# Patient Record
Sex: MALE | Race: ASIAN | NOT HISPANIC OR LATINO
[De-identification: names, ages, dates, MRNs, and addresses within clinical notes are randomized per-mention and may not be internally consistent; named-entity substitution may affect disease eponyms.]

---

## 2017-11-22 PROBLEM — Z00.00 ENCOUNTER FOR PREVENTIVE HEALTH EXAMINATION: Status: ACTIVE | Noted: 2017-11-22

## 2017-12-21 ENCOUNTER — RESULT REVIEW (OUTPATIENT)
Age: 62
End: 2017-12-21

## 2017-12-21 ENCOUNTER — OUTPATIENT (OUTPATIENT)
Dept: OUTPATIENT SERVICES | Facility: HOSPITAL | Age: 62
LOS: 1 days | Discharge: HOME | End: 2017-12-21
Payer: COMMERCIAL

## 2017-12-21 DIAGNOSIS — Z98.89 OTHER SPECIFIED POSTPROCEDURAL STATES: Chronic | ICD-10-CM

## 2017-12-21 DIAGNOSIS — R10.9 UNSPECIFIED ABDOMINAL PAIN: ICD-10-CM

## 2017-12-21 LAB — GLUCOSE BLDC GLUCOMTR-MCNC: 197 MG/DL — HIGH (ref 70–99)

## 2017-12-21 PROCEDURE — 88312 SPECIAL STAINS GROUP 1: CPT | Mod: 26

## 2017-12-21 PROCEDURE — 45378 DIAGNOSTIC COLONOSCOPY: CPT

## 2017-12-21 PROCEDURE — 82962 GLUCOSE BLOOD TEST: CPT

## 2017-12-21 PROCEDURE — 88305 TISSUE EXAM BY PATHOLOGIST: CPT

## 2017-12-21 PROCEDURE — 43239 EGD BIOPSY SINGLE/MULTIPLE: CPT

## 2017-12-21 PROCEDURE — 88312 SPECIAL STAINS GROUP 1: CPT

## 2017-12-21 PROCEDURE — 88305 TISSUE EXAM BY PATHOLOGIST: CPT | Mod: 26

## 2017-12-26 LAB — SURGICAL PATHOLOGY FINAL REPORT - CH: SIGNIFICANT CHANGE UP

## 2018-03-27 ENCOUNTER — APPOINTMENT (OUTPATIENT)
Dept: UROLOGY | Facility: CLINIC | Age: 63
End: 2018-03-27

## 2018-04-03 ENCOUNTER — APPOINTMENT (OUTPATIENT)
Dept: UROLOGY | Facility: CLINIC | Age: 63
End: 2018-04-03
Payer: MEDICARE

## 2018-04-03 VITALS
BODY MASS INDEX: 25.9 KG/M2 | WEIGHT: 185 LBS | HEIGHT: 71 IN | SYSTOLIC BLOOD PRESSURE: 119 MMHG | DIASTOLIC BLOOD PRESSURE: 57 MMHG | HEART RATE: 66 BPM

## 2018-04-03 DIAGNOSIS — F17.200 NICOTINE DEPENDENCE, UNSPECIFIED, UNCOMPLICATED: ICD-10-CM

## 2018-04-03 DIAGNOSIS — R35.0 FREQUENCY OF MICTURITION: ICD-10-CM

## 2018-04-03 DIAGNOSIS — I10 ESSENTIAL (PRIMARY) HYPERTENSION: ICD-10-CM

## 2018-04-03 DIAGNOSIS — Z83.3 FAMILY HISTORY OF DIABETES MELLITUS: ICD-10-CM

## 2018-04-03 DIAGNOSIS — R32 UNSPECIFIED URINARY INCONTINENCE: ICD-10-CM

## 2018-04-03 DIAGNOSIS — Z82.49 FAMILY HISTORY OF ISCHEMIC HEART DISEASE AND OTHER DISEASES OF THE CIRCULATORY SYSTEM: ICD-10-CM

## 2018-04-03 PROCEDURE — 99203 OFFICE O/P NEW LOW 30 MIN: CPT

## 2018-04-03 PROCEDURE — 51798 US URINE CAPACITY MEASURE: CPT

## 2018-04-18 ENCOUNTER — OTHER (OUTPATIENT)
Age: 63
End: 2018-04-18

## 2018-04-24 ENCOUNTER — APPOINTMENT (OUTPATIENT)
Dept: UROLOGY | Facility: CLINIC | Age: 63
End: 2018-04-24
Payer: MEDICARE

## 2018-04-24 VITALS
SYSTOLIC BLOOD PRESSURE: 136 MMHG | DIASTOLIC BLOOD PRESSURE: 63 MMHG | BODY MASS INDEX: 25.9 KG/M2 | WEIGHT: 185 LBS | HEART RATE: 64 BPM | HEIGHT: 71 IN

## 2018-04-24 DIAGNOSIS — R33.9 RETENTION OF URINE, UNSPECIFIED: ICD-10-CM

## 2018-04-24 DIAGNOSIS — N39.3 STRESS INCONTINENCE (FEMALE) (MALE): ICD-10-CM

## 2018-04-24 LAB
BILIRUB UR QL STRIP: NORMAL
CLARITY UR: CLEAR
COLLECTION METHOD: NORMAL
GLUCOSE UR-MCNC: 500
HCG UR QL: NORMAL EU/DL
HGB UR QL STRIP.AUTO: NORMAL
KETONES UR-MCNC: NORMAL
LEUKOCYTE ESTERASE UR QL STRIP: NORMAL
NITRITE UR QL STRIP: NORMAL
PH UR STRIP: 9
PROT UR STRIP-MCNC: 30
SP GR UR STRIP: 1.01

## 2018-04-24 PROCEDURE — 81003 URINALYSIS AUTO W/O SCOPE: CPT | Mod: QW

## 2018-04-24 PROCEDURE — 99213 OFFICE O/P EST LOW 20 MIN: CPT

## 2018-05-29 ENCOUNTER — APPOINTMENT (OUTPATIENT)
Dept: UROLOGY | Facility: CLINIC | Age: 63
End: 2018-05-29

## 2022-06-27 ENCOUNTER — INPATIENT (INPATIENT)
Facility: HOSPITAL | Age: 67
LOS: 1 days | Discharge: HOME | DRG: 682 | End: 2022-06-29
Attending: INTERNAL MEDICINE | Admitting: INTERNAL MEDICINE
Payer: MEDICARE

## 2022-06-27 VITALS
HEART RATE: 67 BPM | TEMPERATURE: 96 F | SYSTOLIC BLOOD PRESSURE: 200 MMHG | RESPIRATION RATE: 18 BRPM | OXYGEN SATURATION: 99 % | DIASTOLIC BLOOD PRESSURE: 79 MMHG | WEIGHT: 184.97 LBS

## 2022-06-27 DIAGNOSIS — Z98.89 OTHER SPECIFIED POSTPROCEDURAL STATES: Chronic | ICD-10-CM

## 2022-06-27 DIAGNOSIS — J96.01 ACUTE RESPIRATORY FAILURE WITH HYPOXIA: ICD-10-CM

## 2022-06-27 DIAGNOSIS — I25.10 ATHEROSCLEROTIC HEART DISEASE OF NATIVE CORONARY ARTERY WITHOUT ANGINA PECTORIS: ICD-10-CM

## 2022-06-27 DIAGNOSIS — I16.0 HYPERTENSIVE URGENCY: ICD-10-CM

## 2022-06-27 DIAGNOSIS — N18.6 END STAGE RENAL DISEASE: ICD-10-CM

## 2022-06-27 DIAGNOSIS — E11.9 TYPE 2 DIABETES MELLITUS WITHOUT COMPLICATIONS: ICD-10-CM

## 2022-06-27 LAB
ALBUMIN SERPL ELPH-MCNC: 3.3 G/DL — SIGNIFICANT CHANGE UP (ref 3.3–5)
ALP SERPL-CCNC: 102 U/L — SIGNIFICANT CHANGE UP (ref 40–120)
ALT FLD-CCNC: 56 U/L — SIGNIFICANT CHANGE UP (ref 12–78)
ANION GAP SERPL CALC-SCNC: 11 MMOL/L — SIGNIFICANT CHANGE UP (ref 5–17)
AST SERPL-CCNC: 30 U/L — SIGNIFICANT CHANGE UP (ref 15–37)
BILIRUB SERPL-MCNC: 0.4 MG/DL — SIGNIFICANT CHANGE UP (ref 0.2–1.2)
BUN SERPL-MCNC: 61 MG/DL — HIGH (ref 7–23)
CALCIUM SERPL-MCNC: 8.3 MG/DL — LOW (ref 8.5–10.1)
CHLORIDE SERPL-SCNC: 108 MMOL/L — SIGNIFICANT CHANGE UP (ref 96–108)
CO2 SERPL-SCNC: 17 MMOL/L — LOW (ref 22–31)
CREAT SERPL-MCNC: 13 MG/DL — HIGH (ref 0.5–1.3)
EGFR: 4 ML/MIN/1.73M2 — LOW
GLUCOSE SERPL-MCNC: 89 MG/DL — SIGNIFICANT CHANGE UP (ref 70–99)
HAV IGM SER-ACNC: SIGNIFICANT CHANGE UP
HBV CORE IGM SER-ACNC: SIGNIFICANT CHANGE UP
HBV SURFACE AG SER-ACNC: SIGNIFICANT CHANGE UP
HCT VFR BLD CALC: 33 % — LOW (ref 39–50)
HCV AB S/CO SERPL IA: 0.08 S/CO — SIGNIFICANT CHANGE UP (ref 0–0.99)
HCV AB SERPL-IMP: SIGNIFICANT CHANGE UP
HGB BLD-MCNC: 10.1 G/DL — LOW (ref 13–17)
MAGNESIUM SERPL-MCNC: 4.1 MG/DL — HIGH (ref 1.6–2.6)
MCHC RBC-ENTMCNC: 30.2 PG — SIGNIFICANT CHANGE UP (ref 27–34)
MCHC RBC-ENTMCNC: 30.6 GM/DL — LOW (ref 32–36)
MCV RBC AUTO: 98.8 FL — SIGNIFICANT CHANGE UP (ref 80–100)
NRBC # BLD: 0 /100 WBCS — SIGNIFICANT CHANGE UP (ref 0–0)
PHOSPHATE SERPL-MCNC: 3.8 MG/DL — SIGNIFICANT CHANGE UP (ref 2.5–4.5)
PLATELET # BLD AUTO: 144 K/UL — LOW (ref 150–400)
POTASSIUM SERPL-MCNC: 5.3 MMOL/L — SIGNIFICANT CHANGE UP (ref 3.5–5.3)
POTASSIUM SERPL-SCNC: 5.3 MMOL/L — SIGNIFICANT CHANGE UP (ref 3.5–5.3)
PROT SERPL-MCNC: 6.7 G/DL — SIGNIFICANT CHANGE UP (ref 6–8.3)
RBC # BLD: 3.34 M/UL — LOW (ref 4.2–5.8)
RBC # FLD: 15.8 % — HIGH (ref 10.3–14.5)
SARS-COV-2 RNA SPEC QL NAA+PROBE: SIGNIFICANT CHANGE UP
SODIUM SERPL-SCNC: 136 MMOL/L — SIGNIFICANT CHANGE UP (ref 135–145)
WBC # BLD: 10.7 K/UL — HIGH (ref 3.8–10.5)
WBC # FLD AUTO: 10.7 K/UL — HIGH (ref 3.8–10.5)

## 2022-06-27 PROCEDURE — 99222 1ST HOSP IP/OBS MODERATE 55: CPT

## 2022-06-27 PROCEDURE — 71046 X-RAY EXAM CHEST 2 VIEWS: CPT | Mod: 26

## 2022-06-27 PROCEDURE — 99291 CRITICAL CARE FIRST HOUR: CPT

## 2022-06-27 PROCEDURE — 93010 ELECTROCARDIOGRAM REPORT: CPT

## 2022-06-27 RX ORDER — NITROGLYCERIN 6.5 MG
0.4 CAPSULE, EXTENDED RELEASE ORAL
Refills: 0 | Status: COMPLETED | OUTPATIENT
Start: 2022-06-27 | End: 2022-06-27

## 2022-06-27 RX ORDER — DEXTROSE 50 % IN WATER 50 %
12.5 SYRINGE (ML) INTRAVENOUS ONCE
Refills: 0 | Status: DISCONTINUED | OUTPATIENT
Start: 2022-06-27 | End: 2022-06-29

## 2022-06-27 RX ORDER — FUROSEMIDE 40 MG
80 TABLET ORAL ONCE
Refills: 0 | Status: COMPLETED | OUTPATIENT
Start: 2022-06-27 | End: 2022-06-27

## 2022-06-27 RX ORDER — FUROSEMIDE 40 MG
40 TABLET ORAL ONCE
Refills: 0 | Status: COMPLETED | OUTPATIENT
Start: 2022-06-27 | End: 2022-06-27

## 2022-06-27 RX ORDER — DEXTROSE 50 % IN WATER 50 %
12.5 SYRINGE (ML) INTRAVENOUS ONCE
Refills: 0 | Status: COMPLETED | OUTPATIENT
Start: 2022-06-27 | End: 2022-06-27

## 2022-06-27 RX ORDER — SODIUM CHLORIDE 9 MG/ML
1000 INJECTION, SOLUTION INTRAVENOUS
Refills: 0 | Status: DISCONTINUED | OUTPATIENT
Start: 2022-06-27 | End: 2022-06-29

## 2022-06-27 RX ORDER — INSULIN LISPRO 100/ML
VIAL (ML) SUBCUTANEOUS EVERY 6 HOURS
Refills: 0 | Status: DISCONTINUED | OUTPATIENT
Start: 2022-06-27 | End: 2022-06-28

## 2022-06-27 RX ORDER — DEXTROSE 50 % IN WATER 50 %
25 SYRINGE (ML) INTRAVENOUS ONCE
Refills: 0 | Status: DISCONTINUED | OUTPATIENT
Start: 2022-06-27 | End: 2022-06-29

## 2022-06-27 RX ORDER — DEXTROSE 50 % IN WATER 50 %
15 SYRINGE (ML) INTRAVENOUS ONCE
Refills: 0 | Status: DISCONTINUED | OUTPATIENT
Start: 2022-06-27 | End: 2022-06-29

## 2022-06-27 RX ORDER — PANTOPRAZOLE SODIUM 20 MG/1
40 TABLET, DELAYED RELEASE ORAL
Refills: 0 | Status: DISCONTINUED | OUTPATIENT
Start: 2022-06-27 | End: 2022-06-29

## 2022-06-27 RX ORDER — CHLORHEXIDINE GLUCONATE 213 G/1000ML
1 SOLUTION TOPICAL
Refills: 0 | Status: DISCONTINUED | OUTPATIENT
Start: 2022-06-27 | End: 2022-06-29

## 2022-06-27 RX ORDER — NITROGLYCERIN 6.5 MG
6 CAPSULE, EXTENDED RELEASE ORAL
Qty: 50 | Refills: 0 | Status: DISCONTINUED | OUTPATIENT
Start: 2022-06-27 | End: 2022-06-28

## 2022-06-27 RX ORDER — ERYTHROPOIETIN 10000 [IU]/ML
10000 INJECTION, SOLUTION INTRAVENOUS; SUBCUTANEOUS
Refills: 0 | Status: DISCONTINUED | OUTPATIENT
Start: 2022-06-27 | End: 2022-06-29

## 2022-06-27 RX ORDER — HEPARIN SODIUM 5000 [USP'U]/ML
5000 INJECTION INTRAVENOUS; SUBCUTANEOUS EVERY 8 HOURS
Refills: 0 | Status: DISCONTINUED | OUTPATIENT
Start: 2022-06-27 | End: 2022-06-29

## 2022-06-27 RX ORDER — METOPROLOL TARTRATE 50 MG
25 TABLET ORAL
Refills: 0 | Status: DISCONTINUED | OUTPATIENT
Start: 2022-06-27 | End: 2022-06-29

## 2022-06-27 RX ORDER — GLUCAGON INJECTION, SOLUTION 0.5 MG/.1ML
1 INJECTION, SOLUTION SUBCUTANEOUS ONCE
Refills: 0 | Status: DISCONTINUED | OUTPATIENT
Start: 2022-06-27 | End: 2022-06-29

## 2022-06-27 RX ORDER — CYCLOSPORINE 0.5 MG/ML
1 EMULSION OPHTHALMIC DAILY
Refills: 0 | Status: DISCONTINUED | OUTPATIENT
Start: 2022-06-27 | End: 2022-06-29

## 2022-06-27 RX ORDER — NITROGLYCERIN 6.5 MG
20 CAPSULE, EXTENDED RELEASE ORAL
Qty: 50 | Refills: 0 | Status: DISCONTINUED | OUTPATIENT
Start: 2022-06-27 | End: 2022-06-27

## 2022-06-27 RX ADMIN — Medication 6 MICROGRAM(S)/MIN: at 11:31

## 2022-06-27 RX ADMIN — Medication 40 MILLIGRAM(S): at 10:36

## 2022-06-27 RX ADMIN — ERYTHROPOIETIN 10000 UNIT(S): 10000 INJECTION, SOLUTION INTRAVENOUS; SUBCUTANEOUS at 16:48

## 2022-06-27 RX ADMIN — CYCLOSPORINE 1 DROP(S): 0.5 EMULSION OPHTHALMIC at 18:44

## 2022-06-27 RX ADMIN — HEPARIN SODIUM 5000 UNIT(S): 5000 INJECTION INTRAVENOUS; SUBCUTANEOUS at 21:35

## 2022-06-27 RX ADMIN — Medication 1 DROP(S): at 21:34

## 2022-06-27 RX ADMIN — Medication 0.4 MILLIGRAM(S): at 10:50

## 2022-06-27 RX ADMIN — SODIUM CHLORIDE 50 MILLILITER(S): 9 INJECTION, SOLUTION INTRAVENOUS at 19:29

## 2022-06-27 RX ADMIN — Medication 80 MILLIGRAM(S): at 11:43

## 2022-06-27 RX ADMIN — HEPARIN SODIUM 5000 UNIT(S): 5000 INJECTION INTRAVENOUS; SUBCUTANEOUS at 15:19

## 2022-06-27 RX ADMIN — Medication 12.5 GRAM(S): at 18:36

## 2022-06-27 RX ADMIN — Medication 2 MILLIGRAM(S): at 10:52

## 2022-06-27 NOTE — ED PROVIDER NOTE - NS ED MD DISPO ADMITTING SERVICE
Progress Note, Physician


Chief Complaint: 





24 HR EVENTS:


- Khanh d/tom this AM


- pt reports lack of pain control


-pt continues to work with PT 





- Current Medication List


Current Medications: 


Active Medications





Docusate Sodium (Colace -)  100 mg PO DAILY [facility]


   Last Admin: 01/16/19 11:39 Dose:  100 mg


Famotidine/Sodium Chloride (Pepcid 20 Mg Premixed Ivpb -)  20 mg in 50 mls @ 

100 mls/hr IVPB BID [facility]


   Last Admin: 01/16/19 10:02 Dose:  100 mls/hr


Morphine Sulfate (Morphine Sulfate)  2 mg IVPUSH Q3H PRN


   PRN Reason: PAIN LEVEL 6-10


   Last Admin: 01/16/19 18:15 Dose:  2 mg


Ondansetron HCl (Zofran Injection)  4 mg IVPUSH Q6H PRN


   PRN Reason: NAUSEA AND/OR VOMITING


Ondansetron HCl (Zofran Injection)  4 mg IVPUSH Q6H PRN


   PRN Reason: NAUSEA AND/OR VOMITING


Oxycodone HCl (Roxicodone -)  10 mg PO Q6H PRN


   PRN Reason: PAIN LEVEL 1-5


   Last Admin: 01/16/19 12:52 Dose:  10 mg


Polyethylene Glycol (Miralax (For Daily Use) -)  17 gm PO DAILY [facility]


   Last Admin: 01/16/19 13:24 Dose:  17 gm


Saliva Substitute (Mouthkote Solution -)  1 applic MM DAILY [facility]


   Last Admin: 01/16/19 09:39 Dose:  Not Given


Senna/Docusate Sodium (Pericolace -)  1 tablet PO HS [facility]


   Last Admin: 01/15/19 22:38 Dose:  1 tablet











- Objective


Vital Signs: 


 Vital Signs











Temperature  100.7 F H  01/16/19 18:00


 


Pulse Rate  92 H  01/16/19 18:00


 


Respiratory Rate  18   01/16/19 18:00


 


Blood Pressure  115/77   01/16/19 18:00


 


O2 Sat by Pulse Oximetry (%)  97   01/16/19 08:00











Constitutional: Yes: Well Nourished, No Distress, Calm


Eyes: Yes: Conjunctiva Clear, PERRL


HENT: Yes: Atraumatic, Normocephalic


Neck: Yes: Supple


Cardiovascular: Yes: Regular Rate and Rhythm


Respiratory: Yes: Regular, CTA Bilaterally


Gastrointestinal: Yes: Normal Bowel Sounds, Soft


...Rectal Exam: Yes: Deferred


Musculoskeletal: Yes: Back Pain


Extremities: Yes: WNL


Edema: No


Peripheral Pulses WNL: Yes


Integumentary: Yes: WNL


Wound/Incision: Yes: Clean/Dry, Dressing Dry and Intact


Neurological: Yes: Alert, Oriented


...Motor Strength: WNL


Psychiatric: Yes: Alert, Oriented


Labs: 


 CBC, BMP





 01/16/19 05:30 





 01/16/19 05:30 











- ....Imaging


Chest X-ray: Report Reviewed (cxr 1/16: no acute pathology)





Problem List





- Problems


(1) S/P laminectomy


Assessment/Plan: 


transfer to floor


IC


ambulate with PT


pain control with oxycodone and morphine








Code(s): Z98.890 - OTHER SPECIFIED POSTPROCEDURAL STATES   





Impression/Plan


Impression/Plan: 





DISPO: transfer to step floor





DVT PPX: venodynes





GI: pepcid, Zofran PRN nausea














Visit type





- Emergency Visit


Emergency Visit: No





- New Patient


This patient is new to me today: No





- Critical Care


Critical Care patient: Yes


Total Critical Care Time (in minutes): 30


Critical Care Statement: The care of this patient involved high complexity 

decision making to prevent further life threatening deterioration of the patient

's condition and/or to evaluate & treat vital organ system(s) failure or risk 

of failure.





- Discharge Referral


Referred to [facility] No MED

## 2022-06-27 NOTE — PATIENT PROFILE ADULT - FALL HARM RISK - FALLEN IN PAST
Referred To Oculoplastics For Closure Text (Leave Blank If You Do Not Want): After obtaining clear surgical margins the patient was sent to oculoplastics for surgical repair.  The patient understands they will receive post-surgical care and follow-up from the referring physician's office. No

## 2022-06-27 NOTE — ED PROVIDER NOTE - OBJECTIVE STATEMENT
67 M history of ESRD on HD sent in from HD center for PPD, Covid test, hepatitis panel. patient was on vacation in [place] and just returned. patient previously getting HD T,R,S with last session Thursday (4 days ago). patient denies complaints- No associated chest pain, shortness of breath. patient also with DM, CAD, Gerd, HLD, HTN. PMD Dr. Win. Nephro Dr. Shoemaker. Gets HD at [facility] [place].

## 2022-06-27 NOTE — ED PROVIDER NOTE - PROGRESS NOTE DETAILS
spoke with HD center, patient is a new patient, cannot get HD until Wednesday at the soonest. requesting information faxed to [phone], will admit. Dr. Shoemaker to arrange for HD today. Hospitalist made aware. patient with increased work of breathing, now with bilateral rales. spO2 88 on 5L NC. will give lasix and start bipap. Hospitalist made aware. HTN worsening, increased dyspnea and work of breathing. patient not tolerating bipap until given ativan, patient given sublingual nitro and started on nitro drip. Dr. Shoemaker updated, recommending 80 lasix on top of 40 already given. ICU aware and accepting.

## 2022-06-27 NOTE — CONSULT NOTE ADULT - ASSESSMENT
67 yr male T2DM ,  ESRD on HD, returned from trip overseas  and not not able to get on community HD seat.  Came to hospital ED to have HD and arrange for community seat.  (27 Jun 2022 19:44)    esrd on hd plan for hd now   Excess fluids and waste products will be removed from your blood; your electrolytes will be balanced; your blood pressure will be controlled.      BP monitoring,continue current antihypertensive meds, low salt diet,followup with PMD in 1-2 weeks      ANEMIA PLAN:  Anemia of chronic disease:  Well controlled by Aranesp  H and H subtherapeutic .  We will continue Aranesp aiming for a HCT of 32-36 %.   We will monitor Iron stores, B12 and RBC folate .

## 2022-06-27 NOTE — H&P ADULT - NSHPLABSRESULTS_GEN_ALL_CORE
< from: Xray Chest 2 Views PA/Lat (06.27.22 @ 08:39) >    NTERPRETATION:  PA and lateral chest radiographs    COMPARISON:  NONE.    CLINICAL INFORMATION: Screening chest radiograph. Dialysis patient.    FINDINGS:    PULMONARY: The airway is midline.  Bilateral perihilar/basilar diffuse airspace disease.  No pleural effusion or pneumothorax.    HEART/VASCULAR: The  heart is enlarged in transverse diameter. .    BONES: The visualized osseus structures are intact.    IMPRESSION:    Mild cardiomegaly.  Bilateral perihilar/basilar diffuse airspace disease.    --- End of    < end of copied text >

## 2022-06-27 NOTE — CONSULT NOTE ADULT - NS PANP COMMENT GEN_ALL_CORE FT
68 yo man with Hx ERSD on HD, htn, DM2, CAD s/p PCI recently returned from long stay in [place] and missed HD session, presenting with htn emergency, acute pulmonary edema and respiratory failure.    admit to ICU for emergent HD  nitro gtt for preload reduction, wean as tolerates and restart lopressor  continue BiPAP, wean FiO2  after HD able to be weaned from BiPAP  emergent HD with volume removal   plan discussed with family at bedside

## 2022-06-27 NOTE — CONSULT NOTE ADULT - SUBJECTIVE AND OBJECTIVE BOX
Patient is a 67y old  Male who presents with a chief complaint of     BRIEF HOSPITAL COURSE: 67 M history of ESRD on HD sent in from HD center for PPD, Covid test, hepatitis panel. Patient was on vacation in [place] x 6 months and just returned. Patient previously getting HD T,R,S with last session Thursday (4 days ago). patient denies complaints- No associated chest pain, shortness of breath. patient also with DM, CAD, Gerd, HLD, HTN. PMD Dr. Win. Nephro Dr. Shoemaker. Gets HD at [facility] [place].    Called by ED for Hypertension, AHRF, Emergent Dialysis. Upon my initial evaluation Pt hypertensive SBP > 200 on Nitro gtt, On bIPAP  FIO2 60% with abdominal thoracic paradoxis, requiring emergent dialysis. Will admit to ICU for need of higher level of care. Pt high risl for decompensation and losing airway.    PAST MEDICAL & SURGICAL HISTORY:    Allergies    No Known Allergies    Intolerances      FAMILY HISTORY:    SOCIAL HISTORY:     Review of Systems:  TOM due to clinical condition    Medications:    nitroglycerin  Infusion 20 MICROgram(s)/Min IV Continuous <Continuous>                    epoetin demetrius-epbx (RETACRIT) Injectable 49771 Unit(s) IV Push <User Schedule>    chlorhexidine 4% Liquid 1 Application(s) Topical <User Schedule>            ICU Vital Signs Last 24 Hrs  T(C): 35.6 (27 Jun 2022 07:05), Max: 35.6 (27 Jun 2022 07:05)  T(F): 96.1 (27 Jun 2022 07:05), Max: 96.1 (27 Jun 2022 07:05)  HR: 84 (27 Jun 2022 12:15) (67 - 112)  BP: 232/96 (27 Jun 2022 12:15) (178/135 - 234/108)  BP(mean): --  ABP: --  ABP(mean): --  RR: 28 (27 Jun 2022 11:10) (18 - 28)  SpO2: 97% (27 Jun 2022 12:15) (92% - 99%)    Vital Signs Last 24 Hrs  T(C): 35.6 (27 Jun 2022 07:05), Max: 35.6 (27 Jun 2022 07:05)  T(F): 96.1 (27 Jun 2022 07:05), Max: 96.1 (27 Jun 2022 07:05)  HR: 84 (27 Jun 2022 12:15) (67 - 112)  BP: 232/96 (27 Jun 2022 12:15) (178/135 - 234/108)  BP(mean): --  RR: 28 (27 Jun 2022 11:10) (18 - 28)  SpO2: 97% (27 Jun 2022 12:15) (92% - 99%)        I&O's Detail      LABS:                        10.1   10.70 )-----------( 144      ( 27 Jun 2022 07:50 )             33.0     06-27    136  |  108  |  61<H>  ----------------------------<  89  5.3   |  17<L>  |  13.00<H>    Ca    8.3<L>      27 Jun 2022 07:50  Phos  3.8     06-27  Mg     4.1     06-27    TPro  6.7  /  Alb  3.3  /  TBili  0.4  /  DBili  x   /  AST  30  /  ALT  56  /  AlkPhos  102  06-27          CAPILLARY BLOOD GLUCOSE      POCT Blood Glucose.: 114 mg/dL (27 Jun 2022 11:04)          CULTURES:      Physical Examination:    General: toxic appearing in distress    HEENT: Pupils equal, reactive to light. Symmetric. No scleral icterus or injection.    PULM: Clear to auscultation B/L. No wheezes, rales, or rhonchi appreciated. No significant sputum production positive increased respiratory effort.    NECK: Supple, no lymphadenopathy, trachea midline.    CVS: Regular rate and rhythm, no murmurs appreciated, +s1/s2.    ABD: Soft, distended, nontender, normoactive bowel sounds.    EXT: No edema, nontender.    SKIN: Warm and well perfused, no rashes noted.    NEURO: Alert, interactive, nonfocal, in respiratory distress on bipap TOM orientation

## 2022-06-27 NOTE — H&P ADULT - NSICDXPASTMEDICALHX_GEN_ALL_CORE_FT
PAST MEDICAL HISTORY:  CAD (coronary artery disease)     ESRD on dialysis     T2DM (type 2 diabetes mellitus)      PAST MEDICAL HISTORY:  CAD (coronary artery disease)     ESRD on dialysis     GERD (gastroesophageal reflux disease)     HLD (hyperlipidemia)     T2DM (type 2 diabetes mellitus)

## 2022-06-27 NOTE — ED ADULT NURSE REASSESSMENT NOTE - NS ED NURSE REASSESS COMMENT FT1
Patient started to have breathing difficulty. Oxygen saturation is 88%. Patient is very anxious. Oxygen via nasal canula 5 liter is on flow. Oxygen came up to 93%. Dr. Dueñas made aware.

## 2022-06-27 NOTE — PROVIDER CONTACT NOTE (HYPOGLYCEMIA EVENT) - NS PROVIDER CONTACT BACKGROUND-HYPO
Age: 67y    Gender: Male    POCT Blood Glucose:  67 mg/dL (06-27-22 @ 18:29)  114 mg/dL (06-27-22 @ 11:04)  73 mg/dL (06-27-22 @ 10:28)      eMAR:  dextrose 50% Injectable   12.5 Gram(s) IV Push (06-27-22 @ 18:36)

## 2022-06-27 NOTE — ED PROVIDER NOTE - EMPLOYMENT
Pt discharged with significant other and transport team via wheelchair  Report given to [place] at [facility]  N/A

## 2022-06-27 NOTE — H&P ADULT - HISTORY OF PRESENT ILLNESS
67 yr male T2DM ,  ESRD on HD, returned from trip [place]  and not not able to get on community HD seat.  Came to hospital ED to have HD and arrange for community seat.  67 yr male T2DM , HTN , CAD ,  ESRD on HD, [place] from trip [place]  and not not able to get on community HD seat as apparently terminated for being away 6 months. Came to hospital ED to have HD and arrange for community seat. Required to get PPD and hepatitis panel.  67 yr male T2DM , HTN , CAD ,  ESRD on HD, returned from trip [place]  and not not able to get on community HD seat as apparently terminated for being away 6 months. Came to hospital ED to have HD and arrange for community seat. Required to get PPD and hepatitis panel.  In ED dismal renal indices with Cr 13, CXR cardiomegaly with perihilar airspace prominence. Severe BP elevation 220/135 with ensuing delirium necessitating nitro drip , Bipap and ICU admission , emergent HD

## 2022-06-27 NOTE — H&P ADULT - PROBLEM SELECTOR PLAN 1
Presented in uremia with cr 13.o  Signs of fluid overload pulm edema  Severe hypertension   resp failure   Lasix iv   Nitro drip  Metoprolol  Bipap  Ativan  ICU admit for close monitoring   Emergent HD Presented in uremia with cr 13.o  Signs of fluid overload pulm edema  Severe hypertension   resp failure   Lasix iv   Nitro drip  Metoprolol  Bipap  Ativan  ICU admit for close monitoring   Nephrology is following , recommend epogen for anemia.   Emergent HD

## 2022-06-27 NOTE — ED ADULT NURSE NOTE - NURSING GU BLADDER
non-distended IV Ceftriaxone day 2  can be switch to 500 mg Ceftin BID upon DC IV Ceftriaxone day 2  can be switch to 500 mg Ceftin BID upon DC appears stable s/p gastrectomy.  - s/p chemo

## 2022-06-27 NOTE — H&P ADULT - NSHPPHYSICALEXAM_GEN_ALL_CORE
Normocephalic   EOMI PERRL  Neck supple no JVD  S1 and S2 regular   Chest basilar rales   Abd soft + BS not tender   No pedal edema   AAO X3 No focal neurologic deficit   Anxious, depressed mood . Affect is appropriate

## 2022-06-27 NOTE — CONSULT NOTE ADULT - SUBJECTIVE AND OBJECTIVE BOX
full consult to f/u    Patient is a 67y Male whom presented to the hospital with esrd on hd     PAST MEDICAL & SURGICAL HISTORY:  ESRD on dialysis      T2DM (type 2 diabetes mellitus)      CAD (coronary artery disease)          MEDICATIONS  (STANDING):  artificial tears (preservative free) Ophthalmic Solution 1 Drop(s) Both EYES three times a day  chlorhexidine 4% Liquid 1 Application(s) Topical <User Schedule>  cycloSPORINE (RESTASIS) 0.05% Emulsion 1 Drop(s) Both EYES daily  dextrose 5%. 1000 milliLiter(s) (100 mL/Hr) IV Continuous <Continuous>  dextrose 5%. 1000 milliLiter(s) (50 mL/Hr) IV Continuous <Continuous>  dextrose 5%. 1000 milliLiter(s) (50 mL/Hr) IV Continuous <Continuous>  dextrose 50% Injectable 25 Gram(s) IV Push once  dextrose 50% Injectable 12.5 Gram(s) IV Push once  dextrose 50% Injectable 25 Gram(s) IV Push once  epoetin demetrius-epbx (RETACRIT) Injectable 84946 Unit(s) IV Push <User Schedule>  glucagon  Injectable 1 milliGRAM(s) IntraMuscular once  heparin   Injectable 5000 Unit(s) SubCutaneous every 8 hours  insulin lispro (ADMELOG) corrective regimen sliding scale   SubCutaneous every 6 hours  metoprolol tartrate 25 milliGRAM(s) Oral two times a day  nitroglycerin  Infusion 6 MICROgram(s)/Min (1.8 mL/Hr) IV Continuous <Continuous>  pantoprazole    Tablet 40 milliGRAM(s) Oral before breakfast      Allergies    No Known Allergies    Intolerances        SOCIAL HISTORY:  Denies ETOh,Smoking,     FAMILY HISTORY:      REVIEW OF SYSTEMS:    CONSTITUTIONAL: No weakness, fevers or chills  RESPIRATORY: No cough, wheezing, hemoptysis; pos  shortness of breath  CARDIOVASCULAR: No chest pain or palpitations  GASTROINTESTINAL: No abdominal or epigastric pain. No nausea, vomiting,     No diarrhea or constipation. No melena   GENITOURINARY: No dysuria, frequency or hematuria  NEUROLOGICAL: No numbness or weakness  SKIN: dry      VITAL:  T(C): , Max: 37.6 (06-27-22 @ 19:10)  T(F): , Max: 99.7 (06-27-22 @ 19:10)  HR: 86 (06-27-22 @ 20:30)  BP: 164/71 (06-27-22 @ 20:30)  BP(mean): 102 (06-27-22 @ 20:30)  RR: 31 (06-27-22 @ 20:30)  SpO2: 97% (06-27-22 @ 20:30)  Wt(kg): --    I and O's:    06-27 @ 07:01  -  06-27 @ 21:06  --------------------------------------------------------  IN: 122.5 mL / OUT: 2600 mL / NET: -2477.5 mL      Height (cm): 177.8 (06-27 @ 13:00)  Weight (kg): 87 (06-27 @ 13:00)  BMI (kg/m2): 27.5 (06-27 @ 13:00)  BSA (m2): 2.05 (06-27 @ 13:00)    PHYSICAL EXAM:    Constitutional: NAD  HEENT: conjunctive   clear   Neck:  No JVD  Respiratory: decrease bs b/l   Cardiovascular: S1 and S2  Gastrointestinal: BS+, soft, NT/ND  Extremities: No peripheral edema  : No Lechuga  Skin: No rashes  Access: pos fistula     LABS:                        10.1   10.70 )-----------( 144      ( 27 Jun 2022 07:50 )             33.0     06-27    136  |  108  |  61<H>  ----------------------------<  89  5.3   |  17<L>  |  13.00<H>    Ca    8.3<L>      27 Jun 2022 07:50  Phos  3.8     06-27  Mg     4.1     06-27    TPro  6.7  /  Alb  3.3  /  TBili  0.4  /  DBili  x   /  AST  30  /  ALT  56  /  AlkPhos  102  06-27      Urine Studies:          RADIOLOGY & ADDITIONAL STUDIES:

## 2022-06-27 NOTE — ED PROVIDER NOTE - PHYSICAL EXAMINATION
Vital signs as available reviewed.  General:  No acute distress.  Head:  Normocephalic, atraumatic.  Eyes:  Conjunctiva pink, no icterus.  Cardiovascular:  Regular rate, no obvious murmur.  Respiratory:  Clear to auscultation, good air entry bilaterally.  Abdomen:  Soft, non-tender.  Musculoskeletal:  No obvious deformity.  Neurologic: Alert and oriented, moving all extremities.  Skin:  Warm and dry. left arm with HD shunt.

## 2022-06-27 NOTE — H&P ADULT - ASSESSMENT
67 yr male with T2DM Hypertension, CAD , ESRD on HD presenting in uremia Hypertensive urgency, acute  hypoxic resp failure , fluid overload, secondary to missed HD session, ESRD.

## 2022-06-27 NOTE — ED ADULT NURSE REASSESSMENT NOTE - NS ED NURSE REASSESS COMMENT FT1
Patient breathing difficulty is increasing. Patient is placed on Bipap. Patient is not cooperating.  Keep on pulling out the bipap mask. Ativan 2 mg, IVP given as ordered.

## 2022-06-27 NOTE — PATIENT PROFILE ADULT - FALL HARM RISK - HARM RISK INTERVENTIONS

## 2022-06-27 NOTE — CONSULT NOTE ADULT - ASSESSMENT
67 M history of ESRD on HD admitted to ICU for     1. Sympathetic Crashing Acute Pulmonary edema  2. Acute Hypoxic Respiratory Failure  3. ESRD requiring emergent Dialysis    Neuro:  - Avoid Neuro Deliriogenic / sedative medications    CV:  - Nitro gtt to decrease afterload  - Avoiding fluid overload    Pulm:  - Actively titrating Bipap settings to maintain SpO2 > 92%                  GI:  -NPO    Renal:  - Even to net negative fluid balance as tolerated by hemodynamics and renal fx.    - Cr 13, Chronic HD with L AVF, Continue to monitor Bun/Cr and UOP  - Replacing electrolytes as needed with Goal K> 4, PO> 3, Mg> 2               - Strict I&O's, [place] to BSD  - Avoid Nephro toxic medication  - Renally dose meds  - retacrit  - Nephrology consulted for emergent diaylsis    Heme:  - Heparin for DVT Prophylaxis    ID:  - WBC 10.7,  remains afebrile with no antipyretics administered   - Microbiology and Radiology reviewed   - trend CBC with diff, CMP  and fever curve  - Avoiding antibiotics unless there is a concern for a bacterial/fungal infection    Endo:  - ISS for aggressive glycemic control to limit FS glucose to < 180mg/dl.      Critical Care Time (EXCLUSIVE of any non bundled procedures) :  40 minutes were spent assessing the patient's presenting problems of acute illness that pose a high probability of life threatening  deterioration or end organ damage / dysfunction.  Medical decision making includes initiation / continuation of plan or care review data/ labwork/ radiographic study, direct patient care bedside ,  discussions with  consultants regarding care,  evaluation and interpretation of vital signs, any necessary ventilator management,   NIV or BIPAP changes  or initiation,    discussions with multidisciplinary team,  am or pm rounds, discussions of goals of care with patient and family all non-inclusive of procedures.    Plan discussed with Dr Davenport 67 M history of ESRD on HD admitted to ICU for     1. Sympathetic Crashing Acute Pulmonary edema  2. Acute Hypoxic Respiratory Failure  3. ESRD requiring emergent Dialysis    Neuro:  - Avoid Neuro Deliriogenic / sedative medications    CV:  - Nitro gtt to decrease afterload  - Avoiding fluid overload    Pulm:  - Actively titrating Bipap settings to maintain SpO2 > 92%                  GI:  -NPO    Renal:  - Even to net negative fluid balance as tolerated by hemodynamics and renal fx.    - Cr 13, Chronic HD with L AVF, Continue to monitor Bun/Cr and UOP  - Replacing electrolytes as needed with Goal K> 4, PO> 3, Mg> 2               - Strict I&O's, [place] Cath to BSD pt makes urine  - Avoid Nephro toxic medication  - Renally dose meds  - retacrit  - Nephrology consulted for emergent diaylsis    Heme:  - Heparin for DVT Prophylaxis    ID:  - WBC 10.7,  remains afebrile with no antipyretics administered   - Microbiology and Radiology reviewed   - trend CBC with diff, CMP  and fever curve  - Avoiding antibiotics unless there is a concern for a bacterial/fungal infection    Endo:  - ISS for aggressive glycemic control to limit FS glucose to < 180mg/dl.      Critical Care Time (EXCLUSIVE of any non bundled procedures) :  40 minutes were spent assessing the patient's presenting problems of acute illness that pose a high probability of life threatening  deterioration or end organ damage / dysfunction.  Medical decision making includes initiation / continuation of plan or care review data/ labwork/ radiographic study, direct patient care bedside ,  discussions with  consultants regarding care,  evaluation and interpretation of vital signs, any necessary ventilator management,   NIV or BIPAP changes  or initiation,    discussions with multidisciplinary team,  am or pm rounds, discussions of goals of care with patient and family all non-inclusive of procedures.    Plan discussed with Dr Davenport 67 M history of ESRD on HD admitted to ICU for     1. Sympathetic Crashing Acute Pulmonary edema  2. Acute Hypoxic Respiratory Failure  3. ESRD requiring emergent Dialysis    Neuro:  - Avoid Neuro Deliriogenic / sedative medications  - PRN pushes ativan/morphine if needed for Bipap compliance    CV:  - Nitro gtt to decrease afterload  - Avoiding fluid overload    Pulm:  - Actively titrating Bipap settings to maintain SpO2 > 92%, Currently on 15/7 60%, pt high risk for losing airway                  GI:  -NPO    Renal:  - Even to net negative fluid balance as tolerated by hemodynamics and renal fx.    - Cr 13, Chronic HD with L AVF, Continue to monitor Bun/Cr and UOP  - Replacing electrolytes as needed with Goal K> 4, PO> 3, Mg> 2               - Strict I&O's, [place] Cath to BSD pt makes urine  - Avoid Nephro toxic medication  - Renally dose meds  - retacrit  - Nephrology consulted for emergent diaylsis    Heme:  - Heparin for DVT Prophylaxis    ID:  - WBC 10.7,  remains afebrile with no antipyretics administered   - Microbiology and Radiology reviewed   - trend CBC with diff, CMP  and fever curve  - Avoiding antibiotics unless there is a concern for a bacterial/fungal infection    Endo:  - ISS for aggressive glycemic control to limit FS glucose to < 180mg/dl.      Critical Care Time (EXCLUSIVE of any non bundled procedures) :  40 minutes were spent assessing the patient's presenting problems of acute illness that pose a high probability of life threatening  deterioration or end organ damage / dysfunction.  Medical decision making includes initiation / continuation of plan or care review data/ labwork/ radiographic study, direct patient care bedside ,  discussions with  consultants regarding care,  evaluation and interpretation of vital signs, any necessary ventilator management,   NIV or BIPAP changes  or initiation,    discussions with multidisciplinary team,  am or pm rounds, discussions of goals of care with patient and family all non-inclusive of procedures.    Plan discussed with Dr Davenport 67 M history of ESRD on HD admitted to ICU for     1. Sympathetic Crashing Acute Pulmonary edema  2. Acute Hypoxic Respiratory Failure  3. ESRD requiring emergent Dialysis    Neuro:  - Avoid Neuro Deliriogenic / sedative medications  - PRN pushes ativan/morphine if needed for Bipap compliance    CV:  - Nitro gtt to decrease afterload  - POCUS- Lungs: Diffuse B lines bilateral, no pleural effusions. Cardiac: No wall motion abnormalities, IVC is moderately dilated with respirophasic variation.  - Avoiding Fluid overload    Pulm:  - Actively titrating Bipap settings to maintain SpO2 > 92%, Currently on 15/7 60%, pt high risk for losing airway                  GI:  -NPO    Renal:  - Even to net negative fluid balance as tolerated by hemodynamics and renal fx.    - Cr 13, Chronic HD with L AVF, Continue to monitor Bun/Cr and UOP  - Replacing electrolytes as needed with Goal K> 4, PO> 3, Mg> 2               - Strict I&O's, [place] Cath to BSD pt makes urine  - Avoid Nephro toxic medication  - Renally dose meds  - retacrit  - Nephrology consulted for emergent diaylsis    Heme:  - Heparin for DVT Prophylaxis    ID:  - WBC 10.7,  remains afebrile with no antipyretics administered   - Microbiology and Radiology reviewed   - trend CBC with diff, CMP  and fever curve  - Avoiding antibiotics unless there is a concern for a bacterial/fungal infection    Endo:  - ISS for aggressive glycemic control to limit FS glucose to < 180mg/dl.      Critical Care Time (EXCLUSIVE of any non bundled procedures) :  40 minutes were spent assessing the patient's presenting problems of acute illness that pose a high probability of life threatening  deterioration or end organ damage / dysfunction.  Medical decision making includes initiation / continuation of plan or care review data/ labwork/ radiographic study, direct patient care bedside ,  discussions with  consultants regarding care,  evaluation and interpretation of vital signs, any necessary ventilator management,   NIV or BIPAP changes  or initiation,    discussions with multidisciplinary team,  am or pm rounds, discussions of goals of care with patient and family all non-inclusive of procedures.    Plan discussed with Dr Davenport 67 M history of ESRD on HD admitted to ICU for     1. Sympathetic Crashing Acute Pulmonary edema  2. Acute Hypoxic Respiratory Failure  3. ESRD requiring emergent Dialysis    Neuro:  - Avoid Neuro Deliriogenic / sedative medications  - PRN pushes ativan/morphine if needed for Bipap compliance    CV:  - Nitro gtt to decrease afterload goal SBP < 200, Pt baseline sbp 140's according to daughter  - POCUS- Lungs: Diffuse B lines bilateral, no pleural effusions. Cardiac: No wall motion abnormalities, IVC is moderately dilated with respirophasic variation.  - Avoiding Fluid overload    Pulm:  - Actively titrating Bipap settings to maintain SpO2 > 92%, Currently on 15/7 60%, pt high risk for losing airway                  GI:  -NPO    Renal:  - Even to net negative fluid balance as tolerated by hemodynamics and renal fx.    - Cr 13, Chronic HD with L AVF, Continue to monitor Bun/Cr and UOP  - Replacing electrolytes as needed with Goal K> 4, PO> 3, Mg> 2               - Strict I&O's, [place] Cath to BSD pt makes urine  - Avoid Nephro toxic medication  - Renally dose meds  - retacrit  - Nephrology consulted for emergent diaylsis    Heme:  - Heparin for DVT Prophylaxis    ID:  - WBC 10.7,  remains afebrile with no antipyretics administered   - Microbiology and Radiology reviewed   - trend CBC with diff, CMP  and fever curve  - Avoiding antibiotics unless there is a concern for a bacterial/fungal infection    Endo:  - ISS for aggressive glycemic control to limit FS glucose to < 180mg/dl.      Critical Care Time (EXCLUSIVE of any non bundled procedures) :  40 minutes were spent assessing the patient's presenting problems of acute illness that pose a high probability of life threatening  deterioration or end organ damage / dysfunction.  Medical decision making includes initiation / continuation of plan or care review data/ labwork/ radiographic study, direct patient care bedside ,  discussions with  consultants regarding care,  evaluation and interpretation of vital signs, any necessary ventilator management,   NIV or BIPAP changes  or initiation,    discussions with multidisciplinary team,  am or pm rounds, discussions of goals of care with patient and family all non-inclusive of procedures.    Plan discussed with Dr Davenport 67 M history of ESRD on HD admitted to ICU for     1. Sympathetic Crashing Acute Pulmonary edema  2. Acute Hypoxic Respiratory Failure  3. ESRD requiring emergent Dialysis    Neuro:  - Avoid Neuro Deliriogenic / sedative medications  - PRN pushes ativan/morphine if needed for Bipap compliance    CV:  - Nitro gtt to decrease afterload goal , Pt baseline sbp 140's according to daughter  - POCUS- Lungs: Diffuse B lines bilateral, no pleural effusions. Cardiac: No wall motion abnormalities, IVC is moderately dilated with respirophasic variation.  - Avoiding Fluid overload    Pulm:  - Actively titrating Bipap settings to maintain SpO2 > 92%, Currently on 15/7 60%, pt high risk for losing airway                  GI:  -NPO    Renal:  - Even to net negative fluid balance as tolerated by hemodynamics and renal fx.    - Cr 13, Chronic HD with L AVF, Continue to monitor Bun/Cr and UOP  - Replacing electrolytes as needed with Goal K> 4, PO> 3, Mg> 2               - Strict I&O's, [place] Cath to BSD pt makes urine  - Avoid Nephro toxic medication  - Renally dose meds  - retacrit  - Nephrology consulted for emergent diaylsis    Heme:  - Heparin for DVT Prophylaxis    ID:  - WBC 10.7,  remains afebrile with no antipyretics administered   - Microbiology and Radiology reviewed   - trend CBC with diff, CMP  and fever curve  - Avoiding antibiotics unless there is a concern for a bacterial/fungal infection    Endo:  - ISS for aggressive glycemic control to limit FS glucose to < 180mg/dl.      Critical Care Time (EXCLUSIVE of any non bundled procedures) :  40 minutes were spent assessing the patient's presenting problems of acute illness that pose a high probability of life threatening  deterioration or end organ damage / dysfunction.  Medical decision making includes initiation / continuation of plan or care review data/ labwork/ radiographic study, direct patient care bedside ,  discussions with  consultants regarding care,  evaluation and interpretation of vital signs, any necessary ventilator management,   NIV or BIPAP changes  or initiation,    discussions with multidisciplinary team,  am or pm rounds, discussions of goals of care with patient and family all non-inclusive of procedures.    Plan discussed with Dr Davenport 67 M history of ESRD on HD, DM2, Cardiac Stent (per daughter) admitted to ICU for     1. Sympathetic Crashing Acute Pulmonary edema  2. Acute Hypoxic Respiratory Failure  3. ESRD requiring emergent Dialysis    Neuro:  - Avoid Neuro Deliriogenic / sedative medications  - PRN pushes ativan/morphine if needed for Bipap compliance    CV:  - Nitro gtt to decrease afterload goal , Pt baseline sbp 140's according to daughter  - POCUS- Lungs: Diffuse B lines bilateral, no pleural effusions. Cardiac: No wall motion abnormalities, IVC is moderately dilated with respirophasic variation.  - Avoiding Fluid overload    Pulm:  - Actively titrating Bipap settings to maintain SpO2 > 92%, Currently on 15/7 60%, pt high risk for losing airway                  GI:  -NPO    Renal:  - Even to net negative fluid balance as tolerated by hemodynamics and renal fx.    - Cr 13, Chronic HD with L AVF, Continue to monitor Bun/Cr and UOP  - Replacing electrolytes as needed with Goal K> 4, PO> 3, Mg> 2               - Strict I&O's, [place] Cath to BSD pt makes urine  - Avoid Nephro toxic medication  - Renally dose meds  - retacrit  - Nephrology consulted for emergent diaylsis    Heme:  - Heparin for DVT Prophylaxis    ID:  - WBC 10.7,  remains afebrile with no antipyretics administered   - Microbiology and Radiology reviewed   - trend CBC with diff, CMP  and fever curve  - Avoiding antibiotics unless there is a concern for a bacterial/fungal infection    Endo:  - ISS for aggressive glycemic control to limit FS glucose to < 180mg/dl.      Critical Care Time (EXCLUSIVE of any non bundled procedures) :  40 minutes were spent assessing the patient's presenting problems of acute illness that pose a high probability of life threatening  deterioration or end organ damage / dysfunction.  Medical decision making includes initiation / continuation of plan or care review data/ labwork/ radiographic study, direct patient care bedside ,  discussions with  consultants regarding care,  evaluation and interpretation of vital signs, any necessary ventilator management,   NIV or BIPAP changes  or initiation,    discussions with multidisciplinary team,  am or pm rounds, discussions of goals of care with patient and family all non-inclusive of procedures.    Plan discussed with Dr Davenport

## 2022-06-27 NOTE — ED ADULT NURSE NOTE - OBJECTIVE STATEMENT
Received the patient in the Er. Patient is alert and oriented. Skin warm and dry. Patient was in [place] for 6 months. Patient came back last week. Unable to get dialysis unless gets cleared. Patient came in for blood work

## 2022-06-27 NOTE — ED PROVIDER NOTE - CARE PLAN
normal... 1 Principal Discharge DX:	Encounter for hemodialysis for ESRD   Principal Discharge DX:	Encounter for hemodialysis for ESRD  Secondary Diagnosis:	Acute respiratory failure with hypoxia

## 2022-06-27 NOTE — ED PROVIDER NOTE - CLINICAL SUMMARY MEDICAL DECISION MAKING FREE TEXT BOX
Needs HD, outpatient HD center requiring infectious testing prior to HD. will start testing, contact nephrology.

## 2022-06-28 LAB
A1C WITH ESTIMATED AVERAGE GLUCOSE RESULT: 5.5 % — SIGNIFICANT CHANGE UP (ref 4–5.6)
ANION GAP SERPL CALC-SCNC: 11 MMOL/L — SIGNIFICANT CHANGE UP (ref 5–17)
APTT BLD: 37.9 SEC — HIGH (ref 27.5–35.5)
BUN SERPL-MCNC: 31 MG/DL — HIGH (ref 7–23)
CALCIUM SERPL-MCNC: 8.1 MG/DL — LOW (ref 8.5–10.1)
CHLORIDE SERPL-SCNC: 98 MMOL/L — SIGNIFICANT CHANGE UP (ref 96–108)
CO2 SERPL-SCNC: 24 MMOL/L — SIGNIFICANT CHANGE UP (ref 22–31)
CREAT SERPL-MCNC: 8.4 MG/DL — HIGH (ref 0.5–1.3)
EGFR: 6 ML/MIN/1.73M2 — LOW
ESTIMATED AVERAGE GLUCOSE: 111 MG/DL — SIGNIFICANT CHANGE UP (ref 68–114)
GLUCOSE SERPL-MCNC: 219 MG/DL — HIGH (ref 70–99)
HCT VFR BLD CALC: 33.7 % — LOW (ref 39–50)
HCV AB S/CO SERPL IA: 0.07 S/CO — SIGNIFICANT CHANGE UP (ref 0–0.99)
HCV AB SERPL-IMP: SIGNIFICANT CHANGE UP
HGB BLD-MCNC: 10.6 G/DL — LOW (ref 13–17)
INR BLD: 1.17 RATIO — HIGH (ref 0.88–1.16)
MAGNESIUM SERPL-MCNC: 3 MG/DL — HIGH (ref 1.6–2.6)
MCHC RBC-ENTMCNC: 29.9 PG — SIGNIFICANT CHANGE UP (ref 27–34)
MCHC RBC-ENTMCNC: 31.5 GM/DL — LOW (ref 32–36)
MCV RBC AUTO: 94.9 FL — SIGNIFICANT CHANGE UP (ref 80–100)
MRSA PCR RESULT.: SIGNIFICANT CHANGE UP
NRBC # BLD: 0 /100 WBCS — SIGNIFICANT CHANGE UP (ref 0–0)
PHOSPHATE SERPL-MCNC: 3.3 MG/DL — SIGNIFICANT CHANGE UP (ref 2.5–4.5)
PLATELET # BLD AUTO: 146 K/UL — LOW (ref 150–400)
POTASSIUM SERPL-MCNC: 3.8 MMOL/L — SIGNIFICANT CHANGE UP (ref 3.5–5.3)
POTASSIUM SERPL-SCNC: 3.8 MMOL/L — SIGNIFICANT CHANGE UP (ref 3.5–5.3)
PROTHROM AB SERPL-ACNC: 13.7 SEC — HIGH (ref 10.5–13.4)
RBC # BLD: 3.55 M/UL — LOW (ref 4.2–5.8)
RBC # FLD: 15.4 % — HIGH (ref 10.3–14.5)
S AUREUS DNA NOSE QL NAA+PROBE: SIGNIFICANT CHANGE UP
SODIUM SERPL-SCNC: 133 MMOL/L — LOW (ref 135–145)
WBC # BLD: 8.02 K/UL — SIGNIFICANT CHANGE UP (ref 3.8–10.5)
WBC # FLD AUTO: 8.02 K/UL — SIGNIFICANT CHANGE UP (ref 3.8–10.5)

## 2022-06-28 PROCEDURE — 99233 SBSQ HOSP IP/OBS HIGH 50: CPT | Mod: GC

## 2022-06-28 PROCEDURE — 99233 SBSQ HOSP IP/OBS HIGH 50: CPT

## 2022-06-28 RX ORDER — TICAGRELOR 90 MG/1
60 TABLET ORAL EVERY 12 HOURS
Refills: 0 | Status: DISCONTINUED | OUTPATIENT
Start: 2022-06-28 | End: 2022-06-29

## 2022-06-28 RX ORDER — ATORVASTATIN CALCIUM 80 MG/1
40 TABLET, FILM COATED ORAL AT BEDTIME
Refills: 0 | Status: DISCONTINUED | OUTPATIENT
Start: 2022-06-28 | End: 2022-06-29

## 2022-06-28 RX ORDER — HYDRALAZINE HCL 50 MG
10 TABLET ORAL EVERY 6 HOURS
Refills: 0 | Status: DISCONTINUED | OUTPATIENT
Start: 2022-06-28 | End: 2022-06-29

## 2022-06-28 RX ADMIN — Medication 10 MILLIGRAM(S): at 05:55

## 2022-06-28 RX ADMIN — Medication 1 DROP(S): at 21:43

## 2022-06-28 RX ADMIN — ATORVASTATIN CALCIUM 40 MILLIGRAM(S): 80 TABLET, FILM COATED ORAL at 21:42

## 2022-06-28 RX ADMIN — Medication 1 DROP(S): at 05:35

## 2022-06-28 RX ADMIN — HEPARIN SODIUM 5000 UNIT(S): 5000 INJECTION INTRAVENOUS; SUBCUTANEOUS at 21:42

## 2022-06-28 RX ADMIN — HEPARIN SODIUM 5000 UNIT(S): 5000 INJECTION INTRAVENOUS; SUBCUTANEOUS at 05:35

## 2022-06-28 RX ADMIN — CYCLOSPORINE 1 DROP(S): 0.5 EMULSION OPHTHALMIC at 12:09

## 2022-06-28 RX ADMIN — HEPARIN SODIUM 5000 UNIT(S): 5000 INJECTION INTRAVENOUS; SUBCUTANEOUS at 13:07

## 2022-06-28 RX ADMIN — Medication 25 MILLIGRAM(S): at 05:35

## 2022-06-28 RX ADMIN — TICAGRELOR 60 MILLIGRAM(S): 90 TABLET ORAL at 18:20

## 2022-06-28 RX ADMIN — TICAGRELOR 60 MILLIGRAM(S): 90 TABLET ORAL at 10:13

## 2022-06-28 RX ADMIN — Medication 1 DROP(S): at 15:20

## 2022-06-28 RX ADMIN — Medication 25 MILLIGRAM(S): at 18:19

## 2022-06-28 RX ADMIN — PANTOPRAZOLE SODIUM 40 MILLIGRAM(S): 20 TABLET, DELAYED RELEASE ORAL at 05:35

## 2022-06-28 NOTE — DIETITIAN INITIAL EVALUATION ADULT - PERTINENT MEDS FT
MEDICATIONS  (STANDING):  artificial tears (preservative free) Ophthalmic Solution 1 Drop(s) Both EYES three times a day  atorvastatin 40 milliGRAM(s) Oral at bedtime  chlorhexidine 4% Liquid 1 Application(s) Topical <User Schedule>  cycloSPORINE (RESTASIS) 0.05% Emulsion 1 Drop(s) Both EYES daily  dextrose 5%. 1000 milliLiter(s) (100 mL/Hr) IV Continuous <Continuous>  dextrose 5%. 1000 milliLiter(s) (50 mL/Hr) IV Continuous <Continuous>  dextrose 5%. 1000 milliLiter(s) (50 mL/Hr) IV Continuous <Continuous>  dextrose 50% Injectable 25 Gram(s) IV Push once  dextrose 50% Injectable 12.5 Gram(s) IV Push once  dextrose 50% Injectable 25 Gram(s) IV Push once  epoetin demetrius-epbx (RETACRIT) Injectable 79057 Unit(s) IV Push <User Schedule>  glucagon  Injectable 1 milliGRAM(s) IntraMuscular once  heparin   Injectable 5000 Unit(s) SubCutaneous every 8 hours  insulin lispro (ADMELOG) corrective regimen sliding scale   SubCutaneous every 6 hours  metoprolol tartrate 25 milliGRAM(s) Oral two times a day  pantoprazole    Tablet 40 milliGRAM(s) Oral before breakfast  ticagrelor 60 milliGRAM(s) Oral every 12 hours    MEDICATIONS  (PRN):  benzonatate 100 milliGRAM(s) Oral three times a day PRN Cough  dextrose Oral Gel 15 Gram(s) Oral once PRN Blood Glucose LESS THAN 70 milliGRAM(s)/deciliter  hydrALAZINE Injectable 10 milliGRAM(s) IV Push every 6 hours PRN SBP >160

## 2022-06-28 NOTE — PROGRESS NOTE ADULT - ASSESSMENT
67 yr male with T2DM Hypertension, CAD, ESRD on HD presenting in uremia, hypertensive urgency, acute hypoxic respiratory failure, fluid overload secondary to missed HD session.

## 2022-06-28 NOTE — PROGRESS NOTE ADULT - PROBLEM SELECTOR PLAN 1
Presented with creatinine of 13.0  s/p dialysis  s/p Nitroglycerin drip  Metoprolol  No longer requiring BiPAP.  Now stable for downgrade to floor.  Nephrology on board.  Continue HD per renal.

## 2022-06-28 NOTE — PROGRESS NOTE ADULT - ATTENDING COMMENTS
66 yo man with Hx ERSD on HD, htn, DM2, CAD s/p PCI presenting with htn emergency, acute pulmonary edema and respiratory failure in the setting of missed HD session.  Now improved and doing well.    --mentating well  --CAD, htn, continue lopressor, prn hydralazine  continue ASA, brilinta, statin  --hypoxemia on 3L NC, wean as tolerates  --ERSD, continue HD tiw  --tolerating PO diet  --no infectious concerns  --DM2, hypoglycemia, continue D5  --stable for tele  --plan discussed with family at bedside.

## 2022-06-28 NOTE — DIETITIAN INITIAL EVALUATION ADULT - OTHER INFO
excerpt from MD progress note: 67 yr male T2DM , HTN , CAD ,  ESRD on HD, [place]  and not not able to get on community HD seat as apparently terminated for being away 6 months. Came to hospital ED to have HD and arrange for community seat. Required to get PPD and hepatitis panel.  In ED dismal renal indices with Cr 13, CXR cardiomegaly with perihilar airspace prominence. Severe BP elevation 220/135 with ensuing delirium necessitating nitro drip , Bipap and ICU admission , emergent HD excerpt from MD progress note: 67 yr male T2DM , HTN , CAD ,  ESRD on HD, [place] from trip [place]  and not not able to get on community HD seat as apparently terminated for being away 6 months. Came to hospital ED to have HD and arrange for community seat. Required to get PPD and hepatitis panel.  In ED dismal renal indices with Cr 13, CXR cardiomegaly with perihilar airspace prominence. Severe BP elevation 220/135 with ensuing delirium necessitating nitro drip , Bipap and ICU admission , emergent HD.  Pt denies being away from HD center for ^ mo, just states 2.5 months but when I asked him where he usually goes for HD, he did not respond. He offers no nutrition related complaints, ate 100% of breakfast ( and plate waste study confirms) , denies any problems chewing or swallowing, denies change in bowel habits, having BM's. He reports he is 5'11" tall and UBW is 185#. Pt reports he was dialyzed regularly while away in [place].

## 2022-06-28 NOTE — PROGRESS NOTE ADULT - ASSESSMENT
68yo male with PMH of ESRD on HD, T2DM, CAD s/p stent, GERD, HLD who presented to the ED for dialysis after losing position at dialysis center, found to be hypertensive and acutely overloaded with acute hypoxic respiratory failure requiring emergent dialysis, BiPAP and nitroprusside drip in MICU.     Neuro:  - No active issues    CV:  - Hypertensive urgency: Now off nitro gtt  - Continue Lopressor 25mg BID, Cardizem 10mg IVP q6h for SBP >160  - CAD s/p stent: continue Brilinta     Pulm:  - Acute hypoxic respiratory failure in the setting of acute pulmonary edema from volume overload  - On BiPAP overnight, now on 3L NC satting well                   GI:  - NPO    Renal:  - Presented to the ED requiring emergent dialysis yesterday with 2.5L of fluid removal  - Cr 8.4 today, down from 13    Heme:  - Heparin for DVT Prophylaxis    ID:  - Afebrile, leukocytosis downtrended   - Monitor off antibiotics    Endo:  - ISS for aggressive glycemic control to limit FS glucose to < 180mg/dl.    Skin:  - Left AV fistula    Dispo  - ICU  66yo male with PMH of ESRD on HD, T2DM, CAD s/p stent, GERD, HLD who presented to the ED for dialysis after losing position at dialysis center, found to be hypertensive and acutely overloaded with acute hypoxic respiratory failure requiring emergent dialysis, BiPAP and nitroprusside drip in MICU.     Neuro:  - No active issues    CV:  - Hypertensive urgency: Now off nitro gtt since 5AM  - Continue Lopressor 25mg BID, Cardizem 10mg IVP q6h for SBP >160  - CAD s/p stent: continue Brilinta     Pulm:  - Acute hypoxic respiratory failure in the setting of acute pulmonary edema from volume overload  - On BiPAP overnight, now on 3L NC satting well                   GI:  - NPO    Renal:  - Presented to the ED requiring emergent dialysis yesterday with 2.5L of fluid removal  - Cr 8.4 today, down from 13    Heme:  - Heparin for DVT Prophylaxis    ID:  - Afebrile, leukocytosis downtrended   - Monitor off antibiotics    Endo:  - T2DM: not on antidiabetic agents, A1c 5.5  - Mildly hypoglycemic, d/c ISS; continue D5W @ 50cc/hr    Skin:  - Left AV fistula    Dispo  - Stable for floor 68yo male with PMH of ESRD on HD, T2DM, CAD s/p stent, GERD, HLD who presented to the ED for dialysis after losing position at dialysis center, found to be hypertensive and acutely overloaded with acute hypoxic respiratory failure requiring emergent dialysis, BiPAP and nitroprusside drip in MICU.     Neuro:  - No active issues    CV:  - Hypertensive urgency: Now off nitro gtt since 5AM  - Continue Lopressor 25mg BID, Cardizem 10mg IVP q6h for SBP >160  - CAD s/p stent: continue Brilinta     Pulm:  - Acute hypoxic respiratory failure in the setting of acute pulmonary edema from volume overload  - On BiPAP overnight, now on 3L NC satting well                   GI:  - NPO    Renal:  - Presented to the ED requiring emergent dialysis yesterday with 2.5L of fluid removal  - Cr 8.4 today, down from 13    Heme:  - Heparin for DVT Prophylaxis    ID:  - Afebrile, leukocytosis downtrended   - Monitor off antibiotics    Endo:  - T2DM: not on antidiabetic agents, A1c 5.5  - Mildly hypoglycemic, d/c ISS; continue D5W @ 50cc/hr    Skin:  - Left AV fistula    Dispo  - Stable for floor  - Updated daughter at bedside

## 2022-06-28 NOTE — DIETITIAN INITIAL EVALUATION ADULT - PERTINENT LABORATORY DATA
06-28    133<L>  |  98  |  31<H>  ----------------------------<  219<H>  3.8   |  24  |  8.40<H>    Ca    8.1<L>      28 Jun 2022 07:05  Phos  3.3     06-28  Mg     3.0     06-28    TPro  6.7  /  Alb  3.3  /  TBili  0.4  /  DBili  x   /  AST  30  /  ALT  56  /  AlkPhos  102  06-27  POCT Blood Glucose.: 117 mg/dL (06-28-22 @ 05:50)

## 2022-06-28 NOTE — PROGRESS NOTE ADULT - SUBJECTIVE AND OBJECTIVE BOX
Patient is a 67y old  Male who presents with a chief complaint of End-stage renal disease     (28 Jun 2022 08:46)    24 hour events: Patient admitted to MICU for emergent dialysis, hypertensive emergency requiring nitroprusside drip and acute hypoxic respiratory failure. Patient received HD with 2.5L of volume removal. Titrated off nitroprusside drip as of 6AM this morning, now on Lopressor 25mg BID and PRN Hydralazine 10mg IVP q6h for SBP >160. Patient seen and examined at bedside. Currently off BiPAP, on 3L NC. He states that his breathing is much improved and he feels better compared to yesterday.     REVIEW OF SYSTEMS  Constitutional: No fever, chills, fatigue  Neuro: No headache, numbness, weakness  Resp: No cough, wheezing, shortness of breath  CVS: No chest pain, palpitations, leg swelling  GI: No abdominal pain, nausea, vomiting, diarrhea   : No dysuria, frequency, incontinence  Skin: No itching, burning, rashes, or lesions   Msk: No joint pain or swelling  Psych: No depression, anxiety, mood swings  Heme: No bleeding    T(F): 98.6 (06-28-22 @ 07:56), Max: 99.7 (06-27-22 @ 19:10)  HR: 72 (06-28-22 @ 08:00) (66 - 112)  BP: 160/72 (06-28-22 @ 08:00) (145/67 - 234/108)  RR: 10 (06-28-22 @ 08:00) (10 - 49)  SpO2: 97% (06-28-22 @ 08:00) (92% - 100%)  Wt(kg): --            I&O's Summary    06-27 @ 07:01  -  06-28 @ 07:00  --------------------------------------------------------  IN: 641.7 mL / OUT: 3080 mL / NET: -2438.3 mL      PHYSICAL EXAM  General: male in NAD  CNS: A&Ox3, follows commands, sensation intact  HEENT: nc/at, TREVER, EOMI b/l  Resp: coarse rhonchi in bilateral lung fields  CVS: RRR, no murmurs  Abd: soft, NT/ND  Ext: no edema b/l; AV fistula of left upper extremity with palpable thrill  Skin: warm and well perfused    MEDICATIONS    hydrALAZINE Injectable IV Push PRN  metoprolol tartrate Oral    atorvastatin Oral  dextrose 50% Injectable IV Push  dextrose 50% Injectable IV Push  dextrose 50% Injectable IV Push  dextrose Oral Gel Oral PRN  glucagon  Injectable IntraMuscular  insulin lispro (ADMELOG) corrective regimen sliding scale SubCutaneous    benzonatate Oral PRN        heparin   Injectable SubCutaneous  ticagrelor Oral    pantoprazole    Tablet Oral      dextrose 5%. IV Continuous  dextrose 5%. IV Continuous  dextrose 5%. IV Continuous    epoetin demetrius-epbx (RETACRIT) Injectable IV Push    artificial tears (preservative free) Ophthalmic Solution Both EYES  chlorhexidine 4% Liquid Topical  cycloSPORINE (RESTASIS) 0.05% Emulsion Both EYES                            10.6   8.02  )-----------( 146      ( 28 Jun 2022 07:05 )             33.7       06-28    133<L>  |  98  |  31<H>  ----------------------------<  219<H>  3.8   |  24  |  8.40<H>    Ca    8.1<L>      28 Jun 2022 07:05  Phos  3.3     06-28  Mg     3.0     06-28    TPro  6.7  /  Alb  3.3  /  TBili  0.4  /  DBili  x   /  AST  30  /  ALT  56  /  AlkPhos  102  06-27          PT/INR - ( 28 Jun 2022 07:05 )   PT: 13.7 sec;   INR: 1.17 ratio         PTT - ( 28 Jun 2022 07:05 )  PTT:37.9 sec          Radiology: CXR bilateral perihilar/basilar diffuse airspace disease  Bedside lung ultrasound: ***  Bedside ECHO: ***    CENTRAL LINE: N            SOUSA: N                          A-LINE: N                         AV fistula left upper extremity      GLOBAL ISSUE/BEST PRACTICE  Analgesia: none  Sedation: Ativan PRN for BiPAP compliance  CAM-ICU: n/a  HOB elevation: yes  Stress ulcer prophylaxis: Protonix  VTE prophylaxis: SQH  Glycemic control: ISS  Nutrition: NPO    CODE STATUS: FULL CODE       Patient is a 67y old  Male who presents with a chief complaint of End-stage renal disease     (28 Jun 2022 08:46)    24 hour events: Patient admitted to MICU for emergent dialysis, hypertensive emergency requiring nitroprusside drip and acute hypoxic respiratory failure. Patient received HD with 2.5L of volume removal. Titrated off nitroprusside drip as of 5AM this morning, now on Lopressor 25mg BID and PRN Hydralazine 10mg IVP q6h for SBP >160. Patient seen and examined at bedside. Currently off BiPAP, on 3L NC. He states that his breathing is much improved and he feels better compared to yesterday.     REVIEW OF SYSTEMS  Constitutional: No fever, chills, fatigue  Neuro: No headache, numbness, weakness  Resp: No cough, wheezing, shortness of breath  CVS: No chest pain, palpitations, leg swelling  GI: No abdominal pain, nausea, vomiting, diarrhea   : No dysuria, frequency, incontinence  Skin: No itching, burning, rashes, or lesions   Msk: No joint pain or swelling  Psych: No depression, anxiety, mood swings  Heme: No bleeding    T(F): 98.6 (06-28-22 @ 07:56), Max: 99.7 (06-27-22 @ 19:10)  HR: 72 (06-28-22 @ 08:00) (66 - 112)  BP: 160/72 (06-28-22 @ 08:00) (145/67 - 234/108)  RR: 10 (06-28-22 @ 08:00) (10 - 49)  SpO2: 97% (06-28-22 @ 08:00) (92% - 100%)  Wt(kg): --            I&O's Summary    06-27 @ 07:01  -  06-28 @ 07:00  --------------------------------------------------------  IN: 641.7 mL / OUT: 3080 mL / NET: -2438.3 mL      PHYSICAL EXAM  General: male in NAD  CNS: A&Ox3, follows commands, sensation intact  HEENT: nc/at, TREVER, EOMI b/l  Resp: coarse rhonchi in bilateral lung fields  CVS: RRR, no murmurs  Abd: soft, NT/ND  Ext: no edema b/l; AV fistula of left upper extremity with palpable thrill  Skin: warm and well perfused    MEDICATIONS    hydrALAZINE Injectable IV Push PRN  metoprolol tartrate Oral    atorvastatin Oral  dextrose 50% Injectable IV Push  dextrose 50% Injectable IV Push  dextrose 50% Injectable IV Push  dextrose Oral Gel Oral PRN  glucagon  Injectable IntraMuscular  insulin lispro (ADMELOG) corrective regimen sliding scale SubCutaneous    benzonatate Oral PRN        heparin   Injectable SubCutaneous  ticagrelor Oral    pantoprazole    Tablet Oral      dextrose 5%. IV Continuous  dextrose 5%. IV Continuous  dextrose 5%. IV Continuous    epoetin demetrius-epbx (RETACRIT) Injectable IV Push    artificial tears (preservative free) Ophthalmic Solution Both EYES  chlorhexidine 4% Liquid Topical  cycloSPORINE (RESTASIS) 0.05% Emulsion Both EYES                            10.6   8.02  )-----------( 146      ( 28 Jun 2022 07:05 )             33.7       06-28    133<L>  |  98  |  31<H>  ----------------------------<  219<H>  3.8   |  24  |  8.40<H>    Ca    8.1<L>      28 Jun 2022 07:05  Phos  3.3     06-28  Mg     3.0     06-28    TPro  6.7  /  Alb  3.3  /  TBili  0.4  /  DBili  x   /  AST  30  /  ALT  56  /  AlkPhos  102  06-27          PT/INR - ( 28 Jun 2022 07:05 )   PT: 13.7 sec;   INR: 1.17 ratio         PTT - ( 28 Jun 2022 07:05 )  PTT:37.9 sec          Radiology: CXR bilateral perihilar/basilar diffuse airspace disease  Bedside lung ultrasound: ***  Bedside ECHO: ***    CENTRAL LINE: N            SOUSA: N                          A-LINE: N                         AV fistula left upper extremity      GLOBAL ISSUE/BEST PRACTICE  Analgesia: none  Sedation: Ativan PRN for BiPAP compliance  CAM-ICU: n/a  HOB elevation: yes  Stress ulcer prophylaxis: Protonix  VTE prophylaxis: SQH  Glycemic control: ISS  Nutrition: NPO    CODE STATUS: FULL CODE       Patient is a 67y old  Male who presents with a chief complaint of End-stage renal disease     (28 Jun 2022 08:46)    24 hour events: Patient admitted to MICU for emergent dialysis, hypertensive emergency requiring nitroglycerine drip and acute hypoxic respiratory failure. Patient received HD with 2.5L of volume removal. Titrated off nitroglycerine drip as of 5AM this morning, now on Lopressor 25mg BID and PRN Hydralazine 10mg IVP q6h for SBP >160. Patient seen and examined at bedside. Currently off BiPAP, on 3L NC. He states that his breathing is much improved and he feels better compared to yesterday.     REVIEW OF SYSTEMS  Constitutional: No fever, chills, fatigue  Neuro: No headache, numbness, weakness  Resp: No cough, wheezing, shortness of breath  CVS: No chest pain, palpitations, leg swelling  GI: No abdominal pain, nausea, vomiting, diarrhea   : No dysuria, frequency, incontinence  Skin: No itching, burning, rashes, or lesions   Msk: No joint pain or swelling  Psych: No depression, anxiety, mood swings  Heme: No bleeding    T(F): 98.6 (06-28-22 @ 07:56), Max: 99.7 (06-27-22 @ 19:10)  HR: 72 (06-28-22 @ 08:00) (66 - 112)  BP: 160/72 (06-28-22 @ 08:00) (145/67 - 234/108)  RR: 10 (06-28-22 @ 08:00) (10 - 49)  SpO2: 97% (06-28-22 @ 08:00) (92% - 100%)  Wt(kg): --            I&O's Summary    06-27 @ 07:01  -  06-28 @ 07:00  --------------------------------------------------------  IN: 641.7 mL / OUT: 3080 mL / NET: -2438.3 mL      PHYSICAL EXAM  General: male in NAD  CNS: A&Ox3, follows commands, sensation intact  HEENT: nc/at, TREVER, EOMI b/l  Resp: coarse rhonchi in bilateral lung fields  CVS: RRR, no murmurs  Abd: soft, NT/ND  Ext: no edema b/l; AV fistula of left upper extremity with palpable thrill  Skin: warm and well perfused    MEDICATIONS    hydrALAZINE Injectable IV Push PRN  metoprolol tartrate Oral    atorvastatin Oral  dextrose 50% Injectable IV Push  dextrose 50% Injectable IV Push  dextrose 50% Injectable IV Push  dextrose Oral Gel Oral PRN  glucagon  Injectable IntraMuscular  insulin lispro (ADMELOG) corrective regimen sliding scale SubCutaneous    benzonatate Oral PRN        heparin   Injectable SubCutaneous  ticagrelor Oral    pantoprazole    Tablet Oral      dextrose 5%. IV Continuous  dextrose 5%. IV Continuous  dextrose 5%. IV Continuous    epoetin demetrius-epbx (RETACRIT) Injectable IV Push    artificial tears (preservative free) Ophthalmic Solution Both EYES  chlorhexidine 4% Liquid Topical  cycloSPORINE (RESTASIS) 0.05% Emulsion Both EYES                            10.6   8.02  )-----------( 146      ( 28 Jun 2022 07:05 )             33.7       06-28    133<L>  |  98  |  31<H>  ----------------------------<  219<H>  3.8   |  24  |  8.40<H>    Ca    8.1<L>      28 Jun 2022 07:05  Phos  3.3     06-28  Mg     3.0     06-28    TPro  6.7  /  Alb  3.3  /  TBili  0.4  /  DBili  x   /  AST  30  /  ALT  56  /  AlkPhos  102  06-27          PT/INR - ( 28 Jun 2022 07:05 )   PT: 13.7 sec;   INR: 1.17 ratio         PTT - ( 28 Jun 2022 07:05 )  PTT:37.9 sec          Radiology: CXR bilateral perihilar/basilar diffuse airspace disease      CENTRAL LINE: N            SOUSA: N                          A-LINE: N                         AV fistula left upper extremity      GLOBAL ISSUE/BEST PRACTICE  Analgesia: none  Sedation: Ativan PRN for BiPAP compliance  CAM-ICU: n/a  HOB elevation: yes  Stress ulcer prophylaxis: Protonix  VTE prophylaxis: SQH  Glycemic control: ISS  Nutrition: NPO    CODE STATUS: FULL CODE

## 2022-06-28 NOTE — PROGRESS NOTE ADULT - ASSESSMENT
67 yr male T2DM ,  ESRD on HD, [place]  and not not able to get on community HD seat.  Came to hospital ED to have HD and arrange for community seat.  (27 Jun 2022 19:44)    esrd on hd plan for hd today    Excess fluids and waste products will be removed from your blood; your electrolytes will be balanced; your blood pressure will be controlled.      BP monitoring,continue current antihypertensive meds, low salt diet,followup with PMD in 1-2 weeks      ANEMIA PLAN:  Anemia of chronic disease:  Well controlled by Aranesp  H and H subtherapeutic .  We will continue Aranesp aiming for a HCT of 32-36 %.   We will monitor Iron stores, B12 and RBC folate .

## 2022-06-28 NOTE — PROGRESS NOTE ADULT - SUBJECTIVE AND OBJECTIVE BOX
Patient is a 67y Male whom presented to the hospital with esrd on hd     PAST MEDICAL & SURGICAL HISTORY:  ESRD on dialysis      T2DM (type 2 diabetes mellitus)      CAD (coronary artery disease)          MEDICATIONS  (STANDING):  artificial tears (preservative free) Ophthalmic Solution 1 Drop(s) Both EYES three times a day  chlorhexidine 4% Liquid 1 Application(s) Topical <User Schedule>  cycloSPORINE (RESTASIS) 0.05% Emulsion 1 Drop(s) Both EYES daily  dextrose 5%. 1000 milliLiter(s) (100 mL/Hr) IV Continuous <Continuous>  dextrose 5%. 1000 milliLiter(s) (50 mL/Hr) IV Continuous <Continuous>  dextrose 5%. 1000 milliLiter(s) (50 mL/Hr) IV Continuous <Continuous>  dextrose 50% Injectable 25 Gram(s) IV Push once  dextrose 50% Injectable 12.5 Gram(s) IV Push once  dextrose 50% Injectable 25 Gram(s) IV Push once  epoetin demetrius-epbx (RETACRIT) Injectable 28349 Unit(s) IV Push <User Schedule>  glucagon  Injectable 1 milliGRAM(s) IntraMuscular once  heparin   Injectable 5000 Unit(s) SubCutaneous every 8 hours  insulin lispro (ADMELOG) corrective regimen sliding scale   SubCutaneous every 6 hours  metoprolol tartrate 25 milliGRAM(s) Oral two times a day  nitroglycerin  Infusion 6 MICROgram(s)/Min (1.8 mL/Hr) IV Continuous <Continuous>  pantoprazole    Tablet 40 milliGRAM(s) Oral before breakfast      Allergies    No Known Allergies    Intolerances        SOCIAL HISTORY:  Denies ETOh,Smoking,     FAMILY HISTORY:      REVIEW OF SYSTEMS:    CONSTITUTIONAL: No weakness, fevers or chills  RESPIRATORY: No cough, wheezing, hemoptysis; pos  shortness of breath  CARDIOVASCULAR: No chest pain or palpitations  GASTROINTESTINAL: No abdominal or epigastric pain. No nausea, vomiting,     No diarrhea or constipation. No melena   GENITOURINARY: No dysuria, frequency or hematuria  NEUROLOGICAL: No numbness or weakness  SKIN: dry                            10.6   8.02  )-----------( 146      ( 28 Jun 2022 07:05 )             33.7       CBC Full  -  ( 28 Jun 2022 07:05 )  WBC Count : 8.02 K/uL  RBC Count : 3.55 M/uL  Hemoglobin : 10.6 g/dL  Hematocrit : 33.7 %  Platelet Count - Automated : 146 K/uL  Mean Cell Volume : 94.9 fl  Mean Cell Hemoglobin : 29.9 pg  Mean Cell Hemoglobin Concentration : 31.5 gm/dL  Auto Neutrophil # : x  Auto Lymphocyte # : x  Auto Monocyte # : x  Auto Eosinophil # : x  Auto Basophil # : x  Auto Neutrophil % : x  Auto Lymphocyte % : x  Auto Monocyte % : x  Auto Eosinophil % : x  Auto Basophil % : x      06-28    133<L>  |  98  |  31<H>  ----------------------------<  219<H>  3.8   |  24  |  8.40<H>    Ca    8.1<L>      28 Jun 2022 07:05  Phos  3.3     06-28  Mg     3.0     06-28    TPro  6.7  /  Alb  3.3  /  TBili  0.4  /  DBili  x   /  AST  30  /  ALT  56  /  AlkPhos  102  06-27      CAPILLARY BLOOD GLUCOSE      POCT Blood Glucose.: 136 mg/dL (28 Jun 2022 11:06)  POCT Blood Glucose.: 117 mg/dL (28 Jun 2022 05:50)  POCT Blood Glucose.: 101 mg/dL (27 Jun 2022 23:25)      Vital Signs Last 24 Hrs  T(C): 37.2 (28 Jun 2022 16:06), Max: 37.2 (28 Jun 2022 16:06)  T(F): 99 (28 Jun 2022 16:06), Max: 99 (28 Jun 2022 16:06)  HR: 73 (28 Jun 2022 19:00) (66 - 86)  BP: 143/64 (28 Jun 2022 19:00) (137/62 - 178/72)  BP(mean): 92 (28 Jun 2022 19:00) (74 - 117)  RR: 28 (28 Jun 2022 19:00) (10 - 36)  SpO2: 98% (28 Jun 2022 19:00) (94% - 100%)        PT/INR - ( 28 Jun 2022 07:05 )   PT: 13.7 sec;   INR: 1.17 ratio         PTT - ( 28 Jun 2022 07:05 )  PTT:37.9 sec        PHYSICAL EXAM:    Constitutional: NAD  HEENT: conjunctive   clear   Neck:  No JVD  Respiratory: decrease bs b/l   Cardiovascular: S1 and S2  Gastrointestinal: BS+, soft, NT/ND  Extremities: No peripheral edema  : No Lechuga  Skin: No rashes  Access: pos fistula

## 2022-06-28 NOTE — DIETITIAN INITIAL EVALUATION ADULT - NSICDXPASTMEDICALHX_GEN_ALL_CORE_FT
PAST MEDICAL HISTORY:  CAD (coronary artery disease)     ESRD on dialysis     GERD (gastroesophageal reflux disease)     HLD (hyperlipidemia)     T2DM (type 2 diabetes mellitus)

## 2022-06-28 NOTE — DIETITIAN INITIAL EVALUATION ADULT - ORAL INTAKE PTA/DIET HISTORY
Pt reports that while he was in [place] for 2.5 months recently he was non compliant with renal diet but educated in the past at HD center and during prior hospitalizations

## 2022-06-28 NOTE — PROGRESS NOTE ADULT - SUBJECTIVE AND OBJECTIVE BOX
Patient is a 67y old  Male who presents with a chief complaint of Need for HD (28 Jun 2022 08:50)      INTERVAL HPI/OVERNIGHT EVENTS:  Patient seen and examined. Resting comfortably in bed. No complaints currently. He is off nitroglycerin drip now and BP reasonably controlled. He denies any headache, visual changes, chest pain, palpitations, nausea, vomiting, diarrhea.    MEDICATIONS  (STANDING):  artificial tears (preservative free) Ophthalmic Solution 1 Drop(s) Both EYES three times a day  atorvastatin 40 milliGRAM(s) Oral at bedtime  chlorhexidine 4% Liquid 1 Application(s) Topical <User Schedule>  cycloSPORINE (RESTASIS) 0.05% Emulsion 1 Drop(s) Both EYES daily  dextrose 5%. 1000 milliLiter(s) (100 mL/Hr) IV Continuous <Continuous>  dextrose 5%. 1000 milliLiter(s) (50 mL/Hr) IV Continuous <Continuous>  dextrose 5%. 1000 milliLiter(s) (50 mL/Hr) IV Continuous <Continuous>  dextrose 50% Injectable 25 Gram(s) IV Push once  dextrose 50% Injectable 12.5 Gram(s) IV Push once  dextrose 50% Injectable 25 Gram(s) IV Push once  epoetin demetrius-epbx (RETACRIT) Injectable 01353 Unit(s) IV Push <User Schedule>  glucagon  Injectable 1 milliGRAM(s) IntraMuscular once  heparin   Injectable 5000 Unit(s) SubCutaneous every 8 hours  metoprolol tartrate 25 milliGRAM(s) Oral two times a day  pantoprazole    Tablet 40 milliGRAM(s) Oral before breakfast  ticagrelor 60 milliGRAM(s) Oral every 12 hours    MEDICATIONS  (PRN):  benzonatate 100 milliGRAM(s) Oral three times a day PRN Cough  dextrose Oral Gel 15 Gram(s) Oral once PRN Blood Glucose LESS THAN 70 milliGRAM(s)/deciliter  hydrALAZINE Injectable 10 milliGRAM(s) IV Push every 6 hours PRN SBP >160      Allergies    No Known Allergies    Intolerances        REVIEW OF SYSTEMS:  as per HPI    Vital Signs Last 24 Hrs  T(C): 37.2 (28 Jun 2022 16:06), Max: 37.6 (27 Jun 2022 19:10)  T(F): 99 (28 Jun 2022 16:06), Max: 99.7 (27 Jun 2022 19:10)  HR: 71 (28 Jun 2022 13:20) (66 - 98)  BP: 161/- (28 Jun 2022 13:20) (145/67 - 194/78)  BP(mean): 74 (28 Jun 2022 13:20) (74 - 121)  RR: 18 (28 Jun 2022 13:20) (10 - 38)  SpO2: 100% (28 Jun 2022 13:20) (94% - 100%)    PHYSICAL EXAM:  GENERAL: NAD  HEENT:  anicteric, moist mucous membranes  CHEST/LUNG:  decreased breath sounds, no wheeze noted  HEART:  RRR, S1, S2  ABDOMEN:  BS+, soft, nontender, nondistended  EXTREMITIES: AV fistula RUE, no edema, cyanosis, or calf tenderness  NERVOUS SYSTEM: answers questions and follows commands appropriately    LABS:                        10.6   8.02  )-----------( 146      ( 28 Jun 2022 07:05 )             33.7     CBC Full  -  ( 28 Jun 2022 07:05 )  WBC Count : 8.02 K/uL  Hemoglobin : 10.6 g/dL  Hematocrit : 33.7 %  Platelet Count - Automated : 146 K/uL  Mean Cell Volume : 94.9 fl  Mean Cell Hemoglobin : 29.9 pg  Mean Cell Hemoglobin Concentration : 31.5 gm/dL  Auto Neutrophil # : x  Auto Lymphocyte # : x  Auto Monocyte # : x  Auto Eosinophil # : x  Auto Basophil # : x  Auto Neutrophil % : x  Auto Lymphocyte % : x  Auto Monocyte % : x  Auto Eosinophil % : x  Auto Basophil % : x    28 Jun 2022 07:05    133    |  98     |  31     ----------------------------<  219    3.8     |  24     |  8.40     Ca    8.1        28 Jun 2022 07:05  Phos  3.3       28 Jun 2022 07:05  Mg     3.0       28 Jun 2022 07:05      PT/INR - ( 28 Jun 2022 07:05 )   PT: 13.7 sec;   INR: 1.17 ratio         PTT - ( 28 Jun 2022 07:05 )  PTT:37.9 sec    CAPILLARY BLOOD GLUCOSE  POCT Blood Glucose.: 136 mg/dL (28 Jun 2022 11:06)  POCT Blood Glucose.: 117 mg/dL (28 Jun 2022 05:50)  POCT Blood Glucose.: 101 mg/dL (27 Jun 2022 23:25)  POCT Blood Glucose.: 125 mg/dL (27 Jun 2022 18:49)  POCT Blood Glucose.: 67 mg/dL (27 Jun 2022 18:29)          RADIOLOGY & ADDITIONAL TESTS:    Personally reviewed.     Consultant(s) Notes Reviewed:  [x] YES  [ ] NO

## 2022-06-29 ENCOUNTER — TRANSCRIPTION ENCOUNTER (OUTPATIENT)
Age: 67
End: 2022-06-29

## 2022-06-29 VITALS
HEART RATE: 74 BPM | SYSTOLIC BLOOD PRESSURE: 146 MMHG | OXYGEN SATURATION: 90 % | TEMPERATURE: 98 F | RESPIRATION RATE: 18 BRPM | DIASTOLIC BLOOD PRESSURE: 67 MMHG

## 2022-06-29 LAB
ALBUMIN SERPL ELPH-MCNC: 2.8 G/DL — LOW (ref 3.3–5)
ALP SERPL-CCNC: 77 U/L — SIGNIFICANT CHANGE UP (ref 40–120)
ALT FLD-CCNC: 27 U/L — SIGNIFICANT CHANGE UP (ref 12–78)
ANION GAP SERPL CALC-SCNC: 6 MMOL/L — SIGNIFICANT CHANGE UP (ref 5–17)
AST SERPL-CCNC: 12 U/L — LOW (ref 15–37)
BASOPHILS # BLD AUTO: 0.05 K/UL — SIGNIFICANT CHANGE UP (ref 0–0.2)
BASOPHILS NFR BLD AUTO: 0.6 % — SIGNIFICANT CHANGE UP (ref 0–2)
BILIRUB SERPL-MCNC: 0.5 MG/DL — SIGNIFICANT CHANGE UP (ref 0.2–1.2)
BUN SERPL-MCNC: 23 MG/DL — SIGNIFICANT CHANGE UP (ref 7–23)
CALCIUM SERPL-MCNC: 8.2 MG/DL — LOW (ref 8.5–10.1)
CHLORIDE SERPL-SCNC: 96 MMOL/L — SIGNIFICANT CHANGE UP (ref 96–108)
CO2 SERPL-SCNC: 32 MMOL/L — HIGH (ref 22–31)
CREAT SERPL-MCNC: 6.4 MG/DL — HIGH (ref 0.5–1.3)
EGFR: 9 ML/MIN/1.73M2 — LOW
EOSINOPHIL # BLD AUTO: 0.06 K/UL — SIGNIFICANT CHANGE UP (ref 0–0.5)
EOSINOPHIL NFR BLD AUTO: 0.7 % — SIGNIFICANT CHANGE UP (ref 0–6)
GAMMA INTERFERON BACKGROUND BLD IA-ACNC: 0.02 IU/ML — SIGNIFICANT CHANGE UP
GLUCOSE SERPL-MCNC: 202 MG/DL — HIGH (ref 70–99)
HCT VFR BLD CALC: 32.9 % — LOW (ref 39–50)
HGB BLD-MCNC: 10.4 G/DL — LOW (ref 13–17)
IMM GRANULOCYTES NFR BLD AUTO: 0.4 % — SIGNIFICANT CHANGE UP (ref 0–1.5)
LYMPHOCYTES # BLD AUTO: 0.88 K/UL — LOW (ref 1–3.3)
LYMPHOCYTES # BLD AUTO: 9.7 % — LOW (ref 13–44)
M TB IFN-G BLD-IMP: NEGATIVE — SIGNIFICANT CHANGE UP
M TB IFN-G CD4+ BCKGRND COR BLD-ACNC: 0.04 IU/ML — SIGNIFICANT CHANGE UP
M TB IFN-G CD4+CD8+ BCKGRND COR BLD-ACNC: 0.02 IU/ML — SIGNIFICANT CHANGE UP
MAGNESIUM SERPL-MCNC: 2.4 MG/DL — SIGNIFICANT CHANGE UP (ref 1.6–2.6)
MCHC RBC-ENTMCNC: 30 PG — SIGNIFICANT CHANGE UP (ref 27–34)
MCHC RBC-ENTMCNC: 31.6 GM/DL — LOW (ref 32–36)
MCV RBC AUTO: 94.8 FL — SIGNIFICANT CHANGE UP (ref 80–100)
MONOCYTES # BLD AUTO: 0.93 K/UL — HIGH (ref 0–0.9)
MONOCYTES NFR BLD AUTO: 10.2 % — SIGNIFICANT CHANGE UP (ref 2–14)
NEUTROPHILS # BLD AUTO: 7.12 K/UL — SIGNIFICANT CHANGE UP (ref 1.8–7.4)
NEUTROPHILS NFR BLD AUTO: 78.4 % — HIGH (ref 43–77)
NRBC # BLD: 0 /100 WBCS — SIGNIFICANT CHANGE UP (ref 0–0)
PHOSPHATE SERPL-MCNC: 2.7 MG/DL — SIGNIFICANT CHANGE UP (ref 2.5–4.5)
PLATELET # BLD AUTO: 140 K/UL — LOW (ref 150–400)
POTASSIUM SERPL-MCNC: 4 MMOL/L — SIGNIFICANT CHANGE UP (ref 3.5–5.3)
POTASSIUM SERPL-SCNC: 4 MMOL/L — SIGNIFICANT CHANGE UP (ref 3.5–5.3)
PROT SERPL-MCNC: 6.1 G/DL — SIGNIFICANT CHANGE UP (ref 6–8.3)
QUANT TB PLUS MITOGEN MINUS NIL: 1.37 IU/ML — SIGNIFICANT CHANGE UP
RBC # BLD: 3.47 M/UL — LOW (ref 4.2–5.8)
RBC # FLD: 15.2 % — HIGH (ref 10.3–14.5)
SODIUM SERPL-SCNC: 134 MMOL/L — LOW (ref 135–145)
WBC # BLD: 9.08 K/UL — SIGNIFICANT CHANGE UP (ref 3.8–10.5)
WBC # FLD AUTO: 9.08 K/UL — SIGNIFICANT CHANGE UP (ref 3.8–10.5)

## 2022-06-29 PROCEDURE — 83735 ASSAY OF MAGNESIUM: CPT

## 2022-06-29 PROCEDURE — 85730 THROMBOPLASTIN TIME PARTIAL: CPT

## 2022-06-29 PROCEDURE — 83036 HEMOGLOBIN GLYCOSYLATED A1C: CPT

## 2022-06-29 PROCEDURE — 82962 GLUCOSE BLOOD TEST: CPT

## 2022-06-29 PROCEDURE — 36415 COLL VENOUS BLD VENIPUNCTURE: CPT

## 2022-06-29 PROCEDURE — 85027 COMPLETE CBC AUTOMATED: CPT

## 2022-06-29 PROCEDURE — 99239 HOSP IP/OBS DSCHRG MGMT >30: CPT

## 2022-06-29 PROCEDURE — 94660 CPAP INITIATION&MGMT: CPT

## 2022-06-29 PROCEDURE — 80053 COMPREHEN METABOLIC PANEL: CPT

## 2022-06-29 PROCEDURE — 86803 HEPATITIS C AB TEST: CPT

## 2022-06-29 PROCEDURE — U0005: CPT

## 2022-06-29 PROCEDURE — 80048 BASIC METABOLIC PNL TOTAL CA: CPT

## 2022-06-29 PROCEDURE — 85610 PROTHROMBIN TIME: CPT

## 2022-06-29 PROCEDURE — 71046 X-RAY EXAM CHEST 2 VIEWS: CPT

## 2022-06-29 PROCEDURE — 87640 STAPH A DNA AMP PROBE: CPT

## 2022-06-29 PROCEDURE — U0003: CPT

## 2022-06-29 PROCEDURE — 85025 COMPLETE CBC W/AUTO DIFF WBC: CPT

## 2022-06-29 PROCEDURE — 80074 ACUTE HEPATITIS PANEL: CPT

## 2022-06-29 PROCEDURE — 87641 MR-STAPH DNA AMP PROBE: CPT

## 2022-06-29 PROCEDURE — 93005 ELECTROCARDIOGRAM TRACING: CPT

## 2022-06-29 PROCEDURE — 99291 CRITICAL CARE FIRST HOUR: CPT | Mod: 25

## 2022-06-29 PROCEDURE — 84100 ASSAY OF PHOSPHORUS: CPT

## 2022-06-29 PROCEDURE — 86480 TB TEST CELL IMMUN MEASURE: CPT

## 2022-06-29 PROCEDURE — 99261: CPT

## 2022-06-29 RX ADMIN — Medication 25 MILLIGRAM(S): at 05:41

## 2022-06-29 RX ADMIN — ERYTHROPOIETIN 10000 UNIT(S): 10000 INJECTION, SOLUTION INTRAVENOUS; SUBCUTANEOUS at 10:53

## 2022-06-29 RX ADMIN — CHLORHEXIDINE GLUCONATE 1 APPLICATION(S): 213 SOLUTION TOPICAL at 14:07

## 2022-06-29 RX ADMIN — SODIUM CHLORIDE 50 MILLILITER(S): 9 INJECTION, SOLUTION INTRAVENOUS at 05:46

## 2022-06-29 RX ADMIN — HEPARIN SODIUM 5000 UNIT(S): 5000 INJECTION INTRAVENOUS; SUBCUTANEOUS at 05:40

## 2022-06-29 RX ADMIN — Medication 1 DROP(S): at 05:41

## 2022-06-29 RX ADMIN — PANTOPRAZOLE SODIUM 40 MILLIGRAM(S): 20 TABLET, DELAYED RELEASE ORAL at 05:41

## 2022-06-29 RX ADMIN — HEPARIN SODIUM 5000 UNIT(S): 5000 INJECTION INTRAVENOUS; SUBCUTANEOUS at 14:01

## 2022-06-29 RX ADMIN — Medication 100 MILLIGRAM(S): at 05:45

## 2022-06-29 RX ADMIN — TICAGRELOR 60 MILLIGRAM(S): 90 TABLET ORAL at 05:41

## 2022-06-29 NOTE — DISCHARGE NOTE PROVIDER - NSDCFUADDAPPT_GEN_ALL_CORE_FT
Dialysis tx at [facility] Friday 7/1 6 AM, after that Monday/Wednesday Friday 6:30AM

## 2022-06-29 NOTE — DISCHARGE NOTE NURSING/CASE MANAGEMENT/SOCIAL WORK - NSDCFUADDAPPT_GEN_ALL_CORE_FT
Dialysis tx at [facility] [place] Friday 7/1 6 AM, after that Monday/Wednesday Friday 6:30AM

## 2022-06-29 NOTE — DISCHARGE NOTE NURSING/CASE MANAGEMENT/SOCIAL WORK - NSTRANSFEREYEGLASSESPAIRS_GEN_A_NUR
81 yo male hx of CAD s/p CABG s/p 20+ years ago, HFrEF, right sided large inguinal hernia BIBA from hotel room because he was unable to ambulate due to Rt thigh weakness and numbness, pt also had mandibular swelling for over a year. CT lumbosacral spine showed mass suggestive of mets with obliteration of neural foramina so was started on decadron.  Sacral mass biopsied 3/10 showed prostate adenocarcinoma. Mandibluar mass biopsied 3/19 also showed prostate cancer. Hospital course was complicated by hypovolemic shock likely 2/2 UGIB, required pressors and upgrade to stepdown unit. Pt currently in hospital undergoing chemo and radiotherapy. In addition he has unstageable sacral ulcer, consulted by burn, needs surgical debridement, please send Cx after debridement, pt was started on IV Unasyn, ID consulted.  Today pf is c/o generalized body ache mostly from the waist below, has jaw swelling and discomfort after radiation therapy.     ROS: Otherwise unremarkable     PAST MEDICAL & SURGICAL HISTORY  CHF (congestive heart failure)  Inguinal hernia  S/P CABG x 3      ALLERGIES  No Known Allergies    Vital Signs Last 24 Hrs  T(C): 36.2 (03 Apr 2021 13:07), Max: 36.2 (03 Apr 2021 13:07)  T(F): 97.2 (03 Apr 2021 13:07), Max: 97.2 (03 Apr 2021 13:07)  HR: 89 (03 Apr 2021 13:07) (78 - 92)  BP: 94/51 (03 Apr 2021 13:07) (94/51 - 103/59)  BP(mean): --  RR: 19 (03 Apr 2021 13:07) (19 - 19)  SpO2: 99% (03 Apr 2021 07:51) (99% - 99%)    PHYSICAL EXAM  GEN: NAD, cachectic, with temporal muscle waisting   Facial asymmetry noted, dense palpable mass noted on right lower jaw with facial swelling   PULM: decreased BS at bases   CVS: Regular rate and rhythm, S1-S2, no murmurs  ABD: Soft, non-tender, non-distended, no guarding, bulging noted below umbilicus   EXT: No edema  NEURO: A&Ox3, no focal deficits    LABS                        10.0   1.93  )-----------( 83       ( 03 Apr 2021 06:52 )             31.2     04-02    130<L>  |  98  |  22<H>  ----------------------------<  108<H>  4.5   |  23  |  0.5<L>    Ca    7.8<L>      02 Apr 2021 07:01  Mg     1.9     04-02    TPro  4.9<L>  /  Alb  3.2<L>  /  TBili  0.7  /  DBili  x   /  AST  16  /  ALT  20  /  AlkPhos  65  04-02    RADIOLOGY:    < from: MR Head No Cont (03.18.21 @ 22:41) >  IMPRESSION:    Allowing for limitation from absence of intravenous contrast, there are multiple calvarial lesions compatible with osseous metastasis. The largest lesion in the left occipital calvarium demonstrates mildly expansile subgaleal soft tissue component.    Redemonstrated partially imaged right mandibular mass, better evaluated on the previously performed MRI of the neck dated 3/11/2021.    < end of copied text >  < from: MR Cervical Spine No Cont (03.18.21 @ 22:38) >    IMPRESSION:    Numerous foci of bone marrow signal abnormality compatible with metastatic disease, including lesions of the right T1/T2 and T5/T6 vertebral bodies with large right paraspinal soft tissue masses. These masses extend into the right neural foramen with obliteration at C7-T1, T1-T2, and T5-T6. There is additional extension of tumor into the ventral epidural space at T5-6 which results in moderate spinal canal stenosis.    Moderate-sized bilateral pleural effusions.    Degenerative changes of the spine as noted above.    < end of copied text >    < from: TTE Echo Complete w/o Contrast w/ Doppler (03.15.21 @ 13:23) >    Summary:   1. Moderately to severely decreased segmental left ventricular systolic function.   2. Multiple left ventricular regional wall motion abnormalities exist. See wall motion findings.   3. Severely enlarged left atrium.   4. LV Ejection Fraction by Chakraborty's Method with a biplane EF of 35 %.   5. Normal left ventricular internal cavity size.   6. Normal right atrial size.   7. Mild mitral valve regurgitation.   8. Moderate mitral annular calcification.   9. Mild tricuspid regurgitation.  10. Sclerotic aortic valve with normal opening.  11. LA volume Index is 60.8 ml/m² ml/m2.  12. There is moderate aortic root calcification.    MEDICATIONS  (STANDING):  ampicillin/sulbactam  IVPB 3 Gram(s) IV Intermittent every 6 hours  ampicillin/sulbactam  IVPB      bicalutamide 50 milliGRAM(s) Oral daily  chlorhexidine 4% Liquid 1 Application(s) Topical <User Schedule>  cholecalciferol 2000 Unit(s) Oral daily  collagenase Ointment 1 Application(s) Topical daily  Dakins Solution - 1/2 Strength 1 Application(s) Topical two times a day  dexAMETHasone     Tablet 4 milliGRAM(s) Oral every 12 hours  enoxaparin Injectable 70 milliGRAM(s) SubCutaneous two times a day  fentaNYL   Patch  25 MICROgram(s)/Hr. 1 Patch Transdermal every 48 hours  latanoprost 0.005% Ophthalmic Solution 1 Drop(s) Both EYES at bedtime  lidocaine   Patch 2 Patch Transdermal daily  methocarbamol 500 milliGRAM(s) Oral three times a day  midodrine. 10 milliGRAM(s) Oral three times a day  pantoprazole    Tablet 40 milliGRAM(s) Oral before breakfast  sodium chloride 1 Gram(s) Oral two times a day  sodium zirconium cyclosilicate 5 Gram(s) Oral two times a day  zoledronic acid IVPB (ZOMETA) (Non - oncologic) 4 milliGRAM(s) IV Intermittent every 4 weeks    MEDICATIONS  (PRN):  acetaminophen   Tablet .. 650 milliGRAM(s) Oral every 6 hours PRN Moderate Pain (4 - 6)  aluminum hydroxide/magnesium hydroxide/simethicone Suspension 30 milliLiter(s) Oral every 6 hours PRN Dyspepsia  brimonidine 0.2% Ophthalmic Solution 1 Drop(s) Both EYES every 8 hours PRN dry eyes  morphine  - Injectable 2 milliGRAM(s) IV Push every 6 hours PRN Severe Pain (7 - 10)  oxycodone    5 mG/acetaminophen 325 mG 1 Tablet(s) Oral every 6 hours PRN Severe Pain (7 - 10)             not with pt/1 pair

## 2022-06-29 NOTE — DISCHARGE NOTE PROVIDER - NSDCCPCAREPLAN_GEN_ALL_CORE_FT
PRINCIPAL DISCHARGE DIAGNOSIS  Diagnosis: Encounter for hemodialysis for ESRD  Assessment and Plan of Treatment: You had emergent dialysis in the hospital. Please resume dialysis at your regular center.      SECONDARY DISCHARGE DIAGNOSES  Diagnosis: Acute respiratory failure with hypoxia  Assessment and Plan of Treatment: Due to missed dialysis. Please continue dialysis as ordered outpatient.

## 2022-06-29 NOTE — DISCHARGE NOTE NURSING/CASE MANAGEMENT/SOCIAL WORK - NSDCPEFALRISK_GEN_ALL_CORE
For information on Fall & Injury Prevention, visit: [email or web address] OR  [email or web address] OR  https://www.cdc.gov/steadi/patient.html

## 2022-06-29 NOTE — DISCHARGE NOTE PROVIDER - CARE PROVIDER_API CALL
Pahlavan, Mohsen (MD)  Medicine  [address], [address]  [place] [address]  Phone: [phone]  Fax: [phone]  Follow Up Time: 1-3 days

## 2022-06-29 NOTE — DISCHARGE NOTE PROVIDER - NSDCMRMEDTOKEN_GEN_ALL_CORE_FT
atorvastatin 40 mg oral tablet: 1 tab(s) orally once a day (at bedtime)  Brilinta (ticagrelor) 60 mg oral tablet: 1 tab(s) orally 2 times a day  Metoprolol Succinate ER 25 mg oral tablet, extended release: 1 tab(s) orally once a day  Nephro-Beronica oral tablet: 1 tab(s) orally once a day  Pepcid 20 mg oral tablet: 1 tab(s) orally 2 times a day  Protonix 20 mg oral delayed release tablet: 1 tab(s) orally once a day

## 2022-06-29 NOTE — PROGRESS NOTE ADULT - REASON FOR ADMISSION
Need for HD
Dermal Autograft Text: The defect edges were debeveled with a #15 scalpel blade.  Given the location of the defect, shape of the defect and the proximity to free margins a dermal autograft was deemed most appropriate.  Using a sterile surgical marker, the primary defect shape was transferred to the donor site. The area thus outlined was incised deep to adipose tissue with a #15 scalpel blade.  The harvested graft was then trimmed of adipose and epidermal tissue until only dermis was left.  The skin graft was then placed in the primary defect and oriented appropriately.

## 2022-06-29 NOTE — DISCHARGE NOTE PROVIDER - ATTENDING DISCHARGE PHYSICAL EXAMINATION:
Vital Signs Last 24 Hrs  T(C): 36.9 (29 Jun 2022 13:46), Max: 37.4 (28 Jun 2022 21:04)  T(F): 98.4 (29 Jun 2022 13:46), Max: 99.3 (28 Jun 2022 21:04)  HR: 74 (29 Jun 2022 13:46) (67 - 78)  BP: 146/67 (29 Jun 2022 13:46) (127/62 - 152/69)  BP(mean): 95 (28 Jun 2022 20:00) (89 - 99)  RR: 18 (29 Jun 2022 13:46) (18 - 44)  SpO2: 90% (29 Jun 2022 13:46) (90% - 99%)    GEN: Awake, alert, NAD  HEENT: NC/AT, EOMI, MMM, neck supple  RESP: LCTAB/L, no R/R/W  CARD: RRR, no wheeze  ABD: soft, NT, ND, +BS  EXT: no C/C/E  Neuro: non focal

## 2022-06-29 NOTE — PROGRESS NOTE ADULT - NUTRITIONAL ASSESSMENT
MEDICATIONS  (STANDING):  artificial tears (preservative free) Ophthalmic Solution 1 Drop(s) Both EYES three times a day  atorvastatin 40 milliGRAM(s) Oral at bedtime  chlorhexidine 4% Liquid 1 Application(s) Topical <User Schedule>  cycloSPORINE (RESTASIS) 0.05% Emulsion 1 Drop(s) Both EYES daily  dextrose 5%. 1000 milliLiter(s) (100 mL/Hr) IV Continuous <Continuous>  dextrose 5%. 1000 milliLiter(s) (50 mL/Hr) IV Continuous <Continuous>  dextrose 5%. 1000 milliLiter(s) (50 mL/Hr) IV Continuous <Continuous>  dextrose 50% Injectable 25 Gram(s) IV Push once  dextrose 50% Injectable 12.5 Gram(s) IV Push once  dextrose 50% Injectable 25 Gram(s) IV Push once  epoetin demetrius-epbx (RETACRIT) Injectable 10079 Unit(s) IV Push <User Schedule>  glucagon  Injectable 1 milliGRAM(s) IntraMuscular once  heparin   Injectable 5000 Unit(s) SubCutaneous every 8 hours  metoprolol tartrate 25 milliGRAM(s) Oral two times a day  pantoprazole    Tablet 40 milliGRAM(s) Oral before breakfast  ticagrelor 60 milliGRAM(s) Oral every 12 hours
MEDICATIONS  (STANDING):  artificial tears (preservative free) Ophthalmic Solution 1 Drop(s) Both EYES three times a day  atorvastatin 40 milliGRAM(s) Oral at bedtime  chlorhexidine 4% Liquid 1 Application(s) Topical <User Schedule>  cycloSPORINE (RESTASIS) 0.05% Emulsion 1 Drop(s) Both EYES daily  dextrose 5%. 1000 milliLiter(s) (100 mL/Hr) IV Continuous <Continuous>  dextrose 5%. 1000 milliLiter(s) (50 mL/Hr) IV Continuous <Continuous>  dextrose 5%. 1000 milliLiter(s) (50 mL/Hr) IV Continuous <Continuous>  dextrose 50% Injectable 25 Gram(s) IV Push once  dextrose 50% Injectable 12.5 Gram(s) IV Push once  dextrose 50% Injectable 25 Gram(s) IV Push once  epoetin demetrius-epbx (RETACRIT) Injectable 46865 Unit(s) IV Push <User Schedule>  glucagon  Injectable 1 milliGRAM(s) IntraMuscular once  heparin   Injectable 5000 Unit(s) SubCutaneous every 8 hours  metoprolol tartrate 25 milliGRAM(s) Oral two times a day  pantoprazole    Tablet 40 milliGRAM(s) Oral before breakfast  ticagrelor 60 milliGRAM(s) Oral every 12 hours

## 2022-06-29 NOTE — DISCHARGE NOTE NURSING/CASE MANAGEMENT/SOCIAL WORK - PATIENT PORTAL LINK FT
You can access the FollowMyHealth Patient Portal offered by [facility] by registering at the following website: [email or web address]. By joining [facility]’s FollowMyHealth portal, you will also be able to view your health information using other applications (apps) compatible with our system.

## 2022-06-29 NOTE — DISCHARGE NOTE PROVIDER - HOSPITAL COURSE
HPI:  ADMISSION H&P:  67 yr male T2DM , HTN , CAD ,  ESRD on HD, [place]  and not not able to get on community HD seat as apparently terminated for being away 6 months. Came to hospital ED to have HD and arrange for community seat. Required to get PPD and hepatitis panel.  In ED dismal renal indices with Cr 13, CXR cardiomegaly with perihilar airspace prominence. Severe BP elevation 220/135 with ensuing delirium necessitating nitro drip , Bipap and ICU admission , emergent HD (27 Jun 2022 19:44)    HOSPITAL COURSE:   Patient admitted for hypertensive emergency and acute hypoxic respiratory failure due to fluid overload from missed HD sessions. He was treated with emergent dialysis and antihypertensives. His BP normalized and he was continued on HD. Clinicals were sent to his dialysis center and his chair was reinstated. He will follow up outpatient with his nephrologist and go to outpatient dialysis.

## 2022-06-29 NOTE — PROGRESS NOTE ADULT - SUBJECTIVE AND OBJECTIVE BOX
Patient is a 67y Male whom presented to the hospital with esrd on hd     PAST MEDICAL & SURGICAL HISTORY:  ESRD on dialysis      T2DM (type 2 diabetes mellitus)      CAD (coronary artery disease)          MEDICATIONS  (STANDING):  artificial tears (preservative free) Ophthalmic Solution 1 Drop(s) Both EYES three times a day  chlorhexidine 4% Liquid 1 Application(s) Topical <User Schedule>  cycloSPORINE (RESTASIS) 0.05% Emulsion 1 Drop(s) Both EYES daily  dextrose 5%. 1000 milliLiter(s) (100 mL/Hr) IV Continuous <Continuous>  dextrose 5%. 1000 milliLiter(s) (50 mL/Hr) IV Continuous <Continuous>  dextrose 5%. 1000 milliLiter(s) (50 mL/Hr) IV Continuous <Continuous>  dextrose 50% Injectable 25 Gram(s) IV Push once  dextrose 50% Injectable 12.5 Gram(s) IV Push once  dextrose 50% Injectable 25 Gram(s) IV Push once  epoetin demetrius-epbx (RETACRIT) Injectable 34712 Unit(s) IV Push <User Schedule>  glucagon  Injectable 1 milliGRAM(s) IntraMuscular once  heparin   Injectable 5000 Unit(s) SubCutaneous every 8 hours  insulin lispro (ADMELOG) corrective regimen sliding scale   SubCutaneous every 6 hours  metoprolol tartrate 25 milliGRAM(s) Oral two times a day  nitroglycerin  Infusion 6 MICROgram(s)/Min (1.8 mL/Hr) IV Continuous <Continuous>  pantoprazole    Tablet 40 milliGRAM(s) Oral before breakfast      Allergies    No Known Allergies    Intolerances        SOCIAL HISTORY:  Denies ETOh,Smoking,     FAMILY HISTORY:      REVIEW OF SYSTEMS:    CONSTITUTIONAL: No weakness, fevers or chills  RESPIRATORY: No cough, wheezing, hemoptysis; pos  shortness of breath  CARDIOVASCULAR: No chest pain or palpitations  GASTROINTESTINAL: No abdominal or epigastric pain. No nausea, vomiting,     No diarrhea or constipation. No melena                                     10.4   9.08  )-----------( 140      ( 29 Jun 2022 06:00 )             32.9       CBC Full  -  ( 29 Jun 2022 06:00 )  WBC Count : 9.08 K/uL  RBC Count : 3.47 M/uL  Hemoglobin : 10.4 g/dL  Hematocrit : 32.9 %  Platelet Count - Automated : 140 K/uL  Mean Cell Volume : 94.8 fl  Mean Cell Hemoglobin : 30.0 pg  Mean Cell Hemoglobin Concentration : 31.6 gm/dL  Auto Neutrophil # : 7.12 K/uL  Auto Lymphocyte # : 0.88 K/uL  Auto Monocyte # : 0.93 K/uL  Auto Eosinophil # : 0.06 K/uL  Auto Basophil # : 0.05 K/uL  Auto Neutrophil % : 78.4 %  Auto Lymphocyte % : 9.7 %  Auto Monocyte % : 10.2 %  Auto Eosinophil % : 0.7 %  Auto Basophil % : 0.6 %      06-29    134<L>  |  96  |  23  ----------------------------<  202<H>  4.0   |  32<H>  |  6.40<H>    Ca    8.2<L>      29 Jun 2022 06:00  Phos  2.7     06-29  Mg     2.4     06-29    TPro  6.1  /  Alb  2.8<L>  /  TBili  0.5  /  DBili  x   /  AST  12<L>  /  ALT  27  /  AlkPhos  77  06-29      CAPILLARY BLOOD GLUCOSE      POCT Blood Glucose.: 280 mg/dL (29 Jun 2022 09:13)      Vital Signs Last 24 Hrs  T(C): 36.9 (29 Jun 2022 13:46), Max: 36.9 (29 Jun 2022 13:46)  T(F): 98.4 (29 Jun 2022 13:46), Max: 98.4 (29 Jun 2022 13:46)  HR: 74 (29 Jun 2022 13:46) (67 - 74)  BP: 146/67 (29 Jun 2022 13:46) (127/62 - 146/67)  BP(mean): --  RR: 18 (29 Jun 2022 13:46) (18 - 18)  SpO2: 90% (29 Jun 2022 13:46) (90% - 99%)        PT/INR - ( 28 Jun 2022 07:05 )   PT: 13.7 sec;   INR: 1.17 ratio         PTT - ( 28 Jun 2022 07:05 )  PTT:37.9 sec    PHYSICAL EXAM:    Constitutional: NAD  HEENT: conjunctive   clear   Neck:  No JVD  Respiratory: decrease bs b/l   Cardiovascular: S1 and S2  Gastrointestinal: BS+, soft, NT/ND  Extremities: No peripheral edema  : No Lechuga  Skin: No rashes  Access: pos fistula

## 2022-08-12 RX ORDER — OMEPRAZOLE 10 MG/1
1 CAPSULE, DELAYED RELEASE ORAL
Qty: 0 | Refills: 0 | DISCHARGE

## 2023-01-12 ENCOUNTER — INPATIENT (INPATIENT)
Facility: HOSPITAL | Age: 68
LOS: 9 days | Discharge: HOME | DRG: 115 | End: 2023-01-22
Attending: HOSPITALIST | Admitting: PSYCHIATRY & NEUROLOGY
Payer: COMMERCIAL

## 2023-01-12 VITALS
TEMPERATURE: 98 F | DIASTOLIC BLOOD PRESSURE: 89 MMHG | WEIGHT: 184.97 LBS | SYSTOLIC BLOOD PRESSURE: 198 MMHG | HEART RATE: 82 BPM | HEIGHT: 69 IN | RESPIRATION RATE: 18 BRPM | OXYGEN SATURATION: 100 %

## 2023-01-12 LAB
ALBUMIN SERPL ELPH-MCNC: 4.6 G/DL — SIGNIFICANT CHANGE UP (ref 3.3–5)
ALP SERPL-CCNC: 96 U/L — SIGNIFICANT CHANGE UP (ref 40–120)
ALT FLD-CCNC: 15 U/L — SIGNIFICANT CHANGE UP (ref 10–45)
ANION GAP SERPL CALC-SCNC: 18 MMOL/L — HIGH (ref 5–17)
AST SERPL-CCNC: 20 U/L — SIGNIFICANT CHANGE UP (ref 10–40)
BASOPHILS # BLD AUTO: 0.08 K/UL — SIGNIFICANT CHANGE UP (ref 0–0.2)
BASOPHILS NFR BLD AUTO: 1 % — SIGNIFICANT CHANGE UP (ref 0–2)
BILIRUB SERPL-MCNC: 0.3 MG/DL — SIGNIFICANT CHANGE UP (ref 0.2–1.2)
BUN SERPL-MCNC: 39 MG/DL — HIGH (ref 7–23)
CALCIUM SERPL-MCNC: 9.6 MG/DL — SIGNIFICANT CHANGE UP (ref 8.4–10.5)
CHLORIDE SERPL-SCNC: 95 MMOL/L — LOW (ref 96–108)
CO2 SERPL-SCNC: 25 MMOL/L — SIGNIFICANT CHANGE UP (ref 22–31)
CREAT SERPL-MCNC: 8.46 MG/DL — HIGH (ref 0.5–1.3)
CRP SERPL-MCNC: 4 MG/L — SIGNIFICANT CHANGE UP (ref 0–4)
EGFR: 6 ML/MIN/1.73M2 — LOW
EOSINOPHIL # BLD AUTO: 0.14 K/UL — SIGNIFICANT CHANGE UP (ref 0–0.5)
EOSINOPHIL NFR BLD AUTO: 1.8 % — SIGNIFICANT CHANGE UP (ref 0–6)
FLUAV AG NPH QL: SIGNIFICANT CHANGE UP
FLUBV AG NPH QL: SIGNIFICANT CHANGE UP
GLUCOSE SERPL-MCNC: 90 MG/DL — SIGNIFICANT CHANGE UP (ref 70–99)
HCT VFR BLD CALC: 34.9 % — LOW (ref 39–50)
HGB BLD-MCNC: 11.2 G/DL — LOW (ref 13–17)
IMM GRANULOCYTES NFR BLD AUTO: 0.8 % — SIGNIFICANT CHANGE UP (ref 0–0.9)
LYMPHOCYTES # BLD AUTO: 1.42 K/UL — SIGNIFICANT CHANGE UP (ref 1–3.3)
LYMPHOCYTES # BLD AUTO: 17.8 % — SIGNIFICANT CHANGE UP (ref 13–44)
MCHC RBC-ENTMCNC: 31.6 PG — SIGNIFICANT CHANGE UP (ref 27–34)
MCHC RBC-ENTMCNC: 32.1 GM/DL — SIGNIFICANT CHANGE UP (ref 32–36)
MCV RBC AUTO: 98.6 FL — SIGNIFICANT CHANGE UP (ref 80–100)
MONOCYTES # BLD AUTO: 0.64 K/UL — SIGNIFICANT CHANGE UP (ref 0–0.9)
MONOCYTES NFR BLD AUTO: 8 % — SIGNIFICANT CHANGE UP (ref 2–14)
NEUTROPHILS # BLD AUTO: 5.66 K/UL — SIGNIFICANT CHANGE UP (ref 1.8–7.4)
NEUTROPHILS NFR BLD AUTO: 70.6 % — SIGNIFICANT CHANGE UP (ref 43–77)
NRBC # BLD: 0 /100 WBCS — SIGNIFICANT CHANGE UP (ref 0–0)
PLATELET # BLD AUTO: 151 K/UL — SIGNIFICANT CHANGE UP (ref 150–400)
POTASSIUM SERPL-MCNC: 5.1 MMOL/L — SIGNIFICANT CHANGE UP (ref 3.5–5.3)
POTASSIUM SERPL-SCNC: 5.1 MMOL/L — SIGNIFICANT CHANGE UP (ref 3.5–5.3)
PROT SERPL-MCNC: 7.6 G/DL — SIGNIFICANT CHANGE UP (ref 6–8.3)
RBC # BLD: 3.54 M/UL — LOW (ref 4.2–5.8)
RBC # FLD: 14.5 % — SIGNIFICANT CHANGE UP (ref 10.3–14.5)
RSV RNA NPH QL NAA+NON-PROBE: SIGNIFICANT CHANGE UP
SARS-COV-2 RNA SPEC QL NAA+PROBE: SIGNIFICANT CHANGE UP
SODIUM SERPL-SCNC: 138 MMOL/L — SIGNIFICANT CHANGE UP (ref 135–145)
WBC # BLD: 8 K/UL — SIGNIFICANT CHANGE UP (ref 3.8–10.5)
WBC # FLD AUTO: 8 K/UL — SIGNIFICANT CHANGE UP (ref 3.8–10.5)

## 2023-01-12 PROCEDURE — 99285 EMERGENCY DEPT VISIT HI MDM: CPT

## 2023-01-12 PROCEDURE — 70496 CT ANGIOGRAPHY HEAD: CPT | Mod: 26,MA

## 2023-01-12 PROCEDURE — 70498 CT ANGIOGRAPHY NECK: CPT | Mod: 26,MA

## 2023-01-12 RX ORDER — ACETAMINOPHEN 500 MG
650 TABLET ORAL ONCE
Refills: 0 | Status: COMPLETED | OUTPATIENT
Start: 2023-01-12 | End: 2023-01-12

## 2023-01-12 RX ADMIN — Medication 650 MILLIGRAM(S): at 23:30

## 2023-01-12 NOTE — ED PROVIDER NOTE - PHYSICAL EXAMINATION
GENERAL: NAD  HEENT:  Atraumatic  CHEST/LUNG: Chest rise equal bilaterally  HEART: Regular rate and rhythm  ABDOMEN: Soft, Nontender, Nondistended  EXTREMITIES:  Extremities warm  PSYCH: A&Ox3  SKIN: No obvious rashes or lesions  MSK: No cervical spine TTP, able to range neck to the left and right  NEUROLOGY: strength and sensation intact in all extremities. Inability to medially deviate left eye. Remainig cranial nerve examination present. PEERLA, 4mm bilat.   Visual acuity: 20/50 left eye, 20/25 right eye, both corrected w/ reading glasses  IOP 17 bilaterally in both eyes

## 2023-01-12 NOTE — CONSULT NOTE ADULT - SUBJECTIVE AND OBJECTIVE BOX
Neurology - Consult Note    -  Spectra: [phone] ([facility]), [phone] ([facility])  -    HPI: Patient NERY LEONADR is a 67y (1955) Rh danette speaking man with DM, CKD on HD, ,HTN, HLD presenting with Left eye ptosis and diplopia. Patient reports 10-12 days ago having left sided headache. At that time, he noted drooping of left eyelid. Over the course of the last 1 week hit has gotten progressively worse. 5 days ago he noticed double vision and eyes was dysconjugate. When closing one at a time diplopia resolves. He continues to have headache controlled with tylenol rated at 2/10. Headache onset reached maximum intensity of 5/10 over several hours. He saw opthomologist, who noted retinal scarring bilaterally. 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  Denies positional headache, neck stiffness, weakness, numbness, changes to speech, dysphagia, difficulty ambulating.        Review of Systems:    All other review of systems is negative unless indicated above.    Allergies:      PMHx/PSHx/Family Hx: As above, otherwise see below       Social Hx:  No current use of tobacco, alcohol, or illicit drugs      Medications:  MEDICATIONS  (STANDING):    MEDICATIONS  (PRN):      Vitals:  T(C): 36.7 (01-12-23 @ 15:58), Max: 36.7 (01-12-23 @ 15:58)  HR: 82 (01-12-23 @ 15:58) (82 - 82)  BP: 198/89 (01-12-23 @ 15:58) (198/89 - 198/89)  RR: 18 (01-12-23 @ 15:58) (18 - 18)  SpO2: 100% (01-12-23 @ 15:58) (100% - 100%)    Physical Examination:  General - NAD  Cardiovascular - Peripheral pulses palpable, no edema  Eyes - Fundoscopy with flat, sharp optic discs and no hemorrhage or exudates    Neurologic Exam:  Mental status - Awake, Alert, Oriented to person, place, and time. Speech fluent, repetition and naming intact. Follows simple and complex commands. Attention/concentration, recent and remote memory (including registration and recall), and fund of knowledge intact    Cranial nerves - PERRL, left eye ptosis, VFF, all extraocular muscles affected except abduction of left eye, face sensation (V1-V3) intact b/l, facial strength intact without asymmetry b/l, hearing intact b/l, palate with symmetric elevation, trapezius  5/5 strength b/l, tongue midline on protrusion with full lateral movement    Motor - Normal bulk and tone throughout. No pronator drift.  Strength testing            Deltoid      Biceps      Triceps     Wrist Extension    Wrist Flexion     Interossei         R            5                 5               5                     5                              5                        5                 5  L             5                 5               5                     5                              5                        5                 5              Hip Flexion    Hip Extension    Knee Flexion    Knee Extension    Dorsiflexion    Plantar Flexion  R              5                           5                       5                           5                            5                          5  L              5                           5                        5                           5                            5                          5    Sensation - Light touch intact throughout    DTR's -             Biceps      Triceps     Brachioradialis      Patellar    Ankle    Toes/plantar response  R             2+             2+                  2+                       2+            2+                 Down  L              2+             2+                 2+                        2+           2+                 Down    Coordination - Finger to Nose intact b/l. No tremors appreciated    Gait and station - Normal casual gait.     Labs:                        11.2   8.00  )-----------( 151      ( 12 Jan 2023 19:44 )             34.9     01-12    138  |  95<L>  |  39<H>  ----------------------------<  90  5.1   |  25  |  8.46<H>    Ca    9.6      12 Jan 2023 19:44    TPro  7.6  /  Alb  4.6  /  TBili  0.3  /  DBili  x   /  AST  20  /  ALT  15  /  AlkPhos  96  01-12    CAPILLARY BLOOD GLUCOSE        LIVER FUNCTIONS - ( 12 Jan 2023 19:44 )  Alb: 4.6 g/dL / Pro: 7.6 g/dL / ALK PHOS: 96 U/L / ALT: 15 U/L / AST: 20 U/L / GGT: x                   Radiology:

## 2023-01-12 NOTE — ED PROVIDER NOTE - ATTENDING CONTRIBUTION TO CARE
No records available, history from patient and granddaughter, patient is 67-year-old male with a history of diabetes who presents with gradual onset of left-sided headache, drooping of the left eyelid, and double vision.  No priors like this.  Denies any fevers, neck pain, or numbness or weakness of the upper or lower extremities.  On exam patient is well-appearing, nontoxic, has left ptosis, but forehead muscles are intact,, unable to abduct the left eye and has limited upward gaze of the left eye.    MDM: 67-year-old male with concerns for brainstem lesion, will check CT head, angiogram of the head and neck, venogram, check labs including electrolytes and CBC, consult neurology.

## 2023-01-12 NOTE — ED PROVIDER NOTE - NS ED ROS FT
GENERAL: No fever or chills  EYES: + change in vision   HEENT: no trouble swallowing or speaking   CARDIAC: no chest pain   PULMONARY: no cough or SOB  GI:  No abdominal pain  : No changes in urination   SKIN: no rashes   NEURO: + headache   MSK: No joint pain     All other ROS negative unless otherwise specified in HPI.

## 2023-01-12 NOTE — CONSULT NOTE ADULT - ASSESSMENT
Patient NERY LEONARD is a 67y (1955) Rh danette speaking man with DM, CKD on HD, ,HTN, HLD presenting with Left eye ptosis and diplopia. Patient reports 10-12 days ago having left sided headache. At that time, he noted drooping of left eyelid. Over the course of the last 1 week hit has gotten progressively worse. 5 days ago he noticed double vision and eyes was dysconjugate. When closing one at a time diplopia resolves. He continues to have headache controlled with tylenol rated at 2/10. Headache onset reached maximum intensity of 5/10 over several hours. He saw opthomologist, who noted retinal scarring bilaterally. 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  Denies positional headache, neck stiffness, weakness, numbness, changes to speech, dysphagia, difficulty ambulating.     Impression: Cranial Nerve 3 palsy due to possibley       Recommendation: INCOMPLETE      Patient NERY LEONARD is a 67y (1955) Rh danette speaking man with DM, CKD on HD, ,HTN, HLD presenting with Left eye ptosis and diplopia. Patient reports 10-12 days ago having left sided headache. At that time, he noted drooping of left eyelid. Over the course of the last 1 week hit has gotten progressively worse. 5 days ago he noticed double vision and eyes was dysconjugate. When closing one at a time diplopia resolves. He continues to have headache controlled with tylenol rated at 2/10. Headache onset reached maximum intensity of 5/10 over several hours. He saw opthomologist, who noted retinal scarring bilaterally. 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  Denies positional headache, neck stiffness, weakness, numbness, changes to speech, dysphagia, difficulty ambulating.     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 Patient reports 10-12 days ago having left sided headache. At that time, he noted drooping of left eyelid. Over the course of the last 1 week hit has gotten progressively worse. 5 days ago he noticed double vision and eyes was dysconjugate. When closing one at a time diplopia resolves. He continues to have headache controlled with tylenol rated at 2/10. Headache onset reached maximum intensity of 5/10 over several hours. He saw opthomologist, who noted retinal scarring bilaterally. 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  Denies positional headache, neck stiffness, weakness, numbness, changes to speech, dysphagia, difficulty ambulating.     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   [] BG goal <180, avoid hypoglycemia    Case discussed with Dr. Bermudez stroke fellow. To be seen during AM rounds.   Patient NERY LEONARD is a 67y (1955) Rh danette speaking man with DM, CKD on HD, ,HTN, HLD presenting with Left eye ptosis and diplopia. Patient reports 10-12 days ago having left sided headache. At that time, he noted drooping of left eyelid. Over the course of the last 1 week hit has gotten progressively worse. 5 days ago he noticed double vision and eyes was dysconjugate. When closing one at a time diplopia resolves. He continues to have headache controlled with tylenol rated at 2/10. Headache onset reached maximum intensity of 5/10 over several hours. He saw opthomologist, who noted retinal scarring bilaterally. 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  Denies positional headache, neck stiffness, weakness, numbness, changes to speech, dysphagia, difficulty ambulating.     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   [] BG goal <180, avoid hypoglycemia    Case discussed with Dr. Bermudez stroke fellow. To be seen during AM rounds.

## 2023-01-12 NOTE — ED ADULT NURSE NOTE - OBJECTIVE STATEMENT
First encounter with the pt, pt received from triage with complaints of left eye pain. Pt is a/ox4 and verbal. Speech is clear and able to speak in full sentences without distress. Airway is patent with no obstruction nor blocking secretions. Breathing is even and unlabored on room air. Pt has strong pulses palpated bilaterally. Neuro check is wnl. Full rom. Pt denies chest pain, n/v and sob. No acute distress noted. Pt has call light within the reach of the pt at the bedside. Will continue to monitor the pt.

## 2023-01-12 NOTE — ED PROVIDER NOTE - DISPOSITION TYPE
[FreeTextEntry1] : JEB RAGLAND is a 76 year old male here for a physical exam.\par \par He has a history of hypertension, hyperlipidemia, impaired fasting glucose, and BPH. He also has a history of chronic renal failure and was referred to Nephrology (Dr. Rey) in the past. His last creatinine was normal at 1.17 with an normal eGFR as well. \par \par He sees a cardiologist regularly and does not need an EKG today. \par \par  ADMIT

## 2023-01-12 NOTE — ED PROVIDER NOTE - OBJECTIVE STATEMENT
66 y/o male w/ PMH HTN, DM, HLD, CKD on dialysis M, W, and F c/o 12 day history of inability to open left eye, diplopia, and eye pain. Pt admits to eyelid droop and swelling. Admits to left-sided localized headache that began gradually 12 days ago, denies traumas/falls. Denies numbness/tingling, change in speech, auditory changes.

## 2023-01-12 NOTE — ED ADULT NURSE REASSESSMENT NOTE - NS ED NURSE REASSESS COMMENT FT1
Report received from Rekha Koenig RN. Pt resting comfortably in stretcher. A&Ox3, VSS. NAD noted. Left eye is closed shut at this time, patient complaining of double vision. Pupils are equal round and reactive to light. No facial drooping or slurred speech noted. Pt daughter at bedside. Pt pending CT scan at this time. Plan of care discussed. Safety and comfort measures maintained. The patient is a 89y Female complaining of hip pain/injury.

## 2023-01-12 NOTE — ED PROVIDER NOTE - CLINICAL SUMMARY MEDICAL DECISION MAKING FREE TEXT BOX
66 y/o male w/ PMH HTN, DM, HLD, CKD on dialysis M, W, and F c/o 12 day history of inability to open left eye, diplopia, and eye pain. Pt admits to eyelid droop and swelling. Admits to left-sided localized headache that began gradually 12 days ago, denies traumas/falls. Inability to medially deviate left eye, otherwise unremarkable IOP. Visual acuity mildly decreased in left eye. Concern for cranial nerve entrapment 2/2 possible sinus venous thrombosis vs TIA vs carotid dissection. Will CT head and neck angio, CT venogram to screen for cavernous sinus thrombosis, and reassess w/ neuro consult for cranial nerve deficiency.

## 2023-01-12 NOTE — ED PROVIDER NOTE - RAPID ASSESSMENT
67M presents to ED L eye complication x12 days. Pt comes in ED mainly because he cannot open his eye. Pt is accompanied by historian (daughter) who states that pt initially had eye droopiness and the past weak is progresses to swelling. Pt was sent by ophthalmologist to get CAT scan. Pt sees double vision. Pt denies trauma, family history.     Patient was rapidly assessed via a rapid medical evaluation and/or role of Quick Triage Doctor; a limited history, physical exam and assessment was performed. The patient will be seen and further evaluated in the main emergency department. The remainder of care and evaluation will be conducted by the primary emergency medicine team. Receiving team will follow up on labs, imaging and serially reassess patient as indicated. All further decisions regarding patient care, evaluation and disposition are at the discretion of the receiving primary emergency department team. Seen by Latasha Hood (Scribe). 67M hx of diabetes, rentinal scarring, presents to ED L eye complication x12 days. Pt comes in ED mainly because he cannot open his eye. Pt is accompanied by historian (daughter) who states that pt initially had eye droopiness and the past weak is progresses to swelling. Pt was sent by ophthalmologist to get CT scan. Pt sees double vision. Pt denies trauma, family history. Denies hx of mystatehnia, rheumatologic disease, thyroid disease. + left eye swelling, unable to see. Cannot see red. Well appearing in triage.    Patient was rapidly assessed via a rapid medical evaluation and/or role of Quick Triage Doctor; a limited history, physical exam and assessment was performed. The patient will be seen and further evaluated in the main emergency department. The remainder of care and evaluation will be conducted by the primary emergency medicine team. Receiving team will follow up on labs, imaging and serially reassess patient as indicated. All further decisions regarding patient care, evaluation and disposition are at the discretion of the receiving primary emergency department team. Seen by Latasha Hood ([facility]).    *** Atif ANSARI D.O., performed an initial face to face bedside interview with this patient regarding history of present illness and determined that the patient should be evaluated in the main ED. A physical exam was not performed due to private space availability. This patient's evaluation is NOT COMPLETE and only preliminary. The full assessment, management, and reassessment of this patient, including but not limited to the follow up of ordered laboratory and radiologic testing, is deferred to the main ED provider. ***

## 2023-01-13 DIAGNOSIS — H57.12 OCULAR PAIN, LEFT EYE: ICD-10-CM

## 2023-01-13 LAB
A1C WITH ESTIMATED AVERAGE GLUCOSE RESULT: 6.4 % — HIGH (ref 4–5.6)
CHOLEST SERPL-MCNC: 136 MG/DL — SIGNIFICANT CHANGE UP
ERYTHROCYTE [SEDIMENTATION RATE] IN BLOOD: 47 MM/HR — HIGH (ref 0–20)
ESTIMATED AVERAGE GLUCOSE: 137 MG/DL — HIGH (ref 68–114)
GLUCOSE BLDC GLUCOMTR-MCNC: 198 MG/DL — HIGH (ref 70–99)
GLUCOSE BLDC GLUCOMTR-MCNC: 248 MG/DL — HIGH (ref 70–99)
HDLC SERPL-MCNC: 36 MG/DL — LOW
LIPID PNL WITH DIRECT LDL SERPL: 60 MG/DL — SIGNIFICANT CHANGE UP
MAGNESIUM SERPL-MCNC: 4.3 MG/DL — HIGH (ref 1.6–2.6)
NON HDL CHOLESTEROL: 100 MG/DL — SIGNIFICANT CHANGE UP
PHOSPHATE SERPL-MCNC: 5.2 MG/DL — HIGH (ref 2.5–4.5)
TRIGL SERPL-MCNC: 200 MG/DL — HIGH
TSH SERPL-MCNC: 2 UIU/ML — SIGNIFICANT CHANGE UP (ref 0.27–4.2)

## 2023-01-13 PROCEDURE — 70551 MRI BRAIN STEM W/O DYE: CPT | Mod: 26

## 2023-01-13 PROCEDURE — 71046 X-RAY EXAM CHEST 2 VIEWS: CPT | Mod: 26

## 2023-01-13 PROCEDURE — 93306 TTE W/DOPPLER COMPLETE: CPT | Mod: 26

## 2023-01-13 PROCEDURE — 99223 1ST HOSP IP/OBS HIGH 75: CPT

## 2023-01-13 RX ORDER — ASPIRIN/CALCIUM CARB/MAGNESIUM 324 MG
81 TABLET ORAL DAILY
Refills: 0 | Status: DISCONTINUED | OUTPATIENT
Start: 2023-01-13 | End: 2023-01-20

## 2023-01-13 RX ORDER — GLUCAGON INJECTION, SOLUTION 0.5 MG/.1ML
1 INJECTION, SOLUTION SUBCUTANEOUS ONCE
Refills: 0 | Status: DISCONTINUED | OUTPATIENT
Start: 2023-01-13 | End: 2023-01-20

## 2023-01-13 RX ORDER — DEXTROSE 50 % IN WATER 50 %
12.5 SYRINGE (ML) INTRAVENOUS ONCE
Refills: 0 | Status: DISCONTINUED | OUTPATIENT
Start: 2023-01-13 | End: 2023-01-20

## 2023-01-13 RX ORDER — DIPHENHYDRAMINE HCL 50 MG
25 CAPSULE ORAL ONCE
Refills: 0 | Status: COMPLETED | OUTPATIENT
Start: 2023-01-13 | End: 2023-01-13

## 2023-01-13 RX ORDER — INSULIN LISPRO 100/ML
VIAL (ML) SUBCUTANEOUS
Refills: 0 | Status: DISCONTINUED | OUTPATIENT
Start: 2023-01-13 | End: 2023-01-15

## 2023-01-13 RX ORDER — SODIUM CHLORIDE 9 MG/ML
1000 INJECTION, SOLUTION INTRAVENOUS
Refills: 0 | Status: DISCONTINUED | OUTPATIENT
Start: 2023-01-13 | End: 2023-01-20

## 2023-01-13 RX ORDER — DEXTROSE 50 % IN WATER 50 %
25 SYRINGE (ML) INTRAVENOUS ONCE
Refills: 0 | Status: DISCONTINUED | OUTPATIENT
Start: 2023-01-13 | End: 2023-01-20

## 2023-01-13 RX ORDER — POLYETHYLENE GLYCOL 3350 17 G/17G
17 POWDER, FOR SOLUTION ORAL AT BEDTIME
Refills: 0 | Status: DISCONTINUED | OUTPATIENT
Start: 2023-01-13 | End: 2023-01-20

## 2023-01-13 RX ORDER — ENOXAPARIN SODIUM 100 MG/ML
40 INJECTION SUBCUTANEOUS EVERY 24 HOURS
Refills: 0 | Status: DISCONTINUED | OUTPATIENT
Start: 2023-01-13 | End: 2023-01-13

## 2023-01-13 RX ORDER — TICAGRELOR 90 MG/1
60 TABLET ORAL
Refills: 0 | Status: DISCONTINUED | OUTPATIENT
Start: 2023-01-14 | End: 2023-01-16

## 2023-01-13 RX ORDER — METOPROLOL TARTRATE 50 MG
25 TABLET ORAL
Refills: 0 | Status: DISCONTINUED | OUTPATIENT
Start: 2023-01-13 | End: 2023-01-20

## 2023-01-13 RX ORDER — ENOXAPARIN SODIUM 100 MG/ML
30 INJECTION SUBCUTANEOUS EVERY 24 HOURS
Refills: 0 | Status: DISCONTINUED | OUTPATIENT
Start: 2023-01-13 | End: 2023-01-18

## 2023-01-13 RX ORDER — METOCLOPRAMIDE HCL 10 MG
10 TABLET ORAL ONCE
Refills: 0 | Status: COMPLETED | OUTPATIENT
Start: 2023-01-13 | End: 2023-01-13

## 2023-01-13 RX ORDER — LANOLIN ALCOHOL/MO/W.PET/CERES
3 CREAM (GRAM) TOPICAL AT BEDTIME
Refills: 0 | Status: DISCONTINUED | OUTPATIENT
Start: 2023-01-13 | End: 2023-01-20

## 2023-01-13 RX ORDER — DEXTROSE 50 % IN WATER 50 %
15 SYRINGE (ML) INTRAVENOUS ONCE
Refills: 0 | Status: DISCONTINUED | OUTPATIENT
Start: 2023-01-13 | End: 2023-01-20

## 2023-01-13 RX ORDER — PANTOPRAZOLE SODIUM 20 MG/1
40 TABLET, DELAYED RELEASE ORAL
Refills: 0 | Status: DISCONTINUED | OUTPATIENT
Start: 2023-01-13 | End: 2023-01-20

## 2023-01-13 RX ORDER — ACETAMINOPHEN 500 MG
650 TABLET ORAL EVERY 6 HOURS
Refills: 0 | Status: DISCONTINUED | OUTPATIENT
Start: 2023-01-13 | End: 2023-01-20

## 2023-01-13 RX ORDER — INSULIN LISPRO 100/ML
VIAL (ML) SUBCUTANEOUS AT BEDTIME
Refills: 0 | Status: DISCONTINUED | OUTPATIENT
Start: 2023-01-13 | End: 2023-01-16

## 2023-01-13 RX ORDER — SENNA PLUS 8.6 MG/1
2 TABLET ORAL AT BEDTIME
Refills: 0 | Status: DISCONTINUED | OUTPATIENT
Start: 2023-01-13 | End: 2023-01-20

## 2023-01-13 RX ORDER — ATORVASTATIN CALCIUM 80 MG/1
80 TABLET, FILM COATED ORAL AT BEDTIME
Refills: 0 | Status: DISCONTINUED | OUTPATIENT
Start: 2023-01-13 | End: 2023-01-20

## 2023-01-13 RX ADMIN — Medication 81 MILLIGRAM(S): at 11:17

## 2023-01-13 RX ADMIN — POLYETHYLENE GLYCOL 3350 17 GRAM(S): 17 POWDER, FOR SOLUTION ORAL at 16:44

## 2023-01-13 RX ADMIN — Medication 650 MILLIGRAM(S): at 23:38

## 2023-01-13 RX ADMIN — Medication 10 MILLIGRAM(S): at 06:10

## 2023-01-13 RX ADMIN — Medication 1 DROP(S): at 21:52

## 2023-01-13 RX ADMIN — Medication 3 MILLIGRAM(S): at 23:38

## 2023-01-13 RX ADMIN — Medication 25 MILLIGRAM(S): at 06:18

## 2023-01-13 RX ADMIN — ATORVASTATIN CALCIUM 80 MILLIGRAM(S): 80 TABLET, FILM COATED ORAL at 21:54

## 2023-01-13 RX ADMIN — Medication 25 MILLIGRAM(S): at 06:11

## 2023-01-13 RX ADMIN — Medication 2: at 16:43

## 2023-01-13 RX ADMIN — Medication 25 MILLIGRAM(S): at 16:39

## 2023-01-13 RX ADMIN — Medication 650 MILLIGRAM(S): at 00:10

## 2023-01-13 RX ADMIN — SENNA PLUS 2 TABLET(S): 8.6 TABLET ORAL at 21:55

## 2023-01-13 RX ADMIN — ENOXAPARIN SODIUM 30 MILLIGRAM(S): 100 INJECTION SUBCUTANEOUS at 11:17

## 2023-01-13 NOTE — CONSULT NOTE ADULT - SUBJECTIVE AND OBJECTIVE BOX
Patient is a 67y Male whom presented to the hospital with esrd on hd     PAST MEDICAL & SURGICAL HISTORY:      MEDICATIONS  (STANDING):  artificial  tears Solution 1 Drop(s) Both EYES three times a day  aspirin enteric coated 81 milliGRAM(s) Oral daily  atorvastatin 80 milliGRAM(s) Oral at bedtime  dextrose 5%. 1000 milliLiter(s) (50 mL/Hr) IV Continuous <Continuous>  dextrose 5%. 1000 milliLiter(s) (100 mL/Hr) IV Continuous <Continuous>  dextrose 50% Injectable 25 Gram(s) IV Push once  dextrose 50% Injectable 12.5 Gram(s) IV Push once  dextrose 50% Injectable 25 Gram(s) IV Push once  enoxaparin Injectable 30 milliGRAM(s) SubCutaneous every 24 hours  glucagon  Injectable 1 milliGRAM(s) IntraMuscular once  insulin lispro (ADMELOG) corrective regimen sliding scale   SubCutaneous at bedtime  insulin lispro (ADMELOG) corrective regimen sliding scale   SubCutaneous three times a day before meals  metoprolol tartrate 25 milliGRAM(s) Oral two times a day  pantoprazole    Tablet 40 milliGRAM(s) Oral before breakfast  polyethylene glycol 3350 17 Gram(s) Oral at bedtime  senna 2 Tablet(s) Oral at bedtime      Allergies    No Known Allergies    Intolerances        SOCIAL HISTORY:  Denies ETOh,Smoking,     FAMILY HISTORY:      REVIEW OF SYSTEMS:    CONSTITUTIONAL: No weakness, fevers or chills  RESPIRATORY: No cough, wheezing, hemoptysis; No shortness of breath  CARDIOVASCULAR: No chest pain or palpitations  GASTROINTESTINAL: No abdominal or epigastric pain. No nausea, vomiting,     No diarrhea or constipation. No melena   GENITOURINARY: No dysuria, frequency or hematuria  NEUROLOGICAL: No numbness or weakness  SKIN: dry      VITAL:  T(C): , Max: 37 (01-13-23 @ 05:22)  T(F): , Max: 98.6 (01-13-23 @ 05:22)  HR: 58 (01-13-23 @ 17:45)  BP: 172/75 (01-13-23 @ 17:45)  BP(mean): 102 (01-13-23 @ 05:22)  RR: 17 (01-13-23 @ 17:45)  SpO2: 98% (01-13-23 @ 17:45)  Wt(kg): --    I and O's:        PHYSICAL EXAM:    Constitutional: NAD  HEENT: conjunctive   clear   Neck:  No JVD  Respiratory: CTAB  Cardiovascular: S1 and S2  Gastrointestinal: BS+, soft, NT/ND  Extremities: No peripheral edema  Neurological: A/O x 3, no focal deficits  Access: Not applicable    LABS:                        11.2   8.00  )-----------( 151      ( 12 Jan 2023 19:44 )             34.9     01-12    138  |  95<L>  |  39<H>  ----------------------------<  90  5.1   |  25  |  8.46<H>    Ca    9.6      12 Jan 2023 19:44  Phos  5.2     01-13  Mg     4.3     01-13    TPro  7.6  /  Alb  4.6  /  TBili  0.3  /  DBili  x   /  AST  20  /  ALT  15  /  AlkPhos  96  01-12      Urine Studies:          RADIOLOGY & ADDITIONAL STUDIES:

## 2023-01-13 NOTE — SPEECH LANGUAGE PATHOLOGY EVALUATION - SLP DIAGNOSIS
66 y/o [place]-Speaking M p/w left eye ptosis and diplopia, admitted for stroke workup (ddx: CN 3 palsy possibly due to microvascular ischemia, r/o brain stem infarction). Pt was seen today for Speech Language Evaluation which revealed mild cognitive-linguistic deficits in the domains of immediate recall, reasoning, and problem solving. Pt reported current cognitive status as baseline function. No expressive or receptive language deficits. Speech was fluent and no word-finding difficulties appreciated. No dysarthria.

## 2023-01-13 NOTE — PHYSICAL THERAPY INITIAL EVALUATION ADULT - PERTINENT HX OF CURRENT PROBLEM, REHAB EVAL
68yo Rh danette speaking male with CAD s/p 1 stent on brilinta, DM, CKD on HD, ,HTN, HLD presenting with Left eye ptosis and diplopia. Patient reports 10-12 days ago having left sided headache. At that time, he noted drooping of left eyelid. Over the course of the last 1 week hit has gotten progressively worse. 5 days ago he noticed double vision and eyes was dysconjugate. When closing one at a time diplopia resolves. He continues to have headache controlled with tylenol rated at 2/10. Headache onset was initially 2/10 then progressively worsened within several hours, maximum intensity of 7/10 over several hours. He saw opthomologist, who noted retinal scarring bilaterally. He then referred him to retinal specialist and reported this was cranial nerve 3 palsy and needs neuro-opthomologist.  Denies positional headache, neck stiffness, weakness, numbness, changes to speech, dysphagia, difficulty ambulating. Hosp Course: 1/12 CT Angio Head/Neck/Venogram Brain: No CT evidence of acuteintracranial hemorrhage, mass effect, or midline shift. CT ANGIOGRAPHY NECK: No hemodynamically significant stenosis by NASCET criteria. No vascular dissection. CT ANGIOGRAPHY BRAIN: No major vessel occlusion, evidence of aneurysm, or other vascular malformation. Moderate narrowing of the V4 segment of the nondominant right vertebral artery; 1/13 Prelim CXR negative for pathology.

## 2023-01-13 NOTE — CONSULT NOTE ADULT - ASSESSMENT
Patient NERY LEONARD is a 67y (1955) Rh danette speaking man with CAD s/p 1 stent on brilinta, DM, CKD on HD, ,HTN, HLD presenting with Left eye ptosis and diplopia. Patient reports 10-12 days ago having left sided headache. At that time, he noted drooping of left eyelid. Over the course of the last 1 week hit has gotten progressively worse. 5 days ago he noticed double vision and eyes was dysconjugate. When closing one at a time diplopia resolves. He continues to have headache controlled with tylenol rated at 2/10. Headache onset was initially 2/10 then progressively worsened within several hours, maximum intensity of 7/10 over several hours. He saw opthomologist, who noted retinal scarring bilaterally. 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  Denies positional headache, neck stiffness, weakness, numbness, changes to speech, dysphagia, difficulty ambulating.      esrd on hd   Excess fluids and waste products will be removed from your blood; your electrolytes will be balanced; your blood pressure will be controlled.    BP monitoring,continue current antihypertensive meds, low salt diet,followup with PMD in 1-2 weeks      ANEMIA PLAN:  Anemia of chronic disease:  Well controlled by epo   H and H subtherapeutic .  We will continue epo aiming for a HCT of 32-36 %.   We will monitor Iron stores, B12 and RBC folate .

## 2023-01-13 NOTE — PATIENT PROFILE ADULT - FALL HARM RISK - HARM RISK INTERVENTIONS

## 2023-01-13 NOTE — SPEECH LANGUAGE PATHOLOGY EVALUATION - SLP PERTINENT HISTORY OF CURRENT PROBLEM
68 y/o RH [place]-Speaking M with PMH of CAD s/p 1 stent on brilinta, DM, CKD on HD, HTN, and HLD p/w left eye ptosis and diplopia. Pt reported 10-12 days ago having left sided headache. Over the course of the last 1 week hit has gotten progressively worse. He saw Opthomologist, who noted retinal scarring bilaterally. 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 Denies positional headache, neck stiffness, weakness, numbness, changes to speech, dysphagia, difficulty ambulating. 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 Rule out Brain stem infarction.

## 2023-01-13 NOTE — SPEECH LANGUAGE PATHOLOGY EVALUATION - SLP GENERAL OBSERVATIONS
Pt was received in ED stretcher sleeping, easily roused with verbal stimuli. +room air, +tele (VSS), +left eye ptosis. Language Line utilized for translation to [facility] ([facility] #[id number]). He was pleasant and cooperative throughout evaluation.

## 2023-01-13 NOTE — SPEECH LANGUAGE PATHOLOGY EVALUATION - 2-STEP
Inconsistent accuracy. However, suspect language barrier or cognitive-linguistic deficit rather than true language deficit.

## 2023-01-13 NOTE — OCCUPATIONAL THERAPY INITIAL EVALUATION ADULT - LIVES WITH, PROFILE
Pt states he lives with family in private home with 3 steps to enter, I in ADLs and ambulation prior to admission

## 2023-01-13 NOTE — SPEECH LANGUAGE PATHOLOGY EVALUATION - SLP PATIENT/FAMILY GOALS STATEMENT
"My brain is working the same, I have no problems." Pt reported he is retired. Previously worked at [employer].

## 2023-01-13 NOTE — H&P ADULT - HISTORY OF PRESENT ILLNESS
Patient NERY LEONARD is a 67y (1955) Rh danette speaking man with CAD s/p 1 stent on brilinta, DM, CKD on HD, ,HTN, HLD presenting with Left eye ptosis and diplopia. Patient reports 10-12 days ago having left sided headache. At that time, he noted drooping of left eyelid. Over the course of the last 1 week hit has gotten progressively worse. 5 days ago he noticed double vision and eyes was dysconjugate. When closing one at a time diplopia resolves. He continues to have headache controlled with tylenol rated at 2/10. Headache onset reached maximum intensity of 5/10 over several hours. He saw opthomologist, who noted retinal scarring bilaterally. 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  Denies positional headache, neck stiffness, weakness, numbness, changes to speech, dysphagia, difficulty ambulating.        Review of Systems:    All other review of systems is negative unless indicated above.    Allergies:      PMHx/PSHx/Family Hx: As above, otherwise see below       Social Hx:  No current use of tobacco, alcohol, or illicit drugs  Lives with ***    Medications:  MEDICATIONS  (STANDING):  aspirin enteric coated 81 milliGRAM(s) Oral daily  atorvastatin 80 milliGRAM(s) Oral at bedtime  enoxaparin Injectable 40 milliGRAM(s) SubCutaneous every 24 hours  metoprolol tartrate 25 milliGRAM(s) Oral two times a day    MEDICATIONS  (PRN):      Vitals:  T(C): 36.8 (01-12-23 @ 23:16), Max: 36.9 (01-12-23 @ 20:49)  HR: 75 (01-12-23 @ 23:16) (62 - 82)  BP: 156/69 (01-12-23 @ 23:16) (156/69 - 198/89)  RR: 16 (01-12-23 @ 23:16) (16 - 18)  SpO2: 97% (01-12-23 @ 23:16) (97% - 100%)      Labs:                        11.2   8.00  )-----------( 151      ( 12 Jan 2023 19:44 )             34.9     01-12    138  |  95<L>  |  39<H>  ----------------------------<  90  5.1   |  25  |  8.46<H>    Ca    9.6      12 Jan 2023 19:44    TPro  7.6  /  Alb  4.6  /  TBili  0.3  /  DBili  x   /  AST  20  /  ALT  15  /  AlkPhos  96  01-12    CAPILLARY BLOOD GLUCOSE        LIVER FUNCTIONS - ( 12 Jan 2023 19:44 )  Alb: 4.6 g/dL / Pro: 7.6 g/dL / ALK PHOS: 96 U/L / ALT: 15 U/L / AST: 20 U/L / GGT: x               CSF:                  Radiology:     Patient NERY LEONARD is a 67y (1955) Rh danette speaking man with CAD s/p 1 stent on brilinta, DM, CKD on HD, ,HTN, HLD presenting with Left eye ptosis and diplopia. Patient reports 10-12 days ago having left sided headache. At that time, he noted drooping of left eyelid. Over the course of the last 1 week hit has gotten progressively worse. 5 days ago he noticed double vision and eyes was dysconjugate. When closing one at a time diplopia resolves. He continues to have headache controlled with tylenol rated at 2/10. Headache onset reached maximum intensity of 5/10 over several hours. He saw opthomologist, who noted retinal scarring bilaterally. 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  Denies positional headache, neck stiffness, weakness, numbness, changes to speech, dysphagia, difficulty ambulating.           Patient NERY LEONARD is a 67y (1955) Rh danette speaking man with CAD s/p 1 stent on brilinta, DM, CKD on HD, ,HTN, HLD presenting with Left eye ptosis and diplopia. Patient reports 10-12 days ago having left sided headache. At that time, he noted drooping of left eyelid. Over the course of the last 1 week hit has gotten progressively worse. 5 days ago he noticed double vision and eyes was dysconjugate. When closing one at a time diplopia resolves. He continues to have headache controlled with tylenol rated at 2/10. Headache onset was initially 2/10 then progressively worsened within several hours, maximum intensity of 7/10 over several hours. He saw opthomologist, who noted retinal scarring bilaterally. 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  Denies positional headache, neck stiffness, weakness, numbness, changes to speech, dysphagia, difficulty ambulating.

## 2023-01-13 NOTE — OCCUPATIONAL THERAPY INITIAL EVALUATION ADULT - PERTINENT HX OF CURRENT PROBLEM, REHAB EVAL
67y (1955) Rh danette speaking man with CAD s/p 1 stent on brilinta, DM, CKD on HD, ,HTN, HLD presenting with Left eye ptosis and diplopia. Patient reports 10-12 days ago having left sided headache. At that time, he noted drooping of left eyelid. Over the course of the last 1 week hit has gotten progressively worse. 5 days ago he noticed double vision and eyes was dysconjugate. When closing one at a time diplopia resolves. He continues to have headache controlled with tylenol rated at 2/10. Headache onset was initially 2/10 then progressively worsened within several hours, maximum intensity of 7/10 over several hours. He saw opthomologist, who noted retinal scarring bilaterally. 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  Denies positional headache, neck stiffness, weakness, numbness, changes to speech, dysphagia, difficulty ambulating.    CT HEAD: No CT evidence of acute intracranial hemorrhage, mass effect, or midline shift.  CT ANGIOGRAPHY NECK: No hemodynamically significant stenosis by NASCET criteria. No vascular dissection.  CT ANGIOGRAPHY BRAIN: No major vessel occlusion, evidence of aneurysm, or other vascular malformation. Moderate narrowing of the V4 segment of the nondominant right vertebral artery.

## 2023-01-13 NOTE — H&P ADULT - ASSESSMENT
Patient NERY LEONARD is a 67y (1955) Rh danette speaking man with DM, CKD on HD, ,HTN, HLD presenting with Left eye ptosis and diplopia. Patient reports 10-12 days ago having left sided headache. At that time, he noted drooping of left eyelid. Over the course of the last 1 week hit has gotten progressively worse. 5 days ago he noticed double vision and eyes was dysconjugate. When closing one at a time diplopia resolves. He continues to have headache controlled with tylenol rated at 2/10. Headache onset reached maximum intensity of 5/10 over several hours. He saw opthomologist, who noted retinal scarring bilaterally. 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  Denies positional headache, neck stiffness, weakness, numbness, changes to speech, dysphagia, difficulty ambulating.     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 Rule out Brain stem infarction       Plan:    Imaging/Labs  [] MRI brain w/o   [] TTE   [] HbA1C and Lipid Panel    Meds  [] Aspirin 81MG PO daily  [] Continuing home Brilinta 60 mg BID  [] Atorvastatin 80MG QHS, titrate to LDL<70  [] DVT prophylaxis - Lovenox 40MG SC daily    Other  [] Telemonitoring; Neurochecks and vital signs per unit protocol, Q4H  [] Normotension.   [] BG goal <180, avoid hypoglycemia  [] NPO until clears dysphagia screen, otherwise swallow evaluation  [] Fall, aspiration precautions  [] PT/OT eval    Case discussed with Dr. Bermudez stroke fellow. To be seen during AM rounds.  Patient NERY LEONARD is a 67y (1955) Rh danette speaking man with CAD s/p 1 stent on brilinta, DM, CKD on HD, ,HTN, HLD presenting with Left eye ptosis and diplopia. Patient reports 10-12 days ago having left sided headache. At that time, he noted drooping of left eyelid. Over the course of the last 1 week hit has gotten progressively worse. 5 days ago he noticed double vision and eyes was dysconjugate. When closing one at a time diplopia resolves. He continues to have headache controlled with tylenol rated at 2/10. Headache onset was initially 2/10 then progressively worsened within several hours, maximum intensity of 7/10 over several hours. He saw opthomologist, who noted retinal scarring bilaterally. 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  Denies positional headache, neck stiffness, weakness, numbness, changes to speech, dysphagia, difficulty ambulating.      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 Rule out Brain stem infarction       Plan:    Imaging/Labs  [] MRI brain w/o   [] TTE   [] HbA1C and Lipid Panel    Meds  [] Aspirin 81MG PO daily  [] Continuing home Brilinta 60 mg BID  [] Atorvastatin 80MG QHS, titrate to LDL<70  [] DVT prophylaxis - Lovenox 40MG SC daily    Other  [] Telemonitoring; Neurochecks and vital signs per unit protocol, Q4H  [] Normotension.   [] BG goal <180, avoid hypoglycemia  [] NPO until clears dysphagia screen, otherwise swallow evaluation  [] Fall, aspiration precautions  [] PT/OT eval    Case discussed with Dr. Bermudez stroke fellow. To be seen during AM rounds.

## 2023-01-13 NOTE — H&P ADULT - TIME BILLING
Right III CN palsy. Among etiologies will rule out stroke. Agree with plan and assessment as stated above.   A1C 6.4  LDL 60 Right III CN palsy. Among etiologies will rule out stroke. Agree with plan and assessment as stated above.   A1C 6.4  LDL 60    Tolossa Hunt syndrome not excluded

## 2023-01-13 NOTE — SPEECH LANGUAGE PATHOLOGY EVALUATION - COMMENTS
IMAGING:  CT HEAD: 1/12/23  IMPRESSION: No CT evidence of acute intracranial hemorrhage, mass effect, or midline shift.    Pt is unknown to this service.     Of note, pt passed dysphagia screener 1/13/23 at 02:12 and is currently on a regular diet and thin liquids. GOALS:  1. Pt will improve cognitive-linguistic skills for functional communication.     Results and recommendations d/w pt, RNMayuri, and Oren LOWE. Pt deferred reading task as he does not read English/[place] well at baseline.

## 2023-01-13 NOTE — H&P ADULT - NSHPPHYSICALEXAM_GEN_ALL_CORE
Physical Examination:  General - NAD  Cardiovascular - Peripheral pulses palpable, no edema  Eyes - Fundoscopy with flat, sharp optic discs and no hemorrhage or exudates    Neurologic Exam:  Mental status - Awake, Alert, Oriented to person, place, and time. Speech fluent, repetition and naming intact. Follows simple and complex commands. Attention/concentration, recent and remote memory (including registration and recall), and fund of knowledge intact    Cranial nerves - PERRL, left eye ptosis, VFF, all extraocular muscles affected except abduction of left eye, face sensation (V1-V3) intact b/l, facial strength intact without asymmetry b/l, hearing intact b/l, palate with symmetric elevation, trapezius  5/5 strength b/l, tongue midline on protrusion with full lateral movement    Motor - Normal bulk and tone throughout. No pronator drift.  Strength testing            Deltoid      Biceps      Triceps     Wrist Extension    Wrist Flexion     Interossei         R            5                 5               5                     5                              5                        5                 5  L             5                 5               5                     5                              5                        5                 5              Hip Flexion    Hip Extension    Knee Flexion    Knee Extension    Dorsiflexion    Plantar Flexion  R              5                           5                       5                           5                            5                          5  L              5                           5                        5                           5                            5                          5    Sensation - Light touch intact throughout    DTR's -             Biceps      Triceps     Brachioradialis      Patellar    Ankle    Toes/plantar response  R             2+             2+                  2+                       2+            2+                 Down  L              2+             2+                 2+                        2+           2+                 Down    Coordination - Finger to Nose intact b/l. No tremors appreciated    Gait and station - Normal casual gait.

## 2023-01-13 NOTE — H&P ADULT - NSHPLABSRESULTS_GEN_ALL_CORE
Labs:                        11.2   8.00  )-----------( 151      ( 12 Jan 2023 19:44 )             34.9     01-12    138  |  95<L>  |  39<H>  ----------------------------<  90  5.1   |  25  |  8.46<H>    Ca    9.6      12 Jan 2023 19:44    TPro  7.6  /  Alb  4.6  /  TBili  0.3  /  DBili  x   /  AST  20  /  ALT  15  /  AlkPhos  96  01-12    CAPILLARY BLOOD GLUCOSE        LIVER FUNCTIONS - ( 12 Jan 2023 19:44 )  Alb: 4.6 g/dL / Pro: 7.6 g/dL / ALK PHOS: 96 U/L / ALT: 15 U/L / AST: 20 U/L / GGT: x         < from: CT Angio Head w/ IV Cont (01.12.23 @ 21:51) >    CT HEAD:  No CT evidence of acuteintracranial hemorrhage, mass effect, or midline   shift.    CT ANGIOGRAPHY NECK:  No hemodynamically significant stenosis by NASCET criteria. No vascular   dissection.    CT ANGIOGRAPHY BRAIN:  No major vessel occlusion, evidence of aneurysm, or other vascular   malformation.    Moderate narrowing of the V4 segment of the nondominant right vertebral   artery.

## 2023-01-13 NOTE — OCCUPATIONAL THERAPY INITIAL EVALUATION ADULT - COGNITIVE, VISUAL PERCEPTUAL, OT EVAL
GOAL: Pt will independently scan environment to B side to safely navigate environment and complete ADLs 2* diplopia in 2 weeks

## 2023-01-13 NOTE — SPEECH LANGUAGE PATHOLOGY EVALUATION - SLP SHORT TERM MEMORY
2/3 immediate recall; 3/3 delayed recall. Suspect difficulty following multi-step directives as immediate recall deficit.

## 2023-01-13 NOTE — PHYSICAL THERAPY INITIAL EVALUATION ADULT - ADDITIONAL COMMENTS
Pt reports that he lives in a pvt house with his wife and kids +3steps to enter and a first floor setup once inside. Pt states he was independent with all functional mobility and ambulation prior to admission.

## 2023-01-13 NOTE — PHYSICAL THERAPY INITIAL EVALUATION ADULT - PLANNED THERAPY INTERVENTIONS, PT EVAL
stair training: GOAL: Pt will negotiate up/down 3 steps with 1 handrail ascending independently in 2 weeks./balance training

## 2023-01-14 LAB
ANION GAP SERPL CALC-SCNC: 13 MMOL/L — SIGNIFICANT CHANGE UP (ref 5–17)
B BURGDOR C6 AB SER-ACNC: NEGATIVE — SIGNIFICANT CHANGE UP
B BURGDOR IGG+IGM SER-ACNC: 0.05 INDEX — SIGNIFICANT CHANGE UP (ref 0.01–0.89)
BUN SERPL-MCNC: 35 MG/DL — HIGH (ref 7–23)
CALCIUM SERPL-MCNC: 8.9 MG/DL — SIGNIFICANT CHANGE UP (ref 8.4–10.5)
CHLORIDE SERPL-SCNC: 93 MMOL/L — LOW (ref 96–108)
CO2 SERPL-SCNC: 27 MMOL/L — SIGNIFICANT CHANGE UP (ref 22–31)
CREAT SERPL-MCNC: 7.3 MG/DL — HIGH (ref 0.5–1.3)
EGFR: 8 ML/MIN/1.73M2 — LOW
GLUCOSE BLDC GLUCOMTR-MCNC: 177 MG/DL — HIGH (ref 70–99)
GLUCOSE BLDC GLUCOMTR-MCNC: 212 MG/DL — HIGH (ref 70–99)
GLUCOSE BLDC GLUCOMTR-MCNC: 222 MG/DL — HIGH (ref 70–99)
GLUCOSE BLDC GLUCOMTR-MCNC: 322 MG/DL — HIGH (ref 70–99)
GLUCOSE SERPL-MCNC: 182 MG/DL — HIGH (ref 70–99)
HCT VFR BLD CALC: 34.6 % — LOW (ref 39–50)
HCV AB S/CO SERPL IA: 0.07 S/CO — SIGNIFICANT CHANGE UP (ref 0–0.99)
HCV AB SERPL-IMP: SIGNIFICANT CHANGE UP
HGB BLD-MCNC: 10.8 G/DL — LOW (ref 13–17)
MCHC RBC-ENTMCNC: 31.2 GM/DL — LOW (ref 32–36)
MCHC RBC-ENTMCNC: 31.5 PG — SIGNIFICANT CHANGE UP (ref 27–34)
MCV RBC AUTO: 100.9 FL — HIGH (ref 80–100)
NRBC # BLD: 0 /100 WBCS — SIGNIFICANT CHANGE UP (ref 0–0)
PLATELET # BLD AUTO: 146 K/UL — LOW (ref 150–400)
POTASSIUM SERPL-MCNC: 5.1 MMOL/L — SIGNIFICANT CHANGE UP (ref 3.5–5.3)
POTASSIUM SERPL-SCNC: 5.1 MMOL/L — SIGNIFICANT CHANGE UP (ref 3.5–5.3)
RBC # BLD: 3.43 M/UL — LOW (ref 4.2–5.8)
RBC # FLD: 14.6 % — HIGH (ref 10.3–14.5)
SODIUM SERPL-SCNC: 133 MMOL/L — LOW (ref 135–145)
WBC # BLD: 6.93 K/UL — SIGNIFICANT CHANGE UP (ref 3.8–10.5)
WBC # FLD AUTO: 6.93 K/UL — SIGNIFICANT CHANGE UP (ref 3.8–10.5)

## 2023-01-14 PROCEDURE — 99223 1ST HOSP IP/OBS HIGH 75: CPT | Mod: GC

## 2023-01-14 RX ORDER — PANTOPRAZOLE SODIUM 20 MG/1
40 TABLET, DELAYED RELEASE ORAL
Refills: 0 | Status: DISCONTINUED | OUTPATIENT
Start: 2023-01-14 | End: 2023-01-14

## 2023-01-14 RX ORDER — ACETAMINOPHEN 500 MG
1000 TABLET ORAL ONCE
Refills: 0 | Status: COMPLETED | OUTPATIENT
Start: 2023-01-14 | End: 2023-01-14

## 2023-01-14 RX ADMIN — Medication 400 MILLIGRAM(S): at 05:35

## 2023-01-14 RX ADMIN — Medication 2: at 11:53

## 2023-01-14 RX ADMIN — Medication 650 MILLIGRAM(S): at 00:08

## 2023-01-14 RX ADMIN — Medication 2: at 16:43

## 2023-01-14 RX ADMIN — Medication 1 DROP(S): at 05:34

## 2023-01-14 RX ADMIN — Medication 58 MILLIGRAM(S): at 17:37

## 2023-01-14 RX ADMIN — POLYETHYLENE GLYCOL 3350 17 GRAM(S): 17 POWDER, FOR SOLUTION ORAL at 21:30

## 2023-01-14 RX ADMIN — Medication 3 MILLIGRAM(S): at 21:29

## 2023-01-14 RX ADMIN — ENOXAPARIN SODIUM 30 MILLIGRAM(S): 100 INJECTION SUBCUTANEOUS at 11:56

## 2023-01-14 RX ADMIN — Medication 25 MILLIGRAM(S): at 17:37

## 2023-01-14 RX ADMIN — SENNA PLUS 2 TABLET(S): 8.6 TABLET ORAL at 21:29

## 2023-01-14 RX ADMIN — TICAGRELOR 60 MILLIGRAM(S): 90 TABLET ORAL at 05:34

## 2023-01-14 RX ADMIN — Medication 2: at 21:30

## 2023-01-14 RX ADMIN — Medication 1000 MILLIGRAM(S): at 06:05

## 2023-01-14 RX ADMIN — Medication 1: at 08:06

## 2023-01-14 RX ADMIN — ATORVASTATIN CALCIUM 80 MILLIGRAM(S): 80 TABLET, FILM COATED ORAL at 21:30

## 2023-01-14 RX ADMIN — Medication 1 DROP(S): at 13:25

## 2023-01-14 RX ADMIN — Medication 81 MILLIGRAM(S): at 11:56

## 2023-01-14 RX ADMIN — TICAGRELOR 60 MILLIGRAM(S): 90 TABLET ORAL at 17:37

## 2023-01-14 RX ADMIN — Medication 1 DROP(S): at 21:30

## 2023-01-14 RX ADMIN — PANTOPRAZOLE SODIUM 40 MILLIGRAM(S): 20 TABLET, DELAYED RELEASE ORAL at 05:33

## 2023-01-14 RX ADMIN — Medication 25 MILLIGRAM(S): at 05:33

## 2023-01-14 NOTE — CONSULT NOTE ADULT - SUBJECTIVE AND OBJECTIVE BOX
[facility] DEPARTMENT OF OPHTHALMOLOGY - INITIAL ADULT CONSULT  -----------------------------------------------------------------------------------------------------------------  Eriberto Puri MD  PGY 2  Contact on Teams  -----------------------------------------------------------------------------------------------------------------    HPI:  Patient NERY LEONARD is a 67y (1955) Rh danette speaking man with CAD s/p 1 stent on brilinta, DM, CKD on HD, ,HTN, HLD presenting with Left eye ptosis and diplopia. Patient reports 10-12 days ago having left sided headache. At that time, he noted drooping of left eyelid. Over the course of the last 1 week hit has gotten progressively worse. 5 days ago he noticed double vision and eyes was dysconjugate. When closing one at a time diplopia resolves. He continues to have headache controlled with tylenol rated at 2/10. Headache onset was initially 2/10 then progressively worsened within several hours, maximum intensity of 7/10 over several hours. He saw opthomologist, who noted retinal scarring bilaterally. 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  Denies positional headache, neck stiffness, weakness, numbness, changes to speech, dysphagia, difficulty ambulating.           (13 Jan 2023 01:10)    Interval History: Ophtho consulted to evaluate CN3 palsy and to evaluate for GCA. Patient reports that about 12 days ago he noticed the left eye lid starting to droop, he thinks it has been getting worse since. He has also noticed that it has been more difficult to move his left eye during that time as well. About 5 days ago he no    PAST MEDICAL & SURGICAL HISTORY:    Past Ocular History: Retinal surgery for blood in eye 20 years ago OD; CE/PCIOL OU  Ophthalmic Medications: None  FAMILY HISTORY:      MEDICATIONS  (STANDING):  artificial  tears Solution 1 Drop(s) Both EYES three times a day  aspirin enteric coated 81 milliGRAM(s) Oral daily  atorvastatin 80 milliGRAM(s) Oral at bedtime  dextrose 5%. 1000 milliLiter(s) (50 mL/Hr) IV Continuous <Continuous>  dextrose 5%. 1000 milliLiter(s) (100 mL/Hr) IV Continuous <Continuous>  dextrose 50% Injectable 25 Gram(s) IV Push once  dextrose 50% Injectable 12.5 Gram(s) IV Push once  dextrose 50% Injectable 25 Gram(s) IV Push once  enoxaparin Injectable 30 milliGRAM(s) SubCutaneous every 24 hours  glucagon  Injectable 1 milliGRAM(s) IntraMuscular once  insulin lispro (ADMELOG) corrective regimen sliding scale   SubCutaneous at bedtime  insulin lispro (ADMELOG) corrective regimen sliding scale   SubCutaneous three times a day before meals  melatonin 3 milliGRAM(s) Oral at bedtime  methylPREDNISolone sodium succinate IVPB 1000 milliGRAM(s) IV Intermittent once  metoprolol tartrate 25 milliGRAM(s) Oral two times a day  pantoprazole    Tablet 40 milliGRAM(s) Oral before breakfast  polyethylene glycol 3350 17 Gram(s) Oral at bedtime  senna 2 Tablet(s) Oral at bedtime  ticagrelor 60 milliGRAM(s) Oral two times a day    MEDICATIONS  (PRN):  acetaminophen     Tablet .. 650 milliGRAM(s) Oral every 6 hours PRN Moderate Pain (4 - 6), Severe Pain (7 - 10)  dextrose Oral Gel 15 Gram(s) Oral once PRN Blood Glucose LESS THAN 70 milliGRAM(s)/deciliter    Allergies & Intolerances:     Review of Systems:  Constitutional: No fever, chills  Eyes: No blurry vision, flashes, floaters, FBS, erythema, discharge, double vision, OU  Neuro: No tremors  Cardiovascular: No chest pain, palpitations  Respiratory: No SOB, no cough  GI: No nausea, vomiting, abdominal pain  : No dysuria  Skin: no rash  Psych: no depression  Endocrine: no polyuria, polydipsia  Heme/lymph: no swelling    VITALS: T(C): 36.6 (01-14-23 @ 13:18)  T(F): 97.9 (01-14-23 @ 13:18), Max: 98.7 (01-14-23 @ 08:21)  HR: 80 (01-14-23 @ 13:18) (58 - 87)  BP: 144/72 (01-14-23 @ 13:18) (108/60 - 172/75)  RR:  (17 - 18)  SpO2:  (96% - 99%)  Wt(kg): --  General: AAO x 3, appropriate mood and affect    Ophthalmology Exam:  Visual acuity (cc): 20/50 ph 20/25 OD; 20/200 PH 20/40 OS OU  Pupils: Right pupil nonreactive (hx of retinal surgery 20 years ago right eye); left pupil reactive no apd by reverse noted.   Ttono: 16 OU  Extraocular movements (EOMs): Full OD, OS: -4 adduction, -4 supraduction, -3 infraduction, -1 adduction.   Confrontational Visual Field (CVF): Full OU  Color Plates: 12/12 OU?    Pen Light Exam (PLE)  External: Flat OU  Lids/Lashes/Lacrimal Ducts: Flat OU    Sclera/Conjunctiva: W+Q OU  Cornea: Cl OU  Anterior Chamber: D+F OU    Iris: Flat OU  Lens: Cl OU    Fundus Exam: dilated with 1% tropicamide and 2.5% phenylephrine  Approval obtained from primary team for dilation  Patient aware that pupils can remained dilated for at least 4-6 hours  Exam performed with 20D lens    Vitreous: wnl OU  Disc, cup/disc: sharp and pink, 0.4 OU  Macula: wnl OU  Vessels: wnl OU  Periphery: wnl OU    Labs/Imaging:  ***   [facility] DEPARTMENT OF OPHTHALMOLOGY - INITIAL ADULT CONSULT  -----------------------------------------------------------------------------------------------------------------  Eriberto Puri MD  PGY 2  Contact on Teams  -----------------------------------------------------------------------------------------------------------------    HPI:  Patient NERY LEONARD is a 67y (1955) Rh danette speaking man with CAD s/p 1 stent on brilinta, DM, CKD on HD, ,HTN, HLD presenting with Left eye ptosis and diplopia. Patient reports 10-12 days ago having left sided headache. At that time, he noted drooping of left eyelid. Over the course of the last 1 week hit has gotten progressively worse. 5 days ago he noticed double vision and eyes was dysconjugate. When closing one at a time diplopia resolves. He continues to have headache controlled with tylenol rated at 2/10. Headache onset was initially 2/10 then progressively worsened within several hours, maximum intensity of 7/10 over several hours. He saw opthomologist, who noted retinal scarring bilaterally. 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  Denies positional headache, neck stiffness, weakness, numbness, changes to speech, dysphagia, difficulty ambulating.           (13 Jan 2023 01:10)    Interval History: Ophtho consulted to evaluate CN3 palsy and to evaluate for GCA. Patient reports that about 12 days ago he noticed the left eye lid starting to droop, he thinks it has been getting worse since. He has also noticed that it has been more difficult to move his left eye during that time as well. About 5 days ago he no    PAST MEDICAL & SURGICAL HISTORY:    Past Ocular History: Retinal surgery for blood in eye 20 years ago OD; CE/PCIOL OU  Ophthalmic Medications: None  FAMILY HISTORY:      MEDICATIONS  (STANDING):  artificial  tears Solution 1 Drop(s) Both EYES three times a day  aspirin enteric coated 81 milliGRAM(s) Oral daily  atorvastatin 80 milliGRAM(s) Oral at bedtime  dextrose 5%. 1000 milliLiter(s) (50 mL/Hr) IV Continuous <Continuous>  dextrose 5%. 1000 milliLiter(s) (100 mL/Hr) IV Continuous <Continuous>  dextrose 50% Injectable 25 Gram(s) IV Push once  dextrose 50% Injectable 12.5 Gram(s) IV Push once  dextrose 50% Injectable 25 Gram(s) IV Push once  enoxaparin Injectable 30 milliGRAM(s) SubCutaneous every 24 hours  glucagon  Injectable 1 milliGRAM(s) IntraMuscular once  insulin lispro (ADMELOG) corrective regimen sliding scale   SubCutaneous at bedtime  insulin lispro (ADMELOG) corrective regimen sliding scale   SubCutaneous three times a day before meals  melatonin 3 milliGRAM(s) Oral at bedtime  methylPREDNISolone sodium succinate IVPB 1000 milliGRAM(s) IV Intermittent once  metoprolol tartrate 25 milliGRAM(s) Oral two times a day  pantoprazole    Tablet 40 milliGRAM(s) Oral before breakfast  polyethylene glycol 3350 17 Gram(s) Oral at bedtime  senna 2 Tablet(s) Oral at bedtime  ticagrelor 60 milliGRAM(s) Oral two times a day    MEDICATIONS  (PRN):  acetaminophen     Tablet .. 650 milliGRAM(s) Oral every 6 hours PRN Moderate Pain (4 - 6), Severe Pain (7 - 10)  dextrose Oral Gel 15 Gram(s) Oral once PRN Blood Glucose LESS THAN 70 milliGRAM(s)/deciliter    Allergies & Intolerances:     Review of Systems:  Constitutional: No fever, chills  Eyes: No blurry vision, flashes, floaters, FBS, erythema, discharge, double vision, OU  Neuro: No tremors  Cardiovascular: No chest pain, palpitations  Respiratory: No SOB, no cough  GI: No nausea, vomiting, abdominal pain  : No dysuria  Skin: no rash  Psych: no depression  Endocrine: no polyuria, polydipsia  Heme/lymph: no swelling    VITALS: T(C): 36.6 (01-14-23 @ 13:18)  T(F): 97.9 (01-14-23 @ 13:18), Max: 98.7 (01-14-23 @ 08:21)  HR: 80 (01-14-23 @ 13:18) (58 - 87)  BP: 144/72 (01-14-23 @ 13:18) (108/60 - 172/75)  RR:  (17 - 18)  SpO2:  (96% - 99%)  Wt(kg): --  General: AAO x 3, appropriate mood and affect    Ophthalmology Exam:  Visual acuity (cc): 20/50 ph 20/25 OD; 20/200 PH 20/40 OS OU  Pupils: Right pupil nonreactive (hx of retinal surgery 20 years ago right eye); left pupil minimally reactive no apd by reverse noted.   Ttono: 16 OU  Extraocular movements (EOMs): Full OD, OS: -4 adduction, -4 supraduction, -3 infraduction, -1 adduction.   Confrontational Visual Field (CVF): constricted OU    Pen Light Exam (PLE)  External: Flat OU  Lids/Lashes/Lacrimal Ducts: Flat OU    Sclera/Conjunctiva: W+Q OU  Cornea: Cl OU  Anterior Chamber: D+F OU    Iris: Flat OU  Lens: PCIOL OU    Fundus Exam: dilated with 1% tropicamide and 2.5% phenylephrine  Approval obtained from primary team for dilation  Patient aware that pupils can remained dilated for at least 4-6 hours  Exam performed with 20D lens    Vitreous: wnl OU  Disc, cup/disc: sharp and pink, 0.3 OU  Macula: wnl OU  Vessels: wnl OU  Periphery: Extensive atrophy and scarring in periphery right adjacent to arcades OU.     Labs/Imaging:  CRP 4  ESR 47      ACC: [id number] EXAM:  CT ANGIO NECK (W) IC                        ACC: [id number] EXAM:  CT VENOGRAM BRAIN (W) IC                        ACC: [id number] EXAM:  CT ANGIO BRAIN (W) IC                          PROCEDURE DATE:  01/12/2023          INTERPRETATION:  CLINICAL INFORMATION: Left eye proptosis and medial gaze   palsy.    TECHNIQUE:  1. Noncontrast axial CT images were acquired through the head.   Two-dimensional sagittal and coronal reformats were generated.  2. Contrast enhanced CT angiography of the neck and head was performed.    MIP reformats were generated in the coronal, sagittal and axial planes.    Intravenous contrast: 70 cc Omnipaque 300 were administered; 30 cc were   discarded.    COMPARISON: No prior studies are available for comparison..    FINDINGS:    CT HEAD:  No acute intracranial hemorrhage, mass effect, or midline shift. No   abnormal extra-axial collections. The basal cisterns are patent without   evidence of central herniation. Subcentimeter hypodensities present in   the bilateral basal ganglia. Mild confluent areas of bilateral   periventricular deep white matter hypoattenuation.    The calvarium is intact. The soft tissues of the scalp are unremarkable.   Polyp/mucus retention cyst in the right maxillary sinus. The mastoid air   cells and middle ear cavities are clear. Bilateral lens replacements.   Mild bilateral proptosis.    CT ANGIOGRAPHY NECK:  Three-vessel aortic arch. origins of the great vessels are unremarkable.   The common carotid arteries are normal in caliber without significant   stenosis.    The carotid bifurcations are unremarkable. The internal carotid arteries   are normal in caliber without significant stenosis. Retropharyngeal   course of the left internal carotid artery.    Left dominant vertebral arteries. The vertebral arteries are normal in   caliber without significant stenosis.    The visualized neck and upper chest are unremarkable. Visualized osseous   structures are unremarkable.    CT ANGIOGRAPHY BRAIN:  Anterior circulation: Calcifications of the carotid siphons bilaterally.   The bilateral anterior cerebral and middle cerebral arteries are patent   without evidence of significant stenosis, major vessel occlusion, or   aneurysm.    Posterior circulation:Smooth narrowing of the distal V4 segment of the   nondominant right vertebral artery. The basilar artery and bilateral   posterior cerebral arteries are patent without evidence of significant   stenosis, major vessel occlusion, or aneurysm.    No enlarged vascular lesions or clusters of abnormal vessels are noted to   suggest an arterial venous malformation.    Visualized portions of the superficial and deep venous systems are   unremarkable.      IMPRESSION:    CT HEAD:  No CT evidence of acuteintracranial hemorrhage, mass effect, or midline   shift.    CT ANGIOGRAPHY NECK:  No hemodynamically significant stenosis by NASCET criteria. No vascular   dissection.    CT ANGIOGRAPHY BRAIN:  No major vessel occlusion, evidence of aneurysm, or other vascular   malformation.    Moderate narrowing of the V4 segment of the nondominant right vertebral   artery.    --- End of Report ---           MARK ENG MD; Resident Radiology  This document has been electronically signed.   NIMIT G DHOLAKIA MD; Attending Radiologist  This document has been electronically signed. Jan 12 2023 11:52PM     [facility] DEPARTMENT OF OPHTHALMOLOGY - INITIAL ADULT CONSULT  -----------------------------------------------------------------------------------------------------------------  Eriberto Puri MD  PGY 2  Contact on Teams  -----------------------------------------------------------------------------------------------------------------    HPI:  Patient NERY LEONARD is a 67y (1955) Rh danette speaking man with CAD s/p 1 stent on brilinta, DM, CKD on HD, ,HTN, HLD presenting with Left eye ptosis and diplopia. Patient reports 10-12 days ago having left sided headache. At that time, he noted drooping of left eyelid. Over the course of the last 1 week hit has gotten progressively worse. 5 days ago he noticed double vision and eyes was dysconjugate. When closing one at a time diplopia resolves. He continues to have headache controlled with tylenol rated at 2/10. Headache onset was initially 2/10 then progressively worsened within several hours, maximum intensity of 7/10 over several hours. He saw opthomologist, who noted retinal scarring bilaterally. 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  Denies positional headache, neck stiffness, weakness, numbness, changes to speech, dysphagia, difficulty ambulating.           (13 Jan 2023 01:10)    Interval History: Ophtho consulted to evaluate CN3 palsy and to evaluate for GCA. Patient reports that about 12 days ago he noticed the left eye lid starting to droop, he thinks it has been getting worse since. He has also noticed that it has been more difficult to move his left eye during that time as well. About 5 days ago he noticed a headache and 3 days ago noticed blurry vision on the left side.     PAST MEDICAL & SURGICAL HISTORY:    Past Ocular History: Retinal surgery for blood in eye 20 years ago OD; CE/PCIOL OU  Ophthalmic Medications: None  FAMILY HISTORY:      MEDICATIONS  (STANDING):  artificial  tears Solution 1 Drop(s) Both EYES three times a day  aspirin enteric coated 81 milliGRAM(s) Oral daily  atorvastatin 80 milliGRAM(s) Oral at bedtime  dextrose 5%. 1000 milliLiter(s) (50 mL/Hr) IV Continuous <Continuous>  dextrose 5%. 1000 milliLiter(s) (100 mL/Hr) IV Continuous <Continuous>  dextrose 50% Injectable 25 Gram(s) IV Push once  dextrose 50% Injectable 12.5 Gram(s) IV Push once  dextrose 50% Injectable 25 Gram(s) IV Push once  enoxaparin Injectable 30 milliGRAM(s) SubCutaneous every 24 hours  glucagon  Injectable 1 milliGRAM(s) IntraMuscular once  insulin lispro (ADMELOG) corrective regimen sliding scale   SubCutaneous at bedtime  insulin lispro (ADMELOG) corrective regimen sliding scale   SubCutaneous three times a day before meals  melatonin 3 milliGRAM(s) Oral at bedtime  methylPREDNISolone sodium succinate IVPB 1000 milliGRAM(s) IV Intermittent once  metoprolol tartrate 25 milliGRAM(s) Oral two times a day  pantoprazole    Tablet 40 milliGRAM(s) Oral before breakfast  polyethylene glycol 3350 17 Gram(s) Oral at bedtime  senna 2 Tablet(s) Oral at bedtime  ticagrelor 60 milliGRAM(s) Oral two times a day    MEDICATIONS  (PRN):  acetaminophen     Tablet .. 650 milliGRAM(s) Oral every 6 hours PRN Moderate Pain (4 - 6), Severe Pain (7 - 10)  dextrose Oral Gel 15 Gram(s) Oral once PRN Blood Glucose LESS THAN 70 milliGRAM(s)/deciliter    Allergies & Intolerances:     Review of Systems:  Constitutional: No fever, chills  Eyes: No blurry vision, flashes, floaters, FBS, erythema, discharge, double vision, OU  Neuro: No tremors  Cardiovascular: No chest pain, palpitations  Respiratory: No SOB, no cough  GI: No nausea, vomiting, abdominal pain  : No dysuria  Skin: no rash  Psych: no depression  Endocrine: no polyuria, polydipsia  Heme/lymph: no swelling    VITALS: T(C): 36.6 (01-14-23 @ 13:18)  T(F): 97.9 (01-14-23 @ 13:18), Max: 98.7 (01-14-23 @ 08:21)  HR: 80 (01-14-23 @ 13:18) (58 - 87)  BP: 144/72 (01-14-23 @ 13:18) (108/60 - 172/75)  RR:  (17 - 18)  SpO2:  (96% - 99%)  Wt(kg): --  General: AAO x 3, appropriate mood and affect    Ophthalmology Exam:  Visual acuity (cc): 20/50 ph 20/25 OD; 20/200 PH 20/40 OS OU  Pupils: Right pupil nonreactive (hx of retinal surgery 20 years ago right eye); left pupil minimally reactive no apd by reverse noted.   Ttono: 16 OU  Extraocular movements (EOMs): Full OD, OS: -4 adduction, -4 supraduction, -3 infraduction, -1 adduction.   Confrontational Visual Field (CVF): constricted OU  Color 1/12 both eyes    Pen Light Exam (PLE)  External: Flat OU  Lids/Lashes/Lacrimal Ducts: Flat OU    Sclera/Conjunctiva: W+Q OU  Cornea: Cl OU  Anterior Chamber: D+F OU    Iris: Flat OU  Lens: PCIOL OU    Fundus Exam: dilated with 1% tropicamide and 2.5% phenylephrine  Approval obtained from primary team for dilation  Patient aware that pupils can remained dilated for at least 4-6 hours  Exam performed with 20D lens    Vitreous: wnl OU  Disc, cup/disc: sharp and pink, 0.3 OU  Macula: wnl OU  Vessels: wnl OU  Periphery: Extensive atrophy and scarring in periphery right adjacent to arcades OU.     Labs/Imaging:  CRP 4  ESR 47      ACC: [id number] EXAM:  CT ANGIO NECK (W) IC                        ACC: [id number] EXAM:  CT VENOGRAM BRAIN (W) IC                        ACC: [id number] EXAM:  CT ANGIO BRAIN (W) IC                          PROCEDURE DATE:  01/12/2023          INTERPRETATION:  CLINICAL INFORMATION: Left eye proptosis and medial gaze   palsy.    TECHNIQUE:  1. Noncontrast axial CT images were acquired through the head.   Two-dimensional sagittal and coronal reformats were generated.  2. Contrast enhanced CT angiography of the neck and head was performed.    MIP reformats were generated in the coronal, sagittal and axial planes.    Intravenous contrast: 70 cc Omnipaque 300 were administered; 30 cc were   discarded.    COMPARISON: No prior studies are available for comparison..    FINDINGS:    CT HEAD:  No acute intracranial hemorrhage, mass effect, or midline shift. No   abnormal extra-axial collections. The basal cisterns are patent without   evidence of central herniation. Subcentimeter hypodensities present in   the bilateral basal ganglia. Mild confluent areas of bilateral   periventricular deep white matter hypoattenuation.    The calvarium is intact. The soft tissues of the scalp are unremarkable.   Polyp/mucus retention cyst in the right maxillary sinus. The mastoid air   cells and middle ear cavities are clear. Bilateral lens replacements.   Mild bilateral proptosis.    CT ANGIOGRAPHY NECK:  Three-vessel aortic arch. origins of the great vessels are unremarkable.   The common carotid arteries are normal in caliber without significant   stenosis.    The carotid bifurcations are unremarkable. The internal carotid arteries   are normal in caliber without significant stenosis. Retropharyngeal   course of the left internal carotid artery.    Left dominant vertebral arteries. The vertebral arteries are normal in   caliber without significant stenosis.    The visualized neck and upper chest are unremarkable. Visualized osseous   structures are unremarkable.    CT ANGIOGRAPHY BRAIN:  Anterior circulation: Calcifications of the carotid siphons bilaterally.   The bilateral anterior cerebral and middle cerebral arteries are patent   without evidence of significant stenosis, major vessel occlusion, or   aneurysm.    Posterior circulation:Smooth narrowing of the distal V4 segment of the   nondominant right vertebral artery. The basilar artery and bilateral   posterior cerebral arteries are patent without evidence of significant   stenosis, major vessel occlusion, or aneurysm.    No enlarged vascular lesions or clusters of abnormal vessels are noted to   suggest an arterial venous malformation.    Visualized portions of the superficial and deep venous systems are   unremarkable.      IMPRESSION:    CT HEAD:  No CT evidence of acuteintracranial hemorrhage, mass effect, or midline   shift.    CT ANGIOGRAPHY NECK:  No hemodynamically significant stenosis by NASCET criteria. No vascular   dissection.    CT ANGIOGRAPHY BRAIN:  No major vessel occlusion, evidence of aneurysm, or other vascular   malformation.    Moderate narrowing of the V4 segment of the nondominant right vertebral   artery.    --- End of Report ---           MARK ENG MD; Resident Radiology  This document has been electronically signed.   BETH BRYAN MD; Attending Radiologist  This document has been electronically signed. Jan 12 2023 11:52PM

## 2023-01-14 NOTE — PROGRESS NOTE ADULT - SUBJECTIVE AND OBJECTIVE BOX
Patient is a 67y Male whom presented to the hospital with esrd on hd     PAST MEDICAL & SURGICAL HISTORY:      MEDICATIONS  (STANDING):  artificial  tears Solution 1 Drop(s) Both EYES three times a day  aspirin enteric coated 81 milliGRAM(s) Oral daily  atorvastatin 80 milliGRAM(s) Oral at bedtime  dextrose 5%. 1000 milliLiter(s) (50 mL/Hr) IV Continuous <Continuous>  dextrose 5%. 1000 milliLiter(s) (100 mL/Hr) IV Continuous <Continuous>  dextrose 50% Injectable 25 Gram(s) IV Push once  dextrose 50% Injectable 12.5 Gram(s) IV Push once  dextrose 50% Injectable 25 Gram(s) IV Push once  enoxaparin Injectable 30 milliGRAM(s) SubCutaneous every 24 hours  glucagon  Injectable 1 milliGRAM(s) IntraMuscular once  insulin lispro (ADMELOG) corrective regimen sliding scale   SubCutaneous at bedtime  insulin lispro (ADMELOG) corrective regimen sliding scale   SubCutaneous three times a day before meals  metoprolol tartrate 25 milliGRAM(s) Oral two times a day  pantoprazole    Tablet 40 milliGRAM(s) Oral before breakfast  polyethylene glycol 3350 17 Gram(s) Oral at bedtime  senna 2 Tablet(s) Oral at bedtime      Allergies    No Known Allergies    Intolerances        SOCIAL HISTORY:  Denies ETOh,Smoking,     FAMILY HISTORY:      REVIEW OF SYSTEMS:    CONSTITUTIONAL: No weakness, fevers or chills  RESPIRATORY: No cough, wheezing, hemoptysis; No shortness of breath  CARDIOVASCULAR: No chest pain or palpitations  GASTROINTESTINAL: No abdominal or epigastric pain. No nausea, vomiting,     No diarrhea or constipation. No melena   GENITOURINARY: No dysuria, frequency or hematuria  NEUROLOGICAL: No numbness or weakness  SKIN: dry                              10.8   6.93  )-----------( 146      ( 14 Jan 2023 09:00 )             34.6       CBC Full  -  ( 14 Jan 2023 09:00 )  WBC Count : 6.93 K/uL  RBC Count : 3.43 M/uL  Hemoglobin : 10.8 g/dL  Hematocrit : 34.6 %  Platelet Count - Automated : 146 K/uL  Mean Cell Volume : 100.9 fl  Mean Cell Hemoglobin : 31.5 pg  Mean Cell Hemoglobin Concentration : 31.2 gm/dL  Auto Neutrophil # : x  Auto Lymphocyte # : x  Auto Monocyte # : x  Auto Eosinophil # : x  Auto Basophil # : x  Auto Neutrophil % : x  Auto Lymphocyte % : x  Auto Monocyte % : x  Auto Eosinophil % : x  Auto Basophil % : x      01-14    133<L>  |  93<L>  |  35<H>  ----------------------------<  182<H>  5.1   |  27  |  7.30<H>    Ca    8.9      14 Jan 2023 09:00  Phos  5.2     01-13  Mg     4.3     01-13    TPro  7.6  /  Alb  4.6  /  TBili  0.3  /  DBili  x   /  AST  20  /  ALT  15  /  AlkPhos  96  01-12      CAPILLARY BLOOD GLUCOSE      POCT Blood Glucose.: 222 mg/dL (14 Jan 2023 11:23)  POCT Blood Glucose.: 177 mg/dL (14 Jan 2023 07:50)  POCT Blood Glucose.: 198 mg/dL (13 Jan 2023 21:47)  POCT Blood Glucose.: 248 mg/dL (13 Jan 2023 16:16)      Vital Signs Last 24 Hrs  T(C): 36.6 (14 Jan 2023 13:18), Max: 37.1 (14 Jan 2023 08:21)  T(F): 97.9 (14 Jan 2023 13:18), Max: 98.7 (14 Jan 2023 08:21)  HR: 80 (14 Jan 2023 13:18) (58 - 87)  BP: 144/72 (14 Jan 2023 13:18) (108/60 - 172/75)  BP(mean): --  RR: 18 (14 Jan 2023 13:18) (17 - 18)  SpO2: 99% (14 Jan 2023 13:18) (96% - 99%)    Parameters below as of 14 Jan 2023 13:18  Patient On (Oxygen Delivery Method): room air                      PHYSICAL EXAM:    Constitutional: NAD  HEENT: conjunctive   clear   Neck:  No JVD  Respiratory: CTAB  Cardiovascular: S1 and S2  Gastrointestinal: BS+, soft, NT/ND  Extremities: No peripheral edema  Neurological: A/O x 3, no focal deficits  Access: Not applicable

## 2023-01-14 NOTE — CONSULT NOTE ADULT - ASSESSMENT
66 yo M PMH danette speaking, current smoker, CAD s/p 1 stent on brilinta, DM, CKD on HD, ,HTN, HLD presenting with Left eye ptosis and diplopia.    #r/o GCA  =p/w 10-12 days of L sided headache, L eye ptosis 2/2 CN 3 palsy  =CTA head/neck w/o significant stenosis, did show moderate narrowing of segment of R vertebral artery  =MR brain w/o contrast unremarkable  =ESR borderline elevated, CRP wnl  =pt w/ tenderness to L temporal artery on exam  =overall low suspicion for GCA (low inflammatory markers, lack of other GCA symptoms), however in very rare instances, GCA can cause CN 3 palsy    Plan:  -since there is no current explanation for pt's symptoms of CN3 palsy and L temporal headache, can trial solumedrol 80mg to see if pt shows any improvement to steroids  -if headache or CN3 palsy improved w/ steroid, would favor possibly GCA more, and at that point would consider potential temporal artery biopsy for confirmation  -will follow     Discussed with Attending Dr. Chely Flores DO  Rheumatology Fellow  Pager: [phone]  Available on TEAMS

## 2023-01-14 NOTE — CONSULT NOTE ADULT - SUBJECTIVE AND OBJECTIVE BOX
NERY LEONARD  [id number]    HPI: 66 yo M PMH danette speaking, current smoker, CAD s/p 1 stent on brilinta, DM, CKD on HD, ,HTN, HLD presenting with Left eye ptosis and diplopia. Patient reports 10-12 days ago having left sided headache. At that time, he noted drooping of left eyelid. Over the course of the last 1 week hit has gotten progressively worse. 5 days ago he noticed double vision and eyes was dysconjugate. When closing one at a time diplopia resolves. He continues to have headache controlled with tylenol rated at 2/10. Headache onset was initially 2/10 then progressively worsened within several hours, maximum intensity of 7/10 over several hours. He saw opthomologist, who noted retinal scarring bilaterally. 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  Denies positional headache, neck stiffness, weakness, numbness, changes to speech, dysphagia, difficulty ambulating.      Subjective: Granddaughter at bedside providing translation per patient. Pt says his L eye ptosis has not improved. When he lifts his L eyelid up, his vision is normal. Has left sided headache that has not improved, he usually does not have headaches. ESRD on HD for 5 years, thought to be 2/2 HTN/DM2, never needed kidney bx.     ROS:  -Denies fever, chills, changes in weight, chest pain, sob, shoulder/hip stiffness pain, jaw pain, scalp tenderness, rash, nasal/oral ulcers, raynauds, weakness, neuropathy     MEDICATIONS  (STANDING):  artificial  tears Solution 1 Drop(s) Both EYES three times a day  aspirin enteric coated 81 milliGRAM(s) Oral daily  atorvastatin 80 milliGRAM(s) Oral at bedtime  dextrose 5%. 1000 milliLiter(s) (50 mL/Hr) IV Continuous <Continuous>  dextrose 5%. 1000 milliLiter(s) (100 mL/Hr) IV Continuous <Continuous>  dextrose 50% Injectable 25 Gram(s) IV Push once  dextrose 50% Injectable 12.5 Gram(s) IV Push once  dextrose 50% Injectable 25 Gram(s) IV Push once  enoxaparin Injectable 30 milliGRAM(s) SubCutaneous every 24 hours  glucagon  Injectable 1 milliGRAM(s) IntraMuscular once  insulin lispro (ADMELOG) corrective regimen sliding scale   SubCutaneous at bedtime  insulin lispro (ADMELOG) corrective regimen sliding scale   SubCutaneous three times a day before meals  melatonin 3 milliGRAM(s) Oral at bedtime  metoprolol tartrate 25 milliGRAM(s) Oral two times a day  pantoprazole    Tablet 40 milliGRAM(s) Oral before breakfast  polyethylene glycol 3350 17 Gram(s) Oral at bedtime  senna 2 Tablet(s) Oral at bedtime  ticagrelor 60 milliGRAM(s) Oral two times a day    MEDICATIONS  (PRN):  acetaminophen     Tablet .. 650 milliGRAM(s) Oral every 6 hours PRN Moderate Pain (4 - 6), Severe Pain (7 - 10)  dextrose Oral Gel 15 Gram(s) Oral once PRN Blood Glucose LESS THAN 70 milliGRAM(s)/deciliter      Allergies    No Known Allergies    Intolerances        PERTINENT MEDICATION HISTORY:    MEDICATIONS: metoprolol, lipitor, brilinta, PPI, pepcid     SURGERIES: laser eye surgery in past     FAMILY HISTORY: DM2, HTN    TRAVEL HISTORY: None recent    SOCIAL HISTORY: Current smoker (1-3 cigerrets ~30 years)     Vital Signs Last 24 Hrs  T(C): 36.6 (14 Jan 2023 18:45), Max: 37.1 (14 Jan 2023 08:21)  T(F): 97.8 (14 Jan 2023 18:45), Max: 98.7 (14 Jan 2023 08:21)  HR: 80 (14 Jan 2023 18:45) (60 - 87)  BP: 137/80 (14 Jan 2023 18:45) (128/74 - 153/72)  BP(mean): --  RR: 18 (14 Jan 2023 18:45) (18 - 18)  SpO2: 97% (14 Jan 2023 18:45) (96% - 99%)    Parameters below as of 14 Jan 2023 18:45  Patient On (Oxygen Delivery Method): room air        Physical Exam:  General: Breathing comfortably on room air, no distress  HEENT: +L eye ptosis, tenderness w/ movement, unable to track fingers medially w/ L left eye  CVS: +S1/S2, RRR  Resp: CTA b/l. No crackles/wheezing  GI: Soft, NT/ND +BS  MSK: no synovitis. Normal ROM in shoulders  Skin: no visible rashes  Vascular: palpable temporal pulses b/l. Tenderness to palpation of L temporal artery    LABS:                        10.8   6.93  )-----------( 146      ( 14 Jan 2023 09:00 )             34.6     01-14    133<L>  |  93<L>  |  35<H>  ----------------------------<  182<H>  5.1   |  27  |  7.30<H>    Ca    8.9      14 Jan 2023 09:00  Phos  5.2     01-13  Mg     4.3     01-13    TPro  7.6  /  Alb  4.6  /  TBili  0.3  /  DBili  x   /  AST  20  /  ALT  15  /  AlkPhos  96  01-12      mrmr< from: MR Head No Cont (01.13.23 @ 23:29) >    ACC: [id number] EXAM:  MR BRAIN                          PROCEDURE DATE:  01/13/2023          INTERPRETATION:  EXAM: MRI brain without contrast    INDICATION: Non-specific neurological symptoms. Altered mental status    TECHNIQUE: MR examination of brain performed without contrast utilizing   axial diffusion-weighted/ADC, axial T2 FLAIR, sagittal T2 FLAIR, coronal   T2 FLAIR, axial T1, sagittal T1, coronal T1, axial susceptibility   weighted sequences.    COMPARISON: January 12, 2023 CT venogram    FINDINGS:    Ventricles are normal in size and configuration for patient's age. No   hydrocephalus or extra-axial fluid collection.    No abnormal restricted diffusion identified to suggest acute territorial   infarction. No acute intracranial hemorrhage. Mild subcortical and   periventricular-white matter T2-weighted hyperintensity, nonspecific but   favored to reflect sequela of mild chronic microvascular ischemia. . No   significant midline shift, mass effect or herniation. Susceptibility   weighted sequences are within normal limits without evidence for chronic   blood products. There are chronic lacunar infarctions of the bilateral   basal ganglia.    Visualized proximal arterial and dural venous sinus flow voids preserved   on spin-echo sequences. Paranasal sinuses and mastoid air cells are well   aerated.    No suspicious expansile or destructive calvarial lesion identified.    Sella and suprasellar region are unremarkable.      IMPRESSION:    No acute intracranial hemorrhage, acute infarction, extra-axial fluid   collection or hydrocephalus.    If clinicians have any questions/need for clarification regarding this   report, Dr. Emiliano Khan can be best reached via the patient secure   hospital email system    --- End of Report ---            EMILIANO LION MD; Attending Radiologist  This document has been electronically signed. Jan 14 2023  8:54AM    < end of copied text >

## 2023-01-14 NOTE — CHART NOTE - NSCHARTNOTEFT_GEN_A_CORE
consulted by neurology team to evaluate for possible GCA. Recommended to team that patient should be started on steroids If GCA is a concern in order to avoid any delay in treatment and potential visual loss. Full consult to follow.     d/w Dr. Mo

## 2023-01-14 NOTE — PROGRESS NOTE ADULT - ASSESSMENT
Patient NERY LEONARD is a 67y (1955) Rh danette speaking man with CAD s/p 1 stent on brilinta, DM, CKD on HD, ,HTN, HLD presenting with Left eye ptosis and diplopia. Patient reports 10-12 days ago having left sided headache. At that time, he noted drooping of left eyelid. Over the course of the last 1 week hit has gotten progressively worse. 5 days ago he noticed double vision and eyes was dysconjugate. When closing one at a time diplopia resolves. He continues to have headache controlled with tylenol rated at 2/10. Headache onset was initially 2/10 then progressively worsened within several hours, maximum intensity of 7/10 over several hours. He saw opthomologist, who noted retinal scarring bilaterally. 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  Denies positional headache, neck stiffness, weakness, numbness, changes to speech, dysphagia, difficulty ambulating.      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 Rule out Brain stem infarction       Plan:    Imaging/Labs  [] MRI brain w/o   [] TTE   [] HbA1C and Lipid Panel    Meds  [] Aspirin 81MG PO daily  [] Continuing home Brilinta 60 mg BID  [] Atorvastatin 80MG QHS, titrate to LDL<70  [] DVT prophylaxis - Lovenox 40MG SC daily    Other  [] Telemonitoring; Neurochecks and vital signs per unit protocol, Q4H  [] Normotension.   [] BG goal <180, avoid hypoglycemia  [] NPO until clears dysphagia screen, otherwise swallow evaluation  [] Fall, aspiration precautions  [] PT/OT eval    Case discussed with Dr. Bermudez stroke fellow. To be seen during AM rounds.       Attestation Statements:  Time-based billing (NON-critical care).     110 minutes spent on total encounter. The necessity of the time spent during the encounter on this date of service was due to:     Right III CN palsy. Among etiologies will rule out stroke. Agree with plan and assessment as stated above.   A1C 6.4  LDL 60    Tolossa Hunt syndrome not excluded.   Patient NERY LEONARD is a 67y (1955) Rh danette speaking man with CAD s/p 1 stent on brilinta, DM, CKD on HD, ,HTN, HLD presenting with Left eye ptosis and diplopia. Patient reports 10-12 days ago having left sided headache. At that time, he noted drooping of left eyelid. Over the course of the last 1 week hit has gotten progressively worse. 5 days ago he noticed double vision and eyes was dysconjugate. When closing one at a time diplopia resolves. He continues to have headache controlled with tylenol rated at 2/10. Headache onset was initially 2/10 then progressively worsened within several hours, maximum intensity of 7/10 over several hours. He saw opthomologist, who noted retinal scarring bilaterally. 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  Denies positional headache, neck stiffness, weakness, numbness, changes to speech, dysphagia, difficulty ambulating.       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 Rule out Brain stem infarction     ASSESSMENT: This is a 67y man with multiple vascular risk factors that presents with a left cranial nerve 3 palsy. CT angio Head/Neck and MR brain on 1/13 was unremarkable for any acute findings.  He does complain of a new left sided headache and jaw pain with chewing  now with tenderness to the left temporal. He also has associated left eye diplopia which may be suggestive of GCA vs Tolossa Hunt syndrome. Rheum w/o pending, rheum and opthalmology consulted for further recs. He was given solumedrol 1000 mg IVPB x1. Further management will be dependent upon response to treatment. Consider diabetes consult for the  management of glucose post steroids if needed. BG goal <180 and he is trending 170-200's.  He is pending a MR head w/ contrast and MR orbits to further investigate symptomology.     NEURO: Pt is alert and oriented x3 and remains clinically stable. He  has a cranial nerve 3 palsy with a new left sided headache and tenderness on palpation to his left temporal.  Continue close monitoring for neurologic deterioration, he remains normotension, continue high dose statin, LDL is 60.     ANTITHROMBOTIC THERAPY: Brilinta 60 mg BID and aspirin 81 mg daily     PULMONARY: CXR clear on 1/13, protecting airway, saturating well     CARDIOVASCULAR: 1/13 TTE: EF 63%, He is NSR not on tele. BP has been well controlled trending systolic 130-150's.                        SBP goal: normotension     GASTROINTESTINAL: solid diet w/ a  HGA1C 6.4%. Insulin sliding scale is in place and BS ranging between 170-200's.      Diet: Consistent Carbohydrate Renal diet     RENAL: BUN 35, Cr 7.3 on dialysis M/W/F.  Nephrology is following, anemia of chronic disease: Continue epo aiming for a HCT of 32-36 %.     Na Goal: Greater than 135. Na: 133     Lechuga: No     HEMATOLOGY: H/H 10.8/34.6  , Platelets 146      DVT ppx: LMWH      ID: afebrile, no leukocytosis     DISPOSITION:  HOME as per PT     CORE MEASURES:        Admission NIHSS: n/a     TPA:  NO      LDL/HDL: 60/36      Depression Screen: no     Statin Therapy: yes      Dysphagia Screen:PASS      Smoking NO      Afib NO     Stroke Education  YES    Obtain screening ultrasound in patients who meet 1 or more of the following criteria as patient is high risk for DVT/PE upon admission:   [] History of DVT/PE  []Hypercoagulable states (Factor V Leiden, Cancer, OCP, etc. )  []Prolonged immobility (hemiplegia/hemiparesis/post operative or any other extended immobilization)   [] Transferred from outside facility (Rehab or Long term care)  [] Age </= to 50

## 2023-01-14 NOTE — CONSULT NOTE ADULT - ATTENDING COMMENTS
Pt with new onset temporal headaches and cranial nerve palsy of unclear etiology  differential diagnosis includes GCA with possible CN involvement (uncommon presentation of GCA)  trial of steroids  further recommendations pending assessment of steroid response.       Dr. Yasmine Mo  Rheumatology Attending  [facility]  Rheumatology at [place]     Contact:   call the office:: [phone] or reach [place] [employer] Teams, weekdays before 5 pm   after 5 pm or on weekends, contact [phone]

## 2023-01-14 NOTE — PROGRESS NOTE ADULT - SUBJECTIVE AND OBJECTIVE BOX
THE PATIENT WAS SEEN AND EXAMINED BY ME WITH THE HOUSESTAFF AND STROKE TEAM DURING MORNING ROUNDS.     Patient NERY LEONARD is a 67y (1955) Rh danette speaking man with CAD s/p 1 stent on brilinta, DM, CKD on HD, ,HTN, HLD presenting with Left eye ptosis and diplopia. Patient reports 10-12 days ago having left sided headache. At that time, he noted drooping of left eyelid. Over the course of the last 1 week hit has gotten progressively worse. 5 days ago he noticed double vision and eyes was dysconjugate. When closing one at a time diplopia resolves. He continues to have headache controlled with tylenol rated at 2/10. Headache onset was initially 2/10 then progressively worsened within several hours, maximum intensity of 7/10 over several hours. He saw opthomologist, who noted retinal scarring bilaterally. 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  Denies positional headache, neck stiffness, weakness, numbness, changes to speech, dysphagia, difficulty ambulating.      Subjective:  Complains of a left sided headache and blurry vision of the left eye     Allergies    No Known Allergies    Intolerances    MEDICATIONS  (STANDING):  artificial  tears Solution 1 Drop(s) Both EYES three times a day  aspirin enteric coated 81 milliGRAM(s) Oral daily  atorvastatin 80 milliGRAM(s) Oral at bedtime  dextrose 5%. 1000 milliLiter(s) (50 mL/Hr) IV Continuous <Continuous>  dextrose 5%. 1000 milliLiter(s) (100 mL/Hr) IV Continuous <Continuous>  dextrose 50% Injectable 25 Gram(s) IV Push once  dextrose 50% Injectable 12.5 Gram(s) IV Push once  dextrose 50% Injectable 25 Gram(s) IV Push once  enoxaparin Injectable 30 milliGRAM(s) SubCutaneous every 24 hours  glucagon  Injectable 1 milliGRAM(s) IntraMuscular once  insulin lispro (ADMELOG) corrective regimen sliding scale   SubCutaneous at bedtime  insulin lispro (ADMELOG) corrective regimen sliding scale   SubCutaneous three times a day before meals  melatonin 3 milliGRAM(s) Oral at bedtime  methylPREDNISolone sodium succinate IVPB 1000 milliGRAM(s) IV Intermittent once  metoprolol tartrate 25 milliGRAM(s) Oral two times a day  pantoprazole    Tablet 40 milliGRAM(s) Oral before breakfast  polyethylene glycol 3350 17 Gram(s) Oral at bedtime  senna 2 Tablet(s) Oral at bedtime  ticagrelor 60 milliGRAM(s) Oral two times a day      Vital Signs Last 24 Hrs  T(C): 36.6 (14 Jan 2023 13:18), Max: 37.1 (14 Jan 2023 08:21)  T(F): 97.9 (14 Jan 2023 13:18), Max: 98.7 (14 Jan 2023 08:21)  HR: 80 (14 Jan 2023 13:18) (58 - 87)  BP: 144/72 (14 Jan 2023 13:18) (128/74 - 172/75)  BP(mean): --  RR: 18 (14 Jan 2023 13:18) (17 - 18)  SpO2: 99% (14 Jan 2023 13:18) (96% - 99%)    Parameters below as of 14 Jan 2023 13:18  Patient On (Oxygen Delivery Method): room air        Physical Exam:   Physical Exam: Physical Examination:  General - NAD  Cardiovascular - Peripheral pulses palpable, no edema  Eyes - Fundoscopy with flat, sharp optic discs and no hemorrhage or exudates    Neurologic Exam:  Mental status - Awake, Alert, Oriented to person, place, and time. Speech fluent, repetition and naming intact. Follows simple and complex commands. Attention/concentration, recent and remote memory (including registration and recall), and fund of knowledge intact    Cranial nerves - PERRL, left eye ptosis, VFF, all extraocular muscles affected except abduction of left eye, face sensation (V1-V3) intact b/l, facial strength intact without asymmetry b/l, hearing intact b/l, palate with symmetric elevation, trapezius  5/5 strength b/l, tongue midline on protrusion with full lateral movement    Motor - Normal bulk and tone throughout. No pronator drift.  Strength testing            Deltoid      Biceps      Triceps     Wrist Extension    Wrist Flexion     Interossei         R            5                 5               5                     5                              5                        5                 5  L             5                 5               5                     5                              5                        5                 5              Hip Flexion    Hip Extension    Knee Flexion    Knee Extension    Dorsiflexion    Plantar Flexion  R              5                           5                       5                           5                            5                          5  L              5                           5                        5                           5                            5                          5    Sensation - Light touch intact throughout    DTR's -             Biceps      Triceps     Brachioradialis      Patellar    Ankle    Toes/plantar response  R             2+             2+                  2+                       2+            2+                 Down  L              2+             2+                 2+                        2+           2+                 Down    Coordination - Finger to Nose intact b/l. No tremors appreciated    Gait and station - Normal casual gait.      Labs and Results:  Labs, Radiology, Cardiology, and Other Results: Labs:                        11.2   8.00  )-----------( 151      ( 12 Jan 2023 19:44 )             34.9     01-12    138  |  95<L>  |  39<H>  ----------------------------<  90  5.1   |  25  |  8.46<H>    Ca    9.6      12 Jan 2023 19:44    TPro  7.6  /  Alb  4.6  /  TBili  0.3  /  DBili  x   /  AST  20  /  ALT  15  /  AlkPhos  96  01-12    CAPILLARY BLOOD GLUCOSE        LIVER FUNCTIONS - ( 12 Jan 2023 19:44 )  Alb: 4.6 g/dL / Pro: 7.6 g/dL / ALK PHOS: 96 U/L / ALT: 15 U/L / AST: 20 U/L / GGT: x         < from: CT Angio Head w/ IV Cont (01.12.23 @ 21:51) >    CT HEAD:  No CT evidence of acuteintracranial hemorrhage, mass effect, or midline   shift.    CT ANGIOGRAPHY NECK:  No hemodynamically significant stenosis by NASCET criteria. No vascular   dissection.    CT ANGIOGRAPHY BRAIN:  No major vessel occlusion, evidence of aneurysm, or other vascular   malformation.    Moderate narrowing of the V4 segment of the nondominant right vertebral   artery.    Assessment and Plan:   · VTE Risk Assessment	VTE Assessment already completed for this visit  · Completed VTE Risk Assessment(s)	Medical Assessment Completed on: 13-Jan-2023 00:56  · Completed VTE Risk Assessment(s)	Refer to the Assessment tab to view/cancel completed assessment.    Assessment:  · Assessment	  Patient NERY LEONARD is a 67y (1955) Rh danette speaking man with CAD s/p 1 stent on brilinta, DM, CKD on HD, ,HTN, HLD presenting with Left eye ptosis and diplopia. Patient reports 10-12 days ago having left sided headache. At that time, he noted drooping of left eyelid. Over the course of the last 1 week hit has gotten progressively worse. 5 days ago he noticed double vision and eyes was dysconjugate. When closing one at a time diplopia resolves. He continues to have headache controlled with tylenol rated at 2/10. Headache onset was initially 2/10 then progressively worsened within several hours, maximum intensity of 7/10 over several hours. He saw opthomologist, who noted retinal scarring bilaterally. 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  Denies positional headache, neck stiffness, weakness, numbness, changes to speech, dysphagia, difficulty ambulating.      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 Rule out Brain stem infarction       Plan:    Imaging/Labs  [] MRI brain w/o   [] TTE   [] HbA1C and Lipid Panel    Meds  [] Aspirin 81MG PO daily  [] Continuing home Brilinta 60 mg BID  [] Atorvastatin 80MG QHS, titrate to LDL<70  [] DVT prophylaxis - Lovenox 40MG SC daily    Other  [] Telemonitoring; Neurochecks and vital signs per unit protocol, Q4H  [] Normotension.   [] BG goal <180, avoid hypoglycemia  [] NPO until clears dysphagia screen, otherwise swallow evaluation  [] Fall, aspiration precautions  [] PT/OT eval    Case discussed with Dr. Bermudez stroke fellow. To be seen during AM rounds.       Attestation Statements:  Time-based billing (NON-critical care).     110 minutes spent on total encounter. The necessity of the time spent during the encounter on this date of service was due to:     Right III CN palsy. Among etiologies will rule out stroke. Agree with plan and assessment as stated above.   A1C 6.4  LDL 60    Tolossa Hunt syndrome not excluded.   THE PATIENT WAS SEEN AND EXAMINED BY ME WITH THE HOUSESTAFF AND STROKE TEAM DURING MORNING ROUNDS.     Patient NERY LEONARD is a 67y (1955) Rh danette speaking man with CAD s/p 1 stent on brilinta, DM, CKD on HD, ,HTN, HLD presenting with Left eye ptosis and diplopia. Patient reports 10-12 days ago having left sided headache. At that time, he noted drooping of left eyelid. Over the course of the last 1 week hit has gotten progressively worse. 5 days ago he noticed double vision and eyes was dysconjugate. When closing one at a time diplopia resolves. He continues to have headache controlled with tylenol rated at 2/10. Headache onset was initially 2/10 then progressively worsened within several hours, maximum intensity of 7/10 over several hours. He saw opthomologist, who noted retinal scarring bilaterally. 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  Denies positional headache, neck stiffness, weakness, numbness, changes to speech, dysphagia, difficulty ambulating.      Subjective:  Complains of a left sided headache and blurry vision of the left eye     Allergies    No Known Allergies    Intolerances    MEDICATIONS  (STANDING):  artificial  tears Solution 1 Drop(s) Both EYES three times a day  aspirin enteric coated 81 milliGRAM(s) Oral daily  atorvastatin 80 milliGRAM(s) Oral at bedtime  dextrose 5%. 1000 milliLiter(s) (50 mL/Hr) IV Continuous <Continuous>  dextrose 5%. 1000 milliLiter(s) (100 mL/Hr) IV Continuous <Continuous>  dextrose 50% Injectable 25 Gram(s) IV Push once  dextrose 50% Injectable 12.5 Gram(s) IV Push once  dextrose 50% Injectable 25 Gram(s) IV Push once  enoxaparin Injectable 30 milliGRAM(s) SubCutaneous every 24 hours  glucagon  Injectable 1 milliGRAM(s) IntraMuscular once  insulin lispro (ADMELOG) corrective regimen sliding scale   SubCutaneous at bedtime  insulin lispro (ADMELOG) corrective regimen sliding scale   SubCutaneous three times a day before meals  melatonin 3 milliGRAM(s) Oral at bedtime  methylPREDNISolone sodium succinate IVPB 1000 milliGRAM(s) IV Intermittent once  metoprolol tartrate 25 milliGRAM(s) Oral two times a day  pantoprazole    Tablet 40 milliGRAM(s) Oral before breakfast  polyethylene glycol 3350 17 Gram(s) Oral at bedtime  senna 2 Tablet(s) Oral at bedtime  ticagrelor 60 milliGRAM(s) Oral two times a day    Vital Signs Last 24 Hrs  T(C): 36.6 (14 Jan 2023 13:18), Max: 37.1 (14 Jan 2023 08:21)  T(F): 97.9 (14 Jan 2023 13:18), Max: 98.7 (14 Jan 2023 08:21)  HR: 80 (14 Jan 2023 13:18) (58 - 87)  BP: 144/72 (14 Jan 2023 13:18) (128/74 - 172/75)  RR: 18 (14 Jan 2023 13:18) (17 - 18)  SpO2: 99% (14 Jan 2023 13:18) (96% - 99%)    Parameters below as of 14 Jan 2023 13:18  Patient On (Oxygen Delivery Method): room air    LABS:  cret                        10.8   6.93  )-----------( 146      ( 14 Jan 2023 09:00 )             34.6     01-14    133<L>  |  93<L>  |  35<H>  ----------------------------<  182<H>  5.1   |  27  |  7.30<H>    Ca    8.9      14 Jan 2023 09:00  Phos  5.2     01-13  Mg     4.3     01-13    TPro  7.6  /  Alb  4.6  /  TBili  0.3  /  DBili  x   /  AST  20  /  ALT  15  /  AlkPhos  96  01-12                     THE PATIENT WAS SEEN AND EXAMINED BY ME WITH THE HOUSESTAFF AND STROKE TEAM DURING MORNING ROUNDS.     Patient NERY LEONARD is a 67y (1955) Rh danette speaking man with CAD s/p 1 stent on brilinta, DM, CKD on HD, ,HTN, HLD presenting with Left eye ptosis and diplopia. Patient reports 10-12 days ago having left sided headache. At that time, he noted drooping of left eyelid. Over the course of the last 1 week hit has gotten progressively worse. 5 days ago he noticed double vision and eyes was dysconjugate. When closing one at a time diplopia resolves. He continues to have headache controlled with tylenol rated at 2/10. Headache onset was initially 2/10 then progressively worsened within several hours, maximum intensity of 7/10 over several hours. He saw opthomologist, who noted retinal scarring bilaterally. 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  Denies positional headache, neck stiffness, weakness, numbness, changes to speech, dysphagia, difficulty ambulating.      Subjective:  complains of a left sided headache and blurry vision of the left eye     Allergies    No Known Allergies    Intolerances    MEDICATIONS  (STANDING):  artificial  tears Solution 1 Drop(s) Both EYES three times a day  aspirin enteric coated 81 milliGRAM(s) Oral daily  atorvastatin 80 milliGRAM(s) Oral at bedtime  dextrose 5%. 1000 milliLiter(s) (50 mL/Hr) IV Continuous <Continuous>  dextrose 5%. 1000 milliLiter(s) (100 mL/Hr) IV Continuous <Continuous>  dextrose 50% Injectable 25 Gram(s) IV Push once  dextrose 50% Injectable 12.5 Gram(s) IV Push once  dextrose 50% Injectable 25 Gram(s) IV Push once  enoxaparin Injectable 30 milliGRAM(s) SubCutaneous every 24 hours  glucagon  Injectable 1 milliGRAM(s) IntraMuscular once  insulin lispro (ADMELOG) corrective regimen sliding scale   SubCutaneous at bedtime  insulin lispro (ADMELOG) corrective regimen sliding scale   SubCutaneous three times a day before meals  melatonin 3 milliGRAM(s) Oral at bedtime  methylPREDNISolone sodium succinate IVPB 1000 milliGRAM(s) IV Intermittent once  metoprolol tartrate 25 milliGRAM(s) Oral two times a day  pantoprazole    Tablet 40 milliGRAM(s) Oral before breakfast  polyethylene glycol 3350 17 Gram(s) Oral at bedtime  senna 2 Tablet(s) Oral at bedtime  ticagrelor 60 milliGRAM(s) Oral two times a day    Vital Signs Last 24 Hrs  T(C): 36.6 (14 Jan 2023 13:18), Max: 37.1 (14 Jan 2023 08:21)  T(F): 97.9 (14 Jan 2023 13:18), Max: 98.7 (14 Jan 2023 08:21)  HR: 80 (14 Jan 2023 13:18) (58 - 87)  BP: 144/72 (14 Jan 2023 13:18) (128/74 - 172/75)  RR: 18 (14 Jan 2023 13:18) (17 - 18)  SpO2: 99% (14 Jan 2023 13:18) (96% - 99%)    Parameters below as of 14 Jan 2023 13:18  Patient On (Oxygen Delivery Method): room air    Physical Examination:  General: NAD  Cardiovascular: Peripheral pulses palpable, no edema    Neurologic Exam:  Mental status - Awake, Alert, Oriented to person, place, and time. Speech fluent, repetition and naming intact. Follows simple and complex commands. Attention/concentration, recent and remote memory (including registration and recall), and fund of knowledge intact  Cranial nerves - PERRL, left eye ptosis, VFF, all extraocular muscles affected except abduction of left eye, diplopia on finger counting,  face sensation (V1-V3) intact b/l, facial strength intact without asymmetry b/l, hearing intact b/l, palate with symmetric elevation, trapezius  5/5 strength b/l, tongue midline on protrusion with full lateral movement  Motor - Normal bulk and tone throughout. No pronator drift. 5/5 strength in all extremities.   Sensation - Light touch intact throughout  Coordination - Finger to Nose intact b/l. No tremors appreciated  Gait and station - Normal casual gait.    LABS:  cret                        10.8   6.93  )-----------( 146      ( 14 Jan 2023 09:00 )             34.6     01-14    133<L>  |  93<L>  |  35<H>  ----------------------------<  182<H>  5.1   |  27  |  7.30<H>    Ca    8.9      14 Jan 2023 09:00  Phos  5.2     01-13  Mg     4.3     01-13    TPro  7.6  /  Alb  4.6  /  TBili  0.3  /  DBili  x   /  AST  20  /  ALT  15  /  AlkPhos  96  01-12      MR HEAD 1/13/23  IMPRESSION:  No acute intracranial hemorrhage, acute infarction, extra-axial fluid   collection or hydrocephalus.    CT HEAD 1/13/23:  No CT evidence of acuteintracranial hemorrhage, mass effect, or midline   shift.    CT ANGIOGRAPHY NECK 1/13/23:  No hemodynamically significant stenosis by NASCET criteria. No vascular   dissection.    CT ANGIOGRAPHY BRAIN 1/13/23:  No major vessel occlusion, evidence of aneurysm, or other vascular   malformation.    Moderate narrowing of the V4 segment of the nondominant right vertebral   artery.

## 2023-01-14 NOTE — CONSULT NOTE ADULT - ASSESSMENT
Assessment and Recommendations:  67y male with a past medical history/ocular history of CAD s/p 1 stent on brilinta, DM, CKD on HD, ,HTN, HLD, b/l retinal surgeries and laser, bilateral cataract extraction and PCIOL placement presenting 12 days of double vision and ptosis, 5 days of headache and 3 days of blurry vision left side. Patient with vision 20/50 ph 20/25 right eye, 20/200 ph 20/40 left eye,  intraocular pressure within normal limits, confrontational visual fields constricted both eyes and color full both eyes***. Dilated fundus exam shows retina atrophy/scarring in periphery right up to the arcades 2/2 laser in past.      #Decreased vision left eye   - Patient complaining of left sided headache 5 days and decreased vision left side for past 3 days   - VA 20/200 PH 20/40 left eye   - Jaw pain with chewing, no scalp pain, + temporal tenderness (palpable artery); and headache.        #  Seen and discussed with ***.    Outpatient Follow-up: Patient should follow-up with his/her ophthalmologist or with [facility] Department of Ophthalmology within 1 week of after discharge at:    [address]  [place] [address]  [phone]    Eriberto Puri MD, PGY-2  Also available on [employer] Teams     Assessment and Recommendations:  67y male with a past medical history/ocular history of CAD s/p 1 stent on brilinta, DM, CKD on HD, ,HTN, HLD, b/l retinal surgeries and laser, bilateral cataract extraction and PCIOL placement presenting 12 days of double vision and ptosis, 5 days of headache and 3 days of blurry vision left side. Patient with vision 20/50 ph 20/25 right eye, 20/200 ph 20/40 left eye,  intraocular pressure within normal limits, confrontational visual fields constricted both eyes and color full both eyes***. Dilated fundus exam shows retina atrophy/scarring in periphery right up to the arcades 2/2 laser in past.      #Decreased vision left eye   - Patient complaining of left sided headache 5 days and decreased vision left side for past 3 days   - VA 20/200 PH 20/40 left eye (patient took long time but was able to read few numbers on 20/40 line)   - Jaw pain with chewing, no scalp pain, + temporal tenderness (palpable artery); and headache.    - Agree with Neuro starting steroids   - Will need temporal artery biopsy    # CN3 palsy left side   - Almost complete restriction in all EOMs left side other than Abduction   - Appreciate workup neurology has done, agree likely microvascular   - CTA shows no aneurysm   - Can patch patient if symptomatic from double vision.   - Can see neuroophthalmology outpatient.     Ophtho will follow    Discussed with Dr. whitt    Outpatient Follow-up: Patient should follow-up with his/her ophthalmologist or with [facility] Department of Ophthalmology within 1 week of after discharge at:    [address]  [place] [address]  [phone]    Eriberto Puri MD, PGY-2  Also available on [employer] Teams     Assessment and Recommendations:  67y male with a past medical history/ocular history of CAD s/p 1 stent on brilinta, DM, CKD on HD, ,HTN, HLD, b/l retinal surgeries and laser, bilateral cataract extraction and PCIOL placement presenting 12 days of double vision and ptosis, 5 days of headache and 3 days of blurry vision left side. Patient with vision 20/50 ph 20/25 right eye, 20/200 ph 20/40 left eye,  intraocular pressure within normal limits, confrontational visual fields constricted both eyes and color 1/12 both eyes. Dilated fundus exam shows retina atrophy/scarring in periphery right up to the arcades 2/2 laser in past.      #Decreased vision left eye   - Patient complaining of left sided headache 5 days and decreased vision left side for past 3 days   - VA 20/200 PH 20/40 left eye (patient took long time but was able to read few numbers on 20/40 line)   - Jaw pain with chewing, no scalp pain, + temporal tenderness (palpable artery); and headache.    - Agree with Neuro starting steroids  -Color vision 1/12 both eyes   - Will need temporal artery biopsy    # CN3 palsy left side   - Almost complete restriction in all EOMs left side other than Abduction   - Appreciate workup neurology has done, agree likely microvascular   - CTA shows no aneurysm   - Can patch patient if symptomatic from double vision.   - Can see neuroophthalmology outpatient.     Ophtho will follow    Discussed with Dr. whitt    Outpatient Follow-up: Patient should follow-up with his/her ophthalmologist or with [facility] Department of Ophthalmology within 1 week of after discharge at:    [address]  [place] [address]  [phone]    Eriberto Puri MD, PGY-2  Also available on [employer] Teams

## 2023-01-15 DIAGNOSIS — E78.5 HYPERLIPIDEMIA, UNSPECIFIED: ICD-10-CM

## 2023-01-15 DIAGNOSIS — R73.9 HYPERGLYCEMIA, UNSPECIFIED: ICD-10-CM

## 2023-01-15 DIAGNOSIS — I10 ESSENTIAL (PRIMARY) HYPERTENSION: ICD-10-CM

## 2023-01-15 DIAGNOSIS — E11.65 TYPE 2 DIABETES MELLITUS WITH HYPERGLYCEMIA: ICD-10-CM

## 2023-01-15 LAB
ALBUMIN SERPL ELPH-MCNC: 4.2 G/DL — SIGNIFICANT CHANGE UP (ref 3.3–5)
ALP SERPL-CCNC: 101 U/L — SIGNIFICANT CHANGE UP (ref 40–120)
ALT FLD-CCNC: 12 U/L — SIGNIFICANT CHANGE UP (ref 10–45)
ANION GAP SERPL CALC-SCNC: 18 MMOL/L — HIGH (ref 5–17)
ANION GAP SERPL CALC-SCNC: 19 MMOL/L — HIGH (ref 5–17)
AST SERPL-CCNC: 7 U/L — LOW (ref 10–40)
B-OH-BUTYR SERPL-SCNC: 0.1 MMOL/L — SIGNIFICANT CHANGE UP
BASE EXCESS BLDV CALC-SCNC: -3.5 MMOL/L — LOW (ref -2–3)
BILIRUB SERPL-MCNC: 0.4 MG/DL — SIGNIFICANT CHANGE UP (ref 0.2–1.2)
BUN SERPL-MCNC: 66 MG/DL — HIGH (ref 7–23)
BUN SERPL-MCNC: 78 MG/DL — HIGH (ref 7–23)
CA-I SERPL-SCNC: 1.12 MMOL/L — LOW (ref 1.15–1.33)
CALCIUM SERPL-MCNC: 8.9 MG/DL — SIGNIFICANT CHANGE UP (ref 8.4–10.5)
CALCIUM SERPL-MCNC: 8.9 MG/DL — SIGNIFICANT CHANGE UP (ref 8.4–10.5)
CHLORIDE BLDV-SCNC: 88 MMOL/L — LOW (ref 96–108)
CHLORIDE SERPL-SCNC: 88 MMOL/L — LOW (ref 96–108)
CHLORIDE SERPL-SCNC: 88 MMOL/L — LOW (ref 96–108)
CO2 BLDV-SCNC: 22 MMOL/L — SIGNIFICANT CHANGE UP (ref 22–26)
CO2 SERPL-SCNC: 20 MMOL/L — LOW (ref 22–31)
CO2 SERPL-SCNC: 20 MMOL/L — LOW (ref 22–31)
CREAT SERPL-MCNC: 9.41 MG/DL — HIGH (ref 0.5–1.3)
CREAT SERPL-MCNC: 9.81 MG/DL — HIGH (ref 0.5–1.3)
EGFR: 5 ML/MIN/1.73M2 — LOW
EGFR: 6 ML/MIN/1.73M2 — LOW
GAS PNL BLDV: 124 MMOL/L — LOW (ref 136–145)
GAS PNL BLDV: SIGNIFICANT CHANGE UP
GAS PNL BLDV: SIGNIFICANT CHANGE UP
GLUCOSE BLDC GLUCOMTR-MCNC: 205 MG/DL — HIGH (ref 70–99)
GLUCOSE BLDC GLUCOMTR-MCNC: 364 MG/DL — HIGH (ref 70–99)
GLUCOSE BLDC GLUCOMTR-MCNC: 400 MG/DL — HIGH (ref 70–99)
GLUCOSE BLDC GLUCOMTR-MCNC: 411 MG/DL — HIGH (ref 70–99)
GLUCOSE BLDC GLUCOMTR-MCNC: 422 MG/DL — HIGH (ref 70–99)
GLUCOSE BLDC GLUCOMTR-MCNC: 468 MG/DL — CRITICAL HIGH (ref 70–99)
GLUCOSE BLDV-MCNC: 553 MG/DL — CRITICAL HIGH (ref 70–99)
GLUCOSE SERPL-MCNC: 403 MG/DL — HIGH (ref 70–99)
GLUCOSE SERPL-MCNC: 432 MG/DL — HIGH (ref 70–99)
HCO3 BLDV-SCNC: 21 MMOL/L — LOW (ref 22–29)
HCT VFR BLD CALC: 32.2 % — LOW (ref 39–50)
HCT VFR BLDA CALC: 32 % — LOW (ref 39–51)
HGB BLD CALC-MCNC: 10.7 G/DL — LOW (ref 12.6–17.4)
HGB BLD-MCNC: 10.4 G/DL — LOW (ref 13–17)
LACTATE BLDV-MCNC: 3.1 MMOL/L — HIGH (ref 0.5–2)
MCHC RBC-ENTMCNC: 31.7 PG — SIGNIFICANT CHANGE UP (ref 27–34)
MCHC RBC-ENTMCNC: 32.3 GM/DL — SIGNIFICANT CHANGE UP (ref 32–36)
MCV RBC AUTO: 98.2 FL — SIGNIFICANT CHANGE UP (ref 80–100)
NRBC # BLD: 0 /100 WBCS — SIGNIFICANT CHANGE UP (ref 0–0)
PCO2 BLDV: 37 MMHG — LOW (ref 42–55)
PH BLDV: 7.37 — SIGNIFICANT CHANGE UP (ref 7.32–7.43)
PLATELET # BLD AUTO: 143 K/UL — LOW (ref 150–400)
PO2 BLDV: 78 MMHG — HIGH (ref 25–45)
POTASSIUM BLDV-SCNC: 5.9 MMOL/L — HIGH (ref 3.5–5.1)
POTASSIUM SERPL-MCNC: 5.4 MMOL/L — HIGH (ref 3.5–5.3)
POTASSIUM SERPL-MCNC: 5.8 MMOL/L — HIGH (ref 3.5–5.3)
POTASSIUM SERPL-SCNC: 5.4 MMOL/L — HIGH (ref 3.5–5.3)
POTASSIUM SERPL-SCNC: 5.8 MMOL/L — HIGH (ref 3.5–5.3)
PROT SERPL-MCNC: 6.9 G/DL — SIGNIFICANT CHANGE UP (ref 6–8.3)
RBC # BLD: 3.28 M/UL — LOW (ref 4.2–5.8)
RBC # FLD: 14.1 % — SIGNIFICANT CHANGE UP (ref 10.3–14.5)
SAO2 % BLDV: 96.7 % — HIGH (ref 67–88)
SODIUM SERPL-SCNC: 126 MMOL/L — LOW (ref 135–145)
SODIUM SERPL-SCNC: 127 MMOL/L — LOW (ref 135–145)
WBC # BLD: 6.35 K/UL — SIGNIFICANT CHANGE UP (ref 3.8–10.5)
WBC # FLD AUTO: 6.35 K/UL — SIGNIFICANT CHANGE UP (ref 3.8–10.5)

## 2023-01-15 PROCEDURE — 70543 MRI ORBT/FAC/NCK W/O &W/DYE: CPT | Mod: 26

## 2023-01-15 PROCEDURE — 99223 1ST HOSP IP/OBS HIGH 75: CPT

## 2023-01-15 PROCEDURE — 99232 SBSQ HOSP IP/OBS MODERATE 35: CPT | Mod: GC

## 2023-01-15 PROCEDURE — 93010 ELECTROCARDIOGRAM REPORT: CPT

## 2023-01-15 PROCEDURE — 70553 MRI BRAIN STEM W/O & W/DYE: CPT | Mod: 26

## 2023-01-15 RX ORDER — INSULIN LISPRO 100/ML
5 VIAL (ML) SUBCUTANEOUS
Refills: 0 | Status: DISCONTINUED | OUTPATIENT
Start: 2023-01-15 | End: 2023-01-15

## 2023-01-15 RX ORDER — INSULIN LISPRO 100/ML
5 VIAL (ML) SUBCUTANEOUS ONCE
Refills: 0 | Status: COMPLETED | OUTPATIENT
Start: 2023-01-15 | End: 2023-01-15

## 2023-01-15 RX ORDER — INSULIN LISPRO 100/ML
VIAL (ML) SUBCUTANEOUS
Refills: 0 | Status: DISCONTINUED | OUTPATIENT
Start: 2023-01-15 | End: 2023-01-20

## 2023-01-15 RX ORDER — INSULIN GLARGINE 100 [IU]/ML
10 INJECTION, SOLUTION SUBCUTANEOUS AT BEDTIME
Refills: 0 | Status: DISCONTINUED | OUTPATIENT
Start: 2023-01-15 | End: 2023-01-16

## 2023-01-15 RX ORDER — INSULIN LISPRO 100/ML
10 VIAL (ML) SUBCUTANEOUS
Refills: 0 | Status: DISCONTINUED | OUTPATIENT
Start: 2023-01-15 | End: 2023-01-15

## 2023-01-15 RX ORDER — POLYETHYLENE GLYCOL 3350 17 G/17G
17 POWDER, FOR SOLUTION ORAL DAILY
Refills: 0 | Status: DISCONTINUED | OUTPATIENT
Start: 2023-01-15 | End: 2023-01-20

## 2023-01-15 RX ORDER — METOPROLOL TARTRATE 50 MG
25 TABLET ORAL
Refills: 0 | Status: DISCONTINUED | OUTPATIENT
Start: 2023-01-15 | End: 2023-01-15

## 2023-01-15 RX ORDER — INSULIN LISPRO 100/ML
13 VIAL (ML) SUBCUTANEOUS
Refills: 0 | Status: DISCONTINUED | OUTPATIENT
Start: 2023-01-15 | End: 2023-01-16

## 2023-01-15 RX ORDER — INSULIN GLARGINE 100 [IU]/ML
10 INJECTION, SOLUTION SUBCUTANEOUS ONCE
Refills: 0 | Status: COMPLETED | OUTPATIENT
Start: 2023-01-15 | End: 2023-01-15

## 2023-01-15 RX ADMIN — ATORVASTATIN CALCIUM 80 MILLIGRAM(S): 80 TABLET, FILM COATED ORAL at 22:00

## 2023-01-15 RX ADMIN — Medication 25 MILLIGRAM(S): at 05:05

## 2023-01-15 RX ADMIN — Medication 1 DROP(S): at 14:31

## 2023-01-15 RX ADMIN — INSULIN GLARGINE 10 UNIT(S): 100 INJECTION, SOLUTION SUBCUTANEOUS at 09:03

## 2023-01-15 RX ADMIN — Medication 81 MILLIGRAM(S): at 11:45

## 2023-01-15 RX ADMIN — Medication 3 MILLIGRAM(S): at 22:00

## 2023-01-15 RX ADMIN — Medication 650 MILLIGRAM(S): at 02:42

## 2023-01-15 RX ADMIN — Medication 10: at 16:28

## 2023-01-15 RX ADMIN — PANTOPRAZOLE SODIUM 40 MILLIGRAM(S): 20 TABLET, DELAYED RELEASE ORAL at 05:05

## 2023-01-15 RX ADMIN — Medication 5 UNIT(S): at 08:49

## 2023-01-15 RX ADMIN — Medication 25 MILLIGRAM(S): at 18:48

## 2023-01-15 RX ADMIN — Medication 1 DROP(S): at 22:00

## 2023-01-15 RX ADMIN — Medication 650 MILLIGRAM(S): at 03:12

## 2023-01-15 RX ADMIN — TICAGRELOR 60 MILLIGRAM(S): 90 TABLET ORAL at 18:49

## 2023-01-15 RX ADMIN — TICAGRELOR 60 MILLIGRAM(S): 90 TABLET ORAL at 05:05

## 2023-01-15 RX ADMIN — Medication 58 MILLIGRAM(S): at 17:30

## 2023-01-15 RX ADMIN — Medication 6: at 11:51

## 2023-01-15 RX ADMIN — Medication 5 UNIT(S): at 11:52

## 2023-01-15 RX ADMIN — Medication 1 DROP(S): at 05:05

## 2023-01-15 RX ADMIN — Medication 6: at 08:22

## 2023-01-15 RX ADMIN — POLYETHYLENE GLYCOL 3350 17 GRAM(S): 17 POWDER, FOR SOLUTION ORAL at 11:45

## 2023-01-15 RX ADMIN — Medication 13 UNIT(S): at 16:30

## 2023-01-15 RX ADMIN — ENOXAPARIN SODIUM 30 MILLIGRAM(S): 100 INJECTION SUBCUTANEOUS at 11:46

## 2023-01-15 RX ADMIN — INSULIN GLARGINE 10 UNIT(S): 100 INJECTION, SOLUTION SUBCUTANEOUS at 22:00

## 2023-01-15 RX ADMIN — SENNA PLUS 2 TABLET(S): 8.6 TABLET ORAL at 22:01

## 2023-01-15 NOTE — CONSULT NOTE ADULT - ASSESSMENT
Patient is a 67 M with CAD s/p 1 stent on brilinta, T2D, CKD on HD, HTN, HLD presenting with Left eye ptosis and diplopia. Patient received 1 g Methylprednisone for possible GCA last night and endocrinology consulted for uncontrolled glucose.  Patient is high risk with high level decision making due to uncontrolled diabetes which places patient at high risk for cardiovascular and cerebrovascular events. Patient with lability of glucose requiring close monitoring and insulin adjustments.    1.  T2DM exacerbated by steroid   - Most recent Hemoglobin A1C 6.4 1/13/2023 may be unreliable in the setting of ESRD   - Current FS ranges from >400  - Current diet: CHO  - Please monitor blood glucose values TID AC & QHS while eating regular meals and Q6H while NPO  - Blood glucose goals pre-meal less than 140 mg/dL and random blood glucose less than 180 mg/dL  - Recommendations:  - Please give another dose of insulin Glargine 10 units tonight   - Tomorrow give NPH 15 units tomorrow am and start Lantus 25 units QHS tomorrow night   - Start standing insulin Lispro 13 units TID with meals, hold if NPO or if eating less than 50% of meals  - Continue with moderate  Correctional scale TID with meals  - Continue with moderate Correctional scale QHS    2. HTN  - BP goal 130/80  - Manage per primary team     3. HLD  - Continue with atorvastatin 80 mg daily   - Manage as outpatient      Discharge planning:  - Current home DM meds: Tresiba 30-40 units QHS   - Discharge DM meds: basal/bolus depending on his steroid plan   - Patient with ESRD would benefit from seeing an endocrinologist.   - He can follow up at  [address] [place] [address]  [phone]  - Patient will need opthalmology and podiatry follow up as outpatient     Thank you for the consult. Will continue to monitor. Please inform the endocrinology team at least 24 hours prior to intended discharge date.     Contact via pager or [employer] Teams during business hours. For follow up questions, discharge recommendations, or new consults please call answering service at [phone] (weekdays), [phone] (nights/weekends). For nonurgent matters, please email  [email or web address].     Jaqueline Nelson MD  Department of Endocrinology, Diabetes and metabolism   Pager [phone]   Patient is a 67 M with CAD s/p 1 stent on brilinta, T2D, CKD on HD, HTN, HLD presenting with Left eye ptosis and diplopia. Patient received 1 g Methylprednisone for possible GCA last night and endocrinology consulted for uncontrolled glucose in the setting of high dose steroid.  Patient is high risk with high level decision making due to uncontrolled diabetes which places patient at high risk for cardiovascular and cerebrovascular events. Patient with lability of glucose requiring close monitoring and insulin adjustments.    1.  T2DM exacerbated by steroid   - Most recent Hemoglobin A1C 6.4 1/13/2023 may be unreliable in the setting of ESRD   - Current FS ranges from >400  - Currently on 1g of methylpred daily   - Current diet: CHO  - Please monitor blood glucose values TID AC & QHS while eating regular meals and Q6H while NPO  - Blood glucose goals pre-meal less than 140 mg/dL and random blood glucose less than 180 mg/dL  - Recommendations:  - Please give another dose of insulin Glargine 10 units tonight   - Tomorrow give NPH 15 units tomorrow am and start Lantus 25 units QHS tomorrow night   - Start standing insulin Lispro 13 units TID with meals, hold if NPO or if eating less than 50% of meals  - Continue with moderate  Correctional scale TID with meals  - Continue with moderate Correctional scale QHS    2. HTN  - BP goal 130/80  - Manage per primary team     3. HLD  - Continue with atorvastatin 80 mg daily   - Manage as outpatient      Discharge planning:  - Current home DM meds: Tresiba 30-40 units QHS   - Discharge DM meds: basal/bolus depending on his steroid plan   - Patient with ESRD would benefit from seeing an endocrinologist.   - He can follow up at  [address] [place] [address]  [phone]  - Patient will need opthalmology and podiatry follow up as outpatient     Thank you for the consult. Will continue to monitor. Please inform the endocrinology team at least 24 hours prior to intended discharge date.     Contact via pager or [employer] Teams during business hours. For follow up questions, discharge recommendations, or new consults please call answering service at [phone] (weekdays), [phone] (nights/weekends). For nonurgent matters, please email  [email or web address].     Jaqueline Nelson MD  Department of Endocrinology, Diabetes and metabolism   Pager [phone]

## 2023-01-15 NOTE — PROGRESS NOTE ADULT - SUBJECTIVE AND OBJECTIVE BOX
Patient is a 67y Male whom presented to the hospital with esrd on hd     PAST MEDICAL & SURGICAL HISTORY:      MEDICATIONS  (STANDING):  artificial  tears Solution 1 Drop(s) Both EYES three times a day  aspirin enteric coated 81 milliGRAM(s) Oral daily  atorvastatin 80 milliGRAM(s) Oral at bedtime  dextrose 5%. 1000 milliLiter(s) (50 mL/Hr) IV Continuous <Continuous>  dextrose 5%. 1000 milliLiter(s) (100 mL/Hr) IV Continuous <Continuous>  dextrose 50% Injectable 25 Gram(s) IV Push once  dextrose 50% Injectable 12.5 Gram(s) IV Push once  dextrose 50% Injectable 25 Gram(s) IV Push once  enoxaparin Injectable 30 milliGRAM(s) SubCutaneous every 24 hours  glucagon  Injectable 1 milliGRAM(s) IntraMuscular once  insulin lispro (ADMELOG) corrective regimen sliding scale   SubCutaneous at bedtime  insulin lispro (ADMELOG) corrective regimen sliding scale   SubCutaneous three times a day before meals  metoprolol tartrate 25 milliGRAM(s) Oral two times a day  pantoprazole    Tablet 40 milliGRAM(s) Oral before breakfast  polyethylene glycol 3350 17 Gram(s) Oral at bedtime  senna 2 Tablet(s) Oral at bedtime      Allergies    No Known Allergies    Intolerances        SOCIAL HISTORY:  Denies ETOh,Smoking,     FAMILY HISTORY:      REVIEW OF SYSTEMS:    CONSTITUTIONAL: No weakness, fevers or chills  RESPIRATORY: No cough, wheezing, hemoptysis; No shortness of breath  CARDIOVASCULAR: No chest pain or palpitations  GASTROINTESTINAL: No abdominal or epigastric pain. No nausea, vomiting,     No diarrhea or constipation. No melena   GENITOURINARY: No dysuria, frequency or hematuria  NEUROLOGICAL: No numbness or weakness  SKIN: dry                                                    10.4   6.35  )-----------( 143      ( 15 German 2023 07:10 )             32.2       CBC Full  -  ( 15 German 2023 07:10 )  WBC Count : 6.35 K/uL  RBC Count : 3.28 M/uL  Hemoglobin : 10.4 g/dL  Hematocrit : 32.2 %  Platelet Count - Automated : 143 K/uL  Mean Cell Volume : 98.2 fl  Mean Cell Hemoglobin : 31.7 pg  Mean Cell Hemoglobin Concentration : 32.3 gm/dL  Auto Neutrophil # : x  Auto Lymphocyte # : x  Auto Monocyte # : x  Auto Eosinophil # : x  Auto Basophil # : x  Auto Neutrophil % : x  Auto Lymphocyte % : x  Auto Monocyte % : x  Auto Eosinophil % : x  Auto Basophil % : x      01-15    127<L>  |  88<L>  |  78<H>  ----------------------------<  403<H>  5.4<H>   |  20<L>  |  9.81<H>    Ca    8.9      15 German 2023 15:35    TPro  6.9  /  Alb  4.2  /  TBili  0.4  /  DBili  x   /  AST  7<L>  /  ALT  12  /  AlkPhos  101  01-15      CAPILLARY BLOOD GLUCOSE      POCT Blood Glucose.: 364 mg/dL (15 German 2023 16:20)  POCT Blood Glucose.: 400 mg/dL (15 German 2023 15:30)  POCT Blood Glucose.: 468 mg/dL (15 German 2023 11:40)  POCT Blood Glucose.: 411 mg/dL (15 German 2023 07:54)  POCT Blood Glucose.: 422 mg/dL (15 German 2023 07:50)  POCT Blood Glucose.: 322 mg/dL (14 Jan 2023 21:19)      Vital Signs Last 24 Hrs  T(C): 36.9 (15 German 2023 13:26), Max: 37 (14 Jan 2023 21:09)  T(F): 98.4 (15 German 2023 13:26), Max: 98.6 (14 Jan 2023 21:09)  HR: 75 (15 German 2023 13:26) (69 - 97)  BP: 187/78 (15 German 2023 13:26) (135/75 - 187/78)  BP(mean): --  RR: 18 (15 German 2023 13:26) (16 - 18)  SpO2: 98% (15 German 2023 13:26) (96% - 98%)    Parameters below as of 15 German 2023 05:00  Patient On (Oxygen Delivery Method): room air                            PHYSICAL EXAM:    Constitutional: NAD  HEENT: conjunctive   clear   Neck:  No JVD  Respiratory: CTAB  Cardiovascular: S1 and S2  Gastrointestinal: BS+, soft, NT/ND  Extremities: No peripheral edema  Neurological: A/O x 3, no focal deficits  Access: Not applicable

## 2023-01-15 NOTE — PROGRESS NOTE ADULT - ASSESSMENT
Patient NERY LEONRAD is a 67y (1955) Rh danette speaking man with CAD s/p 1 stent on brilinta, DM, CKD on HD, ,HTN, HLD presenting with Left eye ptosis and diplopia. Patient reports 10-12 days ago having left sided headache. At that time, he noted drooping of left eyelid. Over the course of the last 1 week hit has gotten progressively worse. 5 days ago he noticed double vision and eyes was dysconjugate. When closing one at a time diplopia resolves. He continues to have headache controlled with tylenol rated at 2/10. Headache onset was initially 2/10 then progressively worsened within several hours, maximum intensity of 7/10 over several hours. He saw opthomologist, who noted retinal scarring bilaterally. 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  Denies positional headache, neck stiffness, weakness, numbness, changes to speech, dysphagia, difficulty ambulating.       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 Rule out Brain stem infarction     ASSESSMENT: This is a 67y man with multiple vascular risk factors that presents with a left cranial nerve 3 palsy. CT angio Head/Neck and MR brain on 1/13 was unremarkable for any acute findings.  He does complain of a new left sided headache and jaw pain with chewing  now with tenderness to the left temporal.  He also has associated left eye diplopia which may be suggestive of GCA vs Tolossa Hunt syndrome. Rheum consulted on 1/14 and recommended starting solumedrol 1g. Patients headaches improved 50% and he is still having blurry vision. He will receive 2 more days of solumedrol and the last dose will be on 1/16. He will then start 80 mg IV daily until temporal biopsy is done. Vasculr       NEURO: Pt is alert and oriented x3 and remains clinically stable. He  has a cranial nerve 3 palsy with a new left sided headache and tenderness on palpation to his left temporal.  Continue close monitoring for neurologic deterioration, he remains normotension, continue high dose statin, LDL is 60.     ANTITHROMBOTIC THERAPY: Brilinta 60 mg BID and aspirin 81 mg daily     PULMONARY: CXR clear on 1/13, protecting airway, saturating well     CARDIOVASCULAR: 1/13 TTE: EF 63%, He is NSR not on tele. BP has been well controlled trending systolic 130-150's.                        SBP goal: normotension     GASTROINTESTINAL: solid diet w/ a  HGA1C 6.4%. Insulin sliding scale is in place and BS ranging between 170-200's.      Diet: Consistent Carbohydrate Renal diet     RENAL: BUN 35, Cr 7.3 on dialysis M/W/F.  Nephrology is following, anemia of chronic disease: Continue epo aiming for a HCT of 32-36 %.     Na Goal: Greater than 135. Na: 133     Lechuga: No     HEMATOLOGY: H/H 10.8/34.6  , Platelets 146      DVT ppx: LMWH      ID: afebrile, no leukocytosis     DISPOSITION:  HOME as per PT     CORE MEASURES:        Admission NIHSS: n/a     TPA:  NO      LDL/HDL: 60/36      Depression Screen: no     Statin Therapy: yes      Dysphagia Screen:PASS      Smoking NO      Afib NO     Stroke Education  YES    Obtain screening ultrasound in patients who meet 1 or more of the following criteria as patient is high risk for DVT/PE upon admission:   [] History of DVT/PE  []Hypercoagulable states (Factor V Leiden, Cancer, OCP, etc. )  []Prolonged immobility (hemiplegia/hemiparesis/post operative or any other extended immobilization)   [] Transferred from outside facility (Rehab or Long term care)  [] Age </= to 50   Patient NERY LEONARD is a 67y (1955) Rh danette speaking man with CAD s/p 1 stent on brilinta, DM, CKD on HD, ,HTN, HLD presenting with Left eye ptosis and diplopia. Patient reports 10-12 days ago having left sided headache. At that time, he noted drooping of left eyelid. Over the course of the last 1 week hit has gotten progressively worse. 5 days ago he noticed double vision and eyes was dysconjugate. When closing one at a time diplopia resolves. He continues to have headache controlled with tylenol rated at 2/10. Headache onset was initially 2/10 then progressively worsened within several hours, maximum intensity of 7/10 over several hours. He saw opthomologist, who noted retinal scarring bilaterally. 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  Denies positional headache, neck stiffness, weakness, numbness, changes to speech, dysphagia, difficulty ambulating.       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 Rule out Brain stem infarction     ASSESSMENT: This is a 67y man with multiple vascular risk factors that presents with a left cranial nerve 3 palsy. CT angio Head/Neck and MR brain on 1/13 was unremarkable for any acute findings.  He does complain of a new left sided headache and jaw pain with chewing  now with tenderness to the left temporal.  He also has associated left eye diplopia which may be suggestive of GCA vs Tolossa Hunt syndrome. Rheum labs are pending.  Rheum consulted on 1/14 and recommended starting solumedrol 1g. Patients headaches improved 50% and he is still having blurry vision. He will receive 2 more days of  solumedrol 1g ( last day 1/16)  and then 80 mg IV daily until temporal biopsy is done. Vascular consulted.  Pt has a cardiac stent that was placed ten years ago  and is on dual antiplatelet therapy. Cardiology consulted for recs and clearance. Pt also has an outpatient cardiologist; Dr. David Shaw. Attempted to reach his private provider; waiting a call back to see if he will clear him at the hospital.  His blood sugars are now >400 endo consulted and is insulin is titrated accordingly. He will receive dialysis tomorrow on 1/16.     NEURO: Pt is alert and oriented x3 and remains clinically stable. He  has a cranial nerve 3 palsy with a new left sided headache and tenderness on palpation to his left temporal.  Continue close monitoring for neurologic deterioration, he remains normotension, continue high dose statin, LDL is 60.     ANTITHROMBOTIC THERAPY: Brilinta 60 mg BID and aspirin 81 mg daily     PULMONARY: CXR clear on 1/13, protecting airway, saturating well     CARDIOVASCULAR: 1/13 TTE: EF 63%, He is NSR not on tele. BP has been elevated. He was restarted on home dose metoprolol 25 mg BID.                       SBP goal: normotension     GASTROINTESTINAL: solid diet w/ a  HGA1C 6.4%. Endocrinology consulted on 1/15 for hyperglycemia due to steroids. He received 10 units of Lantus 1/15 AM for persistent elevated BS >400. He will receive another dose of Lantus 10 units tonight. Pre- meal insulin Lispo 13 units TID with meals. He will start NPH 15 units on 1/16 am and start Lantus 25 units QHS tomorrow night.  Moderate correctional sclare TID with meals and QHS  Diet: Consistent Carbohydrate Renal diet     RENAL: BUN 78, Cr 9.81. on dialysis M/W/F.  Nephrology is following, anemia of chronic disease: Continue epo aiming for a HCT of 32-36 %.     Na Goal: Greater than 135. Na: 133     Lechuga: No     HEMATOLOGY: H/H 10.8/34.6  , Platelets 143     DVT ppx: LMWH      ID: afebrile, no leukocytosis     DISPOSITION:  HOME as per PT     CORE MEASURES:        Admission NIHSS: n/a     TPA:  NO      LDL/HDL: 60/36      Depression Screen: no     Statin Therapy: yes      Dysphagia Screen:PASS      Smoking NO      Afib NO     Stroke Education  YES    Obtain screening ultrasound in patients who meet 1 or more of the following criteria as patient is high risk for DVT/PE upon admission:   [] History of DVT/PE  []Hypercoagulable states (Factor V Leiden, Cancer, OCP, etc. )  []Prolonged immobility (hemiplegia/hemiparesis/post operative or any other extended immobilization)   [] Transferred from outside facility (Rehab or Long term care)  [] Age </= to 50   Patient NERY LEONARD is a 67y (1955) Rh danette speaking man with CAD s/p 1 stent on brilinta, DM, CKD on HD, ,HTN, HLD presenting with Left eye ptosis and diplopia. Patient reports 10-12 days ago having left sided headache. At that time, he noted drooping of left eyelid. Over the course of the last 1 week hit has gotten progressively worse. 5 days ago he noticed double vision and eyes was dysconjugate. When closing one at a time diplopia resolves. He continues to have headache controlled with tylenol rated at 2/10. Headache onset was initially 2/10 then progressively worsened within several hours, maximum intensity of 7/10 over several hours. He saw opthomologist, who noted retinal scarring bilaterally. 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  Denies positional headache, neck stiffness, weakness, numbness, changes to speech, dysphagia, difficulty ambulating.       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 Rule out Brain stem infarction     ASSESSMENT: This is a 67y man with multiple vascular risk factors that presents with a left cranial nerve 3 palsy. CT angio Head/Neck and MR brain on 1/13 was unremarkable for any acute findings.  He does complain of a new left sided headache and jaw pain with chewing  now with tenderness to the left temporal.  He also has associated left eye diplopia which may be suggestive of GCA vs Tolossa Hunt syndrome. Rheum labs are pending.  Rheum consulted on 1/14 and recommended starting solumedrol 1g. Patients headaches improved 50% and he is still having blurry vision. He will receive 2 more days of  solumedrol 1g ( last day 1/16)  and then 80 mg IV daily until temporal biopsy is done. Vascular consulted.  Pt has a cardiac stent that was placed ten years ago  and is on dual antiplatelet therapy. Cardiology consulted for recs and clearance. Pt also has an outpatient cardiologist; Dr. David Shaw. His partner Dr. Cameron jean can be consulted tomorrow on 1/16. Unable to reach via phone today. His blood sugars are now >400 endo consulted and is insulin is titrated accordingly. He will receive dialysis tomorrow on 1/16.     NEURO: Pt is alert and oriented x3 and remains clinically stable. He  has a cranial nerve 3 palsy with a new left sided headache and tenderness on palpation to his left temporal.  Continue close monitoring for neurologic deterioration, continue high dose statin, LDL is 60.     ANTITHROMBOTIC THERAPY: Brilinta 60 mg BID and aspirin 81 mg daily     PULMONARY: CXR clear on 1/13, protecting airway, saturating well     CARDIOVASCULAR: 1/13 TTE: EF 63%, He is NSR not on tele. BP has been elevated. He was restarted on home dose metoprolol 25 mg BID.                       SBP goal: normotension     GASTROINTESTINAL: solid diet w/ a  HGA1C 6.4%. Endocrinology consulted on 1/15 for hyperglycemia due to steroids. He received 10 units of Lantus 1/15 AM for persistent elevated BS >400. He will receive another dose of Lantus 10 units tonight. Pre- meal insulin Lispo 13 units TID with meals. He will start NPH 15 units on 1/16 am and start Lantus 25 units QHS tomorrow night.  Moderate correctional sclare TID with meals and QHS  Diet: Consistent Carbohydrate Renal diet     RENAL: BUN 78, Cr 9.81. on dialysis M/W/F.  Nephrology is following, anemia of chronic disease: Continue epo aiming for a HCT of 32-36 %.     Na Goal: Greater than 135. Na: 133     Lechuga: No     HEMATOLOGY: H/H 10.8/34.6  , Platelets 143     DVT ppx: LMWH      ID: afebrile, no leukocytosis     DISPOSITION:  HOME as per PT     CORE MEASURES:        Admission NIHSS: n/a     TPA:  NO      LDL/HDL: 60/36      Depression Screen: no     Statin Therapy: yes      Dysphagia Screen:PASS      Smoking NO      Afib NO     Stroke Education  YES    Obtain screening ultrasound in patients who meet 1 or more of the following criteria as patient is high risk for DVT/PE upon admission:   [] History of DVT/PE  []Hypercoagulable states (Factor V Leiden, Cancer, OCP, etc. )  []Prolonged immobility (hemiplegia/hemiparesis/post operative or any other extended immobilization)   [] Transferred from outside facility (Rehab or Long term care)  [] Age </= to 50   Patient NERY LEONARD is a 67y (1955) Rh danette speaking man with CAD s/p 1 stent on brilinta, DM, CKD on HD, ,HTN, HLD presenting with Left eye ptosis and diplopia. Patient reports 10-12 days ago having left sided headache. At that time, he noted drooping of left eyelid. Over the course of the last 1 week hit has gotten progressively worse. 5 days ago he noticed double vision and eyes was dysconjugate. When closing one at a time diplopia resolves. He continues to have headache controlled with tylenol rated at 2/10. Headache onset was initially 2/10 then progressively worsened within several hours, maximum intensity of 7/10 over several hours. He saw opthomologist, who noted retinal scarring bilaterally. 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  Denies positional headache, neck stiffness, weakness, numbness, changes to speech, dysphagia, difficulty ambulating.       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 Patient responding to steroids. 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     ASSESSMENT: This is a 67y man with multiple vascular risk factors that presents with a left cranial nerve 3 palsy. CT angio Head/Neck and MR brain on 1/13 was unremarkable for any acute findings.  He does complain of a new left sided headache and jaw pain with chewing  now with tenderness to the left temporal.  Among differential diagnosis GCA vs Tolossa Hunt syndrome. Rheum labs are pending.  Rheum consulted on 1/14 and recommended starting solumedrol 1g. Patients headaches improved 50% and he is still having blurry vision. He will receive 2 more days of  solumedrol 1g ( last day 1/16)  and then 80 mg IV daily until temporal biopsy is done. Vascular consulted.  Pt has a cardiac stent that was placed ten years ago  and is on dual antiplatelet therapy. Cardiology consulted for recs and clearance. Pt also has an outpatient cardiologist; Dr. David Shaw. His partner Dr. Cameron jean can be consulted tomorrow on 1/16. Unable to reach via phone today. His blood sugars are now >400 endo consulted and is insulin is titrated accordingly. He will receive dialysis tomorrow on 1/16.     NEURO: Pt is alert and oriented x3 and remains clinically stable. He  has a cranial nerve 3 palsy with a new left sided headache and tenderness on palpation to his left temporal.  Continue close monitoring for neurologic deterioration, continue high dose statin, LDL is 60.     ANTITHROMBOTIC THERAPY: Brilinta 60 mg BID and aspirin 81 mg daily  cardiac stents    PULMONARY: CXR clear on 1/13, protecting airway, saturating well     CARDIOVASCULAR: 1/13 TTE: EF 63%, He is NSR not on tele. BP has been elevated. He was restarted on home dose metoprolol 25 mg BID.                       SBP goal: normotension     GASTROINTESTINAL: solid diet w/ a  HGA1C 6.4%. Endocrinology consulted on 1/15 for hyperglycemia due to steroids. He received 10 units of Lantus 1/15 AM for persistent elevated BS >400. He will receive another dose of Lantus 10 units tonight. Pre- meal insulin Lispo 13 units TID with meals. He will start NPH 15 units on 1/16 am and start Lantus 25 units QHS tomorrow night.  Moderate correctional sclare TID with meals and QHS  Diet: Consistent Carbohydrate Renal diet     RENAL: BUN 78, Cr 9.81. on dialysis M/W/F.  Nephrology is following, anemia of chronic disease: Continue epo aiming for a HCT of 32-36 %.     Na Goal: Greater than 135. Na: 133     Lechuga: No     HEMATOLOGY: H/H 10.8/34.6  , Platelets 143     DVT ppx: LMWH      ID: afebrile, no leukocytosis     DISPOSITION:  HOME as per PT     CORE MEASURES:        Admission NIHSS: n/a     TPA:  NO      LDL/HDL: 60/36      Depression Screen: no     Statin Therapy: yes      Dysphagia Screen:PASS      Smoking NO      Afib NO     Stroke Education  YES    Obtain screening ultrasound in patients who meet 1 or more of the following criteria as patient is high risk for DVT/PE upon admission:   [] History of DVT/PE  []Hypercoagulable states (Factor V Leiden, Cancer, OCP, etc. )  []Prolonged immobility (hemiplegia/hemiparesis/post operative or any other extended immobilization)   [] Transferred from outside facility (Rehab or Long term care)  [] Age </= to 50

## 2023-01-15 NOTE — CONSULT NOTE ADULT - ASSESSMENT
Assessment:  67y Male with possible left temporal arteritis. Vascular consulted for left TA biopsy.     Plan:  - please document cardiology clearance for procedure, also if able to hold brillinta for 5 days before TA biopsy  - follow up with rheumatology  - can plan for left TA biopsy once all above cleared  - plan dw fellow/attending    Vascular  [id number]

## 2023-01-15 NOTE — PROGRESS NOTE ADULT - SUBJECTIVE AND OBJECTIVE BOX
THE PATIENT WAS SEEN AND EXAMINED BY ME WITH THE HOUSESTAFF AND STROKE TEAM DURING MORNING ROUNDS.     Patient NERY LEONARD is a 67y (1955) Rh danette speaking man with CAD s/p 1 stent on brilinta, DM, CKD on HD, ,HTN, HLD presenting with Left eye ptosis and diplopia. Patient reports 10-12 days ago having left sided headache. At that time, he noted drooping of left eyelid. Over the course of the last 1 week hit has gotten progressively worse. 5 days ago he noticed double vision and eyes was dysconjugate. When closing one at a time diplopia resolves. He continues to have headache controlled with tylenol rated at 2/10. Headache onset was initially 2/10 then progressively worsened within several hours, maximum intensity of 7/10 over several hours. He saw opthomologist, who noted retinal scarring bilaterally. 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  Denies positional headache, neck stiffness, weakness, numbness, changes to speech, dysphagia, difficulty ambulating.      Subjective:  complains of a left sided headache and blurry vision of the left eye     Allergies    No Known Allergies    Intolerances    MEDICATIONS  (STANDING):  artificial  tears Solution 1 Drop(s) Both EYES three times a day  aspirin enteric coated 81 milliGRAM(s) Oral daily  atorvastatin 80 milliGRAM(s) Oral at bedtime  dextrose 5%. 1000 milliLiter(s) (50 mL/Hr) IV Continuous <Continuous>  dextrose 5%. 1000 milliLiter(s) (100 mL/Hr) IV Continuous <Continuous>  dextrose 50% Injectable 25 Gram(s) IV Push once  dextrose 50% Injectable 12.5 Gram(s) IV Push once  dextrose 50% Injectable 25 Gram(s) IV Push once  enoxaparin Injectable 30 milliGRAM(s) SubCutaneous every 24 hours  glucagon  Injectable 1 milliGRAM(s) IntraMuscular once  insulin lispro (ADMELOG) corrective regimen sliding scale   SubCutaneous at bedtime  insulin lispro (ADMELOG) corrective regimen sliding scale   SubCutaneous three times a day before meals  melatonin 3 milliGRAM(s) Oral at bedtime  methylPREDNISolone sodium succinate IVPB 1000 milliGRAM(s) IV Intermittent once  metoprolol tartrate 25 milliGRAM(s) Oral two times a day  pantoprazole    Tablet 40 milliGRAM(s) Oral before breakfast  polyethylene glycol 3350 17 Gram(s) Oral at bedtime  senna 2 Tablet(s) Oral at bedtime  ticagrelor 60 milliGRAM(s) Oral two times a day    Vital Signs Last 24 Hrs  T(C): 36.6 (14 Jan 2023 13:18), Max: 37.1 (14 Jan 2023 08:21)  T(F): 97.9 (14 Jan 2023 13:18), Max: 98.7 (14 Jan 2023 08:21)  HR: 80 (14 Jan 2023 13:18) (58 - 87)  BP: 144/72 (14 Jan 2023 13:18) (128/74 - 172/75)  RR: 18 (14 Jan 2023 13:18) (17 - 18)  SpO2: 99% (14 Jan 2023 13:18) (96% - 99%)    Parameters below as of 14 Jan 2023 13:18  Patient On (Oxygen Delivery Method): room air    Physical Examination:  General: NAD  Cardiovascular: Peripheral pulses palpable, no edema    Neurologic Exam:  Mental status - Awake, Alert, Oriented to person, place, and time. Speech fluent, repetition and naming intact. Follows simple and complex commands. Attention/concentration, recent and remote memory (including registration and recall), and fund of knowledge intact  Cranial nerves - PERRL, left eye ptosis, VFF, all extraocular muscles affected except abduction of left eye, diplopia on finger counting,  face sensation (V1-V3) intact b/l, facial strength intact without asymmetry b/l, hearing intact b/l, palate with symmetric elevation, trapezius  5/5 strength b/l, tongue midline on protrusion with full lateral movement  Motor - Normal bulk and tone throughout. No pronator drift. 5/5 strength in all extremities.   Sensation - Light touch intact throughout  Coordination - Finger to Nose intact b/l. No tremors appreciated  Gait and station - Normal casual gait.    LABS:  cret                        10.8   6.93  )-----------( 146      ( 14 Jan 2023 09:00 )             34.6     01-14    133<L>  |  93<L>  |  35<H>  ----------------------------<  182<H>  5.1   |  27  |  7.30<H>    Ca    8.9      14 Jan 2023 09:00  Phos  5.2     01-13  Mg     4.3     01-13    TPro  7.6  /  Alb  4.6  /  TBili  0.3  /  DBili  x   /  AST  20  /  ALT  15  /  AlkPhos  96  01-12      MR HEAD 1/13/23  IMPRESSION:  No acute intracranial hemorrhage, acute infarction, extra-axial fluid   collection or hydrocephalus.    CT HEAD 1/13/23:  No CT evidence of acuteintracranial hemorrhage, mass effect, or midline   shift.    CT ANGIOGRAPHY NECK 1/13/23:  No hemodynamically significant stenosis by NASCET criteria. No vascular   dissection.    CT ANGIOGRAPHY BRAIN 1/13/23:  No major vessel occlusion, evidence of aneurysm, or other vascular   malformation.    Moderate narrowing of the V4 segment of the nondominant right vertebral   artery.                                Assessment and Plan:   · Assessment	  Patient NERY LEONARD is a 67y (1955) Rh danette speaking man with CAD s/p 1 stent on brilinta, DM, CKD on HD, ,HTN, HLD presenting with Left eye ptosis and diplopia. Patient reports 10-12 days ago having left sided headache. At that time, he noted drooping of left eyelid. Over the course of the last 1 week hit has gotten progressively worse. 5 days ago he noticed double vision and eyes was dysconjugate. When closing one at a time diplopia resolves. He continues to have headache controlled with tylenol rated at 2/10. Headache onset was initially 2/10 then progressively worsened within several hours, maximum intensity of 7/10 over several hours. He saw opthomologist, who noted retinal scarring bilaterally. 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  Denies positional headache, neck stiffness, weakness, numbness, changes to speech, dysphagia, difficulty ambulating.       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 Rule out Brain stem infarction     ASSESSMENT: This is a 67y man with multiple vascular risk factors that presents with a left cranial nerve 3 palsy. CT angio Head/Neck and MR brain on 1/13 was unremarkable for any acute findings.  He does complain of a new left sided headache and jaw pain with chewing  now with tenderness to the left temporal. He also has associated left eye diplopia which may be suggestive of GCA vs Tolossa Hunt syndrome. Rheum w/o pending, rheum and opthalmology consulted for further recs. He was given solumedrol 1000 mg IVPB x1. Further management will be dependent upon response to treatment. Consider diabetes consult for the  management of glucose post steroids if needed. BG goal <180 and he is trending 170-200's.  He is pending a MR head w/ contrast and MR orbits to further investigate symptomology.     NEURO: Pt is alert and oriented x3 and remains clinically stable. He  has a cranial nerve 3 palsy with a new left sided headache and tenderness on palpation to his left temporal.  Continue close monitoring for neurologic deterioration, he remains normotension, continue high dose statin, LDL is 60.     ANTITHROMBOTIC THERAPY: Brilinta 60 mg BID and aspirin 81 mg daily     PULMONARY: CXR clear on 1/13, protecting airway, saturating well     CARDIOVASCULAR: 1/13 TTE: EF 63%, He is NSR not on tele. BP has been well controlled trending systolic 130-150's.                        SBP goal: normotension     GASTROINTESTINAL: solid diet w/ a  HGA1C 6.4%. Insulin sliding scale is in place and BS ranging between 170-200's.      Diet: Consistent Carbohydrate Renal diet     RENAL: BUN 35, Cr 7.3 on dialysis M/W/F.  Nephrology is following, anemia of chronic disease: Continue epo aiming for a HCT of 32-36 %.     Na Goal: Greater than 135. Na: 133     Lechuga: No     HEMATOLOGY: H/H 10.8/34.6  , Platelets 146      DVT ppx: LMWH      ID: afebrile, no leukocytosis     DISPOSITION:  HOME as per PT     CORE MEASURES:        Admission NIHSS: n/a     TPA:  NO      LDL/HDL: 60/36      Depression Screen: no     Statin Therapy: yes      Dysphagia Screen:PASS      Smoking NO      Afib NO     Stroke Education  YES    Obtain screening ultrasound in patients who meet 1 or more of the following criteria as patient is high risk for DVT/PE upon admission:   [] History of DVT/PE  []Hypercoagulable states (Factor V Leiden, Cancer, OCP, etc. )  []Prolonged immobility (hemiplegia/hemiparesis/post operative or any other extended immobilization)   [] Transferred from outside facility (Rehab or Long term care)  [] Age </= to 50       THE PATIENT WAS SEEN AND EXAMINED BY ME WITH THE HOUSESTAFF AND STROKE TEAM DURING MORNING ROUNDS.     Patient NERY LEONARD is a 67y (1955) Rh danette speaking man with CAD s/p 1 stent on brilinta, DM, CKD on HD, ,HTN, HLD presenting with Left eye ptosis and diplopia. Patient reports 10-12 days ago having left sided headache. At that time, he noted drooping of left eyelid. Over the course of the last 1 week hit has gotten progressively worse. 5 days ago he noticed double vision and eyes was dysconjugate. When closing one at a time diplopia resolves. He continues to have headache controlled with tylenol rated at 2/10. Headache onset was initially 2/10 then progressively worsened within several hours, maximum intensity of 7/10 over several hours. He saw opthomologist, who noted retinal scarring bilaterally. 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  Denies positional headache, neck stiffness, weakness, numbness, changes to speech, dysphagia, difficulty ambulating.      Subjective:  complains of a left sided headache and blurry vision of the left eye     Allergies    No Known Allergies    Intolerances    MEDICATIONS  (STANDING):  artificial  tears Solution 1 Drop(s) Both EYES three times a day  aspirin enteric coated 81 milliGRAM(s) Oral daily  atorvastatin 80 milliGRAM(s) Oral at bedtime  dextrose 5%. 1000 milliLiter(s) (50 mL/Hr) IV Continuous <Continuous>  dextrose 5%. 1000 milliLiter(s) (100 mL/Hr) IV Continuous <Continuous>  dextrose 50% Injectable 25 Gram(s) IV Push once  dextrose 50% Injectable 12.5 Gram(s) IV Push once  dextrose 50% Injectable 25 Gram(s) IV Push once  enoxaparin Injectable 30 milliGRAM(s) SubCutaneous every 24 hours  glucagon  Injectable 1 milliGRAM(s) IntraMuscular once  insulin lispro (ADMELOG) corrective regimen sliding scale   SubCutaneous at bedtime  insulin lispro (ADMELOG) corrective regimen sliding scale   SubCutaneous three times a day before meals  melatonin 3 milliGRAM(s) Oral at bedtime  methylPREDNISolone sodium succinate IVPB 1000 milliGRAM(s) IV Intermittent once  metoprolol tartrate 25 milliGRAM(s) Oral two times a day  pantoprazole    Tablet 40 milliGRAM(s) Oral before breakfast  polyethylene glycol 3350 17 Gram(s) Oral at bedtime  senna 2 Tablet(s) Oral at bedtime  ticagrelor 60 milliGRAM(s) Oral two times a day    Vital Signs Last 24 Hrs  Vital Signs Last 24 Hrs  T(C): 36.9 (15 German 2023 13:26), Max: 37 (14 Jan 2023 21:09)  T(F): 98.4 (15 German 2023 13:26), Max: 98.6 (14 Jan 2023 21:09)  HR: 75 (15 German 2023 13:26) (69 - 97)  BP: 187/78 (15 German 2023 13:26) (135/75 - 187/78)  RR: 18 (15 German 2023 13:26) (16 - 18)  SpO2: 98% (15 German 2023 13:26) (96% - 98%)    Parameters below as of 15 German 2023 05:00  Patient On (Oxygen Delivery Method): room air    Physical Examination:  General: NAD  Cardiovascular: Peripheral pulses palpable, no edema    Neurologic Exam:  Mental status - Awake, Alert, Oriented to person, place, and time. Speech fluent, repetition and naming intact. Follows simple and complex commands. Attention/concentration, recent and remote memory (including registration and recall), and fund of knowledge intact  Cranial nerves - PERRL, left eye ptosis, VFF, all extraocular muscles affected except abduction of left eye, diplopia on finger counting,  face sensation (V1-V3) intact b/l, facial strength intact without asymmetry b/l, hearing intact b/l, palate with symmetric elevation, trapezius  5/5 strength b/l, tongue midline on protrusion with full lateral movement  Motor - Normal bulk and tone throughout. No pronator drift. 5/5 strength in all extremities.   Sensation - Light touch intact throughout  Coordination - Finger to Nose intact b/l. No tremors appreciated  Gait and station - Normal casual gait.    LABS:  cret                        10.8   6.93  )-----------( 146      ( 14 Jan 2023 09:00 )             34.6     01-14    133<L>  |  93<L>  |  35<H>  ----------------------------<  182<H>  5.1   |  27  |  7.30<H>    Ca    8.9      14 Jan 2023 09:00  Phos  5.2     01-13  Mg     4.3     01-13    TPro  7.6  /  Alb  4.6  /  TBili  0.3  /  DBili  x   /  AST  20  /  ALT  15  /  AlkPhos  96  01-12    MR Head w/ IV contrast & MR Orbits 1/15/23  Impression: Mild atrophy and small vessel white matter ischemic changes.   No acute infarcts or hemorrhage. Normal cavernous sinuses. Unremarkable   orbits.    MR HEAD 1/13/23  IMPRESSION:  No acute intracranial hemorrhage, acute infarction, extra-axial fluid   collection or hydrocephalus.    CT HEAD 1/13/23:  No CT evidence of acuteintracranial hemorrhage, mass effect, or midline   shift.    CT ANGIOGRAPHY NECK 1/13/23:  No hemodynamically significant stenosis by NASCET criteria. No vascular   dissection.    CT ANGIOGRAPHY BRAIN 1/13/23:  No major vessel occlusion, evidence of aneurysm, or other vascular   malformation.    Moderate narrowing of the V4 segment of the nondominant right vertebral   artery.

## 2023-01-15 NOTE — PROGRESS NOTE ADULT - ASSESSMENT
68 yo M PMH danette speaking, current smoker, CAD s/p 1 stent on brilinta, DM, CKD on HD, ,HTN, HLD presenting with Left eye ptosis and diplopia.    #r/o GCA  =p/w 10-12 days of L sided headache, L eye ptosis 2/2 CN 3 palsy  =CTA head/neck w/o significant stenosis, did show moderate narrowing of segment of R vertebral artery  =MR brain w/o contrast unremarkable  =ESR borderline elevated, CRP wnl  =In very rare instances, GCA can cause CN 3 palsy  =pt initialy w/ tenderness to L temporal artery on exam, which has resolved since 1 dose of 1g solumedrol (1/14/23)    Plan:  -c/w 1g solumedrol for 3 days total (1/14-16), then transition to solumedrol 80mg qd (on 1/17)  -appreciate endo recs while on high dose steroids  -obtain vascular consult for L sided temporal artery biopsy (per ACR guidelines, unilateral biopsy warranted as symptoms only on L side)  -will follow     Discussed with Attending Dr. Chely Flores,   Rheumatology Fellow  Pager: [phone]  Available on TEAMS 66 yo M PMH danette speaking, current smoker, CAD s/p 1 stent on brilinta, DM, CKD on HD, ,HTN, HLD presenting with Left eye ptosis and diplopia.    #r/o GCA  =p/w 10-12 days of L sided headache, L eye ptosis 2/2 CN 3 palsy  =CTA head/neck w/o significant stenosis, did show moderate narrowing of segment of R vertebral artery  =MR brain w/o contrast unremarkable  =MR orbits w/ mild atrophy and small vessel white matter ischemic changes - which are non-specific findings  =ESR borderline elevated, CRP wnl  =In very rare instances, GCA can cause CN 3 palsy  =pt initialy w/ tenderness to L temporal artery on exam, which has resolved since 1 dose of 1g solumedrol (1/14/23)    Plan:  -c/w 1g solumedrol for 3 days total (1/14-16), then transition to solumedrol 80mg qd (on 1/17)  -appreciate endo recs while on high dose steroids  -obtain vascular consult for L sided temporal artery biopsy (per ACR guidelines, unilateral biopsy warranted as symptoms only on L side)  -will follow     Discussed with Attending Dr. Chely Flores,   Rheumatology Fellow  Pager: [phone]  Available on TEAMS

## 2023-01-15 NOTE — PROGRESS NOTE ADULT - ATTENDING COMMENTS
Dr. Yasmine Mo  Rheumatology Attending  [facility]  Rheumatology at [place]     Contact:   call the office:: [phone] or reach [place] [employer] Teams, weekdays before 5 pm   after 5 pm or on weekends, contact [phone]

## 2023-01-15 NOTE — CONSULT NOTE ADULT - SUBJECTIVE AND OBJECTIVE BOX
HPI: Patient is a 67 M with CAD s/p 1 stent on brilinta, DM, CKD on HD, HTN, HLD presenting with Left eye ptosis and diplopia. Patient received 1 g Methylprednisone for possible GCA last night and endocrinology consulted for uncontrolld glucose.     Diabetes history:  Patient seen with his son and wife at the bedside. Diagnosed with T2D since he was 40s. Patient on Tresiba at home. Follows with his PCP for diabetes management, does not see an endocrinologist. Checks sugar BID at home, glucose ranges from . Used to be on Metformin, but stopped due to worsening kidney function. Denies retinopathy, neuropathy. Does have nephropathy. Had CAD s/p 1 stent.      Most recent A1C 6.4 01/13/2023 may not be reliable in the setting of ESRD, but does correlate with patient's self reported finger stick at home    Home DM medications:  - Tresiba 30-40 units QHS Depending on his bedtime sugar, will take less if glucose is <200        Current inpatient DM Meds:   None, given 1 dose of lantus 10 units this morning and admelog 5 units plus correction     FH:  DM: siblings have diabetes     SH:  Smoking: denies  Etoh: denies  Recreational Drugs: denies       PAST MEDICAL & SURGICAL HISTORY:          Current Meds:  acetaminophen     Tablet .. 650 milliGRAM(s) Oral every 6 hours PRN  artificial  tears Solution 1 Drop(s) Both EYES three times a day  aspirin enteric coated 81 milliGRAM(s) Oral daily  atorvastatin 80 milliGRAM(s) Oral at bedtime  dextrose 5%. 1000 milliLiter(s) IV Continuous <Continuous>  dextrose 5%. 1000 milliLiter(s) IV Continuous <Continuous>  dextrose 50% Injectable 25 Gram(s) IV Push once  dextrose 50% Injectable 12.5 Gram(s) IV Push once  dextrose 50% Injectable 25 Gram(s) IV Push once  dextrose Oral Gel 15 Gram(s) Oral once PRN  enoxaparin Injectable 30 milliGRAM(s) SubCutaneous every 24 hours  glucagon  Injectable 1 milliGRAM(s) IntraMuscular once  insulin glargine Injectable (LANTUS) 10 Unit(s) SubCutaneous at bedtime  insulin lispro (ADMELOG) corrective regimen sliding scale   SubCutaneous three times a day before meals  insulin lispro (ADMELOG) corrective regimen sliding scale   SubCutaneous at bedtime  insulin lispro Injectable (ADMELOG) 10 Unit(s) SubCutaneous three times a day before meals  melatonin 3 milliGRAM(s) Oral at bedtime  metoprolol tartrate 25 milliGRAM(s) Oral two times a day  pantoprazole    Tablet 40 milliGRAM(s) Oral before breakfast  polyethylene glycol 3350 17 Gram(s) Oral daily  polyethylene glycol 3350 17 Gram(s) Oral at bedtime  senna 2 Tablet(s) Oral at bedtime  ticagrelor 60 milliGRAM(s) Oral two times a day      Allergies:  No Known Allergies      ROS:     General: Denies weight loss/weight gain, decreased appetite, fatigue  Eyes: Blurry vision, visual changes   ENT: Denies Throat pain, changes in voice    CV: Denies palpitations  Resp: Denies SOB, CP, cough  GI: Denies NVD, difficulty swallowing, abdominal pain  : Denies polyuria, dysuria   MSK: Denies weakness, joint pain  Skin: Denies rash, dryness, diaphoresis  Heme: Denies Easy bruising or bleeding  Neuro: Denies HA, dizziness, lightheadedness, numbness tingling  Psych: Denies Anxiety, Depression    Vital Signs Last 24 Hrs  T(C): 36.9 (15 German 2023 13:26), Max: 37 (14 Jan 2023 21:09)  T(F): 98.4 (15 German 2023 13:26), Max: 98.6 (14 Jan 2023 21:09)  HR: 75 (15 German 2023 13:26) (69 - 97)  BP: 187/78 (15 German 2023 13:26) (135/75 - 187/78)  BP(mean): --  RR: 18 (15 German 2023 13:26) (16 - 18)  SpO2: 98% (15 German 2023 13:26) (96% - 98%)    Parameters below as of 15 German 2023 05:00  Patient On (Oxygen Delivery Method): room air      Height (cm): 175.3 (01-12 @ 15:58)  Weight (kg): 83.9 (01-12 @ 15:58)  BMI (kg/m2): 27.3 (01-12 @ 15:58)      Constitutional: wn/wd in NAD.   HEENT:    no proptosis or lid retraction  Neck: no thyromegaly    Respiratory: non labored breathing   Cardiovascular:  no peripheral edema  GI: soft, NT/ND, no masses/HSM appreciated.  Neurology: no tremors   Skin: no visible rashes   Psychiatric: AAO x 3, normal affect/mood.  Ext: extremities warm, no cyanosis, clubbing or edema.       LABS:                        10.4   6.35  )-----------( 143      ( 15 German 2023 07:10 )             32.2     01-15    126<L>  |  88<L>  |  66<H>  ----------------------------<  432<H>  5.8<H>   |  20<L>  |  9.41<H>    Ca    8.9      15 German 2023 07:10    TPro  6.9  /  Alb  4.2  /  TBili  0.4  /  DBili  x   /  AST  7<L>  /  ALT  12  /  AlkPhos  101  01-15          Thyroid Stimulating Hormone, Serum: 2.00 (01-12 @ 19:44)      RADIOLOGY & ADDITIONAL STUDIES:  CAPILLARY BLOOD GLUCOSE      POCT Blood Glucose.: 468 mg/dL (15 German 2023 11:40)  POCT Blood Glucose.: 411 mg/dL (15 German 2023 07:54)  POCT Blood Glucose.: 422 mg/dL (15 German 2023 07:50)  POCT Blood Glucose.: 322 mg/dL (14 Jan 2023 21:19)  POCT Blood Glucose.: 212 mg/dL (14 Jan 2023 16:37)     HPI: Patient is a 67 M with CAD s/p 1 stent on brilinta, DM, CKD on HD, HTN, HLD presenting with Left eye ptosis and diplopia. Patient received 1 g Methylprednisone for possible GCA last night and endocrinology consulted for uncontrolld glucose.     Diabetes history:  Patient seen with his son and wife at the bedside. Diagnosed with T2D since he was 40s. Patient on Tresiba at home. Follows with his PCP for diabetes management, does not see an endocrinologist. Checks sugar BID at home, glucose ranges from . Used to be on Metformin, but stopped due to worsening kidney function. Denies retinopathy, neuropathy. Does have nephropathy. Had CAD s/p 1 stent.      Most recent A1C 6.4 01/13/2023 may not be reliable in the setting of ESRD, but does correlate with patient's self reported finger stick at home    Home DM medications:  - Tresiba 30-40 units QHS Depending on his bedtime sugar, will take less if glucose is <200        Current inpatient DM Meds:   None, given 1 dose of lantus 10 units this morning and admelog 5 units plus correction     Per neurology resident, patient will be receiving 1g of Methylprednisolone for the next 3 days, then switch to 80 mg daily of Methylpred afterwards.     FH:  DM: siblings have diabetes     SH:  Smoking: denies  Etoh: denies  Recreational Drugs: denies       PAST MEDICAL & SURGICAL HISTORY:          Current Meds:  acetaminophen     Tablet .. 650 milliGRAM(s) Oral every 6 hours PRN  artificial  tears Solution 1 Drop(s) Both EYES three times a day  aspirin enteric coated 81 milliGRAM(s) Oral daily  atorvastatin 80 milliGRAM(s) Oral at bedtime  dextrose 5%. 1000 milliLiter(s) IV Continuous <Continuous>  dextrose 5%. 1000 milliLiter(s) IV Continuous <Continuous>  dextrose 50% Injectable 25 Gram(s) IV Push once  dextrose 50% Injectable 12.5 Gram(s) IV Push once  dextrose 50% Injectable 25 Gram(s) IV Push once  dextrose Oral Gel 15 Gram(s) Oral once PRN  enoxaparin Injectable 30 milliGRAM(s) SubCutaneous every 24 hours  glucagon  Injectable 1 milliGRAM(s) IntraMuscular once  insulin glargine Injectable (LANTUS) 10 Unit(s) SubCutaneous at bedtime  insulin lispro (ADMELOG) corrective regimen sliding scale   SubCutaneous three times a day before meals  insulin lispro (ADMELOG) corrective regimen sliding scale   SubCutaneous at bedtime  insulin lispro Injectable (ADMELOG) 10 Unit(s) SubCutaneous three times a day before meals  melatonin 3 milliGRAM(s) Oral at bedtime  metoprolol tartrate 25 milliGRAM(s) Oral two times a day  pantoprazole    Tablet 40 milliGRAM(s) Oral before breakfast  polyethylene glycol 3350 17 Gram(s) Oral daily  polyethylene glycol 3350 17 Gram(s) Oral at bedtime  senna 2 Tablet(s) Oral at bedtime  ticagrelor 60 milliGRAM(s) Oral two times a day      Allergies:  No Known Allergies      ROS:     General: Denies weight loss/weight gain, decreased appetite, fatigue  Eyes: Blurry vision, visual changes   ENT: Denies Throat pain, changes in voice    CV: Denies palpitations  Resp: Denies SOB, CP, cough  GI: Denies NVD, difficulty swallowing, abdominal pain  : Denies polyuria, dysuria   MSK: Denies weakness, joint pain  Skin: Denies rash, dryness, diaphoresis  Heme: Denies Easy bruising or bleeding  Neuro: Denies HA, dizziness, lightheadedness, numbness tingling  Psych: Denies Anxiety, Depression    Vital Signs Last 24 Hrs  T(C): 36.9 (15 German 2023 13:26), Max: 37 (14 Jan 2023 21:09)  T(F): 98.4 (15 German 2023 13:26), Max: 98.6 (14 Jan 2023 21:09)  HR: 75 (15 German 2023 13:26) (69 - 97)  BP: 187/78 (15 German 2023 13:26) (135/75 - 187/78)  BP(mean): --  RR: 18 (15 German 2023 13:26) (16 - 18)  SpO2: 98% (15 German 2023 13:26) (96% - 98%)    Parameters below as of 15 German 2023 05:00  Patient On (Oxygen Delivery Method): room air      Height (cm): 175.3 (01-12 @ 15:58)  Weight (kg): 83.9 (01-12 @ 15:58)  BMI (kg/m2): 27.3 (01-12 @ 15:58)      Constitutional: wn/wd in NAD.   HEENT:    no proptosis or lid retraction  Neck: no thyromegaly    Respiratory: non labored breathing   Cardiovascular:  no peripheral edema  GI: soft, NT/ND, no masses/HSM appreciated.  Neurology: no tremors   Skin: no visible rashes   Psychiatric: AAO x 3, normal affect/mood.  Ext: extremities warm, no cyanosis, clubbing or edema.       LABS:                        10.4   6.35  )-----------( 143      ( 15 German 2023 07:10 )             32.2     01-15    126<L>  |  88<L>  |  66<H>  ----------------------------<  432<H>  5.8<H>   |  20<L>  |  9.41<H>    Ca    8.9      15 German 2023 07:10    TPro  6.9  /  Alb  4.2  /  TBili  0.4  /  DBili  x   /  AST  7<L>  /  ALT  12  /  AlkPhos  101  01-15          Thyroid Stimulating Hormone, Serum: 2.00 (01-12 @ 19:44)      RADIOLOGY & ADDITIONAL STUDIES:  CAPILLARY BLOOD GLUCOSE      POCT Blood Glucose.: 468 mg/dL (15 German 2023 11:40)  POCT Blood Glucose.: 411 mg/dL (15 German 2023 07:54)  POCT Blood Glucose.: 422 mg/dL (15 German 2023 07:50)  POCT Blood Glucose.: 322 mg/dL (14 Jan 2023 21:19)  POCT Blood Glucose.: 212 mg/dL (14 Jan 2023 16:37)

## 2023-01-15 NOTE — CONSULT NOTE ADULT - SUBJECTIVE AND OBJECTIVE BOX
NERY LEONARD [id number]  67y Male  2d    HPI:  Patient NERY LEONARD is a 67y (1955) Rh danette speaking man with CAD s/p 1 stent on brilinta, DM, CKD on HD, ,HTN, HLD presenting with Left eye ptosis and diplopia. Patient reports 10-12 days ago having left sided headache. At that time, he noted drooping of left eyelid. Over the course of the last 1 week hit has gotten progressively worse. 5 days ago he noticed double vision and eyes was dysconjugate. When closing one at a time diplopia resolves. He continues to have headache controlled with tylenol rated at 2/10. Headache onset was initially 2/10 then progressively worsened within several hours, maximum intensity of 7/10 over several hours. He saw opthomologist, who noted retinal scarring bilaterally. 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  Denies positional headache, neck stiffness, weakness, numbness, changes to speech, dysphagia, difficulty ambulating.     (13 Jan 2023 01:10)    Patient was seen by neuro and rheum. Received steroids yesterday with significant symptomatic improvement. Per rheum, if symptoms improve with steroids to consider TA biopsy. Vascular surgery consulted.       PAST MEDICAL & SURGICAL HISTORY:        MEDICATIONS  (STANDING):  artificial  tears Solution 1 Drop(s) Both EYES three times a day  aspirin enteric coated 81 milliGRAM(s) Oral daily  atorvastatin 80 milliGRAM(s) Oral at bedtime  dextrose 5%. 1000 milliLiter(s) (50 mL/Hr) IV Continuous <Continuous>  dextrose 5%. 1000 milliLiter(s) (100 mL/Hr) IV Continuous <Continuous>  dextrose 50% Injectable 25 Gram(s) IV Push once  dextrose 50% Injectable 12.5 Gram(s) IV Push once  dextrose 50% Injectable 25 Gram(s) IV Push once  enoxaparin Injectable 30 milliGRAM(s) SubCutaneous every 24 hours  glucagon  Injectable 1 milliGRAM(s) IntraMuscular once  insulin glargine Injectable (LANTUS) 10 Unit(s) SubCutaneous at bedtime  insulin lispro (ADMELOG) corrective regimen sliding scale   SubCutaneous three times a day before meals  insulin lispro (ADMELOG) corrective regimen sliding scale   SubCutaneous at bedtime  insulin lispro Injectable (ADMELOG) 13 Unit(s) SubCutaneous three times a day before meals  melatonin 3 milliGRAM(s) Oral at bedtime  methylPREDNISolone sodium succinate IVPB 1000 milliGRAM(s) IV Intermittent once  metoprolol tartrate 25 milliGRAM(s) Oral two times a day  pantoprazole    Tablet 40 milliGRAM(s) Oral before breakfast  polyethylene glycol 3350 17 Gram(s) Oral daily  polyethylene glycol 3350 17 Gram(s) Oral at bedtime  senna 2 Tablet(s) Oral at bedtime  ticagrelor 60 milliGRAM(s) Oral two times a day    MEDICATIONS  (PRN):  acetaminophen     Tablet .. 650 milliGRAM(s) Oral every 6 hours PRN Moderate Pain (4 - 6), Severe Pain (7 - 10)  dextrose Oral Gel 15 Gram(s) Oral once PRN Blood Glucose LESS THAN 70 milliGRAM(s)/deciliter      Allergies    No Known Allergies    Intolerances        REVIEW OF SYSTEMS    [ ] A ten-point review of systems was otherwise negative except as noted.  [ ] Due to altered mental status/intubation, subjective information were not able to be obtained from the patient. History was obtained, to the extent possible, from review of the chart and collateral sources of information.      Vital Signs Last 24 Hrs  T(C): 36.9 (15 German 2023 13:26), Max: 37 (14 Jan 2023 21:09)  T(F): 98.4 (15 German 2023 13:26), Max: 98.6 (14 Jan 2023 21:09)  HR: 75 (15 German 2023 13:26) (69 - 97)  BP: 187/78 (15 German 2023 13:26) (135/75 - 187/78)  BP(mean): --  RR: 18 (15 German 2023 13:26) (16 - 18)  SpO2: 98% (15 German 2023 13:26) (96% - 98%)    Parameters below as of 15 German 2023 05:00  Patient On (Oxygen Delivery Method): room air        PHYSICAL EXAM:  GENERAL: NAD, well-appearing  HEENT: left eye drooping, left temporal mild tenderness  CHEST/LUNG: Clear to auscultation bilaterally  HEART: Regular rate and rhythm  ABDOMEN: Soft, Nontender, Nondistended;   EXTREMITIES:  No clubbing, cyanosis, or edema      LABS:  Labs:  CAPILLARY BLOOD GLUCOSE      POCT Blood Glucose.: 364 mg/dL (15 German 2023 16:20)  POCT Blood Glucose.: 400 mg/dL (15 German 2023 15:30)  POCT Blood Glucose.: 468 mg/dL (15 German 2023 11:40)  POCT Blood Glucose.: 411 mg/dL (15 German 2023 07:54)  POCT Blood Glucose.: 422 mg/dL (15 German 2023 07:50)  POCT Blood Glucose.: 322 mg/dL (14 Jan 2023 21:19)                          10.4   6.35  )-----------( 143      ( 15 German 2023 07:10 )             32.2         01-15    127<L>  |  88<L>  |  78<H>  ----------------------------<  403<H>  5.4<H>   |  20<L>  |  9.81<H>      Calcium, Total Serum: 8.9 mg/dL (01-15-23 @ 15:35)      LFTs:             6.9  | 0.4  | 7        ------------------[101     ( 15 German 2023 07:10 )  4.2  | x    | 12          Lipase:x      Amylase:x         Blood Gas Venous - Lactate: 3.1 mmol/L (01-15-23 @ 09:46)      Coags:

## 2023-01-15 NOTE — PROGRESS NOTE ADULT - SUBJECTIVE AND OBJECTIVE BOX
NERY MICHAELL  [id number]    INTERVAL HPI/OVERNIGHT EVENTS: Pt says his L temporal headache has resolved since receiving steroids. Describes mild b/l jaw pain, no association w/ eating. L eye blurriness remains the same. Denies fever, chills, chest pain, sob.    MEDICATIONS  (STANDING):  artificial  tears Solution 1 Drop(s) Both EYES three times a day  aspirin enteric coated 81 milliGRAM(s) Oral daily  atorvastatin 80 milliGRAM(s) Oral at bedtime  dextrose 5%. 1000 milliLiter(s) (100 mL/Hr) IV Continuous <Continuous>  dextrose 5%. 1000 milliLiter(s) (50 mL/Hr) IV Continuous <Continuous>  dextrose 50% Injectable 25 Gram(s) IV Push once  dextrose 50% Injectable 12.5 Gram(s) IV Push once  dextrose 50% Injectable 25 Gram(s) IV Push once  enoxaparin Injectable 30 milliGRAM(s) SubCutaneous every 24 hours  glucagon  Injectable 1 milliGRAM(s) IntraMuscular once  insulin glargine Injectable (LANTUS) 10 Unit(s) SubCutaneous at bedtime  insulin lispro (ADMELOG) corrective regimen sliding scale   SubCutaneous three times a day before meals  insulin lispro (ADMELOG) corrective regimen sliding scale   SubCutaneous at bedtime  insulin lispro Injectable (ADMELOG) 13 Unit(s) SubCutaneous three times a day before meals  melatonin 3 milliGRAM(s) Oral at bedtime  metoprolol tartrate 25 milliGRAM(s) Oral two times a day  pantoprazole    Tablet 40 milliGRAM(s) Oral before breakfast  polyethylene glycol 3350 17 Gram(s) Oral daily  polyethylene glycol 3350 17 Gram(s) Oral at bedtime  senna 2 Tablet(s) Oral at bedtime  ticagrelor 60 milliGRAM(s) Oral two times a day    MEDICATIONS  (PRN):  acetaminophen     Tablet .. 650 milliGRAM(s) Oral every 6 hours PRN Moderate Pain (4 - 6), Severe Pain (7 - 10)  dextrose Oral Gel 15 Gram(s) Oral once PRN Blood Glucose LESS THAN 70 milliGRAM(s)/deciliter      Allergies    No Known Allergies    Intolerances        Review of Systems:  As above    Vital Signs Last 24 Hrs  T(C): 36.7 (15 German 2023 18:07), Max: 37 (14 Jan 2023 21:09)  T(F): 98 (15 German 2023 18:07), Max: 98.6 (14 Jan 2023 21:09)  HR: 73 (15 German 2023 18:07) (69 - 97)  BP: 176/68 (15 German 2023 18:07) (135/75 - 187/78)  BP(mean): --  RR: 17 (15 German 2023 18:07) (16 - 18)  SpO2: 98% (15 German 2023 18:07) (96% - 98%)    Parameters below as of 15 German 2023 18:07  Patient On (Oxygen Delivery Method): room air        Physical Exam:  General: Breathing comfortably on room air, no distress  HEENT: +L eye ptosis, tenderness w/ movement, unable to track fingers medially w/ L left eye  CVS: +S1/S2, RRR  Resp: CTA b/l. No crackles/wheezing  GI: Soft, NT/ND +BS  MSK: no synovitis. Normal ROM in shoulders  Skin: no visible rashes  Vascular: palpable temporal pulses b/l. No tenderness to palpation of L temporal artery    LABS:                        10.4   6.35  )-----------( 143      ( 15 German 2023 07:10 )             32.2     01-15    127<L>  |  88<L>  |  78<H>  ----------------------------<  403<H>  5.4<H>   |  20<L>  |  9.81<H>    Ca    8.9      15 German 2023 15:35    TPro  6.9  /  Alb  4.2  /  TBili  0.4  /  DBili  x   /  AST  7<L>  /  ALT  12  /  AlkPhos  101  01-15    mr< from: MR Orbits w/wo IV Cont (01.15.23 @ 11:28) >    ACC: [id number] EXAM:  MR BRAIN [facility]                        ACC: [id number] EXAM:  MR ORBITS ONLY [facility]                          PROCEDURE DATE:  01/15/2023          INTERPRETATION:  CLINICAL INDICATION: Left cranial nerve III palsy and   headaches      Magnetic resonance imaging of the brain was carried out with transaxial   SPGR, FLAIR, fast spin echo T2 weighted images, axial susceptibility   weighted series, diffusion weighted series and sagittal T1 weighted   series on a 1.5 Kat magnet. Post contrast axial, coronal and sagittal   T1 weighted images were obtained. 8 cc of Gadavist were intravenously   injected, 2 cc were discarded. Thin axial and coronal T1, T2 and   postcontrast T1-weighted series through the orbits were obtained.      Comparison is made with the prior brain CT with contrast 1/12/2023 and   MRI 1/13/2023.    Mild ventricular and sulcal prominence is consistent with age-appropriate   involutional change. Small vessel white matter ischemic changes are   noted. No acute infarcts are seen. After contrast administration is   normal intracranial vascular enhancement.    Thin sections through the orbits demonstrates the globes, extraocular   muscles and optic nerves to be unremarkable. There has been previous   bilateral lens replacement surgery. There is no evidence of post septal   cellulitis. There is no abnormal optic nerve enhancement.    After contrast administration there is normal intracranial vascular   enhancement. The cavernous sinuses enhance normally.The sellar and   parasellar structures are unremarkable. A retention cyst or polyp is   present in the right maxillary sinus.    Impression: Mild atrophy and small vessel white matter ischemic changes.   No acute infarcts or hemorrhage. Normal cavernous sinuses. Unremarkable   orbits.    --- End of Report ---            ISAÍAS YE MD; Attending Radiologist  This document has been electronically signed. German 15 2023  2:57PM    < end of copied text >

## 2023-01-15 NOTE — CHART NOTE - NSCHARTNOTEFT_GEN_A_CORE
Patient NERY LEONARD is a 67y (1955) Rh danette speaking man with CAD s/p 1 stent on brilinta, DM (type 2 presumably, does not appear to be on meds for DM), CKD on HD, HTN, HLD presenting with Left eye ptosis and diplopia. Received 1g methylprednisolone for possible CGA last night. BG elevated as well as AG. Bicarb 20 today. A1c 6.4 though has ESRD. eGFR 6.    Recommend Lantus 10 units STAT and Admelog 5 units TIDac. Intensify bedtime and premeal Admleog correction scales to MODERATE intensity.    Obtain BHB and VBG and repeat whole set of DKA labs in 3 hours from now.    Full consult later today (1/15/22).    Terry Tong DO, Endocrinology Fellow  For follow-up questions, discharge recommendations, or new consults please call answering service at [phone] (weekdays), [phone] (nights/weekends). For nonurgent matters, please email [email or web address] or [email or web address].

## 2023-01-15 NOTE — PROVIDER CONTACT NOTE (OTHER) - ACTION/TREATMENT ORDERED:
Provider Dahlia was made aware, EKG complete. No interventions at this time. Will continue to monitor.
 is aware.

## 2023-01-16 DIAGNOSIS — E87.1 HYPO-OSMOLALITY AND HYPONATREMIA: ICD-10-CM

## 2023-01-16 DIAGNOSIS — E78.5 HYPERLIPIDEMIA, UNSPECIFIED: ICD-10-CM

## 2023-01-16 DIAGNOSIS — D69.6 THROMBOCYTOPENIA, UNSPECIFIED: ICD-10-CM

## 2023-01-16 DIAGNOSIS — H49.02 THIRD [OCULOMOTOR] NERVE PALSY, LEFT EYE: ICD-10-CM

## 2023-01-16 DIAGNOSIS — R73.9 HYPERGLYCEMIA, UNSPECIFIED: ICD-10-CM

## 2023-01-16 DIAGNOSIS — E11.65 TYPE 2 DIABETES MELLITUS WITH HYPERGLYCEMIA: ICD-10-CM

## 2023-01-16 DIAGNOSIS — E87.5 HYPERKALEMIA: ICD-10-CM

## 2023-01-16 DIAGNOSIS — E11.9 TYPE 2 DIABETES MELLITUS WITHOUT COMPLICATIONS: ICD-10-CM

## 2023-01-16 DIAGNOSIS — I25.10 ATHEROSCLEROTIC HEART DISEASE OF NATIVE CORONARY ARTERY WITHOUT ANGINA PECTORIS: ICD-10-CM

## 2023-01-16 DIAGNOSIS — Z29.9 ENCOUNTER FOR PROPHYLACTIC MEASURES, UNSPECIFIED: ICD-10-CM

## 2023-01-16 DIAGNOSIS — I10 ESSENTIAL (PRIMARY) HYPERTENSION: ICD-10-CM

## 2023-01-16 LAB
ACE SERPL-CCNC: 34 U/L — SIGNIFICANT CHANGE UP (ref 14–82)
ANA TITR SER: NEGATIVE — SIGNIFICANT CHANGE UP
ANION GAP SERPL CALC-SCNC: 20 MMOL/L — HIGH (ref 5–17)
APTT BLD: 29.2 SEC — SIGNIFICANT CHANGE UP (ref 27.5–35.5)
BUN SERPL-MCNC: 98 MG/DL — HIGH (ref 7–23)
CALCIUM SERPL-MCNC: 8.6 MG/DL — SIGNIFICANT CHANGE UP (ref 8.4–10.5)
CHLORIDE SERPL-SCNC: 85 MMOL/L — LOW (ref 96–108)
CO2 SERPL-SCNC: 18 MMOL/L — LOW (ref 22–31)
CREAT SERPL-MCNC: 10.78 MG/DL — HIGH (ref 0.5–1.3)
EGFR: 5 ML/MIN/1.73M2 — LOW
GLUCOSE BLDC GLUCOMTR-MCNC: 150 MG/DL — HIGH (ref 70–99)
GLUCOSE BLDC GLUCOMTR-MCNC: 172 MG/DL — HIGH (ref 70–99)
GLUCOSE BLDC GLUCOMTR-MCNC: 248 MG/DL — HIGH (ref 70–99)
GLUCOSE BLDC GLUCOMTR-MCNC: 333 MG/DL — HIGH (ref 70–99)
GLUCOSE BLDC GLUCOMTR-MCNC: 392 MG/DL — HIGH (ref 70–99)
GLUCOSE SERPL-MCNC: 377 MG/DL — HIGH (ref 70–99)
HCT VFR BLD CALC: 28.5 % — LOW (ref 39–50)
HGB BLD-MCNC: 9.5 G/DL — LOW (ref 13–17)
INR BLD: 0.97 RATIO — SIGNIFICANT CHANGE UP (ref 0.88–1.16)
MCHC RBC-ENTMCNC: 31.7 PG — SIGNIFICANT CHANGE UP (ref 27–34)
MCHC RBC-ENTMCNC: 33.3 GM/DL — SIGNIFICANT CHANGE UP (ref 32–36)
MCV RBC AUTO: 95 FL — SIGNIFICANT CHANGE UP (ref 80–100)
NRBC # BLD: 0 /100 WBCS — SIGNIFICANT CHANGE UP (ref 0–0)
PLATELET # BLD AUTO: 149 K/UL — LOW (ref 150–400)
POTASSIUM SERPL-MCNC: 5.6 MMOL/L — HIGH (ref 3.5–5.3)
POTASSIUM SERPL-SCNC: 5.6 MMOL/L — HIGH (ref 3.5–5.3)
PROTHROM AB SERPL-ACNC: 11.1 SEC — SIGNIFICANT CHANGE UP (ref 10.5–13.4)
RBC # BLD: 3 M/UL — LOW (ref 4.2–5.8)
RBC # FLD: 14.6 % — HIGH (ref 10.3–14.5)
SODIUM SERPL-SCNC: 123 MMOL/L — LOW (ref 135–145)
WBC # BLD: 11.09 K/UL — HIGH (ref 3.8–10.5)
WBC # FLD AUTO: 11.09 K/UL — HIGH (ref 3.8–10.5)

## 2023-01-16 PROCEDURE — 99223 1ST HOSP IP/OBS HIGH 75: CPT

## 2023-01-16 PROCEDURE — 99232 SBSQ HOSP IP/OBS MODERATE 35: CPT

## 2023-01-16 RX ORDER — HUMAN INSULIN 100 [IU]/ML
12 INJECTION, SUSPENSION SUBCUTANEOUS ONCE
Refills: 0 | Status: COMPLETED | OUTPATIENT
Start: 2023-01-16 | End: 2023-01-16

## 2023-01-16 RX ORDER — INSULIN LISPRO 100/ML
15 VIAL (ML) SUBCUTANEOUS
Refills: 0 | Status: DISCONTINUED | OUTPATIENT
Start: 2023-01-16 | End: 2023-01-18

## 2023-01-16 RX ORDER — INSULIN LISPRO 100/ML
VIAL (ML) SUBCUTANEOUS
Refills: 0 | Status: DISCONTINUED | OUTPATIENT
Start: 2023-01-16 | End: 2023-01-20

## 2023-01-16 RX ORDER — SODIUM CHLORIDE 9 MG/ML
2 INJECTION INTRAMUSCULAR; INTRAVENOUS; SUBCUTANEOUS THREE TIMES A DAY
Refills: 0 | Status: DISCONTINUED | OUTPATIENT
Start: 2023-01-16 | End: 2023-01-20

## 2023-01-16 RX ORDER — SODIUM ZIRCONIUM CYCLOSILICATE 10 G/10G
5 POWDER, FOR SUSPENSION ORAL DAILY
Refills: 0 | Status: DISCONTINUED | OUTPATIENT
Start: 2023-01-16 | End: 2023-01-17

## 2023-01-16 RX ORDER — INSULIN GLARGINE 100 [IU]/ML
30 INJECTION, SOLUTION SUBCUTANEOUS AT BEDTIME
Refills: 0 | Status: DISCONTINUED | OUTPATIENT
Start: 2023-01-16 | End: 2023-01-19

## 2023-01-16 RX ADMIN — ATORVASTATIN CALCIUM 80 MILLIGRAM(S): 80 TABLET, FILM COATED ORAL at 21:55

## 2023-01-16 RX ADMIN — SODIUM CHLORIDE 2 GRAM(S): 9 INJECTION INTRAMUSCULAR; INTRAVENOUS; SUBCUTANEOUS at 18:43

## 2023-01-16 RX ADMIN — Medication 13 UNIT(S): at 07:39

## 2023-01-16 RX ADMIN — Medication 10: at 11:30

## 2023-01-16 RX ADMIN — Medication 3 MILLIGRAM(S): at 21:55

## 2023-01-16 RX ADMIN — POLYETHYLENE GLYCOL 3350 17 GRAM(S): 17 POWDER, FOR SOLUTION ORAL at 18:26

## 2023-01-16 RX ADMIN — HUMAN INSULIN 12 UNIT(S): 100 INJECTION, SUSPENSION SUBCUTANEOUS at 11:29

## 2023-01-16 RX ADMIN — SENNA PLUS 2 TABLET(S): 8.6 TABLET ORAL at 21:54

## 2023-01-16 RX ADMIN — Medication 1 DROP(S): at 11:41

## 2023-01-16 RX ADMIN — POLYETHYLENE GLYCOL 3350 17 GRAM(S): 17 POWDER, FOR SOLUTION ORAL at 21:54

## 2023-01-16 RX ADMIN — Medication 81 MILLIGRAM(S): at 11:30

## 2023-01-16 RX ADMIN — Medication 15 UNIT(S): at 11:40

## 2023-01-16 RX ADMIN — Medication 25 MILLIGRAM(S): at 05:47

## 2023-01-16 RX ADMIN — Medication 1 DROP(S): at 21:53

## 2023-01-16 RX ADMIN — INSULIN GLARGINE 30 UNIT(S): 100 INJECTION, SOLUTION SUBCUTANEOUS at 21:55

## 2023-01-16 RX ADMIN — PANTOPRAZOLE SODIUM 40 MILLIGRAM(S): 20 TABLET, DELAYED RELEASE ORAL at 05:48

## 2023-01-16 RX ADMIN — Medication 8: at 07:39

## 2023-01-16 RX ADMIN — TICAGRELOR 60 MILLIGRAM(S): 90 TABLET ORAL at 05:47

## 2023-01-16 RX ADMIN — Medication 58 MILLIGRAM(S): at 11:29

## 2023-01-16 RX ADMIN — Medication 1 DROP(S): at 05:48

## 2023-01-16 RX ADMIN — Medication 25 MILLIGRAM(S): at 18:26

## 2023-01-16 RX ADMIN — Medication 15 UNIT(S): at 18:25

## 2023-01-16 RX ADMIN — ENOXAPARIN SODIUM 30 MILLIGRAM(S): 100 INJECTION SUBCUTANEOUS at 18:26

## 2023-01-16 NOTE — PROGRESS NOTE ADULT - ASSESSMENT
Patient is a 67 M with CAD s/p 1 stent on brilinta, T2D, CKD on HD, HTN, HLD presenting with Left eye ptosis and diplopia. Patient received 1 g Methylprednisone for possible GCA last night and endocrinology consulted for uncontrolled glucose in the setting of high dose steroid.  Patient is high risk with high level decision making due to uncontrolled diabetes which places patient at high risk for cardiovascular and cerebrovascular events. Tolerating POs BG values 200-300s over past 24 hours. Receiving another dose of Solumedrol 1gm IV today. Discussed elevated glucose is transient given very high steroid load. BG goal (100-180mg/dl).

## 2023-01-16 NOTE — PROGRESS NOTE ADULT - PROBLEM SELECTOR PLAN 3
Patient has hx of CAD and is on Brilinta   - Cardiology consulted, recs appreciated. Per cardiology, can hold Brilinta for 5 days prior to biopsy

## 2023-01-16 NOTE — PROGRESS NOTE ADULT - SUBJECTIVE AND OBJECTIVE BOX
Patient is a 67y Male whom presented to the hospital with esrd on hd     PAST MEDICAL & SURGICAL HISTORY:      MEDICATIONS  (STANDING):  artificial  tears Solution 1 Drop(s) Both EYES three times a day  aspirin enteric coated 81 milliGRAM(s) Oral daily  atorvastatin 80 milliGRAM(s) Oral at bedtime  dextrose 5%. 1000 milliLiter(s) (50 mL/Hr) IV Continuous <Continuous>  dextrose 5%. 1000 milliLiter(s) (100 mL/Hr) IV Continuous <Continuous>  dextrose 50% Injectable 25 Gram(s) IV Push once  dextrose 50% Injectable 12.5 Gram(s) IV Push once  dextrose 50% Injectable 25 Gram(s) IV Push once  enoxaparin Injectable 30 milliGRAM(s) SubCutaneous every 24 hours  glucagon  Injectable 1 milliGRAM(s) IntraMuscular once  insulin lispro (ADMELOG) corrective regimen sliding scale   SubCutaneous at bedtime  insulin lispro (ADMELOG) corrective regimen sliding scale   SubCutaneous three times a day before meals  metoprolol tartrate 25 milliGRAM(s) Oral two times a day  pantoprazole    Tablet 40 milliGRAM(s) Oral before breakfast  polyethylene glycol 3350 17 Gram(s) Oral at bedtime  senna 2 Tablet(s) Oral at bedtime      Allergies    No Known Allergies    Intolerances        SOCIAL HISTORY:  Denies ETOh,Smoking,     FAMILY HISTORY:      REVIEW OF SYSTEMS:    CONSTITUTIONAL: No weakness, fevers or chills  RESPIRATORY: No cough, wheezing, hemoptysis; No shortness of breath  CARDIOVASCULAR: No chest pain or palpitations  GASTROINTESTINAL: No abdominal or epigastric pain. No nausea, vomiting,     No diarrhea or constipation. No melena   GENITOURINARY: No dysuria, frequency or hematuria  NEUROLOGICAL: No numbness or weakness  SKIN: dry                                                                       9.5    11.09 )-----------( 149      ( 16 Jan 2023 08:32 )             28.5       CBC Full  -  ( 16 Jan 2023 08:32 )  WBC Count : 11.09 K/uL  RBC Count : 3.00 M/uL  Hemoglobin : 9.5 g/dL  Hematocrit : 28.5 %  Platelet Count - Automated : 149 K/uL  Mean Cell Volume : 95.0 fl  Mean Cell Hemoglobin : 31.7 pg  Mean Cell Hemoglobin Concentration : 33.3 gm/dL  Auto Neutrophil # : x  Auto Lymphocyte # : x  Auto Monocyte # : x  Auto Eosinophil # : x  Auto Basophil # : x  Auto Neutrophil % : x  Auto Lymphocyte % : x  Auto Monocyte % : x  Auto Eosinophil % : x  Auto Basophil % : x      01-16    123<L>  |  85<L>  |  98<H>  ----------------------------<  377<H>  5.6<H>   |  18<L>  |  10.78<H>    Ca    8.6      16 Jan 2023 08:32    TPro  6.9  /  Alb  4.2  /  TBili  0.4  /  DBili  x   /  AST  7<L>  /  ALT  12  /  AlkPhos  101  01-15      CAPILLARY BLOOD GLUCOSE      POCT Blood Glucose.: 172 mg/dL (16 Jan 2023 18:03)  POCT Blood Glucose.: 150 mg/dL (16 Jan 2023 16:50)  POCT Blood Glucose.: 392 mg/dL (16 Jan 2023 11:20)  POCT Blood Glucose.: 333 mg/dL (16 Jan 2023 07:29)  POCT Blood Glucose.: 205 mg/dL (15 German 2023 21:57)      Vital Signs Last 24 Hrs  T(C): 36.7 (16 Jan 2023 18:05), Max: 37 (16 Jan 2023 13:41)  T(F): 98.1 (16 Jan 2023 18:05), Max: 98.6 (16 Jan 2023 13:41)  HR: 67 (16 Jan 2023 18:05) (61 - 74)  BP: 152/70 (16 Jan 2023 18:05) (141/58 - 173/79)  BP(mean): --  RR: 18 (16 Jan 2023 18:05) (16 - 18)  SpO2: 96% (16 Jan 2023 18:05) (92% - 98%)    Parameters below as of 16 Jan 2023 18:05  Patient On (Oxygen Delivery Method): room air            PT/INR - ( 16 Jan 2023 14:27 )   PT: 11.1 sec;   INR: 0.97 ratio         PTT - ( 16 Jan 2023 14:27 )  PTT:29.2 sec              PHYSICAL EXAM:    Constitutional: NAD  HEENT: conjunctive   clear   Neck:  No JVD  Respiratory: CTAB  Cardiovascular: S1 and S2  Gastrointestinal: BS+, soft, NT/ND  Extremities: No peripheral edema  Neurological: A/O x 3, no focal deficits  Access: Not applicable

## 2023-01-16 NOTE — CONSULT NOTE ADULT - CONSULT REQUESTED DATE/TIME
14-Jan-2023 19:40
16-Jan-2023 10:28
12-Jan-2023 20:44
14-Jan-2023 17:03
13-Jan-2023
15-German-2023 17:20
16-Jan-2023 13:03
15-German-2023 15:27

## 2023-01-16 NOTE — PROGRESS NOTE ADULT - PROBLEM SELECTOR PLAN 7
Hyponatremia, with Na to 123 on 1/16   - Hyponatremia likely in part 2/2 hyperglycemia vs. volume overload   - Nephrology consulted, recs appreciated. Per nephrology, HD today

## 2023-01-16 NOTE — CONSULT NOTE ADULT - REASON FOR ADMISSION
Left CN 3 palsy

## 2023-01-16 NOTE — PROGRESS NOTE ADULT - ASSESSMENT
Patient NERY LEONARD is a 67y (1955) Rh danette speaking man with CAD s/p 1 stent on brilinta, DM, CKD on HD, ,HTN, HLD presenting with Left eye ptosis and diplopia. Patient reports 10-12 days ago having left sided headache. At that time, he noted drooping of left eyelid. Over the course of the last 1 week hit has gotten progressively worse. 5 days ago he noticed double vision and eyes was dysconjugate. When closing one at a time diplopia resolves. He continues to have headache controlled with tylenol rated at 2/10. Headache onset was initially 2/10 then progressively worsened within several hours, maximum intensity of 7/10 over several hours. He saw opthomologist, who noted retinal scarring bilaterally. 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  Denies positional headache, neck stiffness, weakness, numbness, changes to speech, dysphagia, difficulty ambulating.       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 Cranial nerve 3 palsy with a new left sided headache and tenderness on palpation to his left temporal.  Continue close monitoring for neurologic deterioration, continue high dose statin, LDL is 60.     ANTITHROMBOTIC THERAPY: Brilinta 60 mg BID and aspirin 81 mg daily     PULMONARY: CXR clear on 1/13, protecting airway, saturating well     CARDIOVASCULAR: 1/13 TTE: EF 63%, He is NSR not on tele. BP has been elevated. He was restarted on home dose metoprolol 25 mg BID.                       SBP goal: normotension     GASTROINTESTINAL: solid diet w/ a  HGA1C 6.4%. Endocrinology consulted on 1/15 for hyperglycemia due to steroids. He received 10 units of Lantus 1/15 AM for persistent elevated BS >400. He will receive another dose of Lantus 10 units tonight. Pre- meal insulin Lispo 13 units TID with meals. He will start NPH 15 units on 1/16 am and start Lantus 25 units QHS tomorrow night.  Moderate correctional sclare TID with meals and QHS  Diet: Consistent Carbohydrate Renal diet     RENAL: BUN 78, Cr 9.81. on dialysis M/W/F.  Nephrology is following, anemia of chronic disease: Continue epo aiming for a HCT of 32-36 %.     Na Goal: Greater than 135. Na: 127, pending repeat BMP, nephrology following    Lechuga: No     HEMATOLOGY: H/H 10.8/34.6  , Platelets 143     DVT ppx: LMWH      ID: afebrile, no leukocytosis     Other: Rheumatology consulted:  new left sided headache and jaw pain with chewing  now with tenderness to the left temporal.  He also has associated left eye diplopia which may be suggestive of GCA vs Tolossa Hunt syndrome. Rheum labs are pending.  Rheum consulted on 1/14 and recommended starting solumedrol 1g. Patients headaches improved 50% and he is still having blurry vision. He will receive 2 more days of  solumedrol 1g ( last day 1/16)  and then 80 mg IV daily until temporal biopsy is done. Vascular consulted.      DISPOSITION:  HOME as per PT     CORE MEASURES:        Admission NIHSS: n/a     TPA:  NO      LDL/HDL: 60/36      Depression Screen: no     Statin Therapy: yes      Dysphagia Screen:PASS      Smoking NO      Afib NO     Stroke Education  YES    Obtain screening ultrasound in patients who meet 1 or more of the following criteria as patient is high risk for DVT/PE upon admission:   [] History of DVT/PE  []Hypercoagulable states (Factor V Leiden, Cancer, OCP, etc. )  []Prolonged immobility (hemiplegia/hemiparesis/post operative or any other extended immobilization)   [] Transferred from outside facility (Rehab or Long term care)  [] Age </= to 50   Patient NERY LEONARD is a 67y (1955) Rh danette speaking man with CAD s/p 1 stent on brilinta, DM, CKD on HD, ,HTN, HLD presenting with Left eye ptosis and diplopia. Patient reports 10-12 days ago having left sided headache. At that time, he noted drooping of left eyelid. Over the course of the last 1 week hit has gotten progressively worse. 5 days ago he noticed double vision and eyes was dysconjugate. When closing one at a time diplopia resolves. He continues to have headache controlled with tylenol rated at 2/10. Headache onset was initially 2/10 then progressively worsened within several hours, maximum intensity of 7/10 over several hours. He saw opthomologist, who noted retinal scarring bilaterally. 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  Denies positional headache, neck stiffness, weakness, numbness, changes to speech, dysphagia, difficulty ambulating.       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 Cranial nerve 3 palsy with a new left sided headache and tenderness on palpation to his left temporal which has improved with steroids.  Continue close monitoring for neurologic deterioration, continue high dose statin, LDL is 60.     ANTITHROMBOTIC THERAPY: Brilinta 60 mg BID and aspirin 81 mg daily     PULMONARY: CXR clear on 1/13, protecting airway, saturating well     CARDIOVASCULAR: 1/13 TTE: EF 63%, He is NSR not on tele. BP has been elevated. He was restarted on home dose metoprolol 25 mg BID.  Dr. Meyers consulted and will provide cardiac clearance for biopsy.                      SBP goal: normotension     GASTROINTESTINAL:  HGA1C 6.4%. Endocrinology consulted on 1/15 for hyperglycemia due to steroids, consult appreciated, started on Lantus and Admelog pre meal   Diet: Consistent Carbohydrate Renal diet     RENAL: BUN 78, Cr 9.81. on dialysis M/W/F.  Nephrology is following, anemia of chronic disease: Continue epo aiming for a HCT of 32-36 %.     Na Goal: Greater than 135. Na: 127, nephrology following, plan for dialysis today.     Lechuga: No     HEMATOLOGY: H/H 10.8/34.6  , Platelets 143     DVT ppx: LMWH      ID: afebrile, no leukocytosis     Other: Rheumatology consulted:  new left sided headache and jaw pain with chewing  now with tenderness to the left temporal.  He also has associated left eye diplopia which may be suggestive of GCA vs Tolossa Hunt syndrome. Rheum labs are pending.  Rheum consulted on 1/14 and recommended starting solumedrol 1g. Patients headaches improved 50% and he is still having blurry vision. He will receive 2 more days of  solumedrol 1g ( last day 1/16)  and then 80 mg IV daily until temporal biopsy is done. Vascular consulted for biopsy.      DISPOSITION:  HOME as per PT     CORE MEASURES:        Admission NIHSS: n/a     TPA:  NO      LDL/HDL: 60/36      Depression Screen: no     Statin Therapy: yes      Dysphagia Screen:PASS      Smoking NO      Afib NO     Stroke Education  YES    Obtain screening ultrasound in patients who meet 1 or more of the following criteria as patient is high risk for DVT/PE upon admission:   [] History of DVT/PE  []Hypercoagulable states (Factor V Leiden, Cancer, OCP, etc. )  []Prolonged immobility (hemiplegia/hemiparesis/post operative or any other extended immobilization)   [] Transferred from outside facility (Rehab or Long term care)  [] Age </= to 50   Patient NERY LEONARD is a 67y (1955) Rh danette speaking man with CAD s/p 1 stent on brilinta, DM, CKD on HD, ,HTN, HLD presenting with Left eye ptosis and diplopia. Patient reports 10-12 days ago having left sided headache. At that time, he noted drooping of left eyelid. Over the course of the last 1 week hit has gotten progressively worse. 5 days ago he noticed double vision and eyes was dysconjugate. When closing one at a time diplopia resolves. He continues to have headache controlled with tylenol rated at 2/10. Headache onset was initially 2/10 then progressively worsened within several hours, maximum intensity of 7/10 over several hours. He saw opthomologist, who noted retinal scarring bilaterally. 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  Denies positional headache, neck stiffness, weakness, numbness, changes to speech, dysphagia, difficulty ambulating.       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 Cranial nerve 3 palsy with a new left sided headache and tenderness on palpation to his left temporal which has improved with steroids.  Continue close monitoring for neurologic deterioration, continue high dose statin, LDL is 60.     ANTITHROMBOTIC THERAPY: Brilinta 60 mg BID and aspirin 81 mg daily     PULMONARY: CXR clear on 1/13, protecting airway, saturating well     CARDIOVASCULAR: 1/13 TTE: EF 63%, He is NSR not on tele. BP has been elevated. He was restarted on home dose metoprolol 25 mg BID.  Dr. Meyers consulted and will provide cardiac clearance for biopsy.                      SBP goal: normotension     GASTROINTESTINAL:  HGA1C 6.4%. Endocrinology consulted on 1/15 for hyperglycemia due to steroids, consult appreciated, started on Lantus and Admelog pre meal   Diet: Consistent Carbohydrate Renal diet     RENAL: BUN 78, Cr 9.81. on dialysis M/W/F.  Nephrology is following, anemia of chronic disease: Continue epo aiming for a HCT of 32-36 %.     Na Goal: Greater than 135. Na: 127, nephrology following, plan for dialysis today.     Lechuga: No     HEMATOLOGY: H/H 10.8/34.6  , Platelets 143     DVT ppx: LMWH      ID: afebrile, no leukocytosis     Other: Rheumatology consulted:  new left sided headache and jaw pain with chewing  now with tenderness to the left temporal.  He also has associated left eye diplopia which may be suggestive of GCA vs Tolossa Hunt syndrome. Rheum labs are pending.  Rheum consulted on 1/14 and recommended starting solumedrol 1g. Patients headaches improved 50% and he is still having blurry vision. He will receive 2 more days of  solumedrol 1g ( last day 1/16)  and then 80 mg IV daily until temporal biopsy is done. Vascular consulted for biopsy.    Hospitalist service consulted and accepted to their service.     DISPOSITION:  HOME as per PT     CORE MEASURES:        Admission NIHSS: n/a     TPA:  NO      LDL/HDL: 60/36      Depression Screen: no     Statin Therapy: yes      Dysphagia Screen:PASS      Smoking NO      Afib NO     Stroke Education  YES    Obtain screening ultrasound in patients who meet 1 or more of the following criteria as patient is high risk for DVT/PE upon admission:   [] History of DVT/PE  []Hypercoagulable states (Factor V Leiden, Cancer, OCP, etc. )  []Prolonged immobility (hemiplegia/hemiparesis/post operative or any other extended immobilization)   [] Transferred from outside facility (Rehab or Long term care)  [] Age </= to 50   Patient NERY LEONARD is a 67y (1955) Rh danette speaking man with CAD s/p 1 stent on brilinta, DM, CKD on HD, ,HTN, HLD presenting with Left eye ptosis and diplopia. Patient reports 10-12 days ago having left sided headache. At that time, he noted drooping of left eyelid. Over the course of the last 1 week hit has gotten progressively worse. 5 days ago he noticed double vision and eyes was dysconjugate. When closing one at a time diplopia resolves. He continues to have headache controlled with tylenol rated at 2/10. Headache onset was initially 2/10 then progressively worsened within several hours, maximum intensity of 7/10 over several hours. He saw opthomologist, who noted retinal scarring bilaterally. 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  Denies positional headache, neck stiffness, weakness, numbness, changes to speech, dysphagia, difficulty ambulating.       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 Patient responding to steroids cant completely exclude diabetic ophthalmoplegia mimicking inflammatory etiology.    NEURO: Pt is alert and oriented x3 and remains clinically stable. Cranial nerve 3 palsy with a new left sided headache and tenderness on palpation to his left temporal which has improved with steroids.  Continue close monitoring for neurologic deterioration, continue home dose statin, LDL is 60.     ANTITHROMBOTIC THERAPY: Brilinta 60 mg BID and aspirin 81 mg daily cardiac stents    PULMONARY: CXR clear on 1/13, protecting airway, saturating well     CARDIOVASCULAR: 1/13 TTE: EF 63%, He is NSR not on tele. BP has been elevated. He was restarted on home dose metoprolol 25 mg BID.  Dr. Meyers consulted and will provide cardiac clearance for biopsy.                      SBP goal: normotension     GASTROINTESTINAL:  HGA1C 6.4%. Endocrinology consulted on 1/15 for hyperglycemia due to steroids, consult appreciated, started on Lantus and Admelog pre meal   Diet: Consistent Carbohydrate Renal diet     RENAL: BUN 78, Cr 9.81. on dialysis M/W/F.  Nephrology is following, anemia of chronic disease: Continue epo aiming for a HCT of 32-36 %.     Na Goal: Greater than 135. Na: 127, nephrology following, plan for dialysis today.     Lechuga: No     HEMATOLOGY: H/H 10.8/34.6  , Platelets 143     DVT ppx: LMWH      ID: afebrile, no leukocytosis     Other: Rheumatology consulted:  new left sided headache and jaw pain with chewing  now with tenderness to the left temporal.  He also has associated left eye diplopia which may be suggestive of GCA vs Tolossa Hunt syndrome. Rheum labs are pending.  Rheum consulted on 1/14 and recommended starting solumedrol 1g. Patients headaches improved 50% and he is still having blurry vision. He will receive 2 more days of  solumedrol 1g ( last day 1/16)  and then 80 mg IV daily until temporal biopsy is done. Vascular consulted for biopsy.    Hospitalist service consulted and accepted to their service manage uncontrolled hyperglycemia due to steroids.     DISPOSITION:  HOME as per PT     CORE MEASURES:        Admission NIHSS: n/a     TPA:  NO      LDL/HDL: 60/36      Depression Screen: no     Statin Therapy: yes      Dysphagia Screen:PASS      Smoking NO      Afib NO     Stroke Education  YES    Obtain screening ultrasound in patients who meet 1 or more of the following criteria as patient is high risk for DVT/PE upon admission:   [] History of DVT/PE  []Hypercoagulable states (Factor V Leiden, Cancer, OCP, etc. )  []Prolonged immobility (hemiplegia/hemiparesis/post operative or any other extended immobilization)   [] Transferred from outside facility (Rehab or Long term care)  [] Age </= to 50

## 2023-01-16 NOTE — CONSULT NOTE ADULT - ASSESSMENT
67y (1955) Rh danette speaking man with CAD s/p 1 stent on brilinta, DM, CKD on HD, ,HTN, HLD presenting with Left eye ptosis and diplopia.

## 2023-01-16 NOTE — PROGRESS NOTE ADULT - PROBLEM SELECTOR PLAN 6
- Nephrology consulted, recs appreciated. Per nephrology, plan for HD today. Adjusting HD for hyponatremia/hyperK and added salt tabs   - BMP in am if > 5.6 would check monitor EKG. - Nephrology consulted, recs appreciated. Per nephrology, plan for HD today, adjusting HD for hyponatremia/hyperK, and added salt tabs   - BMP in am. If K+ > 5.6 would check EKG.

## 2023-01-16 NOTE — PROGRESS NOTE ADULT - PROBLEM SELECTOR PLAN 2
Takes Tresiba 35-40 units QHS   - Hyperglycemia likely 2/2 steroids   - Endocrinology consulted, recs appreciated: Increase Lantus 30 units tonight. Increase Admelog 15 units AC meals, if BG >250mg/dl prior to dinner will likely increase further.   - c/w Admelog moderate correction scale AC and change to moderate HS scale.   - Add 2AM moderate scale to correct >250mg/dl     Per pt at home FS  max. Takes Tresiba 35-40 units QHS at home   - Hyperglycemia likely 2/2 steroids   - Endocrinology consulted, recs appreciated: Increase Lantus 30 units tonight. Increase Admelog 15 units AC meals, if BG >250mg/dl prior to dinner will likely increase further.   - c/w Admelog moderate correction scale AC and change to moderate HS scale.   - Add 2AM moderate scale to correct >250mg/dl     Per pt at home FS  max.

## 2023-01-16 NOTE — CONSULT NOTE ADULT - CONSULT REASON
TA biopsy
esrd on hd
L headache w/ ptosis
Hyperglycemia
Left eye ptosis and diplopia
Pre-op risk stratification
evaluate cn3 palsy and consider GCA
Comanagement medical conditions

## 2023-01-16 NOTE — CONSULT NOTE ADULT - PROBLEM SELECTOR RECOMMENDATION 9
Current DDX include atypical presentation of GCA vs. Tolossa Guerra (but MRI negative)  -Temporal artery US and biopsy pending- discussed with rhem, would  based on this  -Neuro following for alternate CNIII palsy dx- ruled out stroke, large tumor, hemorrhage, trauma) possibly other type inflammatory vs infection vs demyelination? Pseudohyponatremia w/ hyperglycemia plus potentially volume overload

## 2023-01-16 NOTE — CONSULT NOTE ADULT - PROBLEM SELECTOR RECOMMENDATION 2
Takes Tresiba 35-40 units QHS   Hyperglycemia after steroids and likely contributing to hyponatremia  Appreciate endo recs:   -Give NPH 12 units stat now   -Increase Lantus 30 units tonight  -Increase Admelog 15 units AC meals, if BG >250mg/dl prior to dinner will likely increase further.   -c/w Admelog moderate correction scale AC and change to Mod HS scale.  ** Add 2AM moderate scale to correct >250mg/dl       Per pt at home FS  max

## 2023-01-16 NOTE — PROGRESS NOTE ADULT - PROBLEM SELECTOR PLAN 1
-test BG AC/HS/2AM  -Give NPH 12 units stat now   -Increase Lantus 30 units tonight  -Increase Admelog 15 units AC meals, if BG >250mg/dl prior to dinner will likely increase further.   -c/w Admelog moderate correction scale AC and change to Mod HS scale. Add 2AM moderate scale to correct >250mg/dl  -Pt received solumedrol 1gm x 3 days. To be tapered to 80mg IV daily starting tomorrow  -cons carb renal diet  Discharge planning:  - Current home DM meds: Tresiba 30-40 units QHS   - Discharge DM meds: basal/bolus depending on his steroid plan   - Patient with ESRD would benefit from seeing an endocrinologist.   - He can follow up at  [address] [place] [address]  [phone]  - Patient will need opthalmology and podiatry follow up as outpatient -test BG AC/HS/2AM  -Give NPH 12 units stat now   -Increase Lantus 30 units tonight  -Increase Admelog 15 units AC meals, if BG >250mg/dl prior to dinner will likely increase further.   -c/w Admelog moderate correction scale AC and change to Mod HS scale. Add 2AM moderate scale to correct >250mg/dl  -Pt received solumedrol 1gm x 3 days. To be tapered to 80mg IV daily starting tomorrow  -cons carb renal diet  Discharge planning:  - Current home DM meds: Tresiba 30-40 units QHS   - Discharge DM meds: basal/bolus depending on his steroid plan   - Patient with ESRD would benefit from seeing an endocrinologist.   - He can follow up at  [address] [place] [address]  [phone]  - Patient will need opthalmology and podiatry follow up as outpatient.

## 2023-01-16 NOTE — PROGRESS NOTE ADULT - PROBLEM SELECTOR PLAN 1
Current DDX include atypical presentation of GCA vs. Tolossa Guerra (but MRI negative)   - Temporal artery US and biopsy pending   - Neuro following for alternate CNIII palsy dx- ruled out stroke, large tumor, hemorrhage, trauma) possibly other type inflammatory vs infection vs demyelination?

## 2023-01-16 NOTE — CONSULT NOTE ADULT - PROBLEM SELECTOR RECOMMENDATION 8
Discussed with renal attending- plan for HD today and he also started Lokalma  BMP in am if > 5.6 would check monitor EKG

## 2023-01-16 NOTE — CONSULT NOTE ADULT - SUBJECTIVE AND OBJECTIVE BOX
67y (1955) Rh danette speaking man with CAD s/p 1 stent on brilinta, DM, CKD on HD, ,HTN, HLD presenting with Left eye ptosis and diplopia. Patient reports 10-12 days ago having left sided headache. At that time, he noted drooping of left eyelid. Over the course of the last 1 week hit has gotten progressively worse. 5 days ago he noticed double vision and eyes was dysconjugate. When closing one at a time diplopia resolves. He continues to have headache controlled with tylenol rated at 2/10. Headache onset was initially 2/10 then progressively worsened within several hours, maximum intensity of 7/10 over several hours. He saw opthomologist, who noted retinal scarring bilaterally. 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  Denies positional headache, neck stiffness, weakness, numbness, changes to speech, dysphagia, difficulty ambulating.          INTERVAL HPI/OVERNIGHT EVENTS:     Since then has been started on Solumedrol for presumed GCT and having improvement in pain but not ptosis. Since starting on steroids BG very elevated and also hyponatremic.   Discussed course and plans with daughter at bedside.     Home Medications:  atorvastatin 40 mg oral tablet: 1 tab(s) orally once a day (13 Jan 2023 04:07)  Brilinta (ticagrelor) 60 mg oral tablet: 1 dose(s) orally 2 times a day (13 Jan 2023 04:07)  famotidine 20 mg oral tablet: 1 dose(s) orally once a day (13 Jan 2023 04:07)  metoprolol tartrate 25 mg oral tablet: 1 tab(s) orally 2 times a day (13 Jan 2023 04:07)  pantoprazole 20 mg oral delayed release tablet: 1 tab(s) orally once a day (13 Jan 2023 04:07)    PAST MEDICAL & SURGICAL HISTORY:  CAD s/p 1 stent on brilinta 2016   IDDM,   ESRD on HD  HTN   HLD    Social History:  no smoking    Allergies    No Known Allergies    Intolerances    FAMILY HISTORY:  no fm asthma or COPD      REVIEW OF SYSTEMS:  CONSTITUTIONAL: No fever, weight loss, or fatigue  EYES: No eye pain, visual disturbances, or discharge  ENMT:  No difficulty hearing, tinnitus, vertigo; No sinus or throat pain  NECK: No pain or stiffness  BREASTS: No pain, masses, or nipple discharge  RESPIRATORY: No cough, wheezing, chills or hemoptysis; No shortness of breath  CARDIOVASCULAR: No chest pain, palpitations, dizziness, or leg swelling  GASTROINTESTINAL: No abdominal or epigastric pain. No nausea, vomiting, or hematemesis; No diarrhea or constipation. No melena or hematochezia.  GENITOURINARY: No dysuria, frequency, hematuria, or incontinence  NEUROLOGICAL: No headaches, memory loss, loss of strength, numbness, or tremors  SKIN: No itching, burning, rashes, or lesions   LYMPH NODES: No enlarged glands  ENDOCRINE: No heat or cold intolerance; No hair loss  MUSCULOSKELETAL: No joint pain or swelling; No muscle, back, or extremity pain  PSYCHIATRIC: No depression, anxiety, mood swings, or difficulty sleeping  HEME/LYMPH: No easy bruising, or bleeding gums  ALLERY AND IMMUNOLOGIC: No hives or eczema    T(C): 36.7 (01-16-23 @ 08:44), Max: 36.9 (01-15-23 @ 13:26)  HR: 72 (01-16-23 @ 08:44) (65 - 75)  BP: 162/69 (01-16-23 @ 08:44) (144/66 - 187/78)  RR: 18 (01-16-23 @ 08:44) (17 - 18)  SpO2: 98% (01-16-23 @ 08:44) (96% - 98%)  Wt(kg): --Vital Signs Last 24 Hrs  T(C): 36.7 (16 Jan 2023 08:44), Max: 36.9 (15 German 2023 13:26)  T(F): 98 (16 Jan 2023 08:44), Max: 98.4 (15 German 2023 13:26)  HR: 72 (16 Jan 2023 08:44) (65 - 75)  BP: 162/69 (16 Jan 2023 08:44) (144/66 - 187/78)  BP(mean): --  RR: 18 (16 Jan 2023 08:44) (17 - 18)  SpO2: 98% (16 Jan 2023 08:44) (96% - 98%)    Parameters below as of 16 Jan 2023 08:44  Patient On (Oxygen Delivery Method): room air        PHYSICAL EXAM:  GENERAL: NAD, well-groomed, well-developed  HEAD:  Atraumatic, Normocephalic  EYES: Ptosis on L eye- able to open slightly   ENMT: No tonsillar erythema, exudates, or enlargement; Moist mucous membranes, Good dentition, No lesions  NECK: Supple, No JVD, Normal thyroid  NERVOUS SYSTEM:  Alert & Oriented X3, Good concentration; Motor Strength 5/5 B/L upper and lower extremities; DTRs 2+ intact and symmetric  CHEST/LUNG: Clear to percussion bilaterally; No rales, rhonchi, wheezing, or rubs  HEART: Regular rate and rhythm; No murmurs, rubs, or gallops  ABDOMEN: Soft, Nontender, Nondistended; Bowel sounds present  EXTREMITIES:  2+ Peripheral Pulses, No clubbing, cyanosis, or edema  LYMPH: No lymphadenopathy noted  SKIN: No rashes or lesions    Consultant(s) Notes Reviewed:  [x ] YES  [ ] NO  Care Discussed with Consultants/Other Providers [ x] YES  [ ] NO    LABS:                        9.5    11.09 )-----------( 149      ( 16 Jan 2023 08:32 )             28.5     01-16    123<L>  |  85<L>  |  98<H>  ----------------------------<  377<H>  5.6<H>   |  18<L>  |  10.78<H>    Ca    8.6      16 Jan 2023 08:32    TPro  6.9  /  Alb  4.2  /  TBili  0.4  /  DBili  x   /  AST  7<L>  /  ALT  12  /  AlkPhos  101  01-15        CAPILLARY BLOOD GLUCOSE      POCT Blood Glucose.: 392 mg/dL (16 Jan 2023 11:20)  POCT Blood Glucose.: 333 mg/dL (16 Jan 2023 07:29)  POCT Blood Glucose.: 205 mg/dL (15 German 2023 21:57)  POCT Blood Glucose.: 364 mg/dL (15 German 2023 16:20)  POCT Blood Glucose.: 400 mg/dL (15 German 2023 15:30)            RADIOLOGY & ADDITIONAL TESTS:    Imaging Personally Reviewed:  [ ] YES  [ ] NO

## 2023-01-16 NOTE — PROGRESS NOTE ADULT - SUBJECTIVE AND OBJECTIVE BOX
THE PATIENT WAS SEEN AND EXAMINED BY ME WITH THE HOUSESTAFF AND STROKE TEAM DURING MORNING ROUNDS.     Patient NERY LEONARD is a 67y (1955) Rh danette speaking man with CAD s/p 1 stent on brilinta, DM, CKD on HD, ,HTN, HLD presenting with Left eye ptosis and diplopia. Patient reports 10-12 days ago having left sided headache. At that time, he noted drooping of left eyelid. Over the course of the last 1 week hit has gotten progressively worse. 5 days ago he noticed double vision and eyes was dysconjugate. When closing one at a time diplopia resolves. He continues to have headache controlled with tylenol rated at 2/10. Headache onset was initially 2/10 then progressively worsened within several hours, maximum intensity of 7/10 over several hours. He saw opthomologist, who noted retinal scarring bilaterally. 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          SUBJECTIVE: No events overnight.  No new neurologic complaints.      acetaminophen     Tablet .. 650 milliGRAM(s) Oral every 6 hours PRN  artificial  tears Solution 1 Drop(s) Both EYES three times a day  aspirin enteric coated 81 milliGRAM(s) Oral daily  atorvastatin 80 milliGRAM(s) Oral at bedtime  dextrose 5%. 1000 milliLiter(s) IV Continuous <Continuous>  dextrose 5%. 1000 milliLiter(s) IV Continuous <Continuous>  dextrose 50% Injectable 25 Gram(s) IV Push once  dextrose 50% Injectable 12.5 Gram(s) IV Push once  dextrose 50% Injectable 25 Gram(s) IV Push once  dextrose Oral Gel 15 Gram(s) Oral once PRN  enoxaparin Injectable 30 milliGRAM(s) SubCutaneous every 24 hours  glucagon  Injectable 1 milliGRAM(s) IntraMuscular once  insulin glargine Injectable (LANTUS) 10 Unit(s) SubCutaneous at bedtime  insulin lispro (ADMELOG) corrective regimen sliding scale   SubCutaneous three times a day before meals  insulin lispro (ADMELOG) corrective regimen sliding scale   SubCutaneous at bedtime  insulin lispro Injectable (ADMELOG) 13 Unit(s) SubCutaneous three times a day before meals  melatonin 3 milliGRAM(s) Oral at bedtime  metoprolol tartrate 25 milliGRAM(s) Oral two times a day  pantoprazole    Tablet 40 milliGRAM(s) Oral before breakfast  polyethylene glycol 3350 17 Gram(s) Oral daily  polyethylene glycol 3350 17 Gram(s) Oral at bedtime  senna 2 Tablet(s) Oral at bedtime  ticagrelor 60 milliGRAM(s) Oral two times a day      PHYSICAL EXAM:   Vital Signs Last 24 Hrs  T(C): 36.4 (16 Jan 2023 05:00), Max: 36.9 (15 German 2023 13:26)  T(F): 97.5 (16 Jan 2023 05:00), Max: 98.4 (15 German 2023 13:26)  HR: 74 (16 Jan 2023 05:00) (65 - 97)  BP: 163/70 (16 Jan 2023 05:00) (135/75 - 187/78)  BP(mean): --  RR: 18 (16 Jan 2023 05:00) (17 - 18)  SpO2: 96% (16 Jan 2023 05:00) (96% - 98%)    Parameters below as of 16 Jan 2023 05:00  Patient On (Oxygen Delivery Method): room air        General: No acute distress  HEENT: EOM intact, visual fields full  Abdomen: Soft, nontender, nondistended   Extremities: No edema    NEUROLOGICAL EXAM:  Mental status: Awake, alert, oriented x3, no aphasia, no neglect, normal memory   Cranial Nerves: No facial asymmetry,  left eye ptosis,  all extraocular muscles affected except abduction of left eye, diplopia on finger counting  Motor exam: Normal tone, no drift, 5/5 RUE, 5/5 RLE, 5/5 LUE, 5/5 LLE, normal fine finger movements.  Sensation: Intact to light touch   Coordination/ Gait: No dysmetria, BEBE intact and symmetric bilaterally    LABS:                        10.4   6.35  )-----------( 143      ( 15 German 2023 07:10 )             32.2    01-15    127<L>  |  88<L>  |  78<H>  ----------------------------<  403<H>  5.4<H>   |  20<L>  |  9.81<H>    Ca    8.9      15 German 2023 15:35    TPro  6.9  /  Alb  4.2  /  TBili  0.4  /  DBili  x   /  AST  7<L>  /  ALT  12  /  AlkPhos  101  01-15        IMAGING: Reviewed by me.   MR Head w/ IV contrast & MR Orbits 1/15/23  Impression: Mild atrophy and small vessel white matter ischemic changes.   No acute infarcts or hemorrhage. Normal cavernous sinuses. Unremarkable   orbits.    MR HEAD 1/13/23  IMPRESSION:  No acute intracranial hemorrhage, acute infarction, extra-axial fluid   collection or hydrocephalus.    CT HEAD 1/13/23:  No CT evidence of acuteintracranial hemorrhage, mass effect, or midline   shift.    CT ANGIOGRAPHY NECK 1/13/23:  No hemodynamically significant stenosis by NASCET criteria. No vascular   dissection.    CT ANGIOGRAPHY BRAIN 1/13/23:  No major vessel occlusion, evidence of aneurysm, or other vascular   malformation.    Moderate narrowing of the V4 segment of the nondominant right vertebral   artery.

## 2023-01-16 NOTE — CONSULT NOTE ADULT - NSCONSULTADDITIONALINFOA_GEN_ALL_CORE
Discussed with rheum attending- should get TA US and biopsy  Discussed with neprho attending- adjusing HD for hyponatremia/hyperK and added salt tabs in the meantime.  Accepted to Medicine for ongoing work up of GCA and CNIII palsy  Discussed plan with daughter at bedside

## 2023-01-16 NOTE — CONSULT NOTE ADULT - PROBLEM SELECTOR RECOMMENDATION 7
No previous hx of this per pt will start with peripheral smear may also be consistent with immune vs malignancy differrential

## 2023-01-16 NOTE — PROGRESS NOTE ADULT - PROBLEM SELECTOR PLAN 3
Continue with atorvastatin 80 mg daily   - Manage as outpatient      discussed plan w/pt and neuro team  Can be reached via TEAMS/pager: [phone]   office:  [phone] (M-F 9a-5pm)               [phone] (nights/weekends)   [email or web address] password [email or web address]

## 2023-01-16 NOTE — PROGRESS NOTE ADULT - SUBJECTIVE AND OBJECTIVE BOX
MEDICINE ACCEPT NOTE     PROGRESS NOTE:     Patient is a 67y old  Male who presents with a chief complaint of Left CN 3 palsy (16 Jan 2023 13:02)      SUBJECTIVE / OVERNIGHT EVENTS:    No acute events overnight. Patient examined at bedside with no acute complaints.     Pain:  Bowel Movements:  Urination:  OOB:  PT:    REVIEW OF SYSTEMS:    CONSTITUTIONAL: No weakness, fevers or chills  EYES/ENT: No visual changes;  No vertigo or throat pain   NECK: No pain or stiffness  RESPIRATORY: No cough, wheezing, hemoptysis; No shortness of breath  CARDIOVASCULAR: No chest pain or palpitations  GASTROINTESTINAL: No abdominal or epigastric pain. No nausea, vomiting, or hematemesis; No diarrhea or constipation. No melena or hematochezia.  GENITOURINARY: No dysuria, frequency or hematuria  NEUROLOGICAL: No numbness or weakness  SKIN: No itching, rashes      MEDICATIONS  (STANDING):  artificial  tears Solution 1 Drop(s) Both EYES three times a day  aspirin enteric coated 81 milliGRAM(s) Oral daily  atorvastatin 80 milliGRAM(s) Oral at bedtime  dextrose 5%. 1000 milliLiter(s) (50 mL/Hr) IV Continuous <Continuous>  dextrose 5%. 1000 milliLiter(s) (100 mL/Hr) IV Continuous <Continuous>  dextrose 50% Injectable 25 Gram(s) IV Push once  dextrose 50% Injectable 12.5 Gram(s) IV Push once  dextrose 50% Injectable 25 Gram(s) IV Push once  enoxaparin Injectable 30 milliGRAM(s) SubCutaneous every 24 hours  glucagon  Injectable 1 milliGRAM(s) IntraMuscular once  insulin glargine Injectable (LANTUS) 30 Unit(s) SubCutaneous at bedtime  insulin lispro (ADMELOG) corrective regimen sliding scale   SubCutaneous <User Schedule>  insulin lispro (ADMELOG) corrective regimen sliding scale   SubCutaneous three times a day before meals  insulin lispro Injectable (ADMELOG) 15 Unit(s) SubCutaneous three times a day before meals  melatonin 3 milliGRAM(s) Oral at bedtime  metoprolol tartrate 25 milliGRAM(s) Oral two times a day  pantoprazole    Tablet 40 milliGRAM(s) Oral before breakfast  polyethylene glycol 3350 17 Gram(s) Oral daily  polyethylene glycol 3350 17 Gram(s) Oral at bedtime  senna 2 Tablet(s) Oral at bedtime  sodium chloride 2 Gram(s) Oral three times a day  sodium zirconium cyclosilicate 5 Gram(s) Oral daily    MEDICATIONS  (PRN):  acetaminophen     Tablet .. 650 milliGRAM(s) Oral every 6 hours PRN Moderate Pain (4 - 6), Severe Pain (7 - 10)  dextrose Oral Gel 15 Gram(s) Oral once PRN Blood Glucose LESS THAN 70 milliGRAM(s)/deciliter      CAPILLARY BLOOD GLUCOSE      POCT Blood Glucose.: 392 mg/dL (16 Jan 2023 11:20)  POCT Blood Glucose.: 333 mg/dL (16 Jan 2023 07:29)  POCT Blood Glucose.: 205 mg/dL (15 German 2023 21:57)  POCT Blood Glucose.: 364 mg/dL (15 German 2023 16:20)  POCT Blood Glucose.: 400 mg/dL (15 German 2023 15:30)    I&O's Summary    15 German 2023 07:01  -  16 Jan 2023 07:00  --------------------------------------------------------  IN: 600 mL / OUT: 0 mL / NET: 600 mL    16 Jan 2023 07:01  -  16 Jan 2023 15:05  --------------------------------------------------------  IN: 170 mL / OUT: 0 mL / NET: 170 mL        VITALS:   T(C): 36.4 (01-16-23 @ 13:49), Max: 37 (01-16-23 @ 13:41)  HR: 67 (01-16-23 @ 13:49) (65 - 74)  BP: 173/79 (01-16-23 @ 13:49) (144/66 - 176/68)  RR: 16 (01-16-23 @ 13:49) (16 - 18)  SpO2: 93% (01-16-23 @ 13:49) (93% - 98%)    GENERAL: NAD, lying in bed comfortably  HEAD:  Atraumatic, normocephalic  EYES: EOMI, PERRLA, conjunctiva and sclera clear  ENT: Moist mucous membranes  NECK: Supple, no JVD  HEART: Regular rate and rhythm, no murmurs, rubs, or gallops  LUNGS: Unlabored respirations.  Clear to auscultation bilaterally, no crackles, wheezing, or rhonchi  ABDOMEN: Soft, nontender, nondistended, +BS  EXTREMITIES: 2+ peripheral pulses bilaterally. No clubbing, cyanosis, or edema  NERVOUS SYSTEM:  A&Ox3, no focal deficits   SKIN: No rashes or lesions    LABS:                        9.5    11.09 )-----------( 149      ( 16 Jan 2023 08:32 )             28.5     01-16    123<L>  |  85<L>  |  98<H>  ----------------------------<  377<H>  5.6<H>   |  18<L>  |  10.78<H>    Ca    8.6      16 Jan 2023 08:32    TPro  6.9  /  Alb  4.2  /  TBili  0.4  /  DBili  x   /  AST  7<L>  /  ALT  12  /  AlkPhos  101  01-15    PT/INR - ( 16 Jan 2023 14:27 )   PT: 11.1 sec;   INR: 0.97 ratio         PTT - ( 16 Jan 2023 14:27 )  PTT:29.2 sec            RADIOLOGY & ADDITIONAL TESTS:  Results Reviewed:   Imaging Personally Reviewed:  Electrocardiogram Personally Reviewed:    COORDINATION OF CARE:  Care Discussed with Consultants/Other Providers [Y/N]:  Prior or Outpatient Records Reviewed [Y/N]:   MEDICINE ACCEPT NOTE     PROGRESS NOTE:     Patient is a 67y old  Male who presents with a chief complaint of Left CN 3 palsy (16 Jan 2023 13:02)      SUBJECTIVE / OVERNIGHT EVENTS:    No acute events overnight. Patient examined at bedside in dialysis unit. Patient reported being very concerned for his vision. Patient denied any chest pain, shortness of breath, or abdominal pain. Patient reports pain in neck improved and left eye vision affected intermittently due to left-sided ptosis.     REVIEW OF SYSTEMS:    CONSTITUTIONAL: No weakness, fevers or chills  EYES/ENT: No visual changes;  No vertigo or throat pain   NECK: No pain or stiffness  RESPIRATORY: No cough, wheezing, hemoptysis; No shortness of breath  CARDIOVASCULAR: No chest pain or palpitations  GASTROINTESTINAL: No abdominal or epigastric pain. No nausea, vomiting, or hematemesis; No diarrhea or constipation. No melena or hematochezia.  GENITOURINARY: No dysuria, frequency or hematuria  NEUROLOGICAL: No numbness or weakness  SKIN: No itching, rashes      MEDICATIONS  (STANDING):  artificial  tears Solution 1 Drop(s) Both EYES three times a day  aspirin enteric coated 81 milliGRAM(s) Oral daily  atorvastatin 80 milliGRAM(s) Oral at bedtime  dextrose 5%. 1000 milliLiter(s) (50 mL/Hr) IV Continuous <Continuous>  dextrose 5%. 1000 milliLiter(s) (100 mL/Hr) IV Continuous <Continuous>  dextrose 50% Injectable 25 Gram(s) IV Push once  dextrose 50% Injectable 12.5 Gram(s) IV Push once  dextrose 50% Injectable 25 Gram(s) IV Push once  enoxaparin Injectable 30 milliGRAM(s) SubCutaneous every 24 hours  glucagon  Injectable 1 milliGRAM(s) IntraMuscular once  insulin glargine Injectable (LANTUS) 30 Unit(s) SubCutaneous at bedtime  insulin lispro (ADMELOG) corrective regimen sliding scale   SubCutaneous <User Schedule>  insulin lispro (ADMELOG) corrective regimen sliding scale   SubCutaneous three times a day before meals  insulin lispro Injectable (ADMELOG) 15 Unit(s) SubCutaneous three times a day before meals  melatonin 3 milliGRAM(s) Oral at bedtime  metoprolol tartrate 25 milliGRAM(s) Oral two times a day  pantoprazole    Tablet 40 milliGRAM(s) Oral before breakfast  polyethylene glycol 3350 17 Gram(s) Oral daily  polyethylene glycol 3350 17 Gram(s) Oral at bedtime  senna 2 Tablet(s) Oral at bedtime  sodium chloride 2 Gram(s) Oral three times a day  sodium zirconium cyclosilicate 5 Gram(s) Oral daily    MEDICATIONS  (PRN):  acetaminophen     Tablet .. 650 milliGRAM(s) Oral every 6 hours PRN Moderate Pain (4 - 6), Severe Pain (7 - 10)  dextrose Oral Gel 15 Gram(s) Oral once PRN Blood Glucose LESS THAN 70 milliGRAM(s)/deciliter      CAPILLARY BLOOD GLUCOSE      POCT Blood Glucose.: 392 mg/dL (16 Jan 2023 11:20)  POCT Blood Glucose.: 333 mg/dL (16 Jan 2023 07:29)  POCT Blood Glucose.: 205 mg/dL (15 German 2023 21:57)  POCT Blood Glucose.: 364 mg/dL (15 Greman 2023 16:20)  POCT Blood Glucose.: 400 mg/dL (15 German 2023 15:30)    I&O's Summary    15 German 2023 07:01  -  16 Jan 2023 07:00  --------------------------------------------------------  IN: 600 mL / OUT: 0 mL / NET: 600 mL    16 Jan 2023 07:01  -  16 Jan 2023 15:05  --------------------------------------------------------  IN: 170 mL / OUT: 0 mL / NET: 170 mL        VITALS:   T(C): 36.4 (01-16-23 @ 13:49), Max: 37 (01-16-23 @ 13:41)  HR: 67 (01-16-23 @ 13:49) (65 - 74)  BP: 173/79 (01-16-23 @ 13:49) (144/66 - 176/68)  RR: 16 (01-16-23 @ 13:49) (16 - 18)  SpO2: 93% (01-16-23 @ 13:49) (93% - 98%)    GENERAL: NAD, lying in bed comfortably  HEAD:  Atraumatic, normocephalic  EYES: Pupils constricted bilaterally. Left eye ptosis.   ENT: Moist mucous membranes  NECK: Supple, no JVD  HEART: Regular rate and rhythm, no murmurs, rubs, or gallops  LUNGS: Unlabored respirations.  Clear to auscultation bilaterally, no crackles, wheezing, or rhonchi  ABDOMEN: Soft, nontender, nondistended   EXTREMITIES: 2+ peripheral pulses bilaterally. No clubbing, cyanosis, or edema  NERVOUS SYSTEM:  A&Ox3, left eye ptosis, motor strength intact in all extremities, facial sensation intact bilaterally.   SKIN: No rashes or lesions    LABS:                        9.5    11.09 )-----------( 149      ( 16 Jan 2023 08:32 )             28.5     01-16    123<L>  |  85<L>  |  98<H>  ----------------------------<  377<H>  5.6<H>   |  18<L>  |  10.78<H>    Ca    8.6      16 Jan 2023 08:32    TPro  6.9  /  Alb  4.2  /  TBili  0.4  /  DBili  x   /  AST  7<L>  /  ALT  12  /  AlkPhos  101  01-15    PT/INR - ( 16 Jan 2023 14:27 )   PT: 11.1 sec;   INR: 0.97 ratio         PTT - ( 16 Jan 2023 14:27 )  PTT:29.2 sec            RADIOLOGY & ADDITIONAL TESTS:  Results Reviewed:   Imaging Personally Reviewed:  Electrocardiogram Personally Reviewed:    COORDINATION OF CARE:  Care Discussed with Consultants/Other Providers [Y/N]:  Prior or Outpatient Records Reviewed [Y/N]:

## 2023-01-16 NOTE — CONSULT NOTE ADULT - PROVIDER SPECIALTY LIST ADULT
Cardiology
Endocrinology
Ophthalmology
Vascular Surgery
Nephrology
Neurology
Rheumatology
Hospitalist

## 2023-01-16 NOTE — PROGRESS NOTE ADULT - SUBJECTIVE AND OBJECTIVE BOX
Diabetes Follow up note:    Chief complaint: T2DM w/steroid induced hyperglycemia    Interval Hx: Pt received Solumedrol 1gm last 3 days. BG values 200-400s over past 24 hours. Pt seen at bedside w/daughter present. Reports tolerating POs. Concerned regarding high glucose values as his BG at home is normally tightly controlled. Pt takes Tresiba once daily at home.     Review of Systems:  General: + left eye weakness  GI: Tolerating POs. Denies N/V/D/Abd pain  CV: Denies CP/SOB  ENDO: No S&Sx of hypoglycemia    MEDS:  atorvastatin 80 milliGRAM(s) Oral at bedtime  insulin glargine Injectable (LANTUS) 30 Unit(s) SubCutaneous at bedtime  insulin lispro (ADMELOG) corrective regimen sliding scale   SubCutaneous three times a day before meals  insulin lispro (ADMELOG) corrective regimen sliding scale   SubCutaneous at bedtime  insulin lispro Injectable (ADMELOG) 15 Unit(s) SubCutaneous three times a day before meals      Allergies    No Known Allergies      PE:  General: Male sitting in chair. NAD.   Vital Signs Last 24 Hrs  T(C): 36.7 (16 Jan 2023 08:44), Max: 36.9 (15 German 2023 13:26)  T(F): 98 (16 Jan 2023 08:44), Max: 98.4 (15 German 2023 13:26)  HR: 72 (16 Jan 2023 08:44) (65 - 75)  BP: 162/69 (16 Jan 2023 08:44) (144/66 - 187/78)  BP(mean): --  RR: 18 (16 Jan 2023 08:44) (17 - 18)  SpO2: 98% (16 Jan 2023 08:44) (96% - 98%)    Parameters below as of 16 Jan 2023 08:44  Patient On (Oxygen Delivery Method): room air    HEENT: L eye ptosis  Abd: Soft, NT,ND,   Extremities: Warm  Neuro: A&O X3    LABS:  POCT Blood Glucose.: 392 mg/dL (01-16-23 @ 11:20)  POCT Blood Glucose.: 333 mg/dL (01-16-23 @ 07:29)  POCT Blood Glucose.: 205 mg/dL (01-15-23 @ 21:57)  POCT Blood Glucose.: 364 mg/dL (01-15-23 @ 16:20)  POCT Blood Glucose.: 400 mg/dL (01-15-23 @ 15:30)  POCT Blood Glucose.: 468 mg/dL (01-15-23 @ 11:40)  POCT Blood Glucose.: 411 mg/dL (01-15-23 @ 07:54)  POCT Blood Glucose.: 422 mg/dL (01-15-23 @ 07:50)  POCT Blood Glucose.: 322 mg/dL (01-14-23 @ 21:19)  POCT Blood Glucose.: 212 mg/dL (01-14-23 @ 16:37)  POCT Blood Glucose.: 222 mg/dL (01-14-23 @ 11:23)  POCT Blood Glucose.: 177 mg/dL (01-14-23 @ 07:50)  POCT Blood Glucose.: 198 mg/dL (01-13-23 @ 21:47)  POCT Blood Glucose.: 248 mg/dL (01-13-23 @ 16:16)                            9.5    11.09 )-----------( 149      ( 16 Jan 2023 08:32 )             28.5       01-16    123<L>  |  85<L>  |  98<H>  ----------------------------<  377<H>  5.6<H>   |  18<L>  |  10.78<H>    eGFR: 5<L>    Ca    8.6      01-16    TPro  6.9  /  Alb  4.2  /  TBili  0.4  /  DBili  x   /  AST  7<L>  /  ALT  12  /  AlkPhos  101  01-15      Thyroid Function Tests:  01-12 @ 19:44 TSH 2.00 FreeT4 -- T3 -- Anti TPO -- Anti Thyroglobulin Ab -- TSI --      A1C with Estimated Average Glucose Result: 6.4 % (01-13-23 @ 06:51)          Contact number: alessio [phone] or [phone]

## 2023-01-16 NOTE — CONSULT NOTE ADULT - SUBJECTIVE AND OBJECTIVE BOX
Cardiovascular Disease Initial Evaluation  Date of service: 01-16-23 @ 10:28    CHIEF COMPLAINT: CN3 Palsy    HISTORY OF PRESENT ILLNESS: Mr. Ramirez is a 67y M  CAD s/p PCI to LAD in 2016 now on Brilinta, DM, ESRD on HTN, HLD presenting with Left eye ptosis and diplopia. Patient reports 10-12 days ago having left sided headache. At that time, he noted drooping of left eyelid. Over the course of the last 1 week hit has gotten progressively worse. 5 days ago he noticed double vision and eyes was dysconjugate. When closing one at a time diplopia resolves. He continues to have headache controlled with tylenol rated at 2/10. Headache onset was initially 2/10 then progressively worsened within several hours, maximum intensity of 7/10 over several hours. He saw opthomologist, who noted retinal scarring bilaterally. 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  Denies positional headache, neck stiffness, weakness, numbness, changes to speech, dysphagia, difficulty ambulating.      Patient was seen by neuro and rheum. Received steroids yesterday with significant symptomatic improvement. Per rheum, if symptoms improve with steroids to consider TA biopsy. Vascular surgery consulted.    Cardiology consulted for pre-op risk stratification. Pt denies chest pain, sob, palpitations or syncope.       Allergies    No Known Allergies    Intolerances    	    MEDICATIONS:  aspirin enteric coated 81 milliGRAM(s) Oral daily  enoxaparin Injectable 30 milliGRAM(s) SubCutaneous every 24 hours  metoprolol tartrate 25 milliGRAM(s) Oral two times a day  ticagrelor 60 milliGRAM(s) Oral two times a day        acetaminophen     Tablet .. 650 milliGRAM(s) Oral every 6 hours PRN  melatonin 3 milliGRAM(s) Oral at bedtime    pantoprazole    Tablet 40 milliGRAM(s) Oral before breakfast  polyethylene glycol 3350 17 Gram(s) Oral daily  polyethylene glycol 3350 17 Gram(s) Oral at bedtime  senna 2 Tablet(s) Oral at bedtime    atorvastatin 80 milliGRAM(s) Oral at bedtime  dextrose 50% Injectable 25 Gram(s) IV Push once  dextrose 50% Injectable 12.5 Gram(s) IV Push once  dextrose 50% Injectable 25 Gram(s) IV Push once  dextrose Oral Gel 15 Gram(s) Oral once PRN  glucagon  Injectable 1 milliGRAM(s) IntraMuscular once  insulin glargine Injectable (LANTUS) 30 Unit(s) SubCutaneous at bedtime  insulin lispro (ADMELOG) corrective regimen sliding scale   SubCutaneous three times a day before meals  insulin lispro (ADMELOG) corrective regimen sliding scale   SubCutaneous at bedtime  insulin lispro Injectable (ADMELOG) 13 Unit(s) SubCutaneous three times a day before meals  insulin NPH human recombinant 12 Unit(s) SubCutaneous once  methylPREDNISolone sodium succinate IVPB 1000 milliGRAM(s) IV Intermittent once    artificial  tears Solution 1 Drop(s) Both EYES three times a day  dextrose 5%. 1000 milliLiter(s) IV Continuous <Continuous>  dextrose 5%. 1000 milliLiter(s) IV Continuous <Continuous>      PAST MEDICAL & SURGICAL HISTORY:      FAMILY HISTORY:      SOCIAL HISTORY:    The patient is a nonsmoker       REVIEW OF SYSTEMS:  See HPI, otherwise complete 14 point review of systems negative    [x] All others negative	  [ ] Unable to obtain    PHYSICAL EXAM:  T(C): 36.7 (01-16-23 @ 08:44), Max: 36.9 (01-15-23 @ 13:26)  HR: 72 (01-16-23 @ 08:44) (65 - 75)  BP: 162/69 (01-16-23 @ 08:44) (144/66 - 187/78)  RR: 18 (01-16-23 @ 08:44) (17 - 18)  SpO2: 98% (01-16-23 @ 08:44) (96% - 98%)  Wt(kg): --  I&O's Summary    15 German 2023 07:01  -  16 Jan 2023 07:00  --------------------------------------------------------  IN: 600 mL / OUT: 0 mL / NET: 600 mL        Appearance: No Acute Distress; resting comfortably  HEENT:  Normal oral mucosa, L eye ptosis  Cardiovascular: Normal S1 S2, No JVD, No murmurs/rubs/gallops  Respiratory: Normal respiratory effort; Lungs clear to auscultation bilaterally  Gastrointestinal:  Soft, Non-tender, + BS	  Skin: No rashes, No ecchymoses, No cyanosis	  Neurologic: Non-focal; no weakness  Extremities: No clubbing, cyanosis or edema  Vascular: Peripheral pulses palpable 2+ bilaterally  Psychiatry: A & O x 3, Mood & affect appropriate    Laboratory Data:	 	    CBC Full  -  ( 16 Jan 2023 08:32 )  WBC Count : 11.09 K/uL  Hemoglobin : 9.5 g/dL  Hematocrit : 28.5 %  Platelet Count - Automated : 149 K/uL  Mean Cell Volume : 95.0 fl  Mean Cell Hemoglobin : 31.7 pg  Mean Cell Hemoglobin Concentration : 33.3 gm/dL  Auto Neutrophil # : x  Auto Lymphocyte # : x  Auto Monocyte # : x  Auto Eosinophil # : x  Auto Basophil # : x  Auto Neutrophil % : x  Auto Lymphocyte % : x  Auto Monocyte % : x  Auto Eosinophil % : x  Auto Basophil % : x    01-16    123<L>  |  85<L>  |  98<H>  ----------------------------<  377<H>  5.6<H>   |  18<L>  |  10.78<H>  01-15    127<L>  |  88<L>  |  78<H>  ----------------------------<  403<H>  5.4<H>   |  20<L>  |  9.81<H>    Ca    8.6      16 Jan 2023 08:32  Ca    8.9      15 German 2023 15:35    TPro  6.9  /  Alb  4.2  /  TBili  0.4  /  DBili  x   /  AST  7<L>  /  ALT  12  /  AlkPhos  101  01-15      proBNP:   Lipid Profile:   HgA1c:   TSH:       CARDIAC MARKERS:            Interpretation of Telemetry: N/a  ECG: Sinus rhythm  RADIOLOGY:  OTHER: 	    PREVIOUS DIAGNOSTIC TESTING:    [x] Echocardiogram: 1/13   1. Normal mitral valve. Minimal mitral regurgitation.  2. Normal trileaflet aortic valve. Minimal aortic  regurgitation. Normal left atrium.  LA volume index = 22  cc/m2.Normal left ventricular internal dimensions and wall  thicknesses. Normal left ventricular systolic function. No  segmental wall motion abnormalities. LVEF calculated using  biplane Pompa's method is 63%.  Strain imaging was performed with a HR of 67 bpm and a BP  of 166/70 mmHg. Global L. Strain= -21%. Normal diastolic  function.  3. Normal right atrium.Normal right ventricular size and  function.  4. Normal tricuspid valve. No tricuspid regurgitation.  Estimated pulmonary artery systolic pressure equals 17 mm  Hg, assuming right atrial pressure equals 3  mm Hg,  consistent with normal pulmonary pressures.  5. No pericardial effusion seen.  [ ] Catheterization:  [ ] Stress Test:  	    Assessment: 67y M  CAD s/p PCI to LAD in 2016 now on Brilinta, DM, ESRD on HTN, HLD presenting with Left eye ptosis and diplopia.    Plan of Care:    #Pre-op risk stratification  - Pt to undergo temporal artery biopsy   - Mr. Ramirez EKG from 1/15 shows sinus rhythm with no acute ischemic changes  - TTE from 1/13 shows normal LV systolic function with no significant structural disease  - Pt displays no evidence of pre-op coronary ischemia  - From a cardiac standpoint, Mr. Ramirez is optimized to proceed with biopsy at the discretion of the vascular team.  - Brilinta may be held for biopsy     #CAD   - S/p PCI in 2016 to LAD  - Brilinta to be held for biopsy   - Continue statin and BB    #ESRD  - HD as per renal    #HTN  - BP elevated  - May consider starting Losartan 25 mg daily     #HLD  - Statin as ordered    #ACP (advance care planning)-  Advanced care planning was discussed with the patient and daughter. Benefits and alternatives of medical treatment and procedures were discussed in detail and all questions were answered. 30 additional minutes spent addressing advance care plans.      72 minutes spent on total encounter; more than 50% of the visit was spent counseling and/or coordinating care by the attending physician.   	  Luigi Barnes DO [facility]  Cardiovascular Diseases  [phone]

## 2023-01-17 LAB
ACRM BINDING ANTIBODY: <0.03 NMOL/L — SIGNIFICANT CHANGE UP (ref 0–0.24)
ANION GAP SERPL CALC-SCNC: 14 MMOL/L — SIGNIFICANT CHANGE UP (ref 5–17)
ANION GAP SERPL CALC-SCNC: 16 MMOL/L — SIGNIFICANT CHANGE UP (ref 5–17)
AUTO DIFF PNL BLD: NEGATIVE — SIGNIFICANT CHANGE UP
BUN SERPL-MCNC: 63 MG/DL — HIGH (ref 7–23)
BUN SERPL-MCNC: 69 MG/DL — HIGH (ref 7–23)
C-ANCA SER-ACNC: NEGATIVE — SIGNIFICANT CHANGE UP
CALCIUM SERPL-MCNC: 8.5 MG/DL — SIGNIFICANT CHANGE UP (ref 8.4–10.5)
CALCIUM SERPL-MCNC: 8.6 MG/DL — SIGNIFICANT CHANGE UP (ref 8.4–10.5)
CHLORIDE SERPL-SCNC: 91 MMOL/L — LOW (ref 96–108)
CHLORIDE SERPL-SCNC: 91 MMOL/L — LOW (ref 96–108)
CO2 SERPL-SCNC: 23 MMOL/L — SIGNIFICANT CHANGE UP (ref 22–31)
CO2 SERPL-SCNC: 25 MMOL/L — SIGNIFICANT CHANGE UP (ref 22–31)
CREAT SERPL-MCNC: 7.85 MG/DL — HIGH (ref 0.5–1.3)
CREAT SERPL-MCNC: 8.31 MG/DL — HIGH (ref 0.5–1.3)
EGFR: 7 ML/MIN/1.73M2 — LOW
EGFR: 7 ML/MIN/1.73M2 — LOW
GLUCOSE BLDC GLUCOMTR-MCNC: 109 MG/DL — HIGH (ref 70–99)
GLUCOSE BLDC GLUCOMTR-MCNC: 172 MG/DL — HIGH (ref 70–99)
GLUCOSE BLDC GLUCOMTR-MCNC: 184 MG/DL — HIGH (ref 70–99)
GLUCOSE BLDC GLUCOMTR-MCNC: 226 MG/DL — HIGH (ref 70–99)
GLUCOSE BLDC GLUCOMTR-MCNC: 252 MG/DL — HIGH (ref 70–99)
GLUCOSE SERPL-MCNC: 228 MG/DL — HIGH (ref 70–99)
GLUCOSE SERPL-MCNC: 258 MG/DL — HIGH (ref 70–99)
HCT VFR BLD CALC: 27.3 % — LOW (ref 39–50)
HGB BLD-MCNC: 9.1 G/DL — LOW (ref 13–17)
MAGNESIUM SERPL-MCNC: 3.3 MG/DL — HIGH (ref 1.6–2.6)
MCHC RBC-ENTMCNC: 31.9 PG — SIGNIFICANT CHANGE UP (ref 27–34)
MCHC RBC-ENTMCNC: 33.3 GM/DL — SIGNIFICANT CHANGE UP (ref 32–36)
MCV RBC AUTO: 95.8 FL — SIGNIFICANT CHANGE UP (ref 80–100)
MPO AB + PR3 PNL SER: SIGNIFICANT CHANGE UP
NRBC # BLD: 0 /100 WBCS — SIGNIFICANT CHANGE UP (ref 0–0)
P-ANCA SER-ACNC: NEGATIVE — SIGNIFICANT CHANGE UP
PHOSPHATE SERPL-MCNC: 3.9 MG/DL — SIGNIFICANT CHANGE UP (ref 2.5–4.5)
PLATELET # BLD AUTO: 147 K/UL — LOW (ref 150–400)
POTASSIUM SERPL-MCNC: 4.9 MMOL/L — SIGNIFICANT CHANGE UP (ref 3.5–5.3)
POTASSIUM SERPL-MCNC: 5 MMOL/L — SIGNIFICANT CHANGE UP (ref 3.5–5.3)
POTASSIUM SERPL-SCNC: 4.9 MMOL/L — SIGNIFICANT CHANGE UP (ref 3.5–5.3)
POTASSIUM SERPL-SCNC: 5 MMOL/L — SIGNIFICANT CHANGE UP (ref 3.5–5.3)
RBC # BLD: 2.85 M/UL — LOW (ref 4.2–5.8)
RBC # FLD: 14.6 % — HIGH (ref 10.3–14.5)
SODIUM SERPL-SCNC: 130 MMOL/L — LOW (ref 135–145)
SODIUM SERPL-SCNC: 130 MMOL/L — LOW (ref 135–145)
WBC # BLD: 9.91 K/UL — SIGNIFICANT CHANGE UP (ref 3.8–10.5)
WBC # FLD AUTO: 9.91 K/UL — SIGNIFICANT CHANGE UP (ref 3.8–10.5)

## 2023-01-17 PROCEDURE — 99232 SBSQ HOSP IP/OBS MODERATE 35: CPT

## 2023-01-17 PROCEDURE — 70450 CT HEAD/BRAIN W/O DYE: CPT | Mod: 26

## 2023-01-17 PROCEDURE — 99232 SBSQ HOSP IP/OBS MODERATE 35: CPT | Mod: GC

## 2023-01-17 PROCEDURE — 93998 UNLISTD NONINVAS VASC DX STD: CPT | Mod: 26

## 2023-01-17 RX ORDER — SODIUM ZIRCONIUM CYCLOSILICATE 10 G/10G
5 POWDER, FOR SUSPENSION ORAL DAILY
Refills: 0 | Status: DISCONTINUED | OUTPATIENT
Start: 2023-01-17 | End: 2023-01-20

## 2023-01-17 RX ADMIN — SODIUM CHLORIDE 2 GRAM(S): 9 INJECTION INTRAMUSCULAR; INTRAVENOUS; SUBCUTANEOUS at 01:26

## 2023-01-17 RX ADMIN — SODIUM CHLORIDE 2 GRAM(S): 9 INJECTION INTRAMUSCULAR; INTRAVENOUS; SUBCUTANEOUS at 05:00

## 2023-01-17 RX ADMIN — SODIUM CHLORIDE 2 GRAM(S): 9 INJECTION INTRAMUSCULAR; INTRAVENOUS; SUBCUTANEOUS at 16:52

## 2023-01-17 RX ADMIN — Medication 4: at 07:44

## 2023-01-17 RX ADMIN — Medication 2: at 01:26

## 2023-01-17 RX ADMIN — Medication 2: at 11:48

## 2023-01-17 RX ADMIN — SENNA PLUS 2 TABLET(S): 8.6 TABLET ORAL at 21:13

## 2023-01-17 RX ADMIN — Medication 3 MILLIGRAM(S): at 21:14

## 2023-01-17 RX ADMIN — SODIUM CHLORIDE 2 GRAM(S): 9 INJECTION INTRAMUSCULAR; INTRAVENOUS; SUBCUTANEOUS at 21:13

## 2023-01-17 RX ADMIN — Medication 81 MILLIGRAM(S): at 11:50

## 2023-01-17 RX ADMIN — POLYETHYLENE GLYCOL 3350 17 GRAM(S): 17 POWDER, FOR SOLUTION ORAL at 21:12

## 2023-01-17 RX ADMIN — INSULIN GLARGINE 30 UNIT(S): 100 INJECTION, SOLUTION SUBCUTANEOUS at 21:19

## 2023-01-17 RX ADMIN — Medication 25 MILLIGRAM(S): at 05:00

## 2023-01-17 RX ADMIN — Medication 25 MILLIGRAM(S): at 17:15

## 2023-01-17 RX ADMIN — Medication 15 UNIT(S): at 16:52

## 2023-01-17 RX ADMIN — ATORVASTATIN CALCIUM 80 MILLIGRAM(S): 80 TABLET, FILM COATED ORAL at 21:13

## 2023-01-17 RX ADMIN — SODIUM ZIRCONIUM CYCLOSILICATE 5 GRAM(S): 10 POWDER, FOR SUSPENSION ORAL at 12:01

## 2023-01-17 RX ADMIN — Medication 1 DROP(S): at 16:53

## 2023-01-17 RX ADMIN — Medication 1 DROP(S): at 05:00

## 2023-01-17 RX ADMIN — ENOXAPARIN SODIUM 30 MILLIGRAM(S): 100 INJECTION SUBCUTANEOUS at 11:51

## 2023-01-17 RX ADMIN — Medication 1 DROP(S): at 21:12

## 2023-01-17 RX ADMIN — PANTOPRAZOLE SODIUM 40 MILLIGRAM(S): 20 TABLET, DELAYED RELEASE ORAL at 05:01

## 2023-01-17 RX ADMIN — Medication 15 UNIT(S): at 11:49

## 2023-01-17 RX ADMIN — Medication 15 UNIT(S): at 07:44

## 2023-01-17 NOTE — PROGRESS NOTE ADULT - ASSESSMENT
67y Male with possible left temporal arteritis. Vascular consulted for left TA biopsy.     Plan:    - please document cardiology clearance for procedure  - hold brillinta for 5 days before TA biopsy  - follow up with rheumatology recs  - will plan for left TA biopsy once all above cleared      Vascular  [id number]

## 2023-01-17 NOTE — PROGRESS NOTE ADULT - PROBLEM SELECTOR PLAN 2
Takes Tresiba 35-40 units QHS at home   Per pt at home FS  max.  Current regimen:   Lantus 30 units qHS   Admelog 15 units AC meals  - Hyperglycemia likely 2/2 steroids   - Endocrinology consulted, recs appreciated.   - C/w Admelog moderate correction scale AC and moderate HS scale   - C/w 2AM moderate scale to correct >250mg/dl

## 2023-01-17 NOTE — PROGRESS NOTE ADULT - ATTENDING COMMENTS
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  T2DM uncontrolled- seen by endo with adjustments made  Hyponatremia improving

## 2023-01-17 NOTE — PROGRESS NOTE ADULT - ATTENDING COMMENTS
66 yo M with rule out GCA  on steroids  left-sided symptoms only  will plan for left temporal artery biopsy at end of this week tentatively  appreciate medical and cardiac risk stratification and optimization  will follow

## 2023-01-17 NOTE — PROGRESS NOTE ADULT - SUBJECTIVE AND OBJECTIVE BOX
Patient is a 67y Male whom presented to the hospital with esrd on hd     PAST MEDICAL & SURGICAL HISTORY:      MEDICATIONS  (STANDING):  artificial  tears Solution 1 Drop(s) Both EYES three times a day  aspirin enteric coated 81 milliGRAM(s) Oral daily  atorvastatin 80 milliGRAM(s) Oral at bedtime  dextrose 5%. 1000 milliLiter(s) (50 mL/Hr) IV Continuous <Continuous>  dextrose 5%. 1000 milliLiter(s) (100 mL/Hr) IV Continuous <Continuous>  dextrose 50% Injectable 25 Gram(s) IV Push once  dextrose 50% Injectable 12.5 Gram(s) IV Push once  dextrose 50% Injectable 25 Gram(s) IV Push once  enoxaparin Injectable 30 milliGRAM(s) SubCutaneous every 24 hours  glucagon  Injectable 1 milliGRAM(s) IntraMuscular once  insulin lispro (ADMELOG) corrective regimen sliding scale   SubCutaneous at bedtime  insulin lispro (ADMELOG) corrective regimen sliding scale   SubCutaneous three times a day before meals  metoprolol tartrate 25 milliGRAM(s) Oral two times a day  pantoprazole    Tablet 40 milliGRAM(s) Oral before breakfast  polyethylene glycol 3350 17 Gram(s) Oral at bedtime  senna 2 Tablet(s) Oral at bedtime      Allergies    No Known Allergies    Intolerances        SOCIAL HISTORY:  Denies ETOh,Smoking,     FAMILY HISTORY:      REVIEW OF SYSTEMS:    CONSTITUTIONAL: No weakness, fevers or chills  RESPIRATORY: No cough, wheezing, hemoptysis; No shortness of breath  CARDIOVASCULAR: No chest pain or palpitations  GASTROINTESTINAL: No abdominal or epigastric pain. No nausea, vomiting,     No diarrhea or constipation. No melena   GENITOURINARY: No dysuria, frequency or hematuria  NEUROLOGICAL: No numbness or weakness  SKIN: dry                                                                                     9.1    9.91  )-----------( 147      ( 17 Jan 2023 05:35 )             27.3       CBC Full  -  ( 17 Jan 2023 05:35 )  WBC Count : 9.91 K/uL  RBC Count : 2.85 M/uL  Hemoglobin : 9.1 g/dL  Hematocrit : 27.3 %  Platelet Count - Automated : 147 K/uL  Mean Cell Volume : 95.8 fl  Mean Cell Hemoglobin : 31.9 pg  Mean Cell Hemoglobin Concentration : 33.3 gm/dL  Auto Neutrophil # : x  Auto Lymphocyte # : x  Auto Monocyte # : x  Auto Eosinophil # : x  Auto Basophil # : x  Auto Neutrophil % : x  Auto Lymphocyte % : x  Auto Monocyte % : x  Auto Eosinophil % : x  Auto Basophil % : x      01-17    130<L>  |  91<L>  |  69<H>  ----------------------------<  228<H>  5.0   |  25  |  8.31<H>    Ca    8.6      17 Jan 2023 05:35  Phos  3.9     01-17  Mg     3.3     01-17        CAPILLARY BLOOD GLUCOSE      POCT Blood Glucose.: 109 mg/dL (17 Jan 2023 16:21)  POCT Blood Glucose.: 172 mg/dL (17 Jan 2023 11:18)  POCT Blood Glucose.: 226 mg/dL (17 Jan 2023 07:36)  POCT Blood Glucose.: 252 mg/dL (17 Jan 2023 01:24)  POCT Blood Glucose.: 248 mg/dL (16 Jan 2023 21:21)      Vital Signs Last 24 Hrs  T(C): 36.6 (17 Jan 2023 17:03), Max: 36.8 (16 Jan 2023 22:05)  T(F): 97.9 (17 Jan 2023 17:03), Max: 98.3 (16 Jan 2023 22:05)  HR: 63 (17 Jan 2023 17:03) (63 - 91)  BP: 163/70 (17 Jan 2023 17:03) (126/61 - 163/70)  BP(mean): --  RR: 18 (17 Jan 2023 17:03) (18 - 19)  SpO2: 98% (17 Jan 2023 17:03) (95% - 98%)    Parameters below as of 17 Jan 2023 17:03  Patient On (Oxygen Delivery Method): room air            PT/INR - ( 16 Jan 2023 14:27 )   PT: 11.1 sec;   INR: 0.97 ratio         PTT - ( 16 Jan 2023 14:27 )  PTT:29.2 sec          PHYSICAL EXAM:    Constitutional: NAD  HEENT: conjunctive   clear   Neck:  No JVD  Respiratory: CTAB  Cardiovascular: S1 and S2  Gastrointestinal: BS+, soft, NT/ND  Extremities: No peripheral edema  Neurological: A/O x 3, no focal deficits  Access: Not applicable

## 2023-01-17 NOTE — PROGRESS NOTE ADULT - ASSESSMENT
68 y/o M w/h/o T2DM> unknown control due to anemia of chronic disease. On Tresiba 30 to 40 units at hs basedof hs BG, Reports FBG 50s to mid 100s at home. DM c/b CAD. stents and CKD > HD. Also h/o HTN, HLD. Here Left eye ptosis and diplopia/pain. On steroids for possible GCA. Endocrinology consulted for uncontrolled glucose in the setting of high dose steroid. Tolerating POs FBG values 200s but improved during the day. Solumedrol dose going down to 60mg tomorro so will waith until tomorrow to see if furhter insulin dose adjustments need to be made. Expecting BG levels to improve as steroid doses come down. BG goal (100-180mg/dl). No hypoglycemia.      Home DM meds: Tresiba 30-40 units QHS     Spoke to tp about the need to add another med to control prandial hyperglycemia while at home. Will be determined closer to discharge depending on BG and steroid doses if any. Pt verbalized understanding and agree with plan

## 2023-01-17 NOTE — PROGRESS NOTE ADULT - PROBLEM SELECTOR PLAN 3
Patient has hx of CAD and is on Brilinta   - Cardiology consulted for preprocedural evaluation, recs appreciated. Per cardiology, can hold Brilinta for 5 days prior to biopsy

## 2023-01-17 NOTE — PROGRESS NOTE ADULT - PROBLEM SELECTOR PLAN 1
Current DDX include atypical presentation of GCA vs. Tolossa Guerra (but MRI negative)   - Temporal artery biopsy pending (requires 5 days off Brilinta)   - Neuro following for alternate CNIII palsy dx- ruled out stroke, large tumor, hemorrhage, trauma) possibly other type inflammatory vs infection vs demyelination?   - Rheumatology consulted, recs appreciated.

## 2023-01-17 NOTE — PROGRESS NOTE ADULT - SUBJECTIVE AND OBJECTIVE BOX
Surgery Progress Note    INTERVAl/SUBJECTIVE: No acute event overnight. Patient seen and examined in am rounds, found to be without acute distress.      Vital Signs Last 24 Hrs  T(C): 36.7 (17 Jan 2023 09:12), Max: 37 (16 Jan 2023 13:41)  T(F): 98 (17 Jan 2023 09:12), Max: 98.6 (16 Jan 2023 13:41)  HR: 79 (17 Jan 2023 09:12) (61 - 91)  BP: 151/78 (17 Jan 2023 09:12) (126/61 - 173/79)  BP(mean): --  RR: 18 (17 Jan 2023 09:12) (16 - 19)  SpO2: 95% (17 Jan 2023 09:12) (92% - 98%)    Parameters below as of 17 Jan 2023 09:12  Patient On (Oxygen Delivery Method): room air        Physical Exam:  Constitutional: NAD  HEENT: conjunctive   clear   Neck:  No JVD  Respiratory: CTAB  Cardiovascular: S1 and S2  Gastrointestinal: BS+, soft, NT/ND  Extremities: No peripheral edema  Neurological: A/O x 3, no focal deficits  Access: Not applicable    LABS:                        9.1    9.91  )-----------( 147      ( 17 Jan 2023 05:35 )             27.3     01-17    130<L>  |  91<L>  |  69<H>  ----------------------------<  228<H>  5.0   |  25  |  8.31<H>    Ca    8.6      17 Jan 2023 05:35  Phos  3.9     01-17  Mg     3.3     01-17      PT/INR - ( 16 Jan 2023 14:27 )   PT: 11.1 sec;   INR: 0.97 ratio         PTT - ( 16 Jan 2023 14:27 )  PTT:29.2 sec      INs and OUTs:    01-16-23 @ 07:01  -  01-17-23 @ 07:00  --------------------------------------------------------  IN: 170 mL / OUT: 3000 mL / NET: -2830 mL

## 2023-01-17 NOTE — PROGRESS NOTE ADULT - SUBJECTIVE AND OBJECTIVE BOX
DIABETES FOLLOW UP NOTE: Saw pt earlier today    Chief Complaint: Endocrine consult requested for management of T2DM    INTERVAL HX: Pt stable, reports tolerating POs with BG levels improving except fasting BG while on Methylprednisolone 80mg daily. Noted dose coming down to 60mg tomorrow. Pt states L eye pain has improved greatly but still unable to open eye. Awaiting L. temporal artery bx.       Review of Systems:  General: As above  Cardiovascular: No chest pain, palpitations  Respiratory: No SOB, no cough  GI: No nausea, vomiting, abdominal pain  Endocrine: No polyuria, polydipsia or S&Sx of hypoglycemia    Allergies    No Known Allergies    Intolerances      MEDICATIONS:  atorvastatin 80 milliGRAM(s) Oral at bedtime  insulin glargine Injectable (LANTUS) 30 Unit(s) SubCutaneous at bedtime  insulin lispro (ADMELOG) corrective regimen sliding scale   SubCutaneous <User Schedule>  insulin lispro (ADMELOG) corrective regimen sliding scale   SubCutaneous three times a day before meals  insulin lispro Injectable (ADMELOG) 15 Unit(s) SubCutaneous three times a day before meals      PHYSICAL EXAM:  VITALS: T(C): 36.6 (01-17-23 @ 17:03)  T(F): 97.9 (01-17-23 @ 17:03), Max: 98.3 (01-16-23 @ 22:05)  HR: 63 (01-17-23 @ 17:03) (63 - 91)  BP: 163/70 (01-17-23 @ 17:03) (126/61 - 163/70)  RR:  (18 - 19)  SpO2:  (95% - 98%)  Wt(kg): --  GENERAL: Male sitting in chair in NAD.   HEENT: L eye Ptosis  Abdomen: Soft, nontender, non distended  Extremities: Warm, no edema in all 4 exts  NEURO: A&O X3    LABS:  POCT Blood Glucose.: 109 mg/dL (01-17-23 @ 16:21)  POCT Blood Glucose.: 172 mg/dL (01-17-23 @ 11:18)  POCT Blood Glucose.: 226 mg/dL (01-17-23 @ 07:36)  POCT Blood Glucose.: 252 mg/dL (01-17-23 @ 01:24)  POCT Blood Glucose.: 248 mg/dL (01-16-23 @ 21:21)  POCT Blood Glucose.: 172 mg/dL (01-16-23 @ 18:03)  POCT Blood Glucose.: 150 mg/dL (01-16-23 @ 16:50)  POCT Blood Glucose.: 392 mg/dL (01-16-23 @ 11:20)  POCT Blood Glucose.: 333 mg/dL (01-16-23 @ 07:29)  POCT Blood Glucose.: 205 mg/dL (01-15-23 @ 21:57)  POCT Blood Glucose.: 364 mg/dL (01-15-23 @ 16:20)  POCT Blood Glucose.: 400 mg/dL (01-15-23 @ 15:30)  POCT Blood Glucose.: 468 mg/dL (01-15-23 @ 11:40)  POCT Blood Glucose.: 411 mg/dL (01-15-23 @ 07:54)  POCT Blood Glucose.: 422 mg/dL (01-15-23 @ 07:50)  POCT Blood Glucose.: 322 mg/dL (01-14-23 @ 21:19)                            9.1    9.91  )-----------( 147      ( 17 Jan 2023 05:35 )             27.3       01-17    130<L>  |  91<L>  |  69<H>  ----------------------------<  228<H>  5.0   |  25  |  8.31<H>    eGFR: 7<L>    Ca    8.6      01-17  Mg     3.3     01-17  Phos  3.9     01-17    TPro  6.9  /  Alb  4.2  /  TBili  0.4  /  DBili  x   /  AST  7<L>  /  ALT  12  /  AlkPhos  101  01-15      Thyroid Function Tests:  01-12 @ 19:44 TSH 2.00 FreeT4 -- T3 -- Anti TPO -- Anti Thyroglobulin Ab -- TSI --      A1C with Estimated Average Glucose Result: 6.4 % (01-13-23 @ 06:51)      Estimated Average Glucose: 137 mg/dL (01-13-23 @ 06:51)        01-13 Chol 136 Direct LDL -- LDL calculated 60 HDL 36<L> Trig 200<H>

## 2023-01-17 NOTE — PROGRESS NOTE ADULT - PROBLEM SELECTOR PLAN 1
-test BG AC/HS/2AM  -C/w Lantus 30 units q hs  -C/w Admelog 15 units AC meals for now  -c/w Admelog moderate correction scale AC and  HS/ 2am scale.  -Will adjust as needed.  -Please contact endo team with changes on steroid doses.   Discharge planning:  -Will be determined according to BG/insulin needs/PO intake and steroid therapy at time of discharge.  -basal/bolus versus basal/orals.    -F/u with endo if pt wishes [address] [place] [address] [phone]  - Patient will need opthalmology and podiatry/ Renal follow up as outpatient.  - Make sure pt has Rx for all DM supplies and insulin/ DM meds.

## 2023-01-17 NOTE — PROGRESS NOTE ADULT - PROBLEM SELECTOR PLAN 7
Hyponatremia, with Na to 123 on 1/16   Now improved to 130 on 1/17 s/p HD on 1/16   - Hyponatremia likely in part 2/2 hyperglycemia vs. volume overload   - Nephrology consulted, recs appreciated.

## 2023-01-17 NOTE — CHART NOTE - NSCHARTNOTEFT_GEN_A_CORE
pt seen and examined in AM      L. temporal headache resolved with pulse steroid, still has blurry vision and ptosis from L. eye (mildly improved)  US temporal artery negative for halo sign   pending L. temporal artery bx  per vascular note - tentatively end of this week   will transition pt to high dose Solumedrol 60mg        Case discussed with attending Dr. Cristopher Knapp, PGY-5  Rheumatology Fellow   Pager: [phone], also available on TEAMS   please enter a full 10 digit number for call back   During weekends, please page on call fellow

## 2023-01-17 NOTE — PROGRESS NOTE ADULT - SUBJECTIVE AND OBJECTIVE BOX
Cardiovascular Disease Progress Note  Date of service: 23 @ 07:48    Overnight events: No acute events overnight.  Pt is in no distress. Denies chest pain or SOB.   Otherwise review of systems negative    Objective Findings:  T(C): 36.7 (23 @ 05:00), Max: 37 (23 @ 13:41)  HR: 69 (23 @ 05:00) (61 - 91)  BP: 143/67 (23 @ 05:00) (126/61 - 173/79)  RR: 18 (23 @ 05:00) (16 - 19)  SpO2: 95% (23 @ 05:00) (92% - 98%)  Wt(kg): --  Daily     Daily Weight in k.8 (2023 17:20)      Physical Exam:  Gen: NAD; Patient resting comfortably  HEENT: EOMI, Normocephalic/ atraumatic, L eye ptosis present  CV: RRR, normal S1 + S2, no m/r/g  Lungs:  Normal respiratory effort; clear to auscultation bilaterally  Abd: soft, non-tender; bowel sounds present  Ext: No edema; warm and well perfused    Telemetry:  N/a    Laboratory Data:                        9.1    9.91  )-----------( 147      ( 2023 05:35 )             27.3         130<L>  |  91<L>  |  69<H>  ----------------------------<  228<H>  5.0   |  25  |  8.31<H>    Ca    8.6      2023 05:35  Phos  3.9       Mg     3.3           PT/INR - ( 2023 14:27 )   PT: 11.1 sec;   INR: 0.97 ratio         PTT - ( 2023 14:27 )  PTT:29.2 sec          Inpatient Medications:  MEDICATIONS  (STANDING):  artificial  tears Solution 1 Drop(s) Both EYES three times a day  aspirin enteric coated 81 milliGRAM(s) Oral daily  atorvastatin 80 milliGRAM(s) Oral at bedtime  dextrose 5%. 1000 milliLiter(s) (50 mL/Hr) IV Continuous <Continuous>  dextrose 5%. 1000 milliLiter(s) (100 mL/Hr) IV Continuous <Continuous>  dextrose 50% Injectable 25 Gram(s) IV Push once  dextrose 50% Injectable 12.5 Gram(s) IV Push once  dextrose 50% Injectable 25 Gram(s) IV Push once  enoxaparin Injectable 30 milliGRAM(s) SubCutaneous every 24 hours  glucagon  Injectable 1 milliGRAM(s) IntraMuscular once  insulin glargine Injectable (LANTUS) 30 Unit(s) SubCutaneous at bedtime  insulin lispro (ADMELOG) corrective regimen sliding scale   SubCutaneous <User Schedule>  insulin lispro (ADMELOG) corrective regimen sliding scale   SubCutaneous three times a day before meals  insulin lispro Injectable (ADMELOG) 15 Unit(s) SubCutaneous three times a day before meals  melatonin 3 milliGRAM(s) Oral at bedtime  methylPREDNISolone sodium succinate Injectable 80 milliGRAM(s) IV Push daily  metoprolol tartrate 25 milliGRAM(s) Oral two times a day  pantoprazole    Tablet 40 milliGRAM(s) Oral before breakfast  polyethylene glycol 3350 17 Gram(s) Oral daily  polyethylene glycol 3350 17 Gram(s) Oral at bedtime  senna 2 Tablet(s) Oral at bedtime  sodium chloride 2 Gram(s) Oral three times a day  sodium zirconium cyclosilicate 5 Gram(s) Oral daily      Assessment:  67y M  CAD s/p PCI to LAD in  now on Brilinta, DM, ESRD on HTN, HLD presenting with Left eye ptosis and diplopia.    Plan of Care:    #Pre-op risk stratification  - Pt to undergo temporal artery biopsy   - . James EKG from 1/15 shows sinus rhythm with no acute ischemic changes  - TTE from  shows normal LV systolic function with no significant structural disease  - Pt displays no evidence of pre-op coronary ischemia  - From a cardiac standpoint, . James is optimized to proceed with biopsy at the discretion of the vascular team.  - Brilinta may be held for biopsy (5 day washout)    #CAD   - S/p PCI in  to LAD  - Brilinta to be held for biopsy   - Continue statin and BB    #ESRD  - HD as per renal    #HTN  - BP elevated  - May consider starting Losartan 25 mg daily     #HLD  - Statin as ordered      Plan of Care:          Over 25 minutes spent on total encounter; more than 50% of the visit was spent counseling and/or coordinating care by the attending physician.      Luigi Barnes,  [facility]  Cardiovascular Disease  [phone]

## 2023-01-17 NOTE — PROGRESS NOTE ADULT - SUBJECTIVE AND OBJECTIVE BOX
NERY MICHAELL  [id number]    INTERVAL HPI/OVERNIGHT EVENTS:        PMHx/PSHx/FamHx/SocHx reviewed and no significant changes    REVIEW OF SYSTEMS   - reviewed with patient and  negative other than as above or previously documented.     MEDICATIONS  (STANDING):  artificial  tears Solution 1 Drop(s) Both EYES three times a day  aspirin enteric coated 81 milliGRAM(s) Oral daily  atorvastatin 80 milliGRAM(s) Oral at bedtime  dextrose 5%. 1000 milliLiter(s) (50 mL/Hr) IV Continuous <Continuous>  dextrose 5%. 1000 milliLiter(s) (100 mL/Hr) IV Continuous <Continuous>  dextrose 50% Injectable 25 Gram(s) IV Push once  dextrose 50% Injectable 12.5 Gram(s) IV Push once  dextrose 50% Injectable 25 Gram(s) IV Push once  enoxaparin Injectable 30 milliGRAM(s) SubCutaneous every 24 hours  glucagon  Injectable 1 milliGRAM(s) IntraMuscular once  insulin glargine Injectable (LANTUS) 30 Unit(s) SubCutaneous at bedtime  insulin lispro (ADMELOG) corrective regimen sliding scale   SubCutaneous <User Schedule>  insulin lispro (ADMELOG) corrective regimen sliding scale   SubCutaneous three times a day before meals  insulin lispro Injectable (ADMELOG) 15 Unit(s) SubCutaneous three times a day before meals  melatonin 3 milliGRAM(s) Oral at bedtime  methylPREDNISolone sodium succinate Injectable 80 milliGRAM(s) IV Push daily  metoprolol tartrate 25 milliGRAM(s) Oral two times a day  pantoprazole    Tablet 40 milliGRAM(s) Oral before breakfast  polyethylene glycol 3350 17 Gram(s) Oral daily  polyethylene glycol 3350 17 Gram(s) Oral at bedtime  senna 2 Tablet(s) Oral at bedtime  sodium chloride 2 Gram(s) Oral three times a day  sodium zirconium cyclosilicate 5 Gram(s) Oral daily    MEDICATIONS  (PRN):  acetaminophen     Tablet .. 650 milliGRAM(s) Oral every 6 hours PRN Moderate Pain (4 - 6), Severe Pain (7 - 10)  dextrose Oral Gel 15 Gram(s) Oral once PRN Blood Glucose LESS THAN 70 milliGRAM(s)/deciliter      Allergies    No Known Allergies    Intolerances          Vital Signs Last 24 Hrs  T(C): 36.7 (17 Jan 2023 09:12), Max: 37 (16 Jan 2023 13:41)  T(F): 98 (17 Jan 2023 09:12), Max: 98.6 (16 Jan 2023 13:41)  HR: 79 (17 Jan 2023 09:12) (61 - 91)  BP: 151/78 (17 Jan 2023 09:12) (126/61 - 173/79)  BP(mean): --  RR: 18 (17 Jan 2023 09:12) (16 - 19)  SpO2: 95% (17 Jan 2023 09:12) (92% - 98%)    Parameters below as of 17 Jan 2023 09:12  Patient On (Oxygen Delivery Method): room air        Physical Exam:  General: NAD  HEENT: EOMI, MMM  Cardio: +S1/S2, RRR  Resp: CTA b/l  GI: +BS, soft, NT/ND  MSK:  Neuro: AAOx3  Psych: wnl    LABS:                        9.1    9.91  )-----------( 147      ( 17 Jan 2023 05:35 )             27.3     01-17    130<L>  |  91<L>  |  69<H>  ----------------------------<  228<H>  5.0   |  25  |  8.31<H>    Ca    8.6      17 Jan 2023 05:35  Phos  3.9     01-17  Mg     3.3     01-17      PT/INR - ( 16 Jan 2023 14:27 )   PT: 11.1 sec;   INR: 0.97 ratio         PTT - ( 16 Jan 2023 14:27 )  PTT:29.2 sec        RADIOLOGY & ADDITIONAL TESTS:       NERY MICHAELL  [id number]    INTERVAL HPI/ OVERNIGHT EVENTS:  L. temporal headache resolved with pulse steroid, still has blurry vision and ptosis from L. eye (mildly improved), pending L. temporal artery bx     PMHx/PSHx/FamHx/SocHx reviewed and no significant changes    REVIEW OF SYSTEMS   - reviewed with patient and  negative other than as above or previously documented.     MEDICATIONS  (STANDING):  artificial  tears Solution 1 Drop(s) Both EYES three times a day  aspirin enteric coated 81 milliGRAM(s) Oral daily  atorvastatin 80 milliGRAM(s) Oral at bedtime  dextrose 5%. 1000 milliLiter(s) (50 mL/Hr) IV Continuous <Continuous>  dextrose 5%. 1000 milliLiter(s) (100 mL/Hr) IV Continuous <Continuous>  dextrose 50% Injectable 25 Gram(s) IV Push once  dextrose 50% Injectable 12.5 Gram(s) IV Push once  dextrose 50% Injectable 25 Gram(s) IV Push once  enoxaparin Injectable 30 milliGRAM(s) SubCutaneous every 24 hours  glucagon  Injectable 1 milliGRAM(s) IntraMuscular once  insulin glargine Injectable (LANTUS) 30 Unit(s) SubCutaneous at bedtime  insulin lispro (ADMELOG) corrective regimen sliding scale   SubCutaneous <User Schedule>  insulin lispro (ADMELOG) corrective regimen sliding scale   SubCutaneous three times a day before meals  insulin lispro Injectable (ADMELOG) 15 Unit(s) SubCutaneous three times a day before meals  melatonin 3 milliGRAM(s) Oral at bedtime  methylPREDNISolone sodium succinate Injectable 80 milliGRAM(s) IV Push daily  metoprolol tartrate 25 milliGRAM(s) Oral two times a day  pantoprazole    Tablet 40 milliGRAM(s) Oral before breakfast  polyethylene glycol 3350 17 Gram(s) Oral daily  polyethylene glycol 3350 17 Gram(s) Oral at bedtime  senna 2 Tablet(s) Oral at bedtime  sodium chloride 2 Gram(s) Oral three times a day  sodium zirconium cyclosilicate 5 Gram(s) Oral daily    MEDICATIONS  (PRN):  acetaminophen     Tablet .. 650 milliGRAM(s) Oral every 6 hours PRN Moderate Pain (4 - 6), Severe Pain (7 - 10)  dextrose Oral Gel 15 Gram(s) Oral once PRN Blood Glucose LESS THAN 70 milliGRAM(s)/deciliter    Allergies  No Known Allergies  Intolerances    Vital Signs Last 24 Hrs  T(C): 36.7 (17 Jan 2023 09:12), Max: 37 (16 Jan 2023 13:41)  T(F): 98 (17 Jan 2023 09:12), Max: 98.6 (16 Jan 2023 13:41)  HR: 79 (17 Jan 2023 09:12) (61 - 91)  BP: 151/78 (17 Jan 2023 09:12) (126/61 - 173/79)  BP(mean): --  RR: 18 (17 Jan 2023 09:12) (16 - 19)  SpO2: 95% (17 Jan 2023 09:12) (92% - 98%)    Parameters below as of 17 Jan 2023 09:12  Patient On (Oxygen Delivery Method): room air    Physical Exam:  General: NAD  HEENT:+ L. eye ptosis, EOM limited, + b/l temporal pulse strong and equal    Cardio: +S1/S2, RRR  Resp: CTA b/l  GI: +BS, soft, NT/ND  MSK: no synovitis, joints NTP   Neuro: AAOx3  Psych: wnl    LABS:                        9.1    9.91  )-----------( 147      ( 17 Jan 2023 05:35 )             27.3     01-17    130<L>  |  91<L>  |  69<H>  ----------------------------<  228<H>  5.0   |  25  |  8.31<H>    Ca    8.6      17 Jan 2023 05:35  Phos  3.9     01-17  Mg     3.3     01-17      PT/INR - ( 16 Jan 2023 14:27 )   PT: 11.1 sec;   INR: 0.97 ratio         PTT - ( 16 Jan 2023 14:27 )  PTT:29.2 sec        RADIOLOGY & ADDITIONAL TESTS:       NERY MICHAELL  [id number]    INTERVAL HPI/ OVERNIGHT EVENTS:  L. temporal headache resolved with pulse steroid, still has blurry vision and ptosis from L. eye (mildly improved), pending L. temporal artery bx     PMHx/PSHx/FamHx/SocHx reviewed and no significant changes    REVIEW OF SYSTEMS   - reviewed with patient and  negative other than as above or previously documented.     MEDICATIONS  (STANDING):  artificial  tears Solution 1 Drop(s) Both EYES three times a day  aspirin enteric coated 81 milliGRAM(s) Oral daily  atorvastatin 80 milliGRAM(s) Oral at bedtime  dextrose 5%. 1000 milliLiter(s) (50 mL/Hr) IV Continuous <Continuous>  dextrose 5%. 1000 milliLiter(s) (100 mL/Hr) IV Continuous <Continuous>  dextrose 50% Injectable 25 Gram(s) IV Push once  dextrose 50% Injectable 12.5 Gram(s) IV Push once  dextrose 50% Injectable 25 Gram(s) IV Push once  enoxaparin Injectable 30 milliGRAM(s) SubCutaneous every 24 hours  glucagon  Injectable 1 milliGRAM(s) IntraMuscular once  insulin glargine Injectable (LANTUS) 30 Unit(s) SubCutaneous at bedtime  insulin lispro (ADMELOG) corrective regimen sliding scale   SubCutaneous <User Schedule>  insulin lispro (ADMELOG) corrective regimen sliding scale   SubCutaneous three times a day before meals  insulin lispro Injectable (ADMELOG) 15 Unit(s) SubCutaneous three times a day before meals  melatonin 3 milliGRAM(s) Oral at bedtime  methylPREDNISolone sodium succinate Injectable 80 milliGRAM(s) IV Push daily  metoprolol tartrate 25 milliGRAM(s) Oral two times a day  pantoprazole    Tablet 40 milliGRAM(s) Oral before breakfast  polyethylene glycol 3350 17 Gram(s) Oral daily  polyethylene glycol 3350 17 Gram(s) Oral at bedtime  senna 2 Tablet(s) Oral at bedtime  sodium chloride 2 Gram(s) Oral three times a day  sodium zirconium cyclosilicate 5 Gram(s) Oral daily    MEDICATIONS  (PRN):  acetaminophen     Tablet .. 650 milliGRAM(s) Oral every 6 hours PRN Moderate Pain (4 - 6), Severe Pain (7 - 10)  dextrose Oral Gel 15 Gram(s) Oral once PRN Blood Glucose LESS THAN 70 milliGRAM(s)/deciliter    Allergies  No Known Allergies  Intolerances    Vital Signs Last 24 Hrs  T(C): 36.7 (17 Jan 2023 09:12), Max: 37 (16 Jan 2023 13:41)  T(F): 98 (17 Jan 2023 09:12), Max: 98.6 (16 Jan 2023 13:41)  HR: 79 (17 Jan 2023 09:12) (61 - 91)  BP: 151/78 (17 Jan 2023 09:12) (126/61 - 173/79)  BP(mean): --  RR: 18 (17 Jan 2023 09:12) (16 - 19)  SpO2: 95% (17 Jan 2023 09:12) (92% - 98%)    Parameters below as of 17 Jan 2023 09:12  Patient On (Oxygen Delivery Method): room air    Physical Exam:  General: NAD  HEENT:+ L. eye ptosis, EOM limited, + b/l temporal pulse strong and equal    Cardio: +S1/S2, RRR  Resp: CTA b/l  GI: +BS, soft, NT/ND  MSK: no synovitis, joints NTP   Neuro: AAOx3  Psych: wnl    LABS:                        9.1    9.91  )-----------( 147      ( 17 Jan 2023 05:35 )             27.3     01-17    130<L>  |  91<L>  |  69<H>  ----------------------------<  228<H>  5.0   |  25  |  8.31<H>    Ca    8.6      17 Jan 2023 05:35  Phos  3.9     01-17  Mg     3.3     01-17      PT/INR - ( 16 Jan 2023 14:27 )   PT: 11.1 sec;   INR: 0.97 ratio    PTT - ( 16 Jan 2023 14:27 )  PTT:29.2 sec      RADIOLOGY & ADDITIONAL TESTS:  < from: [facility] Duplex Face Temporal Artery (01.17.23 @ 10:49) >  Mild atheromatous intimal thickening and irregularity are present along   the course of the right and the left temporal arteries including their   frontal and parietal branches.    There is unimpeded flow through these arteries.    No halo of periarterial edema characteristic for temporal arteritis is   identified.    There is unimpeded flow through the right and left axillary arteries.    IMPRESSION:    Giant cell arteritis is not identified.    < end of copied text >

## 2023-01-17 NOTE — PROGRESS NOTE ADULT - PROBLEM SELECTOR PLAN 4
- C/w metoprolol 25 mg   - Cardiology consulted, recs appreciated - C/w metoprolol 25 mg   - Cardiology consulted, recs appreciated. Per cardiology, may consider adding losartan if BP remains elevated

## 2023-01-17 NOTE — PROGRESS NOTE ADULT - ASSESSMENT
66 yo M PMH danette speaking, current smoker, CAD s/p 1 stent on brilinta, DM, CKD on HD, ,HTN, HLD presenting with Left eye ptosis and diplopia.    #r/o GCA  =p/w 10-12 days of L sided headache, L eye ptosis 2/2 CN 3 palsy  =CTA head/neck w/o significant stenosis, did show moderate narrowing of segment of R vertebral artery  =MR brain w/o contrast unremarkable  =MR orbits w/ mild atrophy and small vessel white matter ischemic changes - which are non-specific findings  =ESR borderline elevated, CRP wnl  =In very rare instances, GCA can cause CN 3 palsy  =pt initialy w/ tenderness to L temporal artery on exam, which has resolved since 1 dose of 1g solumedrol (1/14/23)    Plan:  -c/w 1g solumedrol for 3 days total (1/14-16), then transition to solumedrol 80mg qd (on 1/17)  -appreciate endo recs while on high dose steroids  -obtain vascular consult for L sided temporal artery biopsy (per ACR guidelines, unilateral biopsy warranted as symptoms only on L side)      Erick Knapp, PGY-5  Rheumatology Fellow   Pager: [phone], also available on TEAMS   please enter a full 10 digit number for call back   During weekends, please page on call fellow     68 yo M PMH danette speaking, current smoker, CAD s/p 1 stent on brilinta, DM, CKD on HD, ,HTN, HLD presenting with Left eye ptosis and diplopia.    #r/o GCA  =p/w 10-12 days of L sided headache, L eye ptosis 2/2 CN 3 palsy  =CTA head/neck w/o significant stenosis, did show moderate narrowing of segment of R vertebral artery  =MR brain w/o contrast unremarkable  =MR orbits w/ mild atrophy and small vessel white matter ischemic changes - which are non-specific findings  =ESR borderline elevated, CRP wnl  =In very rare instances, GCA can cause CN 3 palsy  =pt initialy w/ tenderness to L temporal artery on exam, which has resolved since 1 dose of 1g solumedrol (1/14/23)    Plan:  -c/w 1g solumedrol for 3 days total (1/14-16), now on Solumedrol 80mg IV    -appreciate endo recs while on high dose steroids  -obtain vascular consult for L sided temporal artery biopsy (per ACR guidelines, unilateral biopsy warranted as symptoms only on L side)      Erick Knapp, PGY-5  Rheumatology Fellow   Pager: [phone], also available on TEAMS   please enter a full 10 digit number for call back   During weekends, please page on call fellow

## 2023-01-17 NOTE — PROGRESS NOTE ADULT - SUBJECTIVE AND OBJECTIVE BOX
PROGRESS NOTE:     Patient is a 67y old  Male who presents with a chief complaint of Left CN 3 palsy (16 Jan 2023 15:55)      SUBJECTIVE / OVERNIGHT EVENTS:    No acute events overnight. Patient examined at bedside with no acute complaints.     Pain:  Bowel Movements:  Urination:  OOB:  PT:    REVIEW OF SYSTEMS:    CONSTITUTIONAL: No weakness, fevers or chills  EYES/ENT: No visual changes;  No vertigo or throat pain   NECK: No pain or stiffness  RESPIRATORY: No cough, wheezing, hemoptysis; No shortness of breath  CARDIOVASCULAR: No chest pain or palpitations  GASTROINTESTINAL: No abdominal or epigastric pain. No nausea, vomiting, or hematemesis; No diarrhea or constipation. No melena or hematochezia.  GENITOURINARY: No dysuria, frequency or hematuria  NEUROLOGICAL: No numbness or weakness  SKIN: No itching, rashes      MEDICATIONS  (STANDING):  artificial  tears Solution 1 Drop(s) Both EYES three times a day  aspirin enteric coated 81 milliGRAM(s) Oral daily  atorvastatin 80 milliGRAM(s) Oral at bedtime  dextrose 5%. 1000 milliLiter(s) (50 mL/Hr) IV Continuous <Continuous>  dextrose 5%. 1000 milliLiter(s) (100 mL/Hr) IV Continuous <Continuous>  dextrose 50% Injectable 25 Gram(s) IV Push once  dextrose 50% Injectable 12.5 Gram(s) IV Push once  dextrose 50% Injectable 25 Gram(s) IV Push once  enoxaparin Injectable 30 milliGRAM(s) SubCutaneous every 24 hours  glucagon  Injectable 1 milliGRAM(s) IntraMuscular once  insulin glargine Injectable (LANTUS) 30 Unit(s) SubCutaneous at bedtime  insulin lispro (ADMELOG) corrective regimen sliding scale   SubCutaneous <User Schedule>  insulin lispro (ADMELOG) corrective regimen sliding scale   SubCutaneous three times a day before meals  insulin lispro Injectable (ADMELOG) 15 Unit(s) SubCutaneous three times a day before meals  melatonin 3 milliGRAM(s) Oral at bedtime  methylPREDNISolone sodium succinate Injectable 80 milliGRAM(s) IV Push daily  metoprolol tartrate 25 milliGRAM(s) Oral two times a day  pantoprazole    Tablet 40 milliGRAM(s) Oral before breakfast  polyethylene glycol 3350 17 Gram(s) Oral at bedtime  polyethylene glycol 3350 17 Gram(s) Oral daily  senna 2 Tablet(s) Oral at bedtime  sodium chloride 2 Gram(s) Oral three times a day  sodium zirconium cyclosilicate 5 Gram(s) Oral daily    MEDICATIONS  (PRN):  acetaminophen     Tablet .. 650 milliGRAM(s) Oral every 6 hours PRN Moderate Pain (4 - 6), Severe Pain (7 - 10)  dextrose Oral Gel 15 Gram(s) Oral once PRN Blood Glucose LESS THAN 70 milliGRAM(s)/deciliter      CAPILLARY BLOOD GLUCOSE      POCT Blood Glucose.: 252 mg/dL (17 Jan 2023 01:24)  POCT Blood Glucose.: 248 mg/dL (16 Jan 2023 21:21)  POCT Blood Glucose.: 172 mg/dL (16 Jan 2023 18:03)  POCT Blood Glucose.: 150 mg/dL (16 Jan 2023 16:50)  POCT Blood Glucose.: 392 mg/dL (16 Jan 2023 11:20)  POCT Blood Glucose.: 333 mg/dL (16 Jan 2023 07:29)    I&O's Summary    15 German 2023 07:01  -  16 Jan 2023 07:00  --------------------------------------------------------  IN: 600 mL / OUT: 0 mL / NET: 600 mL    16 Jan 2023 07:01  -  17 Jan 2023 06:49  --------------------------------------------------------  IN: 170 mL / OUT: 3000 mL / NET: -2830 mL        VITALS:   T(C): 36.7 (01-17-23 @ 05:00), Max: 37 (01-16-23 @ 13:41)  HR: 69 (01-17-23 @ 05:00) (61 - 91)  BP: 143/67 (01-17-23 @ 05:00) (126/61 - 173/79)  RR: 18 (01-17-23 @ 05:00) (16 - 19)  SpO2: 95% (01-17-23 @ 05:00) (92% - 98%)    GENERAL: NAD, lying in bed comfortably  HEAD:  Atraumatic, normocephalic  EYES: EOMI, PERRLA, conjunctiva and sclera clear  ENT: Moist mucous membranes  NECK: Supple, no JVD  HEART: Regular rate and rhythm, no murmurs, rubs, or gallops  LUNGS: Unlabored respirations.  Clear to auscultation bilaterally, no crackles, wheezing, or rhonchi  ABDOMEN: Soft, nontender, nondistended, +BS  EXTREMITIES: 2+ peripheral pulses bilaterally. No clubbing, cyanosis, or edema  NERVOUS SYSTEM:  A&Ox3, no focal deficits   SKIN: No rashes or lesions    LABS:                        9.1    9.91  )-----------( 147      ( 17 Jan 2023 05:35 )             27.3     01-17    130<L>  |  91<L>  |  69<H>  ----------------------------<  228<H>  5.0   |  25  |  8.31<H>    Ca    8.6      17 Jan 2023 05:35  Phos  3.9     01-17  Mg     3.3     01-17    TPro  6.9  /  Alb  4.2  /  TBili  0.4  /  DBili  x   /  AST  7<L>  /  ALT  12  /  AlkPhos  101  01-15    PT/INR - ( 16 Jan 2023 14:27 )   PT: 11.1 sec;   INR: 0.97 ratio         PTT - ( 16 Jan 2023 14:27 )  PTT:29.2 sec            RADIOLOGY & ADDITIONAL TESTS:  Results Reviewed:   Imaging Personally Reviewed:  Electrocardiogram Personally Reviewed:    COORDINATION OF CARE:  Care Discussed with Consultants/Other Providers [Y/N]:  Prior or Outpatient Records Reviewed [Y/N]:   PROGRESS NOTE:     Patient is a 67y old  Male who presents with a chief complaint of Left CN 3 palsy (16 Jan 2023 15:55)      SUBJECTIVE / OVERNIGHT EVENTS:    No acute events overnight. Patient examined at bedside. Patient reports temporal pain improved, but submandibular pain persistent. Patient reports ptosis is unchanged. Patient denies chest pain, shortness of breath, or abdominal pain.     REVIEW OF SYSTEMS:    CONSTITUTIONAL: No weakness, fevers or chills  EYES/ENT: + visual changes due to ptosis;  No vertigo or throat pain   NECK: + pain   RESPIRATORY: No cough, wheezing, hemoptysis; No shortness of breath  CARDIOVASCULAR: No chest pain or palpitations  GASTROINTESTINAL: No abdominal or epigastric pain. No nausea, vomiting, or hematemesis; No diarrhea or constipation. No melena or hematochezia.  GENITOURINARY: No dysuria, frequency or hematuria  NEUROLOGICAL: No numbness or weakness  SKIN: No itching, rashes      MEDICATIONS  (STANDING):  artificial  tears Solution 1 Drop(s) Both EYES three times a day  aspirin enteric coated 81 milliGRAM(s) Oral daily  atorvastatin 80 milliGRAM(s) Oral at bedtime  dextrose 5%. 1000 milliLiter(s) (50 mL/Hr) IV Continuous <Continuous>  dextrose 5%. 1000 milliLiter(s) (100 mL/Hr) IV Continuous <Continuous>  dextrose 50% Injectable 25 Gram(s) IV Push once  dextrose 50% Injectable 12.5 Gram(s) IV Push once  dextrose 50% Injectable 25 Gram(s) IV Push once  enoxaparin Injectable 30 milliGRAM(s) SubCutaneous every 24 hours  glucagon  Injectable 1 milliGRAM(s) IntraMuscular once  insulin glargine Injectable (LANTUS) 30 Unit(s) SubCutaneous at bedtime  insulin lispro (ADMELOG) corrective regimen sliding scale   SubCutaneous <User Schedule>  insulin lispro (ADMELOG) corrective regimen sliding scale   SubCutaneous three times a day before meals  insulin lispro Injectable (ADMELOG) 15 Unit(s) SubCutaneous three times a day before meals  melatonin 3 milliGRAM(s) Oral at bedtime  methylPREDNISolone sodium succinate Injectable 80 milliGRAM(s) IV Push daily  metoprolol tartrate 25 milliGRAM(s) Oral two times a day  pantoprazole    Tablet 40 milliGRAM(s) Oral before breakfast  polyethylene glycol 3350 17 Gram(s) Oral at bedtime  polyethylene glycol 3350 17 Gram(s) Oral daily  senna 2 Tablet(s) Oral at bedtime  sodium chloride 2 Gram(s) Oral three times a day  sodium zirconium cyclosilicate 5 Gram(s) Oral daily    MEDICATIONS  (PRN):  acetaminophen     Tablet .. 650 milliGRAM(s) Oral every 6 hours PRN Moderate Pain (4 - 6), Severe Pain (7 - 10)  dextrose Oral Gel 15 Gram(s) Oral once PRN Blood Glucose LESS THAN 70 milliGRAM(s)/deciliter      CAPILLARY BLOOD GLUCOSE      POCT Blood Glucose.: 252 mg/dL (17 Jan 2023 01:24)  POCT Blood Glucose.: 248 mg/dL (16 Jan 2023 21:21)  POCT Blood Glucose.: 172 mg/dL (16 Jan 2023 18:03)  POCT Blood Glucose.: 150 mg/dL (16 Jan 2023 16:50)  POCT Blood Glucose.: 392 mg/dL (16 Jan 2023 11:20)  POCT Blood Glucose.: 333 mg/dL (16 Jan 2023 07:29)    I&O's Summary    15 German 2023 07:01  -  16 Jan 2023 07:00  --------------------------------------------------------  IN: 600 mL / OUT: 0 mL / NET: 600 mL    16 Jan 2023 07:01  -  17 Jan 2023 06:49  --------------------------------------------------------  IN: 170 mL / OUT: 3000 mL / NET: -2830 mL        VITALS:   T(C): 36.7 (01-17-23 @ 05:00), Max: 37 (01-16-23 @ 13:41)  HR: 69 (01-17-23 @ 05:00) (61 - 91)  BP: 143/67 (01-17-23 @ 05:00) (126/61 - 173/79)  RR: 18 (01-17-23 @ 05:00) (16 - 19)  SpO2: 95% (01-17-23 @ 05:00) (92% - 98%)    GENERAL: NAD, lying in bed comfortably  HEAD:  Atraumatic, normocephalic  EYES: Pupils constricted bilaterally, conjunctiva and sclera clear, restricted EOM on left eye, right eye EOM intact but sluggish, left eye ptosis   ENT: Moist mucous membranes  NECK: + LAD b/l   HEART: Regular rate and rhythm, no murmurs, rubs, or gallops  LUNGS: Unlabored respirations.  Clear to auscultation bilaterally, no crackles, wheezing, or rhonchi  ABDOMEN: Soft, nontender, nondistended, +BS  EXTREMITIES: 2+ peripheral pulses bilaterally. No clubbing, cyanosis, or edema  NERVOUS SYSTEM:  A&Ox3, motor function intact in all extremities   SKIN: No rashes or lesions    LABS:                        9.1    9.91  )-----------( 147      ( 17 Jan 2023 05:35 )             27.3     01-17    130<L>  |  91<L>  |  69<H>  ----------------------------<  228<H>  5.0   |  25  |  8.31<H>    Ca    8.6      17 Jan 2023 05:35  Phos  3.9     01-17  Mg     3.3     01-17    TPro  6.9  /  Alb  4.2  /  TBili  0.4  /  DBili  x   /  AST  7<L>  /  ALT  12  /  AlkPhos  101  01-15    PT/INR - ( 16 Jan 2023 14:27 )   PT: 11.1 sec;   INR: 0.97 ratio         PTT - ( 16 Jan 2023 14:27 )  PTT:29.2 sec            RADIOLOGY & ADDITIONAL TESTS:  Results Reviewed:   Imaging Personally Reviewed:  Electrocardiogram Personally Reviewed:    COORDINATION OF CARE:  Care Discussed with Consultants/Other Providers [Y/N]:  Prior or Outpatient Records Reviewed [Y/N]:

## 2023-01-18 LAB
ANION GAP SERPL CALC-SCNC: 17 MMOL/L — SIGNIFICANT CHANGE UP (ref 5–17)
B BURGDOR DNA SPEC QL NAA+PROBE: NEGATIVE — SIGNIFICANT CHANGE UP
BUN SERPL-MCNC: 97 MG/DL — HIGH (ref 7–23)
CALCIUM SERPL-MCNC: 8.2 MG/DL — LOW (ref 8.4–10.5)
CHLORIDE SERPL-SCNC: 94 MMOL/L — LOW (ref 96–108)
CO2 SERPL-SCNC: 21 MMOL/L — LOW (ref 22–31)
CREAT SERPL-MCNC: 9.9 MG/DL — HIGH (ref 0.5–1.3)
DSDNA AB SER-ACNC: <12 IU/ML — SIGNIFICANT CHANGE UP
EGFR: 5 ML/MIN/1.73M2 — LOW
FERRITIN SERPL-MCNC: 1278 NG/ML — HIGH (ref 30–400)
FOLATE SERPL-MCNC: >20 NG/ML — SIGNIFICANT CHANGE UP
GLUCOSE BLDC GLUCOMTR-MCNC: 146 MG/DL — HIGH (ref 70–99)
GLUCOSE BLDC GLUCOMTR-MCNC: 176 MG/DL — HIGH (ref 70–99)
GLUCOSE BLDC GLUCOMTR-MCNC: 235 MG/DL — HIGH (ref 70–99)
GLUCOSE BLDC GLUCOMTR-MCNC: 267 MG/DL — HIGH (ref 70–99)
GLUCOSE BLDC GLUCOMTR-MCNC: 79 MG/DL — SIGNIFICANT CHANGE UP (ref 70–99)
GLUCOSE BLDC GLUCOMTR-MCNC: 86 MG/DL — SIGNIFICANT CHANGE UP (ref 70–99)
GLUCOSE SERPL-MCNC: 155 MG/DL — HIGH (ref 70–99)
HCT VFR BLD CALC: 29.3 % — LOW (ref 39–50)
HGB BLD-MCNC: 9.6 G/DL — LOW (ref 13–17)
IRON SATN MFR SERPL: 23 % — SIGNIFICANT CHANGE UP (ref 16–55)
IRON SATN MFR SERPL: 47 UG/DL — SIGNIFICANT CHANGE UP (ref 45–165)
MAGNESIUM SERPL-MCNC: 3.4 MG/DL — HIGH (ref 1.6–2.6)
MCHC RBC-ENTMCNC: 32.5 PG — SIGNIFICANT CHANGE UP (ref 27–34)
MCHC RBC-ENTMCNC: 32.8 GM/DL — SIGNIFICANT CHANGE UP (ref 32–36)
MCV RBC AUTO: 99.3 FL — SIGNIFICANT CHANGE UP (ref 80–100)
NRBC # BLD: 0 /100 WBCS — SIGNIFICANT CHANGE UP (ref 0–0)
PHOSPHATE SERPL-MCNC: 4.7 MG/DL — HIGH (ref 2.5–4.5)
PLATELET # BLD AUTO: 144 K/UL — LOW (ref 150–400)
POTASSIUM SERPL-MCNC: 4.1 MMOL/L — SIGNIFICANT CHANGE UP (ref 3.5–5.3)
POTASSIUM SERPL-SCNC: 4.1 MMOL/L — SIGNIFICANT CHANGE UP (ref 3.5–5.3)
RBC # BLD: 2.95 M/UL — LOW (ref 4.2–5.8)
RBC # BLD: 2.95 M/UL — LOW (ref 4.2–5.8)
RBC # FLD: 15.1 % — HIGH (ref 10.3–14.5)
RETICS #: 100.3 K/UL — SIGNIFICANT CHANGE UP (ref 25–125)
RETICS/RBC NFR: 3.4 % — HIGH (ref 0.5–2.5)
SODIUM SERPL-SCNC: 132 MMOL/L — LOW (ref 135–145)
TIBC SERPL-MCNC: 200 UG/DL — LOW (ref 220–430)
UIBC SERPL-MCNC: 154 UG/DL — SIGNIFICANT CHANGE UP (ref 110–370)
VIT B12 SERPL-MCNC: >2000 PG/ML — HIGH (ref 232–1245)
WBC # BLD: 8.89 K/UL — SIGNIFICANT CHANGE UP (ref 3.8–10.5)
WBC # FLD AUTO: 8.89 K/UL — SIGNIFICANT CHANGE UP (ref 3.8–10.5)

## 2023-01-18 PROCEDURE — 99232 SBSQ HOSP IP/OBS MODERATE 35: CPT | Mod: GC

## 2023-01-18 PROCEDURE — 99232 SBSQ HOSP IP/OBS MODERATE 35: CPT

## 2023-01-18 PROCEDURE — 99233 SBSQ HOSP IP/OBS HIGH 50: CPT

## 2023-01-18 RX ORDER — HEPARIN SODIUM 5000 [USP'U]/ML
5000 INJECTION INTRAVENOUS; SUBCUTANEOUS EVERY 12 HOURS
Refills: 0 | Status: DISCONTINUED | OUTPATIENT
Start: 2023-01-18 | End: 2023-01-20

## 2023-01-18 RX ORDER — INSULIN LISPRO 100/ML
10 VIAL (ML) SUBCUTANEOUS
Refills: 0 | Status: DISCONTINUED | OUTPATIENT
Start: 2023-01-18 | End: 2023-01-20

## 2023-01-18 RX ADMIN — Medication 81 MILLIGRAM(S): at 16:12

## 2023-01-18 RX ADMIN — Medication 650 MILLIGRAM(S): at 08:22

## 2023-01-18 RX ADMIN — Medication 15 UNIT(S): at 08:18

## 2023-01-18 RX ADMIN — Medication 1 DROP(S): at 16:13

## 2023-01-18 RX ADMIN — Medication 2: at 21:52

## 2023-01-18 RX ADMIN — Medication 10 UNIT(S): at 16:17

## 2023-01-18 RX ADMIN — HEPARIN SODIUM 5000 UNIT(S): 5000 INJECTION INTRAVENOUS; SUBCUTANEOUS at 16:19

## 2023-01-18 RX ADMIN — Medication 1 DROP(S): at 21:51

## 2023-01-18 RX ADMIN — POLYETHYLENE GLYCOL 3350 17 GRAM(S): 17 POWDER, FOR SOLUTION ORAL at 21:51

## 2023-01-18 RX ADMIN — POLYETHYLENE GLYCOL 3350 17 GRAM(S): 17 POWDER, FOR SOLUTION ORAL at 16:13

## 2023-01-18 RX ADMIN — Medication 1 DROP(S): at 05:05

## 2023-01-18 RX ADMIN — ATORVASTATIN CALCIUM 80 MILLIGRAM(S): 80 TABLET, FILM COATED ORAL at 21:51

## 2023-01-18 RX ADMIN — SENNA PLUS 2 TABLET(S): 8.6 TABLET ORAL at 21:51

## 2023-01-18 RX ADMIN — Medication 25 MILLIGRAM(S): at 16:18

## 2023-01-18 RX ADMIN — SODIUM CHLORIDE 2 GRAM(S): 9 INJECTION INTRAMUSCULAR; INTRAVENOUS; SUBCUTANEOUS at 16:12

## 2023-01-18 RX ADMIN — Medication 3 MILLIGRAM(S): at 21:50

## 2023-01-18 RX ADMIN — Medication 650 MILLIGRAM(S): at 07:52

## 2023-01-18 RX ADMIN — Medication 2: at 16:16

## 2023-01-18 RX ADMIN — SODIUM ZIRCONIUM CYCLOSILICATE 5 GRAM(S): 10 POWDER, FOR SUSPENSION ORAL at 16:12

## 2023-01-18 RX ADMIN — Medication 60 MILLIGRAM(S): at 05:06

## 2023-01-18 RX ADMIN — INSULIN GLARGINE 30 UNIT(S): 100 INJECTION, SOLUTION SUBCUTANEOUS at 21:50

## 2023-01-18 RX ADMIN — PANTOPRAZOLE SODIUM 40 MILLIGRAM(S): 20 TABLET, DELAYED RELEASE ORAL at 07:51

## 2023-01-18 RX ADMIN — Medication 25 MILLIGRAM(S): at 05:11

## 2023-01-18 RX ADMIN — SODIUM CHLORIDE 2 GRAM(S): 9 INJECTION INTRAMUSCULAR; INTRAVENOUS; SUBCUTANEOUS at 21:52

## 2023-01-18 RX ADMIN — SODIUM CHLORIDE 2 GRAM(S): 9 INJECTION INTRAMUSCULAR; INTRAVENOUS; SUBCUTANEOUS at 05:05

## 2023-01-18 NOTE — PROGRESS NOTE ADULT - ATTENDING COMMENTS
68 y/o M with ESRD on HD, CAD s/p stents, HTN a/w CN3 palsy- ptosis and presumed GCA  Continue with steriods per rheum  Brillinta on hold for planned L Temporal artery biopsy on friday?  T2DM uncontrolled- seen by endo with adjustments made- better today  Hyponatremia improving  Optho follow up for eye pain

## 2023-01-18 NOTE — PROGRESS NOTE ADULT - SUBJECTIVE AND OBJECTIVE BOX
NEUROLOGY FOLLOW-UP CONSULT NOTE    RFC:     Interval history: No acute neurologic events overnight. Patient prefers his daughter to translate. He reports L temporal shooting pain since last night, consistent b/l jaw pain, and worsening of L eye ptosis.    Meds:  MEDICATIONS  (STANDING):  artificial  tears Solution 1 Drop(s) Both EYES three times a day  aspirin enteric coated 81 milliGRAM(s) Oral daily  atorvastatin 80 milliGRAM(s) Oral at bedtime  dextrose 5%. 1000 milliLiter(s) (100 mL/Hr) IV Continuous <Continuous>  dextrose 5%. 1000 milliLiter(s) (50 mL/Hr) IV Continuous <Continuous>  dextrose 50% Injectable 25 Gram(s) IV Push once  dextrose 50% Injectable 12.5 Gram(s) IV Push once  dextrose 50% Injectable 25 Gram(s) IV Push once  glucagon  Injectable 1 milliGRAM(s) IntraMuscular once  heparin   Injectable 5000 Unit(s) SubCutaneous every 12 hours  insulin glargine Injectable (LANTUS) 30 Unit(s) SubCutaneous at bedtime  insulin lispro (ADMELOG) corrective regimen sliding scale   SubCutaneous three times a day before meals  insulin lispro (ADMELOG) corrective regimen sliding scale   SubCutaneous <User Schedule>  insulin lispro Injectable (ADMELOG) 15 Unit(s) SubCutaneous three times a day before meals  melatonin 3 milliGRAM(s) Oral at bedtime  methylPREDNISolone sodium succinate Injectable 60 milliGRAM(s) IV Push daily  metoprolol tartrate 25 milliGRAM(s) Oral two times a day  pantoprazole    Tablet 40 milliGRAM(s) Oral before breakfast  polyethylene glycol 3350 17 Gram(s) Oral daily  polyethylene glycol 3350 17 Gram(s) Oral at bedtime  senna 2 Tablet(s) Oral at bedtime  sodium chloride 2 Gram(s) Oral three times a day  sodium zirconium cyclosilicate 5 Gram(s) Oral daily    MEDICATIONS  (PRN):  acetaminophen     Tablet .. 650 milliGRAM(s) Oral every 6 hours PRN Moderate Pain (4 - 6), Severe Pain (7 - 10)  dextrose Oral Gel 15 Gram(s) Oral once PRN Blood Glucose LESS THAN 70 milliGRAM(s)/deciliter      PMHx/PSHx/FHx/SHx:  Pain of left eye    Handoff    MEWS Score    Left eye pain    Type 2 diabetes mellitus with hyperglycemia    Hypertension    Hyperlipidemia    Steroid-induced hyperglycemia    Cranial nerve III palsy, left    Thrombocytopenia    Hyperkalemia    Hyponatremia    Need for prophylactic measure    CAD (coronary artery disease)    Cranial nerve III palsy, left    Type 2 diabetes mellitus    Hypertension    Hyperlipidemia    Steroid-induced hyperglycemia    CAD (coronary artery disease)    Hyperkalemia    Hyponatremia    Need for prophylactic measure    Type 2 diabetes mellitus with hyperglycemia    L EYE UNABLE TO OPEN 2 WKS    [email or web address]        Allergies:  No Known Allergies      ROS: All systems negative except as documented in Interval history    O:  T(C): 36.2 (01-18-23 @ 13:21), Max: 37.6 (01-18-23 @ 09:30)  HR: 60 (01-18-23 @ 13:21) (60 - 66)  BP: 162/68 (01-18-23 @ 13:21) (139/62 - 163/70)  RR: 17 (01-18-23 @ 13:21) (17 - 19)  SpO2: 98% (01-18-23 @ 13:21) (97% - 100%)    Focused neurologic exam:  MS - AAO x3, speech fluent, rep/naming intact, follows commands, attn/conc/recent and remote memory/fund of knowledge WNL  CN - PERRLA, R eye EOMI intact, Left Ptosis, L eye Lack of adduction movement, VFF, occasional R gaze binocular diplopia, face sens/str/hearing WNL b/l, tongue/palate midline, trap 5/5 b/l  Motor - Normal bulk/tone, 5/5 all  Sens - LT/temp/vib intact all, no temporal artery tenderness  DTR's - 2+ all and downgoing b/l plantar response  Coord - FtN intact b/l  Gait and station - Normal casual gait.     Pertinent labs/studies:    LABS:  cret                        9.6    8.89  )-----------( 144      ( 18 Jan 2023 06:33 )             29.3     01-18    132<L>  |  94<L>  |  97<H>  ----------------------------<  155<H>  4.1   |  21<L>  |  9.90<H>    Ca    8.2<L>      18 Jan 2023 06:33  Phos  4.7     01-18  Mg     3.4     01-18    Radiology:    VA Duplex Face Temporal Artery (01.17.23 @ 10:49)   Mild atheromatous intimal thickening and irregularity are present along   the course of the right and the left temporal arteries including their   frontal and parietal branches.    There is unimpeded flow through these arteries.    No halo of periarterial edema characteristic for temporal arteritis is   identified.    There is unimpeded flow through the right and left axillary arteries.    IMPRESSION:    Giant cell arteritis is not identified.      CT Head No Cont (01.17.23 @ 09:59)   FINDINGS:    No hydrocephalus, mass effect, midline shift, acute intracranial   hemorrhage, or brain edema.    Small polyp/retention cyst in the right axillary sinus. Remaining   visualized paranasal sinuses and mastoid air cells are clear.    IMPRESSION:    No hydrocephalus, acute intracranial hemorrhage, mass effect, or brain   edema.      MR Head w/ IV contrast & MR Orbits 1/15/23  Impression: Mild atrophy and small vessel white matter ischemic changes.   No acute infarcts or hemorrhage. Normal cavernous sinuses. Unremarkable   orbits.    MR HEAD 1/13/23  IMPRESSION:  No acute intracranial hemorrhage, acute infarction, extra-axial fluid   collection or hydrocephalus.    CT HEAD 1/13/23:  No CT evidence of acuteintracranial hemorrhage, mass effect, or midline   shift.    CT ANGIOGRAPHY NECK 1/13/23:  No hemodynamically significant stenosis by NASCET criteria. No vascular   dissection.    CT ANGIOGRAPHY BRAIN 1/13/23:  No major vessel occlusion, evidence of aneurysm, or other vascular   malformation.    Moderate narrowing of the V4 segment of the nondominant right vertebral   artery.

## 2023-01-18 NOTE — PROGRESS NOTE ADULT - SUBJECTIVE AND OBJECTIVE BOX
Cardiovascular Disease Progress Note  Date of service: 01-18-23 @ 07:09    Overnight events: No acute events overnight.  Pt with temporal headache yesterday with resolved s/p pulse steroids. Pt currently in no distress.   Otherwise review of systems negative    Objective Findings:  T(C): 36.6 (01-18-23 @ 04:53), Max: 37.1 (01-17-23 @ 21:04)  HR: 64 (01-18-23 @ 04:53) (61 - 79)  BP: 151/86 (01-18-23 @ 04:53) (139/62 - 163/70)  RR: 18 (01-18-23 @ 04:53) (18 - 19)  SpO2: 100% (01-18-23 @ 04:53) (95% - 100%)  Wt(kg): --  Daily     Daily       Physical Exam:  Gen: NAD; Patient resting comfortably  HEENT: EOMI, Normocephalic/ atraumatic, L ptosis  CV: RRR, normal S1 + S2, no m/r/g  Lungs:  Normal respiratory effort; clear to auscultation bilaterally  Abd: soft, non-tender; bowel sounds present  Ext: No edema; warm and well perfused    Telemetry: N/a    Laboratory Data:                        9.6    8.89  )-----------( 144      ( 18 Jan 2023 06:33 )             29.3     01-17    130<L>  |  91<L>  |  69<H>  ----------------------------<  228<H>  5.0   |  25  |  8.31<H>    Ca    8.6      17 Jan 2023 05:35  Phos  3.9     01-17  Mg     3.3     01-17      PT/INR - ( 16 Jan 2023 14:27 )   PT: 11.1 sec;   INR: 0.97 ratio         PTT - ( 16 Jan 2023 14:27 )  PTT:29.2 sec          Inpatient Medications:  MEDICATIONS  (STANDING):  artificial  tears Solution 1 Drop(s) Both EYES three times a day  aspirin enteric coated 81 milliGRAM(s) Oral daily  atorvastatin 80 milliGRAM(s) Oral at bedtime  dextrose 5%. 1000 milliLiter(s) (100 mL/Hr) IV Continuous <Continuous>  dextrose 5%. 1000 milliLiter(s) (50 mL/Hr) IV Continuous <Continuous>  dextrose 50% Injectable 25 Gram(s) IV Push once  dextrose 50% Injectable 12.5 Gram(s) IV Push once  dextrose 50% Injectable 25 Gram(s) IV Push once  enoxaparin Injectable 30 milliGRAM(s) SubCutaneous every 24 hours  glucagon  Injectable 1 milliGRAM(s) IntraMuscular once  insulin glargine Injectable (LANTUS) 30 Unit(s) SubCutaneous at bedtime  insulin lispro (ADMELOG) corrective regimen sliding scale   SubCutaneous <User Schedule>  insulin lispro (ADMELOG) corrective regimen sliding scale   SubCutaneous three times a day before meals  insulin lispro Injectable (ADMELOG) 15 Unit(s) SubCutaneous three times a day before meals  melatonin 3 milliGRAM(s) Oral at bedtime  methylPREDNISolone sodium succinate Injectable 60 milliGRAM(s) IV Push daily  metoprolol tartrate 25 milliGRAM(s) Oral two times a day  pantoprazole    Tablet 40 milliGRAM(s) Oral before breakfast  polyethylene glycol 3350 17 Gram(s) Oral daily  polyethylene glycol 3350 17 Gram(s) Oral at bedtime  senna 2 Tablet(s) Oral at bedtime  sodium chloride 2 Gram(s) Oral three times a day  sodium zirconium cyclosilicate 5 Gram(s) Oral daily      Assessment:  67y M  CAD s/p PCI to LAD in 2016 now on Brilinta, DM, ESRD on HTN, HLD presenting with Left eye ptosis and diplopia.    Plan of Care:    #Pre-op risk stratification  - Pt to undergo temporal artery biopsy   - Mr. James EKG from 1/15 shows sinus rhythm with no acute ischemic changes  - TTE from 1/13 shows normal LV systolic function with no significant structural disease  - Pt displays no evidence of pre-op coronary ischemia  - From a cardiac standpoint, . James is optimized to proceed with biopsy at the discretion of the vascular team.  - Pt is low to intermediate risk for vascular procedure  - Agree with Brilinta being held for upcoming procedure.     #CAD   - S/p PCI in 2016 to LAD  - Brilinta to be held for biopsy   - Continue statin and BB    #ESRD  - HD as per renal    #HTN  BP acceptable on current regimen    #HLD  - Statin as ordered      Plan of Care:          Over 25 minutes spent on total encounter; more than 50% of the visit was spent counseling and/or coordinating care by the attending physician.      Luigi Barnes,  [facility]  Cardiovascular Disease  [phone]

## 2023-01-18 NOTE — PROGRESS NOTE ADULT - ASSESSMENT
66 y/o M w/h/o T2DM> unknown control due to anemia of chronic disease. On Tresiba 30 to 40 units at hs based on hs BG, Reports FBG 50s to mid 100s at home. DM c/b CAD> stents and CKD > HD. Also h/o HTN, HLD. Here Left eye ptosis and diplopia/pain. On steroids for possible GCA. Endocrinology consulted for uncontrolled glucose in the setting of high dose steroid. Tolerating POs with FBG value improved but tightly control during the day due to decreased steroid dose today. Spoke to team to decrease premeal insulin doses. Will reassess BG tomorrow and continue to adjust insulin to BG goal (100-180mg/dl). No hypoglycemia.      Home DM meds: Tresiba 30-40 units QHS

## 2023-01-18 NOTE — PROGRESS NOTE ADULT - SUBJECTIVE AND OBJECTIVE BOX
Vascular Surgery Progress Note    SUBJECTIVE  No acute events overnight. Pt seen and examined on mornings rounds.    OBJECTIVE  ___________________________________________________  VITAL SIGNS / I&O's   Vital Signs Last 24 Hrs  T(C): 36.6 (18 Jan 2023 04:53), Max: 37.1 (17 Jan 2023 21:04)  T(F): 97.8 (18 Jan 2023 04:53), Max: 98.7 (17 Jan 2023 21:04)  HR: 64 (18 Jan 2023 04:53) (61 - 79)  BP: 151/86 (18 Jan 2023 04:53) (139/62 - 163/70)  BP(mean): --  RR: 18 (18 Jan 2023 04:53) (18 - 19)  SpO2: 100% (18 Jan 2023 04:53) (95% - 100%)    Parameters below as of 18 Jan 2023 04:53  Patient On (Oxygen Delivery Method): room air      17 Jan 2023 07:01  -  18 Jan 2023 07:00  --------------------------------------------------------  IN:    Oral Fluid: 660 mL  Total IN: 660 mL    OUT:  Total OUT: 0 mL    Total NET: 660 mL        ___________________________________________________  PHYSICAL EXAM    General: NAD    ___________________________________________________  LABS                        9.6    8.89  )-----------( 144      ( 18 Jan 2023 06:33 )             29.3     18 Jan 2023 06:33    132    |  94     |  97     ----------------------------<  155    4.1     |  21     |  9.90     Ca    8.2        18 Jan 2023 06:33  Phos  4.7       18 Jan 2023 06:33  Mg     3.4       18 Jan 2023 06:33      PT/INR - ( 16 Jan 2023 14:27 )   PT: 11.1 sec;   INR: 0.97 ratio         PTT - ( 16 Jan 2023 14:27 )  PTT:29.2 sec  CAPILLARY BLOOD GLUCOSE      POCT Blood Glucose.: 235 mg/dL (18 Jan 2023 01:16)  POCT Blood Glucose.: 184 mg/dL (17 Jan 2023 21:07)  POCT Blood Glucose.: 109 mg/dL (17 Jan 2023 16:21)  POCT Blood Glucose.: 172 mg/dL (17 Jan 2023 11:18)  POCT Blood Glucose.: 226 mg/dL (17 Jan 2023 07:36)          ___________________________________________________  MICRO  Recent Cultures:    ___________________________________________________  MEDICATIONS  (STANDING):  artificial  tears Solution 1 Drop(s) Both EYES three times a day  aspirin enteric coated 81 milliGRAM(s) Oral daily  atorvastatin 80 milliGRAM(s) Oral at bedtime  dextrose 5%. 1000 milliLiter(s) (100 mL/Hr) IV Continuous <Continuous>  dextrose 5%. 1000 milliLiter(s) (50 mL/Hr) IV Continuous <Continuous>  dextrose 50% Injectable 25 Gram(s) IV Push once  dextrose 50% Injectable 12.5 Gram(s) IV Push once  dextrose 50% Injectable 25 Gram(s) IV Push once  enoxaparin Injectable 30 milliGRAM(s) SubCutaneous every 24 hours  glucagon  Injectable 1 milliGRAM(s) IntraMuscular once  insulin glargine Injectable (LANTUS) 30 Unit(s) SubCutaneous at bedtime  insulin lispro (ADMELOG) corrective regimen sliding scale   SubCutaneous <User Schedule>  insulin lispro (ADMELOG) corrective regimen sliding scale   SubCutaneous three times a day before meals  insulin lispro Injectable (ADMELOG) 15 Unit(s) SubCutaneous three times a day before meals  melatonin 3 milliGRAM(s) Oral at bedtime  methylPREDNISolone sodium succinate Injectable 60 milliGRAM(s) IV Push daily  metoprolol tartrate 25 milliGRAM(s) Oral two times a day  pantoprazole    Tablet 40 milliGRAM(s) Oral before breakfast  polyethylene glycol 3350 17 Gram(s) Oral daily  polyethylene glycol 3350 17 Gram(s) Oral at bedtime  senna 2 Tablet(s) Oral at bedtime  sodium chloride 2 Gram(s) Oral three times a day  sodium zirconium cyclosilicate 5 Gram(s) Oral daily    MEDICATIONS  (PRN):  acetaminophen     Tablet .. 650 milliGRAM(s) Oral every 6 hours PRN Moderate Pain (4 - 6), Severe Pain (7 - 10)  dextrose Oral Gel 15 Gram(s) Oral once PRN Blood Glucose LESS THAN 70 milliGRAM(s)/deciliter

## 2023-01-18 NOTE — PROGRESS NOTE ADULT - SUBJECTIVE AND OBJECTIVE BOX
NERY MICHAELL  [id number]    INTERVAL HPI/OVERNIGHT EVENTS:        PMHx/PSHx/FamHx/SocHx reviewed and no significant changes    REVIEW OF SYSTEMS   - reviewed with patient and  negative other than as above or previously documented.     MEDICATIONS  (STANDING):  artificial  tears Solution 1 Drop(s) Both EYES three times a day  aspirin enteric coated 81 milliGRAM(s) Oral daily  atorvastatin 80 milliGRAM(s) Oral at bedtime  dextrose 5%. 1000 milliLiter(s) (100 mL/Hr) IV Continuous <Continuous>  dextrose 5%. 1000 milliLiter(s) (50 mL/Hr) IV Continuous <Continuous>  dextrose 50% Injectable 25 Gram(s) IV Push once  dextrose 50% Injectable 12.5 Gram(s) IV Push once  dextrose 50% Injectable 25 Gram(s) IV Push once  glucagon  Injectable 1 milliGRAM(s) IntraMuscular once  heparin   Injectable 5000 Unit(s) SubCutaneous every 12 hours  insulin glargine Injectable (LANTUS) 30 Unit(s) SubCutaneous at bedtime  insulin lispro (ADMELOG) corrective regimen sliding scale   SubCutaneous three times a day before meals  insulin lispro (ADMELOG) corrective regimen sliding scale   SubCutaneous <User Schedule>  insulin lispro Injectable (ADMELOG) 10 Unit(s) SubCutaneous three times a day before meals  melatonin 3 milliGRAM(s) Oral at bedtime  methylPREDNISolone sodium succinate Injectable 60 milliGRAM(s) IV Push daily  metoprolol tartrate 25 milliGRAM(s) Oral two times a day  pantoprazole    Tablet 40 milliGRAM(s) Oral before breakfast  polyethylene glycol 3350 17 Gram(s) Oral daily  polyethylene glycol 3350 17 Gram(s) Oral at bedtime  senna 2 Tablet(s) Oral at bedtime  sodium chloride 2 Gram(s) Oral three times a day  sodium zirconium cyclosilicate 5 Gram(s) Oral daily    MEDICATIONS  (PRN):  acetaminophen     Tablet .. 650 milliGRAM(s) Oral every 6 hours PRN Moderate Pain (4 - 6), Severe Pain (7 - 10)  dextrose Oral Gel 15 Gram(s) Oral once PRN Blood Glucose LESS THAN 70 milliGRAM(s)/deciliter      Allergies    No Known Allergies    Intolerances          Vital Signs Last 24 Hrs  T(C): 36.2 (18 Jan 2023 13:21), Max: 37.6 (18 Jan 2023 09:30)  T(F): 97.2 (18 Jan 2023 13:21), Max: 99.7 (18 Jan 2023 09:30)  HR: 60 (18 Jan 2023 13:21) (60 - 66)  BP: 162/68 (18 Jan 2023 13:21) (139/62 - 163/70)  BP(mean): --  RR: 17 (18 Jan 2023 13:21) (17 - 19)  SpO2: 98% (18 Jan 2023 13:21) (97% - 100%)    Parameters below as of 18 Jan 2023 13:21  Patient On (Oxygen Delivery Method): room air        Physical Exam:  General: NAD  HEENT: EOMI, MMM  Cardio: +S1/S2, RRR  Resp: CTA b/l  GI: +BS, soft, NT/ND  MSK:  Neuro: AAOx3  Psych: wnl    LABS:                        9.6    8.89  )-----------( 144      ( 18 Jan 2023 06:33 )             29.3     01-18    132<L>  |  94<L>  |  97<H>  ----------------------------<  155<H>  4.1   |  21<L>  |  9.90<H>    Ca    8.2<L>      18 Jan 2023 06:33  Phos  4.7     01-18  Mg     3.4     01-18              RADIOLOGY & ADDITIONAL TESTS:       NERY MICHAELL  [id number]    INTERVAL HPI/OVERNIGHT EVENTS:  pt complains worsening L. eye pain and worsening blurry vision.  Feels L. eye ptosis got worse. denies L. temporal headache. Jaw pain improved        PMHx/PSHx/FamHx/SocHx reviewed and no significant changes    REVIEW OF SYSTEMS   - reviewed with patient and  negative other than as above or previously documented.     MEDICATIONS  (STANDING):  artificial  tears Solution 1 Drop(s) Both EYES three times a day  aspirin enteric coated 81 milliGRAM(s) Oral daily  atorvastatin 80 milliGRAM(s) Oral at bedtime  dextrose 5%. 1000 milliLiter(s) (100 mL/Hr) IV Continuous <Continuous>  dextrose 5%. 1000 milliLiter(s) (50 mL/Hr) IV Continuous <Continuous>  dextrose 50% Injectable 25 Gram(s) IV Push once  dextrose 50% Injectable 12.5 Gram(s) IV Push once  dextrose 50% Injectable 25 Gram(s) IV Push once  glucagon  Injectable 1 milliGRAM(s) IntraMuscular once  heparin   Injectable 5000 Unit(s) SubCutaneous every 12 hours  insulin glargine Injectable (LANTUS) 30 Unit(s) SubCutaneous at bedtime  insulin lispro (ADMELOG) corrective regimen sliding scale   SubCutaneous three times a day before meals  insulin lispro (ADMELOG) corrective regimen sliding scale   SubCutaneous <User Schedule>  insulin lispro Injectable (ADMELOG) 10 Unit(s) SubCutaneous three times a day before meals  melatonin 3 milliGRAM(s) Oral at bedtime  methylPREDNISolone sodium succinate Injectable 60 milliGRAM(s) IV Push daily  metoprolol tartrate 25 milliGRAM(s) Oral two times a day  pantoprazole    Tablet 40 milliGRAM(s) Oral before breakfast  polyethylene glycol 3350 17 Gram(s) Oral daily  polyethylene glycol 3350 17 Gram(s) Oral at bedtime  senna 2 Tablet(s) Oral at bedtime  sodium chloride 2 Gram(s) Oral three times a day  sodium zirconium cyclosilicate 5 Gram(s) Oral daily    MEDICATIONS  (PRN):  acetaminophen     Tablet .. 650 milliGRAM(s) Oral every 6 hours PRN Moderate Pain (4 - 6), Severe Pain (7 - 10)  dextrose Oral Gel 15 Gram(s) Oral once PRN Blood Glucose LESS THAN 70 milliGRAM(s)/deciliter      Allergies  No Known Allergies  Intolerances      Vital Signs Last 24 Hrs  T(C): 36.2 (18 Jan 2023 13:21), Max: 37.6 (18 Jan 2023 09:30)  T(F): 97.2 (18 Jan 2023 13:21), Max: 99.7 (18 Jan 2023 09:30)  HR: 60 (18 Jan 2023 13:21) (60 - 66)  BP: 162/68 (18 Jan 2023 13:21) (139/62 - 163/70)  BP(mean): --  RR: 17 (18 Jan 2023 13:21) (17 - 19)  SpO2: 98% (18 Jan 2023 13:21) (97% - 100%)    Parameters below as of 18 Jan 2023 13:21  Patient On (Oxygen Delivery Method): room air    Physical Exam:  General: NAD  HEENT: + b/l temporal pulse strong and equal,  + L. eye ptosis, EOM limited    Cardio: +S1/S2, RRR  Resp: CTA b/l  GI: +BS, soft, NT/ND  MSK: no synovitis, joints NTP    Neuro: AAOx3      LABS:                        9.6    8.89  )-----------( 144      ( 18 Jan 2023 06:33 )             29.3     01-18    132<L>  |  94<L>  |  97<H>  ----------------------------<  155<H>  4.1   |  21<L>  |  9.90<H>    Ca    8.2<L>      18 Jan 2023 06:33  Phos  4.7     01-18  Mg     3.4     01-18      RADIOLOGY & ADDITIONAL TESTS:  C< from: CT Angio Head w/ IV Cont (01.12.23 @ 21:51) >  CT HEAD:  No CT evidence of acuteintracranial hemorrhage, mass effect, or midline   shift.    CT ANGIOGRAPHY NECK:  No hemodynamically significant stenosis by NASCET criteria. No vascular   dissection.    CT ANGIOGRAPHY BRAIN:  No major vessel occlusion, evidence of aneurysm, or other vascular   malformation.    Moderate narrowing of the V4 segment of the nondominant right vertebral   artery.    < end of copied text >  < from: MR Head No Cont (01.13.23 @ 23:29) >  Ventricles are normal in size and configuration for patient's age. No   hydrocephalus or extra-axial fluid collection.  No abnormal restricted diffusion identified to suggest acute territorial   infarction. No acute intracranial hemorrhage. Mild subcortical and   periventricular-white matter T2-weighted hyperintensity, nonspecific but   favored to reflect sequela of mild chronic microvascular ischemia. . No   significant midline shift, mass effect or herniation. Susceptibility   weighted sequences are within normal limits without evidence for chronic   blood products. There are chronic lacunar infarctions of the bilateral   basal ganglia.    Visualized proximal arterial and dural venous sinus flow voids preserved   on spin-echo sequences. Paranasal sinuses and mastoid air cells are well   aerated.    No suspicious expansile or destructive calvarial lesion identified.    Sella and suprasellar region are unremarkable.      IMPRESSION:    No acute intracranial hemorrhage, acute infarction, extra-axial fluid   collection or hydrocephalus.  < end of copied text >    < from: MR Head w/wo IV Cont (01.15.23 @ 11:25) >  Mild ventricular and sulcal prominence is consistent with age-appropriate   involutional change. Small vessel white matter ischemic changes are   noted. No acute infarcts are seen. After contrast administration is   normal intracranial vascular enhancement.    Thin sections through the orbits demonstrates the globes, extraocular   muscles and optic nerves to be unremarkable. There has been previous   bilateral lens replacement surgery. There is no evidence of post septal   cellulitis. There is no abnormal optic nerve enhancement.    After contrast administration there is normal intracranial vascular   enhancement. The cavernous sinuses enhance normally.The sellar and   parasellar structures are unremarkable. A retention cyst or polyp is   present in the right maxillary sinus.    Impression: Mild atrophy and small vessel white matter ischemic changes.   No acute infarcts or hemorrhage. Normal cavernous sinuses. Unremarkable   orbits.  < end of copied text >

## 2023-01-18 NOTE — PROGRESS NOTE ADULT - ASSESSMENT
67y male with a past medical history/ocular history of CAD s/p 1 stent on brilinta, DM, CKD on HD, ,HTN, HLD, b/l retinal surgeries and laser, bilateral cataract extraction and PCIOL placement presenting 12 days of double vision and ptosis, 5 days of headache and 3 days of blurry vision left side. Patient with vision 20/50 ph 20/25 right eye, 20/200 ph 20/40 left eye,  intraocular pressure within normal limits, confrontational visual fields constricted both eyes and color 1/12 both eyes. Dilated fundus exam shows retina atrophy/scarring in periphery right up to the arcades 2/2 laser in past.      #Decreased vision left eye   - Patient complaining of left sided headache 5 days and decreased vision left side for past 3 days   - VA today pt able to read 20/40, stable from admission and not progressive.   - Jaw pain with chewing, no scalp pain, + temporal tenderness (palpable artery); and headache.    - Color vision 1/12 both eyes likely color blind  -  ESR only borderline elevated and CRP wnl   - s/p steroids, plan for TAB, will follow results outpt  - Appreciated rheumatology input    #Complete CN3 palsy left side   - Complete restriction in all EOMs left side other than Abduction with complete ptosis   - On exam today appears pupil involving however CTA ruled out any compression etiology   - Appreciate workup neurology has done, likely microvascular which could be associated with periocular pain   - Can patch patient if symptomatic from double vision.    - Follow neuro-ophthalmology outpatient     Pt seen and discussed with Dr. Sutton  Discussed with Dr. Quintanilla    Outpatient Follow-up: Patient should follow-up with his/her ophthalmologist or with [facility] Department of Ophthalmology within 1 week of after discharge at:    [address]  [place] [address]  [phone] 67y male with a past medical history/ocular history of CAD s/p 1 stent on brilinta, DM, CKD on HD, ,HTN, HLD, b/l retinal surgeries and laser, bilateral cataract extraction and PCIOL placement presenting 12 days of double vision and ptosis, 5 days of headache and 3 days of blurry vision left side. Patient with vision 20/50 ph 20/25 right eye, 20/200 ph 20/40 left eye,  intraocular pressure within normal limits, confrontational visual fields constricted both eyes and color 1/12 both eyes. Dilated fundus exam shows retina atrophy/scarring in periphery right up to the arcades 2/2 laser in past.      #Decreased vision left eye   - Patient complaining of left sided headache 5 days and decreased vision left side for past 3 days   - VA today pt able to read 20/40, stable from admission and not progressive.   - Jaw pain with chewing, no scalp pain, + temporal tenderness (palpable artery); and headache.    - Color vision 1/12 both eyes likely color blind  -  ESR only borderline elevated and CRP wnl  - MRI brain and orbits w/wo showing chronic ischemic changes   - s/p steroids, plan for TAB, will follow results outpt  - Appreciated rheumatology input    #Complete CN3 palsy left side   - Complete restriction in all EOMs left side other than Abduction with complete ptosis   - On exam today appears pupil involving however CTA ruled out any compression etiology  -  MRI brain and orbits w/wo showing chronic ischemic changes   - Appreciate workup neurology has done, likely microvascular which could be associated with periocular pain   - Can patch patient if symptomatic from double vision.    - Follow neuro-ophthalmology outpatient     Pt seen and discussed with Dr. Sutton  Discussed with Dr. Quintanilla    Outpatient Follow-up: Patient should follow-up with his/her ophthalmologist or with [facility] Department of Ophthalmology within 1 week of after discharge at:    [address]  [place] [address]  [phone]

## 2023-01-18 NOTE — PROGRESS NOTE ADULT - PROBLEM SELECTOR PLAN 4
- C/w metoprolol 25 mg   - Cardiology consulted, recs appreciated. Per cardiology, may consider adding losartan if BP remains elevated - C/w metoprolol 25 mg   - Cardiology consulted, recs appreciated. Per cardiology, current BP trend is acceptable

## 2023-01-18 NOTE — PROGRESS NOTE ADULT - ATTENDING COMMENTS
Patient seen and examined, granddaughter at bedside translating  fellow discussed possible worsening ptosis with opthalmology and neurology  Presumed GCA based on temporal HA and possible jaw claudication (though description by patient is not consistent), ESR borderline, has normal TA pulsations bilaterally, partial response to steroids- would continue with prednisone and taper after TA biopsy results  oculomotor palsy has been described in patients with GCA (biopsy proven) but is exceedingly rare, would favor a microvascular etiology in this patient with underlying comorbidities    will follow

## 2023-01-18 NOTE — PROGRESS NOTE ADULT - PROBLEM SELECTOR PLAN 3
Patient has hx of CAD and is on Brilinta   - Cardiology consulted for preprocedural evaluation, recs appreciated. Per cardiology, can hold Brilinta for 5 days prior to biopsy Patient has hx of CAD and is on Brilinta   - Cardiology consulted for preprocedural evaluation, recs appreciated. Per cardiology, can hold Brilinta for 5 days prior to biopsy. Will f/u cardiology re: continuing with Brilinta after procedure given that stent was in 2016 per family

## 2023-01-18 NOTE — PROGRESS NOTE ADULT - SUBJECTIVE AND OBJECTIVE BOX
DIABETES FOLLOW UP NOTE: Saw pt earlier today    Chief Complaint: Endocrine consult requested for management of T2DM    INTERVAL HX: Saw pt while having HD. Pt reports eye pain came back last night and is now worse not relief with Tylenol. States eating well with BG levels improving while on lower steroid dose started today. Noted BG 70-80s for lunch> staff held premeal insulin with BG ac dinner in 170s. Now on Methylprednisolone 60mg daily. Awaiting L. temporal artery bx.         Review of Systems:  General: As above  HEENT:  L eye pain  Cardiovascular: No chest pain, palpitations  Respiratory: No SOB, no cough  GI: No nausea, vomiting, abdominal pain  Endocrine: No S&Sx of hypoglycemia    Allergies    No Known Allergies    Intolerances      MEDICATIONS:  atorvastatin 80 milliGRAM(s) Oral at bedtime  insulin glargine Injectable (LANTUS) 30 Unit(s) SubCutaneous at bedtime  insulin lispro (ADMELOG) corrective regimen sliding scale   SubCutaneous three times a day before meals  insulin lispro (ADMELOG) corrective regimen sliding scale   SubCutaneous <User Schedule>  insulin lispro Injectable (ADMELOG) 10 Unit(s) SubCutaneous three times a day before meals  methylPREDNISolone sodium succinate Injectable 60 milliGRAM(s) IV Push daily    PHYSICAL EXAM:  VITALS: T(C): 36.2 (01-18-23 @ 13:21)  T(F): 97.2 (01-18-23 @ 13:21), Max: 99.7 (01-18-23 @ 09:30)  HR: 60 (01-18-23 @ 13:21) (60 - 66)  BP: 162/68 (01-18-23 @ 13:21) (139/62 - 163/70)  RR:  (17 - 19)  SpO2:  (97% - 100%)  Wt(kg): --  GENERAL: Male laying in bed in NAD. Having HD   HEENT: L eye Ptosis  Abdomen: Soft, nontender, non distended  Extremities: Warm, no edema in all 4 exts  NEURO: A&O X3    LABS:  POCT Blood Glucose.: 176 mg/dL (01-18-23 @ 16:13)  POCT Blood Glucose.: 86 mg/dL (01-18-23 @ 14:27)  POCT Blood Glucose.: 79 mg/dL (01-18-23 @ 14:24)  POCT Blood Glucose.: 146 mg/dL (01-18-23 @ 08:16)  POCT Blood Glucose.: 235 mg/dL (01-18-23 @ 01:16)  POCT Blood Glucose.: 184 mg/dL (01-17-23 @ 21:07)  POCT Blood Glucose.: 109 mg/dL (01-17-23 @ 16:21)  POCT Blood Glucose.: 172 mg/dL (01-17-23 @ 11:18)  POCT Blood Glucose.: 226 mg/dL (01-17-23 @ 07:36)  POCT Blood Glucose.: 252 mg/dL (01-17-23 @ 01:24)  POCT Blood Glucose.: 248 mg/dL (01-16-23 @ 21:21)  POCT Blood Glucose.: 172 mg/dL (01-16-23 @ 18:03)  POCT Blood Glucose.: 150 mg/dL (01-16-23 @ 16:50)  POCT Blood Glucose.: 392 mg/dL (01-16-23 @ 11:20)  POCT Blood Glucose.: 333 mg/dL (01-16-23 @ 07:29)  POCT Blood Glucose.: 205 mg/dL (01-15-23 @ 21:57)  POCT Blood Glucose.: 364 mg/dL (01-15-23 @ 16:20)                            9.6    8.89  )-----------( 144      ( 18 Jan 2023 06:33 )             29.3       01-18    132<L>  |  94<L>  |  97<H>  ----------------------------<  155<H>  4.1   |  21<L>  |  9.90<H>    eGFR: 5<L>    Ca    8.2<L>      01-18  Mg     3.4     01-18  Phos  4.7     01-18  Thyroid Function Tests:  01-12 @ 19:44 TSH 2.00 FreeT4 -- T3 -- Anti TPO -- Anti Thyroglobulin Ab -- TSI --      A1C with Estimated Average Glucose Result: 6.4 % (01-13-23 @ 06:51)      Estimated Average Glucose: 137 mg/dL (01-13-23 @ 06:51)        01-13 Chol 136 Direct LDL -- LDL calculated 60 HDL 36<L> Trig 200<H>

## 2023-01-18 NOTE — PROGRESS NOTE ADULT - PROBLEM SELECTOR PLAN 1
Current DDX include atypical presentation of GCA vs. Tolossa Guerra (but MRI negative)   - Temporal artery biopsy pending (requires 5 days off Brilinta)   - Neuro following for alternate CNIII palsy dx- ruled out stroke, large tumor, hemorrhage, trauma) possibly other type inflammatory vs infection vs demyelination?   - Rheumatology consulted, recs appreciated. Current DDX include atypical presentation of GCA vs. Tolossa Guerra (but MRI negative)   - Temporal artery biopsy pending (requires 5 days off Brilinta)   - Neuro following for alternate CNIII palsy dx- ruled out stroke, large tumor, hemorrhage, trauma) possibly other type inflammatory vs infection vs demyelination?   - Rheumatology consulted, recs appreciated. Per rheumatology, less likely to be GCA but will continue to pursue biopsy. Per rheumatology, change solumedrol to 60 mg qd Current DDX include atypical presentation of GCA vs. Tolossa Guerra (but MRI negative)   - Temporal artery biopsy pending (requires 5 days off Brilinta)   - Neuro following for alternate CNIII palsy dx- ruled out stroke, large tumor, hemorrhage, trauma) possibly other type inflammatory vs infection vs demyelination?   - Rheumatology consulted, recs appreciated. Per rheumatology, less likely to be GCA but will continue to pursue biopsy. Per rheumatology, change solumedrol to 60 mg qd.   - Ophthalmology consulted, recs appreciated. Current DDX include atypical presentation of GCA vs. Tolossa Guerra (but MRI negative)   - Temporal artery biopsy pending (requires 5 days off Brilinta)   - Neuro following for alternate CNIII palsy dx- ruled out stroke, large tumor, hemorrhage, trauma) possibly other type inflammatory vs infection vs demyelination? Recs appreciated   - Rheumatology consulted, recs appreciated. Per rheumatology, less likely to be GCA but will continue to pursue biopsy. Per rheumatology, change solumedrol to 60 mg qd.   - Ophthalmology consulted, recs appreciated.

## 2023-01-18 NOTE — PROGRESS NOTE ADULT - SUBJECTIVE AND OBJECTIVE BOX
Health system DEPARTMENT OF OPHTHALMOLOGY  ------------------------------------------------------------------------------  Cisco Arauz MD PGY-3  Pager: [phone], also available on teams  ------------------------------------------------------------------------------    Interval History: No acute events overnight. Pt endorsing still periocular pain and blurry vision. Ptosis worsened.     MEDICATIONS  (STANDING):  artificial  tears Solution 1 Drop(s) Both EYES three times a day  aspirin enteric coated 81 milliGRAM(s) Oral daily  atorvastatin 80 milliGRAM(s) Oral at bedtime  dextrose 5%. 1000 milliLiter(s) (50 mL/Hr) IV Continuous <Continuous>  dextrose 5%. 1000 milliLiter(s) (100 mL/Hr) IV Continuous <Continuous>  dextrose 50% Injectable 12.5 Gram(s) IV Push once  dextrose 50% Injectable 25 Gram(s) IV Push once  dextrose 50% Injectable 25 Gram(s) IV Push once  glucagon  Injectable 1 milliGRAM(s) IntraMuscular once  heparin   Injectable 5000 Unit(s) SubCutaneous every 12 hours  insulin glargine Injectable (LANTUS) 30 Unit(s) SubCutaneous at bedtime  insulin lispro (ADMELOG) corrective regimen sliding scale   SubCutaneous three times a day before meals  insulin lispro (ADMELOG) corrective regimen sliding scale   SubCutaneous <User Schedule>  insulin lispro Injectable (ADMELOG) 10 Unit(s) SubCutaneous three times a day before meals  melatonin 3 milliGRAM(s) Oral at bedtime  metoprolol tartrate 25 milliGRAM(s) Oral two times a day  pantoprazole    Tablet 40 milliGRAM(s) Oral before breakfast  polyethylene glycol 3350 17 Gram(s) Oral daily  polyethylene glycol 3350 17 Gram(s) Oral at bedtime  senna 2 Tablet(s) Oral at bedtime  sodium chloride 2 Gram(s) Oral three times a day  sodium zirconium cyclosilicate 5 Gram(s) Oral daily    MEDICATIONS  (PRN):  acetaminophen     Tablet .. 650 milliGRAM(s) Oral every 6 hours PRN Moderate Pain (4 - 6), Severe Pain (7 - 10)  dextrose Oral Gel 15 Gram(s) Oral once PRN Blood Glucose LESS THAN 70 milliGRAM(s)/deciliter      VITALS: T(C): 36.7 (01-18-23 @ 17:33)  T(F): 98.1 (01-18-23 @ 17:33), Max: 99.7 (01-18-23 @ 09:30)  HR: 64 (01-18-23 @ 17:33) (60 - 66)  BP: 128/60 (01-18-23 @ 17:33) (128/60 - 162/68)  RR:  (17 - 19)  SpO2:  (97% - 100%)  General: AAO x 3, appropriate mood and affect    Ophthalmology Exam:  Visual acuity (cc): OD 20/25; OS 20/40   Pupils: OD 4mm--> 3mm, OS 5mm min reactive  Extraocular movements (EOMs): Full OD, OS: -4 adduction, -4 supraduction, -3 infraduction,   Confrontational Visual Field (CVF): constricted OU  Color 1/12 both eyes    Pen Light Exam (PLE)  External: Flat OU  Lids/Lashes/Lacrimal Ducts: Flat OU    Sclera/Conjunctiva: W+Q OU  Cornea: Cl OU  Anterior Chamber: D+F OU    Iris: Flat OU  Lens: PCIOL OU    Fundus Exam: dilated with 1% tropicamide and 2.5% phenylephrine  Approval obtained from primary team for dilation  Patient aware that pupils can remained dilated for at least 4-6 hours  Exam performed with 20D lens    Vitreous: wnl OU  Disc, cup/disc: sharp and pink, 0.3 OU  Macula: wnl OU  Vessels: wnl OU  Periphery: Extensive atrophy and scarring in periphery right adjacent to arcades OU.     Labs/Imaging:  CRP 4  ESR 47      ACC: [id number] EXAM:  CT ANGIO NECK (W) IC                        ACC: [id number] EXAM:  CT VENOGRAM BRAIN (W) IC                        ACC: [id number] EXAM:  CT ANGIO BRAIN (W)AW IC                          PROCEDURE DATE:  01/12/2023          INTERPRETATION:  CLINICAL INFORMATION: Left eye proptosis and medial gaze   palsy.    TECHNIQUE:  1. Noncontrast axial CT images were acquired through the head.   Two-dimensional sagittal and coronal reformats were generated.  2. Contrast enhanced CT angiography of the neck and head was performed.    MIP reformats were generated in the coronal, sagittal and axial planes.    Intravenous contrast: 70 cc Omnipaque 300 were administered; 30 cc were   discarded.    COMPARISON: No prior studies are available for comparison..    FINDINGS:    CT HEAD:  No acute intracranial hemorrhage, mass effect, or midline shift. No   abnormal extra-axial collections. The basal cisterns are patent without   evidence of central herniation. Subcentimeter hypodensities present in   the bilateral basal ganglia. Mild confluent areas of bilateral   periventricular deep white matter hypoattenuation.    The calvarium is intact. The soft tissues of the scalp are unremarkable.   Polyp/mucus retention cyst in the right maxillary sinus. The mastoid air   cells and middle ear cavities are clear. Bilateral lens replacements.   Mild bilateral proptosis.    CT ANGIOGRAPHY NECK:  Three-vessel aortic arch. origins of the great vessels are unremarkable.   The common carotid arteries are normal in caliber without significant   stenosis.    The carotid bifurcations are unremarkable. The internal carotid arteries   are normal in caliber without significant stenosis. Retropharyngeal   course of the left internal carotid artery.    Left dominant vertebral arteries. The vertebral arteries are normal in   caliber without significant stenosis.    The visualized neck and upper chest are unremarkable. Visualized osseous   structures are unremarkable.    CT ANGIOGRAPHY BRAIN:  Anterior circulation: Calcifications of the carotid siphons bilaterally.   The bilateral anterior cerebral and middle cerebral arteries are patent   without evidence of significant stenosis, major vessel occlusion, or   aneurysm.    Posterior circulation:Smooth narrowing of the distal V4 segment of the   nondominant right vertebral artery. The basilar artery and bilateral   posterior cerebral arteries are patent without evidence of significant   stenosis, major vessel occlusion, or aneurysm.    No enlarged vascular lesions or clusters of abnormal vessels are noted to   suggest an arterial venous malformation.    Visualized portions of the superficial and deep venous systems are   unremarkable.      IMPRESSION:    CT HEAD:  No CT evidence of acuteintracranial hemorrhage, mass effect, or midline   shift.    CT ANGIOGRAPHY NECK:  No hemodynamically significant stenosis by NASCET criteria. No vascular   dissection.    CT ANGIOGRAPHY BRAIN:  No major vessel occlusion, evidence of aneurysm, or other vascular   malformation.    Moderate narrowing of the V4 segment of the nondominant right vertebral   artery.    --- End of Report ---       MARK ENG MD; Resident Radiology  This document has been electronically signed.   BETH BRYAN MD; Attending Radiologist  This document has been electronically signed. Jan 12 2023 11:52PM

## 2023-01-18 NOTE — PROGRESS NOTE ADULT - ASSESSMENT
Assessment: Patient NERY LEONARD is a 67y (1955) Rh danette speaking man with CAD s/p 1 stent on brilinta, DM, CKD on HD, ,HTN, HLD presenting with Left eye ptosis and diplopia. Patient reports 10-12 days ago having left sided headache. At that time, he noted drooping of left eyelid. Over the course of the last 1 week hit has gotten progressively worse. 5 days ago he noticed double vision and eyes was dysconjugate. Right EOMI, Left eye ANGIE with restricted adduction movement. His headache was responding to tylenol, however worsened last night. He reports improvement of Left eye ptosis with high dose steroid, however, reports L eye ptosis worsens today.       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 Patient responding to steroids, cannot completely exclude diabetic ophthalmoplegia mimicking inflammatory etiology.    Recommendations:  [] Please follow up with rheumatology for GCA management and temporal artery biopsy  [] s/p solumedrol 1g x 3 days, started on prednisone 80mg IV 1/17. Continue prednisone 80mg IV daily recommended by rheumatology  [] follow up with endocrinology for diabetes management  [] Pain management: Due to GUS, would avoid NSAIDS, can consider tylenol 650mg PO q 8 hours for headache.  Please consult pain management due to complicated cardiac and renal history.    Plans discussed with Neurology attending, Dr. Fraire       Assessment: Patient NERY LEONARD is a 67y (1955) Rh danette speaking man with CAD s/p 1 stent on brilinta, DM, CKD on HD, ,HTN, HLD presenting with Left eye ptosis and diplopia. Patient reports 10-12 days ago having left sided headache. At that time, he noted drooping of left eyelid. Over the course of the last 1 week hit has gotten progressively worse. He also reported having binocular diplopia on right gaze. Right EOMI, Left eye ANGIE with restricted adduction movement. His headache was responding to tylenol, however, worsened last night. He reports improvement of Left eye ptosis with high dose steroid, however, reports L eye ptosis worsens today.       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 Patient responding to steroids, cannot completely exclude diabetic ophthalmoplegia mimicking inflammatory etiology.    Recommendations:  [] Please follow up with rheumatology for GCA management and temporal artery biopsy  [] s/p solumedrol 1g x 3 days, started on prednisone 80mg IV 1/17. Continue prednisone 80mg IV daily recommended by rheumatology  [] follow up with endocrinology for diabetes management  [] Pain management: Due to GUS, would avoid NSAIDS, can consider tylenol 650mg PO q 8 hours for headache.  Please consult pain management due to complicated cardiac and renal history.    Plans discussed with Neurology attending, Dr. Fraire

## 2023-01-18 NOTE — PROGRESS NOTE ADULT - ASSESSMENT
67y Male with possible left temporal arteritis. Vascular consulted for left TA biopsy.     Plan:  - OR Friday for TA biopsy  - CLD today, advance as tolerated  - please document cardiology clearance for procedure  - hold brillinta for 5 days before TA biopsy  - follow up with rheumatology recs  - will plan for left TA biopsy once all above cleared      Vascular  [id number]   67y Male with possible left temporal arteritis. Vascular consulted for left TA biopsy.     Plan:  - OR Friday for TA biopsy  - Needs HD tomorrow  - CLD today, advance as tolerated  - please document cardiology clearance for procedure  - hold brillinta for 5 days before TA biopsy  - follow up with rheumatology recs  - will plan for left TA biopsy once all above cleared    Vascular  [id number]

## 2023-01-18 NOTE — PROGRESS NOTE ADULT - ASSESSMENT
66 yo M PMH danette speaking, current smoker, CAD s/p 1 stent on brilinta, DM, CKD on HD, ,HTN, HLD presenting with Left eye ptosis and diplopia. Rheumatology consulted for r/o GCA     # concern for GCA - low to moderate suspicion   =p/w 10-12 days of L sided headache, L eye ptosis 2/2 CN 3 palsy  =CTA head/neck w/o significant stenosis, did show moderate narrowing of segment of R vertebral artery  =MR brain w/o contrast unremarkable  =MR orbits w/ mild atrophy and small vessel white matter ischemic changes - which are non-specific findings  =ESR borderline elevated, CRP wnl  = US temporal arteries negative for halo sign    =pt initialy w/ tenderness to L temporal artery on exam, which has resolved since 1 dose of 1g solumedrol  =s/p pulse steroid (1/14-16), now transition to high dose steroid (1/17- _     Plan:   - switch to prednisone 60mg starting 1/18    - vascular consult appreciated-  pending L. temporal artery biopsy- tentatively end of this week (has to be off Brilinta for 5 days)    - discussed worsening L. eye ptosis and pain today with opthalmology - vision is stable, no further imaging is recommended. Imaging had exclude tumor, pseudoaneurysm and CVA, but cannot exclude microvascular causes of CN3 (i.e uncontrolled HTN, DM)   - in very rare instances, GCA can cause CN 3 palsy, and pts responded well to steroid. Would not recommend escalating steroid dose    - appreciate endo recs while on high dose steroids    Case discussed with Dr. Cristopher Knapp, PGY-5  Rheumatology Fellow   Pager: [phone], also available on TEAMS   please enter a full 10 digit number for call back   During weekends, please page on call fellow     66 yo M PMH danette speaking, current smoker, CAD s/p 1 stent on brilinta, DM, CKD on HD, ,HTN, HLD presenting with Left eye ptosis and diplopia. Rheumatology consulted for r/o GCA     # concern for GCA - low to moderate suspicion   =p/w 10-12 days of L sided headache, L eye ptosis 2/2 CN 3 palsy  =CTA head/neck w/o significant stenosis, did show moderate narrowing of segment of R vertebral artery  =MR brain w/o contrast unremarkable  =MR orbits w/ mild atrophy and small vessel white matter ischemic changes - which are non-specific findings  =ESR borderline elevated, CRP wnl  = US temporal arteries negative for halo sign    =s/p pulse steroid (1/14-16), now transition to high dose steroid (1/17- _     Plan:   - switch to prednisone po 60mg daily starting 1/18    - vascular consult appreciated-  pending L. temporal artery biopsy- tentatively end of this week (has to be off Brilinta for 5 days)    - discussed worsening L. eye ptosis and pain today with opthalmology - vision is stable, no further imaging is recommended. Imaging had excluded tumor, pseudoaneurysm and CVA, but cannot exclude microvascular causes of CN3 (i.e uncontrolled HTN, DM)   - in very rare instances, GCA can cause CN 3 palsy, and pts responded well to steroid. Would not recommend escalating steroid dose    - appreciate endo recs while on high dose steroids    Case discussed with Dr. Cristopher Knapp, PGY-5  Rheumatology Fellow   Pager: [phone], also available on TEAMS   please enter a full 10 digit number for call back   During weekends, please page on call fellow

## 2023-01-18 NOTE — PROGRESS NOTE ADULT - PROBLEM SELECTOR PLAN 1
-test BG AC/HS/2AM  -C/w Lantus 30 units q hs for now  -Changed Admelog 10 units AC meals for now  -c/w Admelog moderate correction scale AC and  HS/ 2am scale while on steroids.  -Will adjust as needed.  -Please contact endo team with changes on steroid doses.   Discharge planning:  -Will be determined according to BG/insulin needs/PO intake and steroid therapy at time of discharge.  -basal/bolus versus basal/orals.    -F/u with endo if pt wishes [address] [place] [address] [phone]  - Patient will need opthalmology and podiatry/ Renal follow up as outpatient.  - Make sure pt has Rx for all DM supplies and insulin/ DM meds.

## 2023-01-18 NOTE — PROGRESS NOTE ADULT - SUBJECTIVE AND OBJECTIVE BOX
Patient is a 67y Male whom presented to the hospital with esrd on hd     PAST MEDICAL & SURGICAL HISTORY:      MEDICATIONS  (STANDING):  artificial  tears Solution 1 Drop(s) Both EYES three times a day  aspirin enteric coated 81 milliGRAM(s) Oral daily  atorvastatin 80 milliGRAM(s) Oral at bedtime  dextrose 5%. 1000 milliLiter(s) (50 mL/Hr) IV Continuous <Continuous>  dextrose 5%. 1000 milliLiter(s) (100 mL/Hr) IV Continuous <Continuous>  dextrose 50% Injectable 25 Gram(s) IV Push once  dextrose 50% Injectable 12.5 Gram(s) IV Push once  dextrose 50% Injectable 25 Gram(s) IV Push once  enoxaparin Injectable 30 milliGRAM(s) SubCutaneous every 24 hours  glucagon  Injectable 1 milliGRAM(s) IntraMuscular once  insulin lispro (ADMELOG) corrective regimen sliding scale   SubCutaneous at bedtime  insulin lispro (ADMELOG) corrective regimen sliding scale   SubCutaneous three times a day before meals  metoprolol tartrate 25 milliGRAM(s) Oral two times a day  pantoprazole    Tablet 40 milliGRAM(s) Oral before breakfast  polyethylene glycol 3350 17 Gram(s) Oral at bedtime  senna 2 Tablet(s) Oral at bedtime      Allergies    No Known Allergies    Intolerances        SOCIAL HISTORY:  Denies ETOh,Smoking,     FAMILY HISTORY:      REVIEW OF SYSTEMS:    CONSTITUTIONAL: No weakness, fevers or chills  RESPIRATORY: No cough, wheezing, hemoptysis; No shortness of breath  CARDIOVASCULAR: No chest pain or palpitations  GASTROINTESTINAL: No abdominal or epigastric pain. No nausea, vomiting,     No diarrhea or constipation. No melena   GENITOURINARY: No dysuria, frequency or hematuria  NEUROLOGICAL: No numbness or weakness  SKIN: dry                                                                                                           9.6    8.89  )-----------( 144      ( 18 Jan 2023 06:33 )             29.3       CBC Full  -  ( 18 Jan 2023 06:33 )  WBC Count : 8.89 K/uL  RBC Count : 2.95 M/uL  Hemoglobin : 9.6 g/dL  Hematocrit : 29.3 %  Platelet Count - Automated : 144 K/uL  Mean Cell Volume : 99.3 fl  Mean Cell Hemoglobin : 32.5 pg  Mean Cell Hemoglobin Concentration : 32.8 gm/dL  Auto Neutrophil # : x  Auto Lymphocyte # : x  Auto Monocyte # : x  Auto Eosinophil # : x  Auto Basophil # : x  Auto Neutrophil % : x  Auto Lymphocyte % : x  Auto Monocyte % : x  Auto Eosinophil % : x  Auto Basophil % : x      01-18    132<L>  |  94<L>  |  97<H>  ----------------------------<  155<H>  4.1   |  21<L>  |  9.90<H>    Ca    8.2<L>      18 Jan 2023 06:33  Phos  4.7     01-18  Mg     3.4     01-18        CAPILLARY BLOOD GLUCOSE      POCT Blood Glucose.: 176 mg/dL (18 Jan 2023 16:13)  POCT Blood Glucose.: 86 mg/dL (18 Jan 2023 14:27)  POCT Blood Glucose.: 79 mg/dL (18 Jan 2023 14:24)  POCT Blood Glucose.: 146 mg/dL (18 Jan 2023 08:16)  POCT Blood Glucose.: 235 mg/dL (18 Jan 2023 01:16)  POCT Blood Glucose.: 184 mg/dL (17 Jan 2023 21:07)      Vital Signs Last 24 Hrs  T(C): 36.7 (18 Jan 2023 17:33), Max: 37.6 (18 Jan 2023 09:30)  T(F): 98.1 (18 Jan 2023 17:33), Max: 99.7 (18 Jan 2023 09:30)  HR: 64 (18 Jan 2023 17:33) (60 - 66)  BP: 128/60 (18 Jan 2023 17:33) (128/60 - 162/68)  BP(mean): --  RR: 18 (18 Jan 2023 17:33) (17 - 19)  SpO2: 98% (18 Jan 2023 17:33) (97% - 100%)    Parameters below as of 18 Jan 2023 17:33  Patient On (Oxygen Delivery Method): room air                  PHYSICAL EXAM:    Constitutional: NAD  HEENT: conjunctive   clear   Neck:  No JVD  Respiratory: CTAB  Cardiovascular: S1 and S2  Gastrointestinal: BS+, soft, NT/ND  Extremities: No peripheral edema  Neurological: A/O x 3, no focal deficits  Access: Not applicable

## 2023-01-18 NOTE — PROGRESS NOTE ADULT - SUBJECTIVE AND OBJECTIVE BOX
PROGRESS NOTE:     Patient is a 67y old  Male who presents with a chief complaint of Left CN 3 palsy (17 Jan 2023 17:52)      SUBJECTIVE / OVERNIGHT EVENTS:    No acute events overnight. Patient examined at bedside with no acute complaints.     Pain:  Bowel Movements:  Urination:  OOB:  PT:    REVIEW OF SYSTEMS:    CONSTITUTIONAL: No weakness, fevers or chills  EYES/ENT: No visual changes;  No vertigo or throat pain   NECK: No pain or stiffness  RESPIRATORY: No cough, wheezing, hemoptysis; No shortness of breath  CARDIOVASCULAR: No chest pain or palpitations  GASTROINTESTINAL: No abdominal or epigastric pain. No nausea, vomiting, or hematemesis; No diarrhea or constipation. No melena or hematochezia.  GENITOURINARY: No dysuria, frequency or hematuria  NEUROLOGICAL: No numbness or weakness  SKIN: No itching, rashes      MEDICATIONS  (STANDING):  artificial  tears Solution 1 Drop(s) Both EYES three times a day  aspirin enteric coated 81 milliGRAM(s) Oral daily  atorvastatin 80 milliGRAM(s) Oral at bedtime  dextrose 5%. 1000 milliLiter(s) (100 mL/Hr) IV Continuous <Continuous>  dextrose 5%. 1000 milliLiter(s) (50 mL/Hr) IV Continuous <Continuous>  dextrose 50% Injectable 25 Gram(s) IV Push once  dextrose 50% Injectable 12.5 Gram(s) IV Push once  dextrose 50% Injectable 25 Gram(s) IV Push once  enoxaparin Injectable 30 milliGRAM(s) SubCutaneous every 24 hours  glucagon  Injectable 1 milliGRAM(s) IntraMuscular once  insulin glargine Injectable (LANTUS) 30 Unit(s) SubCutaneous at bedtime  insulin lispro (ADMELOG) corrective regimen sliding scale   SubCutaneous <User Schedule>  insulin lispro (ADMELOG) corrective regimen sliding scale   SubCutaneous three times a day before meals  insulin lispro Injectable (ADMELOG) 15 Unit(s) SubCutaneous three times a day before meals  melatonin 3 milliGRAM(s) Oral at bedtime  methylPREDNISolone sodium succinate Injectable 60 milliGRAM(s) IV Push daily  metoprolol tartrate 25 milliGRAM(s) Oral two times a day  pantoprazole    Tablet 40 milliGRAM(s) Oral before breakfast  polyethylene glycol 3350 17 Gram(s) Oral daily  polyethylene glycol 3350 17 Gram(s) Oral at bedtime  senna 2 Tablet(s) Oral at bedtime  sodium chloride 2 Gram(s) Oral three times a day  sodium zirconium cyclosilicate 5 Gram(s) Oral daily    MEDICATIONS  (PRN):  acetaminophen     Tablet .. 650 milliGRAM(s) Oral every 6 hours PRN Moderate Pain (4 - 6), Severe Pain (7 - 10)  dextrose Oral Gel 15 Gram(s) Oral once PRN Blood Glucose LESS THAN 70 milliGRAM(s)/deciliter      CAPILLARY BLOOD GLUCOSE      POCT Blood Glucose.: 235 mg/dL (18 Jan 2023 01:16)  POCT Blood Glucose.: 184 mg/dL (17 Jan 2023 21:07)  POCT Blood Glucose.: 109 mg/dL (17 Jan 2023 16:21)  POCT Blood Glucose.: 172 mg/dL (17 Jan 2023 11:18)  POCT Blood Glucose.: 226 mg/dL (17 Jan 2023 07:36)    I&O's Summary    16 Jan 2023 07:01  -  17 Jan 2023 07:00  --------------------------------------------------------  IN: 170 mL / OUT: 3000 mL / NET: -2830 mL    17 Jan 2023 07:01  -  18 Jan 2023 06:50  --------------------------------------------------------  IN: 660 mL / OUT: 0 mL / NET: 660 mL        VITALS:   T(C): 36.6 (01-18-23 @ 04:53), Max: 37.1 (01-17-23 @ 21:04)  HR: 64 (01-18-23 @ 04:53) (61 - 79)  BP: 151/86 (01-18-23 @ 04:53) (139/62 - 163/70)  RR: 18 (01-18-23 @ 04:53) (18 - 19)  SpO2: 100% (01-18-23 @ 04:53) (95% - 100%)    GENERAL: NAD, lying in bed comfortably  HEAD:  Atraumatic, normocephalic  EYES: EOMI, PERRLA, conjunctiva and sclera clear  ENT: Moist mucous membranes  NECK: Supple, no JVD  HEART: Regular rate and rhythm, no murmurs, rubs, or gallops  LUNGS: Unlabored respirations.  Clear to auscultation bilaterally, no crackles, wheezing, or rhonchi  ABDOMEN: Soft, nontender, nondistended, +BS  EXTREMITIES: 2+ peripheral pulses bilaterally. No clubbing, cyanosis, or edema  NERVOUS SYSTEM:  A&Ox3, no focal deficits   SKIN: No rashes or lesions    LABS:                        9.1    9.91  )-----------( 147      ( 17 Jan 2023 05:35 )             27.3     01-17    130<L>  |  91<L>  |  69<H>  ----------------------------<  228<H>  5.0   |  25  |  8.31<H>    Ca    8.6      17 Jan 2023 05:35  Phos  3.9     01-17  Mg     3.3     01-17      PT/INR - ( 16 Jan 2023 14:27 )   PT: 11.1 sec;   INR: 0.97 ratio         PTT - ( 16 Jan 2023 14:27 )  PTT:29.2 sec            RADIOLOGY & ADDITIONAL TESTS:  Results Reviewed:   Imaging Personally Reviewed:  Electrocardiogram Personally Reviewed:    COORDINATION OF CARE:  Care Discussed with Consultants/Other Providers [Y/N]:  Prior or Outpatient Records Reviewed [Y/N]:   PROGRESS NOTE:     Patient is a 67y old  Male who presents with a chief complaint of Left CN 3 palsy (17 Jan 2023 17:52)      SUBJECTIVE / OVERNIGHT EVENTS:    No acute events overnight. Patient examined at bedside with no acute complaints. HD planned for today.     Pain:  Bowel Movements:  Urination:  OOB:  PT:    REVIEW OF SYSTEMS:    CONSTITUTIONAL: No weakness, fevers or chills  EYES/ENT: No visual changes;  No vertigo or throat pain   NECK: No pain or stiffness  RESPIRATORY: No cough, wheezing, hemoptysis; No shortness of breath  CARDIOVASCULAR: No chest pain or palpitations  GASTROINTESTINAL: No abdominal or epigastric pain. No nausea, vomiting, or hematemesis; No diarrhea or constipation. No melena or hematochezia.  GENITOURINARY: No dysuria, frequency or hematuria  NEUROLOGICAL: No numbness or weakness  SKIN: No itching, rashes      MEDICATIONS  (STANDING):  artificial  tears Solution 1 Drop(s) Both EYES three times a day  aspirin enteric coated 81 milliGRAM(s) Oral daily  atorvastatin 80 milliGRAM(s) Oral at bedtime  dextrose 5%. 1000 milliLiter(s) (100 mL/Hr) IV Continuous <Continuous>  dextrose 5%. 1000 milliLiter(s) (50 mL/Hr) IV Continuous <Continuous>  dextrose 50% Injectable 25 Gram(s) IV Push once  dextrose 50% Injectable 12.5 Gram(s) IV Push once  dextrose 50% Injectable 25 Gram(s) IV Push once  enoxaparin Injectable 30 milliGRAM(s) SubCutaneous every 24 hours  glucagon  Injectable 1 milliGRAM(s) IntraMuscular once  insulin glargine Injectable (LANTUS) 30 Unit(s) SubCutaneous at bedtime  insulin lispro (ADMELOG) corrective regimen sliding scale   SubCutaneous <User Schedule>  insulin lispro (ADMELOG) corrective regimen sliding scale   SubCutaneous three times a day before meals  insulin lispro Injectable (ADMELOG) 15 Unit(s) SubCutaneous three times a day before meals  melatonin 3 milliGRAM(s) Oral at bedtime  methylPREDNISolone sodium succinate Injectable 60 milliGRAM(s) IV Push daily  metoprolol tartrate 25 milliGRAM(s) Oral two times a day  pantoprazole    Tablet 40 milliGRAM(s) Oral before breakfast  polyethylene glycol 3350 17 Gram(s) Oral daily  polyethylene glycol 3350 17 Gram(s) Oral at bedtime  senna 2 Tablet(s) Oral at bedtime  sodium chloride 2 Gram(s) Oral three times a day  sodium zirconium cyclosilicate 5 Gram(s) Oral daily    MEDICATIONS  (PRN):  acetaminophen     Tablet .. 650 milliGRAM(s) Oral every 6 hours PRN Moderate Pain (4 - 6), Severe Pain (7 - 10)  dextrose Oral Gel 15 Gram(s) Oral once PRN Blood Glucose LESS THAN 70 milliGRAM(s)/deciliter      CAPILLARY BLOOD GLUCOSE      POCT Blood Glucose.: 235 mg/dL (18 Jan 2023 01:16)  POCT Blood Glucose.: 184 mg/dL (17 Jan 2023 21:07)  POCT Blood Glucose.: 109 mg/dL (17 Jan 2023 16:21)  POCT Blood Glucose.: 172 mg/dL (17 Jan 2023 11:18)  POCT Blood Glucose.: 226 mg/dL (17 Jan 2023 07:36)    I&O's Summary    16 Jan 2023 07:01  -  17 Jan 2023 07:00  --------------------------------------------------------  IN: 170 mL / OUT: 3000 mL / NET: -2830 mL    17 Jan 2023 07:01  -  18 Jan 2023 06:50  --------------------------------------------------------  IN: 660 mL / OUT: 0 mL / NET: 660 mL        VITALS:   T(C): 36.6 (01-18-23 @ 04:53), Max: 37.1 (01-17-23 @ 21:04)  HR: 64 (01-18-23 @ 04:53) (61 - 79)  BP: 151/86 (01-18-23 @ 04:53) (139/62 - 163/70)  RR: 18 (01-18-23 @ 04:53) (18 - 19)  SpO2: 100% (01-18-23 @ 04:53) (95% - 100%)    GENERAL: NAD, lying in bed comfortably  HEAD:  Atraumatic, normocephalic  EYES: EOMI, PERRLA, conjunctiva and sclera clear  ENT: Moist mucous membranes  NECK: Supple, no JVD  HEART: Regular rate and rhythm, no murmurs, rubs, or gallops  LUNGS: Unlabored respirations.  Clear to auscultation bilaterally, no crackles, wheezing, or rhonchi  ABDOMEN: Soft, nontender, nondistended, +BS  EXTREMITIES: 2+ peripheral pulses bilaterally. No clubbing, cyanosis, or edema  NERVOUS SYSTEM:  A&Ox3, no focal deficits   SKIN: No rashes or lesions    LABS:                        9.1    9.91  )-----------( 147      ( 17 Jan 2023 05:35 )             27.3     01-17    130<L>  |  91<L>  |  69<H>  ----------------------------<  228<H>  5.0   |  25  |  8.31<H>    Ca    8.6      17 Jan 2023 05:35  Phos  3.9     01-17  Mg     3.3     01-17      PT/INR - ( 16 Jan 2023 14:27 )   PT: 11.1 sec;   INR: 0.97 ratio         PTT - ( 16 Jan 2023 14:27 )  PTT:29.2 sec            RADIOLOGY & ADDITIONAL TESTS:  Results Reviewed:   Imaging Personally Reviewed:  Electrocardiogram Personally Reviewed:    COORDINATION OF CARE:  Care Discussed with Consultants/Other Providers [Y/N]:  Prior or Outpatient Records Reviewed [Y/N]:   PROGRESS NOTE:     Patient is a 67y old  Male who presents with a chief complaint of Left CN 3 palsy (17 Jan 2023 17:52)      SUBJECTIVE / OVERNIGHT EVENTS:    No acute events overnight. Patient examined at bedside and reports worsening left eye pain with blurry vision due to worsening ptosis. HD planned for today.     REVIEW OF SYSTEMS:    CONSTITUTIONAL: No weakness, fevers or chills  EYES/ENT: + visual changes;  No vertigo or throat pain   NECK: + pain   RESPIRATORY: No cough, wheezing, hemoptysis; No shortness of breath  CARDIOVASCULAR: No chest pain or palpitations  GASTROINTESTINAL: No abdominal or epigastric pain. No nausea, vomiting, or hematemesis; No diarrhea or constipation. No melena or hematochezia.  GENITOURINARY: No dysuria, frequency or hematuria  NEUROLOGICAL: No numbness or weakness  SKIN: No itching, rashes      MEDICATIONS  (STANDING):  artificial  tears Solution 1 Drop(s) Both EYES three times a day  aspirin enteric coated 81 milliGRAM(s) Oral daily  atorvastatin 80 milliGRAM(s) Oral at bedtime  dextrose 5%. 1000 milliLiter(s) (100 mL/Hr) IV Continuous <Continuous>  dextrose 5%. 1000 milliLiter(s) (50 mL/Hr) IV Continuous <Continuous>  dextrose 50% Injectable 25 Gram(s) IV Push once  dextrose 50% Injectable 12.5 Gram(s) IV Push once  dextrose 50% Injectable 25 Gram(s) IV Push once  enoxaparin Injectable 30 milliGRAM(s) SubCutaneous every 24 hours  glucagon  Injectable 1 milliGRAM(s) IntraMuscular once  insulin glargine Injectable (LANTUS) 30 Unit(s) SubCutaneous at bedtime  insulin lispro (ADMELOG) corrective regimen sliding scale   SubCutaneous <User Schedule>  insulin lispro (ADMELOG) corrective regimen sliding scale   SubCutaneous three times a day before meals  insulin lispro Injectable (ADMELOG) 15 Unit(s) SubCutaneous three times a day before meals  melatonin 3 milliGRAM(s) Oral at bedtime  methylPREDNISolone sodium succinate Injectable 60 milliGRAM(s) IV Push daily  metoprolol tartrate 25 milliGRAM(s) Oral two times a day  pantoprazole    Tablet 40 milliGRAM(s) Oral before breakfast  polyethylene glycol 3350 17 Gram(s) Oral daily  polyethylene glycol 3350 17 Gram(s) Oral at bedtime  senna 2 Tablet(s) Oral at bedtime  sodium chloride 2 Gram(s) Oral three times a day  sodium zirconium cyclosilicate 5 Gram(s) Oral daily    MEDICATIONS  (PRN):  acetaminophen     Tablet .. 650 milliGRAM(s) Oral every 6 hours PRN Moderate Pain (4 - 6), Severe Pain (7 - 10)  dextrose Oral Gel 15 Gram(s) Oral once PRN Blood Glucose LESS THAN 70 milliGRAM(s)/deciliter      CAPILLARY BLOOD GLUCOSE      POCT Blood Glucose.: 235 mg/dL (18 Jan 2023 01:16)  POCT Blood Glucose.: 184 mg/dL (17 Jan 2023 21:07)  POCT Blood Glucose.: 109 mg/dL (17 Jan 2023 16:21)  POCT Blood Glucose.: 172 mg/dL (17 Jan 2023 11:18)  POCT Blood Glucose.: 226 mg/dL (17 Jan 2023 07:36)    I&O's Summary    16 Jan 2023 07:01  -  17 Jan 2023 07:00  --------------------------------------------------------  IN: 170 mL / OUT: 3000 mL / NET: -2830 mL    17 Jan 2023 07:01  -  18 Jan 2023 06:50  --------------------------------------------------------  IN: 660 mL / OUT: 0 mL / NET: 660 mL        VITALS:   T(C): 36.6 (01-18-23 @ 04:53), Max: 37.1 (01-17-23 @ 21:04)  HR: 64 (01-18-23 @ 04:53) (61 - 79)  BP: 151/86 (01-18-23 @ 04:53) (139/62 - 163/70)  RR: 18 (01-18-23 @ 04:53) (18 - 19)  SpO2: 100% (01-18-23 @ 04:53) (95% - 100%)    GENERAL: NAD, lying in bed   HEAD:  Atraumatic, normocephalic  EYES: EOM impaired, Pupils fixed and uneven, Left eye ptosis worsened from yesterday   ENT: Moist mucous membranes  NECK: + LAD, tender to palpation   HEART: Regular rate and rhythm, no murmurs, rubs, or gallops  LUNGS: Unlabored respirations.  Clear to auscultation bilaterally, no crackles, wheezing, or rhonchi  ABDOMEN: Soft, nontender, nondistended, +BS  EXTREMITIES: 2+ peripheral pulses bilaterally. No clubbing, cyanosis, or edema  NERVOUS SYSTEM:  A&Ox3, worsening left eye ptosis, pupils fixed, left eye ptosis   SKIN: No rashes or lesions    LABS:                        9.1    9.91  )-----------( 147      ( 17 Jan 2023 05:35 )             27.3     01-17    130<L>  |  91<L>  |  69<H>  ----------------------------<  228<H>  5.0   |  25  |  8.31<H>    Ca    8.6      17 Jan 2023 05:35  Phos  3.9     01-17  Mg     3.3     01-17      PT/INR - ( 16 Jan 2023 14:27 )   PT: 11.1 sec;   INR: 0.97 ratio         PTT - ( 16 Jan 2023 14:27 )  PTT:29.2 sec            RADIOLOGY & ADDITIONAL TESTS:  Results Reviewed:   Imaging Personally Reviewed:  Electrocardiogram Personally Reviewed:    COORDINATION OF CARE:  Care Discussed with Consultants/Other Providers [Y/N]:  Prior or Outpatient Records Reviewed [Y/N]:

## 2023-01-19 ENCOUNTER — TRANSCRIPTION ENCOUNTER (OUTPATIENT)
Age: 68
End: 2023-01-19

## 2023-01-19 LAB
ACHR BLOCK AB SER-ACNC: 14 % — SIGNIFICANT CHANGE UP (ref 0–25)
ANION GAP SERPL CALC-SCNC: 16 MMOL/L — SIGNIFICANT CHANGE UP (ref 5–17)
BUN SERPL-MCNC: 62 MG/DL — HIGH (ref 7–23)
CALCIUM SERPL-MCNC: 8.3 MG/DL — LOW (ref 8.4–10.5)
CHLORIDE SERPL-SCNC: 94 MMOL/L — LOW (ref 96–108)
CO2 SERPL-SCNC: 23 MMOL/L — SIGNIFICANT CHANGE UP (ref 22–31)
CREAT SERPL-MCNC: 7.64 MG/DL — HIGH (ref 0.5–1.3)
EGFR: 7 ML/MIN/1.73M2 — LOW
GLUCOSE BLDC GLUCOMTR-MCNC: 111 MG/DL — HIGH (ref 70–99)
GLUCOSE BLDC GLUCOMTR-MCNC: 171 MG/DL — HIGH (ref 70–99)
GLUCOSE BLDC GLUCOMTR-MCNC: 177 MG/DL — HIGH (ref 70–99)
GLUCOSE BLDC GLUCOMTR-MCNC: 197 MG/DL — HIGH (ref 70–99)
GLUCOSE SERPL-MCNC: 123 MG/DL — HIGH (ref 70–99)
HCT VFR BLD CALC: 32.1 % — LOW (ref 39–50)
HGB BLD-MCNC: 10.2 G/DL — LOW (ref 13–17)
MAGNESIUM SERPL-MCNC: 3 MG/DL — HIGH (ref 1.6–2.6)
MCHC RBC-ENTMCNC: 31.4 PG — SIGNIFICANT CHANGE UP (ref 27–34)
MCHC RBC-ENTMCNC: 31.8 GM/DL — LOW (ref 32–36)
MCV RBC AUTO: 98.8 FL — SIGNIFICANT CHANGE UP (ref 80–100)
NRBC # BLD: 0 /100 WBCS — SIGNIFICANT CHANGE UP (ref 0–0)
PHOSPHATE SERPL-MCNC: 5.6 MG/DL — HIGH (ref 2.5–4.5)
PLATELET # BLD AUTO: 159 K/UL — SIGNIFICANT CHANGE UP (ref 150–400)
POTASSIUM SERPL-MCNC: 4.3 MMOL/L — SIGNIFICANT CHANGE UP (ref 3.5–5.3)
POTASSIUM SERPL-SCNC: 4.3 MMOL/L — SIGNIFICANT CHANGE UP (ref 3.5–5.3)
RBC # BLD: 3.25 M/UL — LOW (ref 4.2–5.8)
RBC # FLD: 15.9 % — HIGH (ref 10.3–14.5)
SARS-COV-2 RNA SPEC QL NAA+PROBE: SIGNIFICANT CHANGE UP
SODIUM SERPL-SCNC: 133 MMOL/L — LOW (ref 135–145)
WBC # BLD: 8.15 K/UL — SIGNIFICANT CHANGE UP (ref 3.8–10.5)
WBC # FLD AUTO: 8.15 K/UL — SIGNIFICANT CHANGE UP (ref 3.8–10.5)

## 2023-01-19 PROCEDURE — 99233 SBSQ HOSP IP/OBS HIGH 50: CPT

## 2023-01-19 PROCEDURE — 99232 SBSQ HOSP IP/OBS MODERATE 35: CPT

## 2023-01-19 PROCEDURE — 99232 SBSQ HOSP IP/OBS MODERATE 35: CPT | Mod: 57

## 2023-01-19 PROCEDURE — 99232 SBSQ HOSP IP/OBS MODERATE 35: CPT | Mod: GC

## 2023-01-19 PROCEDURE — 70546 MR ANGIOGRAPH HEAD W/O&W/DYE: CPT | Mod: 26

## 2023-01-19 RX ORDER — INSULIN GLARGINE 100 [IU]/ML
28 INJECTION, SOLUTION SUBCUTANEOUS AT BEDTIME
Refills: 0 | Status: DISCONTINUED | OUTPATIENT
Start: 2023-01-19 | End: 2023-01-20

## 2023-01-19 RX ORDER — GABAPENTIN 400 MG/1
100 CAPSULE ORAL THREE TIMES A DAY
Refills: 0 | Status: DISCONTINUED | OUTPATIENT
Start: 2023-01-19 | End: 2023-01-19

## 2023-01-19 RX ORDER — GABAPENTIN 400 MG/1
100 CAPSULE ORAL THREE TIMES A DAY
Refills: 0 | Status: DISCONTINUED | OUTPATIENT
Start: 2023-01-19 | End: 2023-01-20

## 2023-01-19 RX ADMIN — Medication 2: at 12:20

## 2023-01-19 RX ADMIN — HEPARIN SODIUM 5000 UNIT(S): 5000 INJECTION INTRAVENOUS; SUBCUTANEOUS at 05:05

## 2023-01-19 RX ADMIN — SODIUM ZIRCONIUM CYCLOSILICATE 5 GRAM(S): 10 POWDER, FOR SUSPENSION ORAL at 12:19

## 2023-01-19 RX ADMIN — POLYETHYLENE GLYCOL 3350 17 GRAM(S): 17 POWDER, FOR SOLUTION ORAL at 12:19

## 2023-01-19 RX ADMIN — POLYETHYLENE GLYCOL 3350 17 GRAM(S): 17 POWDER, FOR SOLUTION ORAL at 21:38

## 2023-01-19 RX ADMIN — HEPARIN SODIUM 5000 UNIT(S): 5000 INJECTION INTRAVENOUS; SUBCUTANEOUS at 21:22

## 2023-01-19 RX ADMIN — SODIUM CHLORIDE 2 GRAM(S): 9 INJECTION INTRAMUSCULAR; INTRAVENOUS; SUBCUTANEOUS at 14:33

## 2023-01-19 RX ADMIN — Medication 25 MILLIGRAM(S): at 05:04

## 2023-01-19 RX ADMIN — Medication 1 DROP(S): at 05:05

## 2023-01-19 RX ADMIN — SENNA PLUS 2 TABLET(S): 8.6 TABLET ORAL at 21:38

## 2023-01-19 RX ADMIN — Medication 1 DROP(S): at 14:33

## 2023-01-19 RX ADMIN — Medication 2: at 08:09

## 2023-01-19 RX ADMIN — Medication 650 MILLIGRAM(S): at 23:53

## 2023-01-19 RX ADMIN — GABAPENTIN 100 MILLIGRAM(S): 400 CAPSULE ORAL at 21:39

## 2023-01-19 RX ADMIN — GABAPENTIN 100 MILLIGRAM(S): 400 CAPSULE ORAL at 14:33

## 2023-01-19 RX ADMIN — Medication 60 MILLIGRAM(S): at 05:05

## 2023-01-19 RX ADMIN — PANTOPRAZOLE SODIUM 40 MILLIGRAM(S): 20 TABLET, DELAYED RELEASE ORAL at 05:04

## 2023-01-19 RX ADMIN — Medication 25 MILLIGRAM(S): at 21:44

## 2023-01-19 RX ADMIN — Medication 10 UNIT(S): at 12:23

## 2023-01-19 RX ADMIN — Medication 81 MILLIGRAM(S): at 12:19

## 2023-01-19 RX ADMIN — INSULIN GLARGINE 28 UNIT(S): 100 INJECTION, SOLUTION SUBCUTANEOUS at 21:39

## 2023-01-19 RX ADMIN — ATORVASTATIN CALCIUM 80 MILLIGRAM(S): 80 TABLET, FILM COATED ORAL at 21:23

## 2023-01-19 RX ADMIN — Medication 1 DROP(S): at 21:21

## 2023-01-19 RX ADMIN — Medication 3 MILLIGRAM(S): at 21:40

## 2023-01-19 RX ADMIN — SODIUM CHLORIDE 2 GRAM(S): 9 INJECTION INTRAMUSCULAR; INTRAVENOUS; SUBCUTANEOUS at 21:38

## 2023-01-19 RX ADMIN — Medication 10 UNIT(S): at 08:09

## 2023-01-19 RX ADMIN — Medication 2: at 21:41

## 2023-01-19 NOTE — PROGRESS NOTE ADULT - PROBLEM SELECTOR PLAN 1
-test BG AC/HS/2AM. Can change to Q6h when NPO  -Adjust Lantus 28 units tonight for NPO status  -Admelog 10 units AC meals for now. Will adjust based on lunch/dinner BG levels today  -c/w Admelog moderate correction scale AC and mod HS scale. Can change to q6h scale when NPO  -cons carb renal diet  -Please contact endo team with changes on steroid doses. Currently on Prednisone 60mg daily  Discharge planning:  -Will be determined according to BG/insulin needs/PO intake and steroid therapy at time of discharge.  -basal/bolus versus basal/orals.    -F/u with endo if pt wishes [address] [place] [address] [phone]  - Patient will need opthalmology and podiatry/ Renal follow up as outpatient.  - Make sure pt has Rx for all DM supplies and insulin/ DM meds.

## 2023-01-19 NOTE — PROGRESS NOTE ADULT - ASSESSMENT
67y Male with possible left temporal arteritis. Vascular consulted for left TA biopsy. OR Friday for TA biopsy    Plan:    - Needs HD today  - 1am labs tonight  - NPO at midnight  - CLD, advance as tolerated  - please document cardiology clearance for procedure  - hold brillinta for 5 days before TA biopsy  - follow up with rheumatology recs  - will plan for left TA biopsy once all above cleared      Vascular  [id number]

## 2023-01-19 NOTE — PROGRESS NOTE ADULT - ATTENDING COMMENTS
68 y/o M with ESRD on HD, CAD s/p stents, HTN a/w CN3 palsy- ptosis and presumed GCA  Continue with steriods per rheum  Brillinta on hold for planned L Temporal artery biopsy on friday  T2DM uncontrolled- seen by endo with adjustments made- better today  Hyponatremia improving  Optho follow up for eye pain  Gabapentin started

## 2023-01-19 NOTE — PROGRESS NOTE ADULT - SUBJECTIVE AND OBJECTIVE BOX
NEUROLOGY FOLLOW-UP CONSULT NOTE    RFC:     Interval history: No acute neurologic events overnight. Patient prefers his daughter to translate. He still reports L temporal  pain , consistent b/l jaw pain, and L eye ptosis.    Meds:  MEDICATIONS  (STANDING):  artificial  tears Solution 1 Drop(s) Both EYES three times a day  aspirin enteric coated 81 milliGRAM(s) Oral daily  atorvastatin 80 milliGRAM(s) Oral at bedtime  dextrose 5%. 1000 milliLiter(s) (100 mL/Hr) IV Continuous <Continuous>  dextrose 5%. 1000 milliLiter(s) (50 mL/Hr) IV Continuous <Continuous>  dextrose 50% Injectable 25 Gram(s) IV Push once  dextrose 50% Injectable 12.5 Gram(s) IV Push once  dextrose 50% Injectable 25 Gram(s) IV Push once  gabapentin Solution 100 milliGRAM(s) Oral three times a day  glucagon  Injectable 1 milliGRAM(s) IntraMuscular once  heparin   Injectable 5000 Unit(s) SubCutaneous every 12 hours  insulin glargine Injectable (LANTUS) 28 Unit(s) SubCutaneous at bedtime  insulin lispro (ADMELOG) corrective regimen sliding scale   SubCutaneous three times a day before meals  insulin lispro (ADMELOG) corrective regimen sliding scale   SubCutaneous <User Schedule>  insulin lispro Injectable (ADMELOG) 10 Unit(s) SubCutaneous three times a day before meals  melatonin 3 milliGRAM(s) Oral at bedtime  metoprolol tartrate 25 milliGRAM(s) Oral two times a day  pantoprazole    Tablet 40 milliGRAM(s) Oral before breakfast  polyethylene glycol 3350 17 Gram(s) Oral daily  polyethylene glycol 3350 17 Gram(s) Oral at bedtime  predniSONE   Tablet 60 milliGRAM(s) Oral daily  senna 2 Tablet(s) Oral at bedtime  sodium chloride 2 Gram(s) Oral three times a day  sodium zirconium cyclosilicate 5 Gram(s) Oral daily    MEDICATIONS  (PRN):  acetaminophen     Tablet .. 650 milliGRAM(s) Oral every 6 hours PRN Moderate Pain (4 - 6), Severe Pain (7 - 10)  dextrose Oral Gel 15 Gram(s) Oral once PRN Blood Glucose LESS THAN 70 milliGRAM(s)/deciliter        PMHx/PSHx/FHx/SHx:  Pain of left eye    Handoff    MEWS Score    Left eye pain    Type 2 diabetes mellitus with hyperglycemia    Hypertension    Hyperlipidemia    Steroid-induced hyperglycemia    Cranial nerve III palsy, left    Thrombocytopenia    Hyperkalemia    Hyponatremia    Need for prophylactic measure    CAD (coronary artery disease)    Cranial nerve III palsy, left    Type 2 diabetes mellitus    Hypertension    Hyperlipidemia    Steroid-induced hyperglycemia    CAD (coronary artery disease)    Hyperkalemia    Hyponatremia    Need for prophylactic measure    Type 2 diabetes mellitus with hyperglycemia    L EYE UNABLE TO OPEN 2 WKS    [email or web address]        Allergies:  No Known Allergies      ROS: All systems negative except as documented in Interval history    O:  T(C): 36.6 (01-19-23 @ 13:54), Max: 36.8 (01-18-23 @ 21:17)  T(F): 97.8 (01-19-23 @ 13:54), Max: 98.2 (01-18-23 @ 21:17)  HR: 66 (01-19-23 @ 13:54) (63 - 79)  BP: 170/72 (01-19-23 @ 13:54) (128/60 - 170/72)  RR: 17 (01-19-23 @ 13:54) (17 - 18)  SpO2: 99% (01-19-23 @ 13:54) (98% - 99%)  Wt(kg): --    Focused neurologic exam:  MS - AAO x3, speech fluent, rep/naming intact, follows commands, attn/conc/recent and remote memory/fund of knowledge WNL  CN - PERRLA, R eye EOMI intact, Left Ptosis, L eye restricted EMO with some abduction movement, VFF, occasional R gaze binocular diplopia, face sens/str/hearing WNL b/l, tongue/palate midline, trap 5/5 b/l  Motor - Normal bulk/tone, 5/5 all  Sens - LT/temp/vib intact all, no temporal artery tenderness  DTR's - 2+ all and downgoing b/l plantar response  Coord - FtN intact b/l  Gait and station - Normal casual gait.     Pertinent labs/studies:      LABS:  cret                        10.2   8.15  )-----------( 159      ( 19 Jan 2023 07:10 )             32.1     01-19    133<L>  |  94<L>  |  62<H>  ----------------------------<  123<H>  4.3   |  23  |  7.64<H>    Ca    8.3<L>      19 Jan 2023 07:09  Phos  5.6     01-19  Mg     3.0     01-19          Radiology:    VA Duplex Face Temporal Artery (01.17.23 @ 10:49)   Mild atheromatous intimal thickening and irregularity are present along   the course of the right and the left temporal arteries including their   frontal and parietal branches.    There is unimpeded flow through these arteries.    No halo of periarterial edema characteristic for temporal arteritis is   identified.    There is unimpeded flow through the right and left axillary arteries.    IMPRESSION:    Giant cell arteritis is not identified.      CT Head No Cont (01.17.23 @ 09:59)   FINDINGS:    No hydrocephalus, mass effect, midline shift, acute intracranial   hemorrhage, or brain edema.    Small polyp/retention cyst in the right axillary sinus. Remaining   visualized paranasal sinuses and mastoid air cells are clear.    IMPRESSION:    No hydrocephalus, acute intracranial hemorrhage, mass effect, or brain   edema.      MR Head w/ IV contrast & MR Orbits 1/15/23  Impression: Mild atrophy and small vessel white matter ischemic changes.   No acute infarcts or hemorrhage. Normal cavernous sinuses. Unremarkable   orbits.    MR HEAD 1/13/23  IMPRESSION:  No acute intracranial hemorrhage, acute infarction, extra-axial fluid   collection or hydrocephalus.    CT HEAD 1/13/23:  No CT evidence of acuteintracranial hemorrhage, mass effect, or midline   shift.    CT ANGIOGRAPHY NECK 1/13/23:  No hemodynamically significant stenosis by NASCET criteria. No vascular   dissection.    CT ANGIOGRAPHY BRAIN 1/13/23:  No major vessel occlusion, evidence of aneurysm, or other vascular   malformation.    Moderate narrowing of the V4 segment of the nondominant right vertebral   artery.   NEUROLOGY FOLLOW-UP CONSULT NOTE    RFC: Headache, L eye ptosis, possible temporal arteritis    Interval history: No acute neurologic events overnight. Patient prefers his daughter to translate. He still reports L temporal  pain , consistent b/l jaw pain, and L eye ptosis.    Meds:  MEDICATIONS  (STANDING):  artificial  tears Solution 1 Drop(s) Both EYES three times a day  aspirin enteric coated 81 milliGRAM(s) Oral daily  atorvastatin 80 milliGRAM(s) Oral at bedtime  dextrose 5%. 1000 milliLiter(s) (100 mL/Hr) IV Continuous <Continuous>  dextrose 5%. 1000 milliLiter(s) (50 mL/Hr) IV Continuous <Continuous>  dextrose 50% Injectable 25 Gram(s) IV Push once  dextrose 50% Injectable 12.5 Gram(s) IV Push once  dextrose 50% Injectable 25 Gram(s) IV Push once  gabapentin Solution 100 milliGRAM(s) Oral three times a day  glucagon  Injectable 1 milliGRAM(s) IntraMuscular once  heparin   Injectable 5000 Unit(s) SubCutaneous every 12 hours  insulin glargine Injectable (LANTUS) 28 Unit(s) SubCutaneous at bedtime  insulin lispro (ADMELOG) corrective regimen sliding scale   SubCutaneous three times a day before meals  insulin lispro (ADMELOG) corrective regimen sliding scale   SubCutaneous <User Schedule>  insulin lispro Injectable (ADMELOG) 10 Unit(s) SubCutaneous three times a day before meals  melatonin 3 milliGRAM(s) Oral at bedtime  metoprolol tartrate 25 milliGRAM(s) Oral two times a day  pantoprazole    Tablet 40 milliGRAM(s) Oral before breakfast  polyethylene glycol 3350 17 Gram(s) Oral daily  polyethylene glycol 3350 17 Gram(s) Oral at bedtime  predniSONE   Tablet 60 milliGRAM(s) Oral daily  senna 2 Tablet(s) Oral at bedtime  sodium chloride 2 Gram(s) Oral three times a day  sodium zirconium cyclosilicate 5 Gram(s) Oral daily    MEDICATIONS  (PRN):  acetaminophen     Tablet .. 650 milliGRAM(s) Oral every 6 hours PRN Moderate Pain (4 - 6), Severe Pain (7 - 10)  dextrose Oral Gel 15 Gram(s) Oral once PRN Blood Glucose LESS THAN 70 milliGRAM(s)/deciliter        PMHx/PSHx/FHx/SHx:  Pain of left eye    Handoff    MEWS Score    Left eye pain    Type 2 diabetes mellitus with hyperglycemia    Hypertension    Hyperlipidemia    Steroid-induced hyperglycemia    Cranial nerve III palsy, left    Thrombocytopenia    Hyperkalemia    Hyponatremia    Need for prophylactic measure    CAD (coronary artery disease)    Cranial nerve III palsy, left    Type 2 diabetes mellitus    Hypertension    Hyperlipidemia    Steroid-induced hyperglycemia    CAD (coronary artery disease)    Hyperkalemia    Hyponatremia    Need for prophylactic measure    Type 2 diabetes mellitus with hyperglycemia    L EYE UNABLE TO OPEN 2 WKS    SysAdmin_VisitLink        Allergies:  No Known Allergies      ROS: All systems negative except as documented in Interval history    O:  T(C): 36.6 (01-19-23 @ 13:54), Max: 36.8 (01-18-23 @ 21:17)  T(F): 97.8 (01-19-23 @ 13:54), Max: 98.2 (01-18-23 @ 21:17)  HR: 66 (01-19-23 @ 13:54) (63 - 79)  BP: 170/72 (01-19-23 @ 13:54) (128/60 - 170/72)  RR: 17 (01-19-23 @ 13:54) (17 - 18)  SpO2: 99% (01-19-23 @ 13:54) (98% - 99%)  Wt(kg): --    Focused neurologic exam:  MS - AAO x3, speech fluent, rep/naming intact, follows commands, attn/conc/recent and remote memory/fund of knowledge WNL  CN - PERRLA, R eye EOMI intact, Left Ptosis, L eye restricted EMO with some abduction movement, VFF, occasional R gaze binocular diplopia, face sens/str/hearing WNL b/l, tongue/palate midline, trap 5/5 b/l  Motor - Normal bulk/tone, 5/5 all  Sens - LT/temp/vib intact all, no temporal artery tenderness  DTR's - 2+ all and downgoing b/l plantar response  Coord - FtN intact b/l  Gait and station - Normal casual gait.     Pertinent labs/studies:      LABS:  cret                        10.2   8.15  )-----------( 159      ( 19 Jan 2023 07:10 )             32.1     01-19    133<L>  |  94<L>  |  62<H>  ----------------------------<  123<H>  4.3   |  23  |  7.64<H>    Ca    8.3<L>      19 Jan 2023 07:09  Phos  5.6     01-19  Mg     3.0     01-19          Radiology:    VA Duplex Face Temporal Artery (01.17.23 @ 10:49)   Mild atheromatous intimal thickening and irregularity are present along   the course of the right and the left temporal arteries including their   frontal and parietal branches.    There is unimpeded flow through these arteries.    No halo of periarterial edema characteristic for temporal arteritis is   identified.    There is unimpeded flow through the right and left axillary arteries.    IMPRESSION:    Giant cell arteritis is not identified.      CT Head No Cont (01.17.23 @ 09:59)   FINDINGS:    No hydrocephalus, mass effect, midline shift, acute intracranial   hemorrhage, or brain edema.    Small polyp/retention cyst in the right axillary sinus. Remaining   visualized paranasal sinuses and mastoid air cells are clear.    IMPRESSION:    No hydrocephalus, acute intracranial hemorrhage, mass effect, or brain   edema.      MR Head w/ IV contrast & MR Orbits 1/15/23  Impression: Mild atrophy and small vessel white matter ischemic changes.   No acute infarcts or hemorrhage. Normal cavernous sinuses. Unremarkable   orbits.    MR HEAD 1/13/23  IMPRESSION:  No acute intracranial hemorrhage, acute infarction, extra-axial fluid   collection or hydrocephalus.    CT HEAD 1/13/23:  No CT evidence of acuteintracranial hemorrhage, mass effect, or midline   shift.    CT ANGIOGRAPHY NECK 1/13/23:  No hemodynamically significant stenosis by NASCET criteria. No vascular   dissection.    CT ANGIOGRAPHY BRAIN 1/13/23:  No major vessel occlusion, evidence of aneurysm, or other vascular   malformation.    Moderate narrowing of the V4 segment of the nondominant right vertebral   artery.

## 2023-01-19 NOTE — PROGRESS NOTE ADULT - SUBJECTIVE AND OBJECTIVE BOX
Diabetes Follow up note:    Chief complaint: T2DM w/steroid induced hyperglycemia    Interval Hx: BG values 70-mid 200s over past 24 hours. Pt seen at bedside. Reports continued L sided facial discomfort/pain. Some difficulty chewing but tolerating diet. NPO tonight for biopsy tomorrow. On Prednisone 60mg daily.     Review of Systems:  General: as above  GI: Tolerating POs. Denies N/V/D/Abd pain  CV: Denies CP/SOB  ENDO: No S&Sx of hypoglycemia    MEDS:  atorvastatin 80 milliGRAM(s) Oral at bedtime    insulin glargine Injectable (LANTUS) 28 Unit(s) SubCutaneous at bedtime  insulin lispro (ADMELOG) corrective regimen sliding scale   SubCutaneous three times a day before meals  insulin lispro (ADMELOG) corrective regimen sliding scale   SubCutaneous <User Schedule>  insulin lispro Injectable (ADMELOG) 10 Unit(s) SubCutaneous three times a day before meals  predniSONE   Tablet 60 milliGRAM(s) Oral daily      Allergies    No Known Allergies      PE:  General: Male sitting in bed. NAD.   Vital Signs Last 24 Hrs  T(C): 36.4 (19 Jan 2023 09:00), Max: 36.8 (18 Jan 2023 21:17)  T(F): 97.6 (19 Jan 2023 09:00), Max: 98.2 (18 Jan 2023 21:17)  HR: 76 (19 Jan 2023 09:00) (60 - 79)  BP: 156/70 (19 Jan 2023 09:00) (128/60 - 162/68)  BP(mean): --  RR: 17 (19 Jan 2023 09:00) (17 - 18)  SpO2: 98% (19 Jan 2023 09:00) (98% - 98%)    Parameters below as of 19 Jan 2023 09:00  Patient On (Oxygen Delivery Method): room air    HEENT: L eye drooping  Abd: Soft, NT,ND,   Extremities: Warm  Neuro: A&O X3    LABS:  POCT Blood Glucose.: 177 mg/dL (01-19-23 @ 08:05)  POCT Blood Glucose.: 111 mg/dL (01-19-23 @ 02:18)  POCT Blood Glucose.: 267 mg/dL (01-18-23 @ 21:06)  POCT Blood Glucose.: 176 mg/dL (01-18-23 @ 16:13)  POCT Blood Glucose.: 86 mg/dL (01-18-23 @ 14:27)  POCT Blood Glucose.: 79 mg/dL (01-18-23 @ 14:24)  POCT Blood Glucose.: 146 mg/dL (01-18-23 @ 08:16)  POCT Blood Glucose.: 235 mg/dL (01-18-23 @ 01:16)  POCT Blood Glucose.: 184 mg/dL (01-17-23 @ 21:07)  POCT Blood Glucose.: 109 mg/dL (01-17-23 @ 16:21)  POCT Blood Glucose.: 172 mg/dL (01-17-23 @ 11:18)  POCT Blood Glucose.: 226 mg/dL (01-17-23 @ 07:36)  POCT Blood Glucose.: 252 mg/dL (01-17-23 @ 01:24)  POCT Blood Glucose.: 248 mg/dL (01-16-23 @ 21:21)  POCT Blood Glucose.: 172 mg/dL (01-16-23 @ 18:03)  POCT Blood Glucose.: 150 mg/dL (01-16-23 @ 16:50)                            10.2   8.15  )-----------( 159      ( 19 Jan 2023 07:10 )             32.1       01-19    133<L>  |  94<L>  |  62<H>  ----------------------------<  123<H>  4.3   |  23  |  7.64<H>    eGFR: 7<L>    Ca    8.3<L>      01-19  Mg     3.0     01-19  Phos  5.6     01-19        Thyroid Function Tests:  01-12 @ 19:44 TSH 2.00 FreeT4 -- T3 -- Anti TPO -- Anti Thyroglobulin Ab -- TSI --      A1C with Estimated Average Glucose Result: 6.4 % (01-13-23 @ 06:51)          Contact number: alessio [phone] or [phone]

## 2023-01-19 NOTE — PROGRESS NOTE ADULT - PROBLEM SELECTOR PLAN 1
Current DDX include atypical presentation of GCA vs. Tolossa Guerra (but MRI negative)   - Temporal artery biopsy pending (requires 5 days off Brilinta)   - Neuro following for alternate CNIII palsy dx- ruled out stroke, large tumor, hemorrhage, trauma) possibly other type inflammatory vs infection vs demyelination? Recs appreciated   - Rheumatology consulted, recs appreciated. Per rheumatology, less likely to be GCA but will continue to pursue biopsy. Per rheumatology, change solumedrol to 60 mg qd.   - Ophthalmology consulted, recs appreciated. Current DDX include atypical presentation of GCA vs. Tolossa Guerra (but MRI negative)   - Temporal artery biopsy pending (requires 5 days off Brilinta)   - Neuro following for alternate CNIII palsy dx- ruled out stroke, large tumor, hemorrhage, trauma) possibly other type inflammatory vs infection vs demyelination? Recs appreciated   - Rheumatology consulted, recs appreciated. Per rheumatology, less likely to be GCA but will continue to pursue biopsy. Per rheumatology, c/w solumedrol 60 mg qd.   - Ophthalmology consulted, recs appreciated. Current DDX include atypical presentation of GCA vs. Tolossa Guerra (but MRI negative)   MRA brain done, no indications for aneurysm   - Temporal artery biopsy planned for tomorrow (requires 5 days off Brilinta)   - Neuro following for alternate CNIII palsy dx- ruled out stroke, large tumor, hemorrhage, trauma) possibly other type inflammatory vs infection vs demyelination? Recs appreciated   - Rheumatology consulted, recs appreciated. Per rheumatology, less likely to be GCA but will continue to pursue biopsy. Per rheumatology, c/w solumedrol 60 mg qd.   - Ophthalmology consulted, recs appreciated. Per ophthalmology, likely microvascular disease Current DDX include atypical presentation of GCA vs. Tolossa Guerra (but MRI negative)   MRA brain done, no signs for aneurysm   - Temporal artery biopsy planned for tomorrow (requires 5 days off Brilinta)   - Neuro following for alternate CNIII palsy dx- ruled out stroke, large tumor, hemorrhage, trauma) possibly other type inflammatory vs infection vs demyelination? Recs appreciated   - Rheumatology consulted, recs appreciated. Per rheumatology, less likely to be GCA but will continue to pursue biopsy. Per rheumatology, c/w solumedrol 60 mg qd.   - Ophthalmology consulted, recs appreciated. Per ophthalmology, likely microvascular disease   - Will start Gabapentin 100 mg TID for pain

## 2023-01-19 NOTE — PROGRESS NOTE ADULT - ASSESSMENT
Assessment: Patient NERY LEONARD is a 67y (1955) Rh danette speaking man with CAD s/p 1 stent on brilinta, DM, CKD on HD, ,HTN, HLD presenting with Left eye ptosis and diplopia. Patient reports 10-12 days ago having left sided headache. At that time, he noted drooping of left eyelid. Over the course of the last 1 week hit has gotten progressively worse. He also reported having binocular diplopia on right gaze. Right EOMI, Left eye ANGIE with restricted adduction movement. His headache was responding to tylenol, however, worsened last night. He reports improvement of Left eye ptosis with high dose steroid, however, reports L eye ptosis worsens today.       Impression: Cranial Nerve 3 palsy, jaw claudications and left temporal pain with unclear etiology. Ddx includes GCA vs Tolossa Hunt syndrome vs microvascular causes ( diabetic ophthalmoplegia),  MRI negative for acute infarct.     Recommendations:  [] Please follow up with rheumatology for GCA management and temporal artery biopsy ( tmr 1/20)  [] s/p solumedrol 1g x 3 days, started on prednisone 80mg IV 1/17. Continue prednisone 60mg IV today recommended by rheumatology  [] follow up with endocrinology for diabetes management  [] follow up with opthalmology  [] Pain management: Due to GUS, would avoid NSAIDS, can consider tylenol 650mg PO q 8 hours for headache.  Please consult pain management due to complicated cardiac and renal history.    Plans discussed with Neurology attending, Dr. Fraire       Assessment: Patient NERY LEONARD is a 67y (1955) Rh danette speaking man with CAD s/p 1 stent on brilinta, DM, CKD on HD, ,HTN, HLD presenting with Left eye ptosis and diplopia. Patient reports 10-12 days ago having left sided headache. At that time, he noted drooping of left eyelid. Over the course of the last 1 week hit has gotten progressively worse. He also reported having binocular diplopia on right gaze. Right EOMI, Left eye with restricted movements, some abduction movement. His headache was responding to tylenol, however, worsened last 2 nights. He reports improvement of Left eye ptosis with high dose steroid, however, reports L eye ptosis worsened yesterday, stayed same today.     Impression: Cranial Nerve 3 palsy, jaw claudications and left temporal pain with unclear etiology. Ddx includes GCA vs Tolossa Hunt syndrome vs microvascular causes ( diabetic ophthalmoplegia),  MRI negative for acute infarct.     Recommendations:  [] Please follow up with rheumatology for GCA management and temporal artery biopsy ( tmr 1/20)  [] s/p solumedrol 1g x 3 days, started on prednisone 80mg IV 1/17. Continue prednisone 60mg IV today recommended by rheumatology  [] follow up with endocrinology for diabetes management  [] follow up with opthalmology  [] Pain management: Due to GUS, would avoid NSAIDS, can consider tylenol 650mg PO q 8 hours for headache.  Please consult pain management due to complicated cardiac and renal history.    Plans discussed with Neurology attending, Dr. Fraire

## 2023-01-19 NOTE — PROGRESS NOTE ADULT - NSPROGADDITIONALINFOA_GEN_ALL_CORE
-Plan discussed with pt/team.  Contact info: [phone] (24/7). pager [phone]  [email or web address] password [email or web address]  Teams  Spent over 25 minutes providing face to face education as well as assessing  pt/labs/meds and discussing plan with primary team  Adjusting insulin  Discharge plan  Follow up care
26 minutes spent on the development of plan of care/coordination of care/glycemic control through review of labs, blood glucose values and vital signs.
26 minutes spent on the development of plan of care/coordination of care/glycemic control through review of labs, blood glucose values and vital signs.
-Plan discussed with pt/team. Team aware of worsening eye pain today.   Contact info: [phone] (24/7). pager [phone]  [email or web address] password [email or web address]  Teams  Spent  27 minutes assessing pt/labs/meds and discussing plan of care with primary team  Adjusting insulin  Discharge plan  Follow up care

## 2023-01-19 NOTE — PROGRESS NOTE ADULT - PROBLEM SELECTOR PLAN 4
- C/w metoprolol 25 mg   - Cardiology consulted, recs appreciated. Per cardiology, current BP trend is acceptable

## 2023-01-19 NOTE — PROGRESS NOTE ADULT - PROBLEM SELECTOR PLAN 3
Continue with atorvastatin 80 mg daily   Follow up levels as out pt.      Can be reached via TEAMS/pager: [phone]  office:  [phone] (M-F 9a-5pm)               [phone] (nights/weekends)   [email or web address] password [email or web address]

## 2023-01-19 NOTE — PROGRESS NOTE ADULT - SUBJECTIVE AND OBJECTIVE BOX
Cardiovascular Disease Progress Note  Date of service: 23 @ 07:27    Overnight events: No acute events overnight.  Pt is in no distress.   Otherwise review of systems negative    Objective Findings:  T(C): 36.6 (23 @ 05:06), Max: 37.6 (23 @ 09:30)  HR: 63 (23 @ 05:06) (60 - 79)  BP: 159/74 (23 @ 05:06) (128/60 - 162/68)  RR: 18 (23 @ 05:06) (17 - 18)  SpO2: 98% (23 @ 05:06) (97% - 100%)  Wt(kg): --  Daily     Daily Weight in k.1 (2023 13:21)      Physical Exam:  Gen: NAD; Patient resting comfortably  HEENT: EOMI, Normocephalic/ atraumatic L ptosis  CV: RRR, normal S1 + S2, no m/r/g  Lungs:  Normal respiratory effort; clear to auscultation bilaterally  Abd: soft, non-tender; bowel sounds present  Ext: No edema; warm and well perfused    Telemetry: N/a    Laboratory Data:                        10.2   8.15  )-----------( 159      ( 2023 07:10 )             32.1         132<L>  |  94<L>  |  97<H>  ----------------------------<  155<H>  4.1   |  21<L>  |  9.90<H>    Ca    8.2<L>      2023 06:33  Phos  4.7       Mg     3.4                     Inpatient Medications:  MEDICATIONS  (STANDING):  artificial  tears Solution 1 Drop(s) Both EYES three times a day  aspirin enteric coated 81 milliGRAM(s) Oral daily  atorvastatin 80 milliGRAM(s) Oral at bedtime  dextrose 5%. 1000 milliLiter(s) (50 mL/Hr) IV Continuous <Continuous>  dextrose 5%. 1000 milliLiter(s) (100 mL/Hr) IV Continuous <Continuous>  dextrose 50% Injectable 25 Gram(s) IV Push once  dextrose 50% Injectable 12.5 Gram(s) IV Push once  dextrose 50% Injectable 25 Gram(s) IV Push once  glucagon  Injectable 1 milliGRAM(s) IntraMuscular once  heparin   Injectable 5000 Unit(s) SubCutaneous every 12 hours  insulin glargine Injectable (LANTUS) 30 Unit(s) SubCutaneous at bedtime  insulin lispro (ADMELOG) corrective regimen sliding scale   SubCutaneous three times a day before meals  insulin lispro (ADMELOG) corrective regimen sliding scale   SubCutaneous <User Schedule>  insulin lispro Injectable (ADMELOG) 10 Unit(s) SubCutaneous three times a day before meals  melatonin 3 milliGRAM(s) Oral at bedtime  metoprolol tartrate 25 milliGRAM(s) Oral two times a day  pantoprazole    Tablet 40 milliGRAM(s) Oral before breakfast  polyethylene glycol 3350 17 Gram(s) Oral at bedtime  polyethylene glycol 3350 17 Gram(s) Oral daily  predniSONE   Tablet 60 milliGRAM(s) Oral daily  senna 2 Tablet(s) Oral at bedtime  sodium chloride 2 Gram(s) Oral three times a day  sodium zirconium cyclosilicate 5 Gram(s) Oral daily      Assessment:  67y M  CAD s/p PCI to LAD in 2016 now on Brilinta, DM, ESRD on HTN, HLD presenting with Left eye ptosis and diplopia.    Plan of Care:    #Pre-op risk stratification  - Pt to undergo temporal artery biopsy   - Mr. Ramirez EKG from 1/15 shows sinus rhythm with no acute ischemic changes  - TTE from  shows normal LV systolic function with no significant structural disease  - Pt displays no evidence of pre-op coronary ischemia  - From a cardiac standpoint, Mr. Ramirez is optimized to proceed with biopsy at the discretion of the vascular team.  - Pt is low to intermediate risk for vascular procedure  - Agree with Brilinta being held for upcoming procedure.     #CAD   - S/p PCI in 2016 to LAD  - Brilinta held for biopsy   - Continue statin and BB    #ESRD  - HD as per renal    #HTN  BP acceptable on current regimen    #HLD  - Statin as ordered      Plan of Care:          Over 25 minutes spent on total encounter; more than 50% of the visit was spent counseling and/or coordinating care by the attending physician.      Luigi Barnes DO [facility]  Cardiovascular Disease  [phone]

## 2023-01-19 NOTE — PROGRESS NOTE ADULT - SUBJECTIVE AND OBJECTIVE BOX
Vascular Surgery Progress Note    SUBJECTIVE  No acute events overnight. Pt seen and examined on mornings rounds.    OBJECTIVE  ___________________________________________________  VITAL SIGNS / I&O's   Vital Signs Last 24 Hrs  T(C): 36.6 (19 Jan 2023 05:06), Max: 37.6 (18 Jan 2023 09:30)  T(F): 97.8 (19 Jan 2023 05:06), Max: 99.7 (18 Jan 2023 09:30)  HR: 63 (19 Jan 2023 05:06) (60 - 79)  BP: 159/74 (19 Jan 2023 05:06) (128/60 - 162/68)  BP(mean): --  RR: 18 (19 Jan 2023 05:06) (17 - 18)  SpO2: 98% (19 Jan 2023 05:06) (97% - 100%)    Parameters below as of 19 Jan 2023 05:06  Patient On (Oxygen Delivery Method): room air          18 Jan 2023 07:01  -  19 Jan 2023 07:00  --------------------------------------------------------  IN:    Oral Fluid: 730 mL  Total IN: 730 mL    OUT:    Other (mL): 3600 mL  Total OUT: 3600 mL    Total NET: -2870 mL        ___________________________________________________  PHYSICAL EXAM    Constitutional: NAD  HEENT: conjunctive   clear   Neck:  No JVD  Respiratory: CTAB  Cardiovascular: S1 and S2  Gastrointestinal: BS+, soft, NT/ND  Extremities: No peripheral edema  Neurological: A/O x 3, no focal deficits  Access: Not applicable    ___________________________________________________  LABS                        10.2   8.15  )-----------( 159      ( 19 Jan 2023 07:10 )             32.1     18 Jan 2023 06:33    132    |  94     |  97     ----------------------------<  155    4.1     |  21     |  9.90     Ca    8.2        18 Jan 2023 06:33  Phos  4.7       18 Jan 2023 06:33  Mg     3.4       18 Jan 2023 06:33        CAPILLARY BLOOD GLUCOSE      POCT Blood Glucose.: 111 mg/dL (19 Jan 2023 02:18)  POCT Blood Glucose.: 267 mg/dL (18 Jan 2023 21:06)  POCT Blood Glucose.: 176 mg/dL (18 Jan 2023 16:13)  POCT Blood Glucose.: 86 mg/dL (18 Jan 2023 14:27)  POCT Blood Glucose.: 79 mg/dL (18 Jan 2023 14:24)  POCT Blood Glucose.: 146 mg/dL (18 Jan 2023 08:16)          ___________________________________________________  MICRO  Recent Cultures:    ___________________________________________________  MEDICATIONS  (STANDING):  artificial  tears Solution 1 Drop(s) Both EYES three times a day  aspirin enteric coated 81 milliGRAM(s) Oral daily  atorvastatin 80 milliGRAM(s) Oral at bedtime  dextrose 5%. 1000 milliLiter(s) (50 mL/Hr) IV Continuous <Continuous>  dextrose 5%. 1000 milliLiter(s) (100 mL/Hr) IV Continuous <Continuous>  dextrose 50% Injectable 25 Gram(s) IV Push once  dextrose 50% Injectable 12.5 Gram(s) IV Push once  dextrose 50% Injectable 25 Gram(s) IV Push once  glucagon  Injectable 1 milliGRAM(s) IntraMuscular once  heparin   Injectable 5000 Unit(s) SubCutaneous every 12 hours  insulin glargine Injectable (LANTUS) 30 Unit(s) SubCutaneous at bedtime  insulin lispro (ADMELOG) corrective regimen sliding scale   SubCutaneous three times a day before meals  insulin lispro (ADMELOG) corrective regimen sliding scale   SubCutaneous <User Schedule>  insulin lispro Injectable (ADMELOG) 10 Unit(s) SubCutaneous three times a day before meals  melatonin 3 milliGRAM(s) Oral at bedtime  metoprolol tartrate 25 milliGRAM(s) Oral two times a day  pantoprazole    Tablet 40 milliGRAM(s) Oral before breakfast  polyethylene glycol 3350 17 Gram(s) Oral daily  polyethylene glycol 3350 17 Gram(s) Oral at bedtime  predniSONE   Tablet 60 milliGRAM(s) Oral daily  senna 2 Tablet(s) Oral at bedtime  sodium chloride 2 Gram(s) Oral three times a day  sodium zirconium cyclosilicate 5 Gram(s) Oral daily    MEDICATIONS  (PRN):  acetaminophen     Tablet .. 650 milliGRAM(s) Oral every 6 hours PRN Moderate Pain (4 - 6), Severe Pain (7 - 10)  dextrose Oral Gel 15 Gram(s) Oral once PRN Blood Glucose LESS THAN 70 milliGRAM(s)/deciliter

## 2023-01-19 NOTE — PROGRESS NOTE ADULT - PROBLEM SELECTOR PLAN 2
Takes Tresiba 35-40 units QHS at home   Per pt at home FS  max.  Current regimen:   Lantus 30 units qHS   Admelog 15 units AC meals  - Hyperglycemia likely 2/2 steroids   - Endocrinology consulted, recs appreciated.   - C/w Admelog moderate correction scale AC and moderate HS scale   - C/w 2AM moderate scale to correct >250mg/dl Takes Tresiba 35-40 units QHS at home   Per pt at home FS  max.  Current regimen:   Lantus 30 units qHS   Admelog 10 units AC meals  - Hyperglycemia likely 2/2 steroids   - Endocrinology consulted, recs appreciated. Per endocrinology, change Ademelog to 10 mg qAC   - C/w Admelog moderate correction scale AC and moderate HS scale   - C/w 2AM moderate scale to correct >250mg/dl Takes Tresiba 35-40 units QHS at home   Per pt at home FS  max.  Current regimen:   Lantus 30 units qHS (28u while NPO)   Admelog 10 units AC meals  - Hyperglycemia likely 2/2 steroids   - Endocrinology consulted, recs appreciated. Per endocrinology, change Admelog to 10u qAC and Lantus to 28u qHS while NPO at midnight   - C/w Admelog moderate correction scale AC and moderate HS scale   - C/w 2AM moderate scale to correct >250mg/dl

## 2023-01-19 NOTE — PROGRESS NOTE ADULT - SUBJECTIVE AND OBJECTIVE BOX
Patient is a 67y Male whom presented to the hospital with esrd on hd     PAST MEDICAL & SURGICAL HISTORY:      MEDICATIONS  (STANDING):  artificial  tears Solution 1 Drop(s) Both EYES three times a day  aspirin enteric coated 81 milliGRAM(s) Oral daily  atorvastatin 80 milliGRAM(s) Oral at bedtime  dextrose 5%. 1000 milliLiter(s) (50 mL/Hr) IV Continuous <Continuous>  dextrose 5%. 1000 milliLiter(s) (100 mL/Hr) IV Continuous <Continuous>  dextrose 50% Injectable 25 Gram(s) IV Push once  dextrose 50% Injectable 12.5 Gram(s) IV Push once  dextrose 50% Injectable 25 Gram(s) IV Push once  enoxaparin Injectable 30 milliGRAM(s) SubCutaneous every 24 hours  glucagon  Injectable 1 milliGRAM(s) IntraMuscular once  insulin lispro (ADMELOG) corrective regimen sliding scale   SubCutaneous at bedtime  insulin lispro (ADMELOG) corrective regimen sliding scale   SubCutaneous three times a day before meals  metoprolol tartrate 25 milliGRAM(s) Oral two times a day  pantoprazole    Tablet 40 milliGRAM(s) Oral before breakfast  polyethylene glycol 3350 17 Gram(s) Oral at bedtime  senna 2 Tablet(s) Oral at bedtime      Allergies    No Known Allergies    Intolerances        SOCIAL HISTORY:  Denies ETOh,Smoking,     FAMILY HISTORY:      REVIEW OF SYSTEMS:    CONSTITUTIONAL: No weakness, fevers or chills  RESPIRATORY: No cough, wheezing, hemoptysis; No shortness of breath  CARDIOVASCULAR: No chest pain or palpitations  GASTROINTESTINAL: No abdominal or epigastric pain. No nausea, vomiting,     No diarrhea or constipation. No melena   GENITOURINARY: No dysuria, frequency or hematuria  NEUROLOGICAL: No numbness or weakness  SKIN: dry                                                                                                                              10.2   8.15  )-----------( 159      ( 19 Jan 2023 07:10 )             32.1       CBC Full  -  ( 19 Jan 2023 07:10 )  WBC Count : 8.15 K/uL  RBC Count : 3.25 M/uL  Hemoglobin : 10.2 g/dL  Hematocrit : 32.1 %  Platelet Count - Automated : 159 K/uL  Mean Cell Volume : 98.8 fl  Mean Cell Hemoglobin : 31.4 pg  Mean Cell Hemoglobin Concentration : 31.8 gm/dL  Auto Neutrophil # : x  Auto Lymphocyte # : x  Auto Monocyte # : x  Auto Eosinophil # : x  Auto Basophil # : x  Auto Neutrophil % : x  Auto Lymphocyte % : x  Auto Monocyte % : x  Auto Eosinophil % : x  Auto Basophil % : x      01-19    133<L>  |  94<L>  |  62<H>  ----------------------------<  123<H>  4.3   |  23  |  7.64<H>    Ca    8.3<L>      19 Jan 2023 07:09  Phos  5.6     01-19  Mg     3.0     01-19        CAPILLARY BLOOD GLUCOSE      POCT Blood Glucose.: 197 mg/dL (19 Jan 2023 12:16)  POCT Blood Glucose.: 177 mg/dL (19 Jan 2023 08:05)  POCT Blood Glucose.: 111 mg/dL (19 Jan 2023 02:18)  POCT Blood Glucose.: 267 mg/dL (18 Jan 2023 21:06)      Vital Signs Last 24 Hrs  T(C): 36 (19 Jan 2023 19:45), Max: 36.8 (18 Jan 2023 21:17)  T(F): 96.8 (19 Jan 2023 19:45), Max: 98.2 (18 Jan 2023 21:17)  HR: 67 (19 Jan 2023 19:45) (63 - 79)  BP: 162/60 (19 Jan 2023 19:45) (149/70 - 179/82)  BP(mean): --  RR: 18 (19 Jan 2023 19:45) (17 - 18)  SpO2: 96% (19 Jan 2023 19:45) (96% - 99%)    Parameters below as of 19 Jan 2023 19:45  Patient On (Oxygen Delivery Method): room air                      PHYSICAL EXAM:    Constitutional: NAD  HEENT: conjunctive   clear   Neck:  No JVD  Respiratory: CTAB  Cardiovascular: S1 and S2  Gastrointestinal: BS+, soft, NT/ND  Extremities: No peripheral edema  Neurological: no focal deficits  Access: Not applicable

## 2023-01-19 NOTE — PROGRESS NOTE ADULT - SUBJECTIVE AND OBJECTIVE BOX
PROGRESS NOTE:     Patient is a 67y old  Male who presents with a chief complaint of Left CN 3 palsy (18 Jan 2023 18:21)      SUBJECTIVE / OVERNIGHT EVENTS:    No acute events overnight. Patient examined at bedside with no acute complaints.     Pain:  Bowel Movements:  Urination:  OOB:  PT:    REVIEW OF SYSTEMS:    CONSTITUTIONAL: No weakness, fevers or chills  EYES/ENT: No visual changes;  No vertigo or throat pain   NECK: No pain or stiffness  RESPIRATORY: No cough, wheezing, hemoptysis; No shortness of breath  CARDIOVASCULAR: No chest pain or palpitations  GASTROINTESTINAL: No abdominal or epigastric pain. No nausea, vomiting, or hematemesis; No diarrhea or constipation. No melena or hematochezia.  GENITOURINARY: No dysuria, frequency or hematuria  NEUROLOGICAL: No numbness or weakness  SKIN: No itching, rashes      MEDICATIONS  (STANDING):  artificial  tears Solution 1 Drop(s) Both EYES three times a day  aspirin enteric coated 81 milliGRAM(s) Oral daily  atorvastatin 80 milliGRAM(s) Oral at bedtime  dextrose 5%. 1000 milliLiter(s) (50 mL/Hr) IV Continuous <Continuous>  dextrose 5%. 1000 milliLiter(s) (100 mL/Hr) IV Continuous <Continuous>  dextrose 50% Injectable 25 Gram(s) IV Push once  dextrose 50% Injectable 12.5 Gram(s) IV Push once  dextrose 50% Injectable 25 Gram(s) IV Push once  glucagon  Injectable 1 milliGRAM(s) IntraMuscular once  heparin   Injectable 5000 Unit(s) SubCutaneous every 12 hours  insulin glargine Injectable (LANTUS) 30 Unit(s) SubCutaneous at bedtime  insulin lispro (ADMELOG) corrective regimen sliding scale   SubCutaneous three times a day before meals  insulin lispro (ADMELOG) corrective regimen sliding scale   SubCutaneous <User Schedule>  insulin lispro Injectable (ADMELOG) 10 Unit(s) SubCutaneous three times a day before meals  melatonin 3 milliGRAM(s) Oral at bedtime  metoprolol tartrate 25 milliGRAM(s) Oral two times a day  pantoprazole    Tablet 40 milliGRAM(s) Oral before breakfast  polyethylene glycol 3350 17 Gram(s) Oral daily  polyethylene glycol 3350 17 Gram(s) Oral at bedtime  predniSONE   Tablet 60 milliGRAM(s) Oral daily  senna 2 Tablet(s) Oral at bedtime  sodium chloride 2 Gram(s) Oral three times a day  sodium zirconium cyclosilicate 5 Gram(s) Oral daily    MEDICATIONS  (PRN):  acetaminophen     Tablet .. 650 milliGRAM(s) Oral every 6 hours PRN Moderate Pain (4 - 6), Severe Pain (7 - 10)  dextrose Oral Gel 15 Gram(s) Oral once PRN Blood Glucose LESS THAN 70 milliGRAM(s)/deciliter      CAPILLARY BLOOD GLUCOSE      POCT Blood Glucose.: 111 mg/dL (19 Jan 2023 02:18)  POCT Blood Glucose.: 267 mg/dL (18 Jan 2023 21:06)  POCT Blood Glucose.: 176 mg/dL (18 Jan 2023 16:13)  POCT Blood Glucose.: 86 mg/dL (18 Jan 2023 14:27)  POCT Blood Glucose.: 79 mg/dL (18 Jan 2023 14:24)  POCT Blood Glucose.: 146 mg/dL (18 Jan 2023 08:16)    I&O's Summary    17 Jan 2023 07:01  -  18 Jan 2023 07:00  --------------------------------------------------------  IN: 660 mL / OUT: 0 mL / NET: 660 mL    18 Jan 2023 07:01  -  19 Jan 2023 06:55  --------------------------------------------------------  IN: 730 mL / OUT: 3600 mL / NET: -2870 mL        VITALS:   T(C): 36.6 (01-19-23 @ 05:06), Max: 37.6 (01-18-23 @ 09:30)  HR: 63 (01-19-23 @ 05:06) (60 - 79)  BP: 159/74 (01-19-23 @ 05:06) (128/60 - 162/68)  RR: 18 (01-19-23 @ 05:06) (17 - 18)  SpO2: 98% (01-19-23 @ 05:06) (97% - 100%)    GENERAL: NAD, lying in bed comfortably  HEAD:  Atraumatic, normocephalic  EYES: EOMI, PERRLA, conjunctiva and sclera clear  ENT: Moist mucous membranes  NECK: Supple, no JVD  HEART: Regular rate and rhythm, no murmurs, rubs, or gallops  LUNGS: Unlabored respirations.  Clear to auscultation bilaterally, no crackles, wheezing, or rhonchi  ABDOMEN: Soft, nontender, nondistended, +BS  EXTREMITIES: 2+ peripheral pulses bilaterally. No clubbing, cyanosis, or edema  NERVOUS SYSTEM:  A&Ox3, no focal deficits   SKIN: No rashes or lesions    LABS:                        9.6    8.89  )-----------( 144      ( 18 Jan 2023 06:33 )             29.3     01-18    132<L>  |  94<L>  |  97<H>  ----------------------------<  155<H>  4.1   |  21<L>  |  9.90<H>    Ca    8.2<L>      18 Jan 2023 06:33  Phos  4.7     01-18  Mg     3.4     01-18                  RADIOLOGY & ADDITIONAL TESTS:  Results Reviewed:   Imaging Personally Reviewed:  Electrocardiogram Personally Reviewed:    COORDINATION OF CARE:  Care Discussed with Consultants/Other Providers [Y/N]:  Prior or Outpatient Records Reviewed [Y/N]:   PROGRESS NOTE:     Patient is a 67y old  Male who presents with a chief complaint of Left CN 3 palsy (18 Jan 2023 18:21)      SUBJECTIVE / OVERNIGHT EVENTS:    No acute events overnight. Patient examined at bedside. Patient reports left eye pain is unchanged. Patient reports persisted left-sided neck pain. Patient reports persistent left temporal headache.     REVIEW OF SYSTEMS:    CONSTITUTIONAL: No weakness, fevers or chills  EYES/ENT: + blurry vision;  No vertigo or throat pain   NECK: + left neck pain   RESPIRATORY: No cough, wheezing, hemoptysis; No shortness of breath   CARDIOVASCULAR: No chest pain or palpitations   GASTROINTESTINAL: No abdominal or epigastric pain. No nausea, vomiting, or hematemesis; No diarrhea or constipation. No melena or hematochezia.  GENITOURINARY: No dysuria, frequency or hematuria  NEUROLOGICAL: No numbness or weakness  SKIN: No itching, rashes      MEDICATIONS  (STANDING):  artificial  tears Solution 1 Drop(s) Both EYES three times a day  aspirin enteric coated 81 milliGRAM(s) Oral daily  atorvastatin 80 milliGRAM(s) Oral at bedtime  dextrose 5%. 1000 milliLiter(s) (50 mL/Hr) IV Continuous <Continuous>  dextrose 5%. 1000 milliLiter(s) (100 mL/Hr) IV Continuous <Continuous>  dextrose 50% Injectable 25 Gram(s) IV Push once  dextrose 50% Injectable 12.5 Gram(s) IV Push once  dextrose 50% Injectable 25 Gram(s) IV Push once  glucagon  Injectable 1 milliGRAM(s) IntraMuscular once  heparin   Injectable 5000 Unit(s) SubCutaneous every 12 hours  insulin glargine Injectable (LANTUS) 30 Unit(s) SubCutaneous at bedtime  insulin lispro (ADMELOG) corrective regimen sliding scale   SubCutaneous three times a day before meals  insulin lispro (ADMELOG) corrective regimen sliding scale   SubCutaneous <User Schedule>  insulin lispro Injectable (ADMELOG) 10 Unit(s) SubCutaneous three times a day before meals  melatonin 3 milliGRAM(s) Oral at bedtime  metoprolol tartrate 25 milliGRAM(s) Oral two times a day  pantoprazole    Tablet 40 milliGRAM(s) Oral before breakfast  polyethylene glycol 3350 17 Gram(s) Oral daily  polyethylene glycol 3350 17 Gram(s) Oral at bedtime  predniSONE   Tablet 60 milliGRAM(s) Oral daily  senna 2 Tablet(s) Oral at bedtime  sodium chloride 2 Gram(s) Oral three times a day  sodium zirconium cyclosilicate 5 Gram(s) Oral daily    MEDICATIONS  (PRN):  acetaminophen     Tablet .. 650 milliGRAM(s) Oral every 6 hours PRN Moderate Pain (4 - 6), Severe Pain (7 - 10)  dextrose Oral Gel 15 Gram(s) Oral once PRN Blood Glucose LESS THAN 70 milliGRAM(s)/deciliter      CAPILLARY BLOOD GLUCOSE      POCT Blood Glucose.: 111 mg/dL (19 Jan 2023 02:18)  POCT Blood Glucose.: 267 mg/dL (18 Jan 2023 21:06)  POCT Blood Glucose.: 176 mg/dL (18 Jan 2023 16:13)  POCT Blood Glucose.: 86 mg/dL (18 Jan 2023 14:27)  POCT Blood Glucose.: 79 mg/dL (18 Jan 2023 14:24)  POCT Blood Glucose.: 146 mg/dL (18 Jan 2023 08:16)    I&O's Summary    17 Jan 2023 07:01  -  18 Jan 2023 07:00  --------------------------------------------------------  IN: 660 mL / OUT: 0 mL / NET: 660 mL    18 Jan 2023 07:01  -  19 Jan 2023 06:55  --------------------------------------------------------  IN: 730 mL / OUT: 3600 mL / NET: -2870 mL        VITALS:   T(C): 36.6 (01-19-23 @ 05:06), Max: 37.6 (01-18-23 @ 09:30)  HR: 63 (01-19-23 @ 05:06) (60 - 79)  BP: 159/74 (01-19-23 @ 05:06) (128/60 - 162/68)  RR: 18 (01-19-23 @ 05:06) (17 - 18)  SpO2: 98% (01-19-23 @ 05:06) (97% - 100%)    GENERAL: NAD, lying in bed   HEAD:  Atraumatic, normocephalic  EYES: EOM impaired b/l, Pupils fixed and uneven, Left eye ptosis unchanged   ENT: Moist mucous membranes  NECK: Left neck tender to palpation   HEART: Regular rate and rhythm, no murmurs, rubs, or gallops  LUNGS: Unlabored respirations.  Clear to auscultation bilaterally, no crackles, wheezing, or rhonchi  ABDOMEN: Soft, nontender, nondistended, +BS  EXTREMITIES: 2+ peripheral pulses bilaterally. No clubbing, cyanosis, or edema  NERVOUS SYSTEM:  A&Ox3, worsening left eye ptosis, pupils fixed, left eye ptosis   SKIN: No rashes or lesions    LABS:                        9.6    8.89  )-----------( 144      ( 18 Jan 2023 06:33 )             29.3     01-18    132<L>  |  94<L>  |  97<H>  ----------------------------<  155<H>  4.1   |  21<L>  |  9.90<H>    Ca    8.2<L>      18 Jan 2023 06:33  Phos  4.7     01-18  Mg     3.4     01-18                  RADIOLOGY & ADDITIONAL TESTS:  Results Reviewed:   Imaging Personally Reviewed:  Electrocardiogram Personally Reviewed:    COORDINATION OF CARE:  Care Discussed with Consultants/Other Providers [Y/N]:  Prior or Outpatient Records Reviewed [Y/N]:

## 2023-01-19 NOTE — PROGRESS NOTE ADULT - ASSESSMENT
68 y/o M w/h/o T2DM> unknown control due to anemia of chronic disease. On Tresiba 30 to 40 units at hs based on hs BG, Reports FBG 50s to mid 100s at home. DM c/b CAD> stents and CKD > HD. Also h/o HTN, HLD. Here Left eye ptosis and diplopia/pain. On steroids for possible GCA. Endocrinology consulted for uncontrolled glucose in the setting of high dose steroid. Now on Prednisone 60mg daily w/BG values variable on present insulin regimen. Tolerating POs. BG goal (100-180mg/dl).       Home DM meds: Tresiba 30-40 units QHS

## 2023-01-20 ENCOUNTER — RESULT REVIEW (OUTPATIENT)
Age: 68
End: 2023-01-20

## 2023-01-20 ENCOUNTER — TRANSCRIPTION ENCOUNTER (OUTPATIENT)
Age: 68
End: 2023-01-20

## 2023-01-20 ENCOUNTER — NON-APPOINTMENT (OUTPATIENT)
Age: 68
End: 2023-01-20

## 2023-01-20 LAB
ACHR MOD AB SER-ACNC: 9 % — SIGNIFICANT CHANGE UP (ref 0–45)
ANION GAP SERPL CALC-SCNC: 17 MMOL/L — SIGNIFICANT CHANGE UP (ref 5–17)
APTT BLD: 27.6 SEC — SIGNIFICANT CHANGE UP (ref 27.5–35.5)
BLD GP AB SCN SERPL QL: NEGATIVE — SIGNIFICANT CHANGE UP
BUN SERPL-MCNC: 49 MG/DL — HIGH (ref 7–23)
CALCIUM SERPL-MCNC: 8.6 MG/DL — SIGNIFICANT CHANGE UP (ref 8.4–10.5)
CHLORIDE SERPL-SCNC: 94 MMOL/L — LOW (ref 96–108)
CO2 SERPL-SCNC: 25 MMOL/L — SIGNIFICANT CHANGE UP (ref 22–31)
CREAT SERPL-MCNC: 6.29 MG/DL — HIGH (ref 0.5–1.3)
EGFR: 9 ML/MIN/1.73M2 — LOW
GLUCOSE BLDC GLUCOMTR-MCNC: 159 MG/DL — HIGH (ref 70–99)
GLUCOSE BLDC GLUCOMTR-MCNC: 171 MG/DL — HIGH (ref 70–99)
GLUCOSE BLDC GLUCOMTR-MCNC: 253 MG/DL — HIGH (ref 70–99)
GLUCOSE BLDC GLUCOMTR-MCNC: 261 MG/DL — HIGH (ref 70–99)
GLUCOSE BLDC GLUCOMTR-MCNC: 509 MG/DL — CRITICAL HIGH (ref 70–99)
GLUCOSE BLDC GLUCOMTR-MCNC: 543 MG/DL — CRITICAL HIGH (ref 70–99)
GLUCOSE SERPL-MCNC: 251 MG/DL — HIGH (ref 70–99)
HCT VFR BLD CALC: 29.4 % — LOW (ref 39–50)
HGB BLD-MCNC: 9.5 G/DL — LOW (ref 13–17)
INR BLD: 0.96 RATIO — SIGNIFICANT CHANGE UP (ref 0.88–1.16)
MAGNESIUM SERPL-MCNC: 2.7 MG/DL — HIGH (ref 1.6–2.6)
MCHC RBC-ENTMCNC: 31.7 PG — SIGNIFICANT CHANGE UP (ref 27–34)
MCHC RBC-ENTMCNC: 32.3 GM/DL — SIGNIFICANT CHANGE UP (ref 32–36)
MCV RBC AUTO: 98 FL — SIGNIFICANT CHANGE UP (ref 80–100)
NRBC # BLD: 0 /100 WBCS — SIGNIFICANT CHANGE UP (ref 0–0)
PHOSPHATE SERPL-MCNC: 4.9 MG/DL — HIGH (ref 2.5–4.5)
PLATELET # BLD AUTO: 151 K/UL — SIGNIFICANT CHANGE UP (ref 150–400)
POTASSIUM SERPL-MCNC: 3.7 MMOL/L — SIGNIFICANT CHANGE UP (ref 3.5–5.3)
POTASSIUM SERPL-SCNC: 3.7 MMOL/L — SIGNIFICANT CHANGE UP (ref 3.5–5.3)
PROTHROM AB SERPL-ACNC: 11.1 SEC — SIGNIFICANT CHANGE UP (ref 10.5–13.4)
RBC # BLD: 3 M/UL — LOW (ref 4.2–5.8)
RBC # FLD: 15.9 % — HIGH (ref 10.3–14.5)
RH IG SCN BLD-IMP: POSITIVE — SIGNIFICANT CHANGE UP
RH IG SCN BLD-IMP: POSITIVE — SIGNIFICANT CHANGE UP
SODIUM SERPL-SCNC: 136 MMOL/L — SIGNIFICANT CHANGE UP (ref 135–145)
WBC # BLD: 9.35 K/UL — SIGNIFICANT CHANGE UP (ref 3.8–10.5)
WBC # FLD AUTO: 9.35 K/UL — SIGNIFICANT CHANGE UP (ref 3.8–10.5)

## 2023-01-20 PROCEDURE — 37609 LIGATION/BX TEMPORAL ARTERY: CPT | Mod: LT

## 2023-01-20 PROCEDURE — 88305 TISSUE EXAM BY PATHOLOGIST: CPT | Mod: 26

## 2023-01-20 PROCEDURE — 88313 SPECIAL STAINS GROUP 2: CPT | Mod: 26

## 2023-01-20 PROCEDURE — 99232 SBSQ HOSP IP/OBS MODERATE 35: CPT | Mod: GC

## 2023-01-20 DEVICE — SURGICEL FIBRILLAR 2 X 4": Type: IMPLANTABLE DEVICE | Site: LEFT | Status: FUNCTIONAL

## 2023-01-20 RX ORDER — SODIUM CHLORIDE 9 MG/ML
2 INJECTION INTRAMUSCULAR; INTRAVENOUS; SUBCUTANEOUS THREE TIMES A DAY
Refills: 0 | Status: DISCONTINUED | OUTPATIENT
Start: 2023-01-20 | End: 2023-01-22

## 2023-01-20 RX ORDER — ASPIRIN/CALCIUM CARB/MAGNESIUM 324 MG
81 TABLET ORAL DAILY
Refills: 0 | Status: DISCONTINUED | OUTPATIENT
Start: 2023-01-20 | End: 2023-01-21

## 2023-01-20 RX ORDER — DEXTROSE 50 % IN WATER 50 %
15 SYRINGE (ML) INTRAVENOUS ONCE
Refills: 0 | Status: DISCONTINUED | OUTPATIENT
Start: 2023-01-20 | End: 2023-01-22

## 2023-01-20 RX ORDER — GABAPENTIN 400 MG/1
100 CAPSULE ORAL THREE TIMES A DAY
Refills: 0 | Status: DISCONTINUED | OUTPATIENT
Start: 2023-01-20 | End: 2023-01-21

## 2023-01-20 RX ORDER — SODIUM CHLORIDE 9 MG/ML
1000 INJECTION, SOLUTION INTRAVENOUS
Refills: 0 | Status: DISCONTINUED | OUTPATIENT
Start: 2023-01-20 | End: 2023-01-22

## 2023-01-20 RX ORDER — INSULIN GLARGINE 100 [IU]/ML
30 INJECTION, SOLUTION SUBCUTANEOUS AT BEDTIME
Refills: 0 | Status: DISCONTINUED | OUTPATIENT
Start: 2023-01-20 | End: 2023-01-22

## 2023-01-20 RX ORDER — INSULIN GLARGINE 100 [IU]/ML
28 INJECTION, SOLUTION SUBCUTANEOUS AT BEDTIME
Refills: 0 | Status: DISCONTINUED | OUTPATIENT
Start: 2023-01-20 | End: 2023-01-20

## 2023-01-20 RX ORDER — POLYETHYLENE GLYCOL 3350 17 G/17G
17 POWDER, FOR SOLUTION ORAL DAILY
Refills: 0 | Status: DISCONTINUED | OUTPATIENT
Start: 2023-01-20 | End: 2023-01-22

## 2023-01-20 RX ORDER — METOPROLOL TARTRATE 50 MG
25 TABLET ORAL
Refills: 0 | Status: DISCONTINUED | OUTPATIENT
Start: 2023-01-20 | End: 2023-01-22

## 2023-01-20 RX ORDER — DEXTROSE 50 % IN WATER 50 %
25 SYRINGE (ML) INTRAVENOUS ONCE
Refills: 0 | Status: DISCONTINUED | OUTPATIENT
Start: 2023-01-20 | End: 2023-01-22

## 2023-01-20 RX ORDER — SENNA PLUS 8.6 MG/1
2 TABLET ORAL AT BEDTIME
Refills: 0 | Status: DISCONTINUED | OUTPATIENT
Start: 2023-01-20 | End: 2023-01-22

## 2023-01-20 RX ORDER — SODIUM ZIRCONIUM CYCLOSILICATE 10 G/10G
5 POWDER, FOR SUSPENSION ORAL DAILY
Refills: 0 | Status: DISCONTINUED | OUTPATIENT
Start: 2023-01-20 | End: 2023-01-20

## 2023-01-20 RX ORDER — LANOLIN ALCOHOL/MO/W.PET/CERES
3 CREAM (GRAM) TOPICAL AT BEDTIME
Refills: 0 | Status: DISCONTINUED | OUTPATIENT
Start: 2023-01-20 | End: 2023-01-22

## 2023-01-20 RX ORDER — ATORVASTATIN CALCIUM 80 MG/1
80 TABLET, FILM COATED ORAL AT BEDTIME
Refills: 0 | Status: DISCONTINUED | OUTPATIENT
Start: 2023-01-20 | End: 2023-01-22

## 2023-01-20 RX ORDER — INSULIN LISPRO 100/ML
VIAL (ML) SUBCUTANEOUS
Refills: 0 | Status: DISCONTINUED | OUTPATIENT
Start: 2023-01-20 | End: 2023-01-22

## 2023-01-20 RX ORDER — ONDANSETRON 8 MG/1
4 TABLET, FILM COATED ORAL ONCE
Refills: 0 | Status: DISCONTINUED | OUTPATIENT
Start: 2023-01-20 | End: 2023-01-20

## 2023-01-20 RX ORDER — POLYETHYLENE GLYCOL 3350 17 G/17G
17 POWDER, FOR SOLUTION ORAL AT BEDTIME
Refills: 0 | Status: DISCONTINUED | OUTPATIENT
Start: 2023-01-20 | End: 2023-01-22

## 2023-01-20 RX ORDER — SODIUM ZIRCONIUM CYCLOSILICATE 10 G/10G
5 POWDER, FOR SUSPENSION ORAL DAILY
Refills: 0 | Status: DISCONTINUED | OUTPATIENT
Start: 2023-01-20 | End: 2023-01-22

## 2023-01-20 RX ORDER — INSULIN LISPRO 100/ML
10 VIAL (ML) SUBCUTANEOUS
Refills: 0 | Status: DISCONTINUED | OUTPATIENT
Start: 2023-01-20 | End: 2023-01-21

## 2023-01-20 RX ORDER — ACETAMINOPHEN 500 MG
650 TABLET ORAL EVERY 6 HOURS
Refills: 0 | Status: DISCONTINUED | OUTPATIENT
Start: 2023-01-20 | End: 2023-01-22

## 2023-01-20 RX ORDER — GLUCAGON INJECTION, SOLUTION 0.5 MG/.1ML
1 INJECTION, SOLUTION SUBCUTANEOUS ONCE
Refills: 0 | Status: DISCONTINUED | OUTPATIENT
Start: 2023-01-20 | End: 2023-01-22

## 2023-01-20 RX ORDER — PANTOPRAZOLE SODIUM 20 MG/1
40 TABLET, DELAYED RELEASE ORAL
Refills: 0 | Status: DISCONTINUED | OUTPATIENT
Start: 2023-01-20 | End: 2023-01-22

## 2023-01-20 RX ORDER — DEXTROSE 50 % IN WATER 50 %
12.5 SYRINGE (ML) INTRAVENOUS ONCE
Refills: 0 | Status: DISCONTINUED | OUTPATIENT
Start: 2023-01-20 | End: 2023-01-22

## 2023-01-20 RX ORDER — HEPARIN SODIUM 5000 [USP'U]/ML
5000 INJECTION INTRAVENOUS; SUBCUTANEOUS EVERY 12 HOURS
Refills: 0 | Status: DISCONTINUED | OUTPATIENT
Start: 2023-01-20 | End: 2023-01-22

## 2023-01-20 RX ADMIN — GABAPENTIN 100 MILLIGRAM(S): 400 CAPSULE ORAL at 05:17

## 2023-01-20 RX ADMIN — Medication 3 MILLIGRAM(S): at 21:13

## 2023-01-20 RX ADMIN — SODIUM CHLORIDE 2 GRAM(S): 9 INJECTION INTRAMUSCULAR; INTRAVENOUS; SUBCUTANEOUS at 21:12

## 2023-01-20 RX ADMIN — INSULIN GLARGINE 30 UNIT(S): 100 INJECTION, SOLUTION SUBCUTANEOUS at 21:20

## 2023-01-20 RX ADMIN — HEPARIN SODIUM 5000 UNIT(S): 5000 INJECTION INTRAVENOUS; SUBCUTANEOUS at 05:17

## 2023-01-20 RX ADMIN — Medication 650 MILLIGRAM(S): at 00:23

## 2023-01-20 RX ADMIN — PANTOPRAZOLE SODIUM 40 MILLIGRAM(S): 20 TABLET, DELAYED RELEASE ORAL at 14:04

## 2023-01-20 RX ADMIN — SODIUM CHLORIDE 2 GRAM(S): 9 INJECTION INTRAMUSCULAR; INTRAVENOUS; SUBCUTANEOUS at 05:17

## 2023-01-20 RX ADMIN — Medication 1 DROP(S): at 21:13

## 2023-01-20 RX ADMIN — SENNA PLUS 2 TABLET(S): 8.6 TABLET ORAL at 21:12

## 2023-01-20 RX ADMIN — Medication 25 MILLIGRAM(S): at 05:17

## 2023-01-20 RX ADMIN — HEPARIN SODIUM 5000 UNIT(S): 5000 INJECTION INTRAVENOUS; SUBCUTANEOUS at 18:29

## 2023-01-20 RX ADMIN — Medication 10 UNIT(S): at 16:31

## 2023-01-20 RX ADMIN — Medication 6: at 16:32

## 2023-01-20 RX ADMIN — POLYETHYLENE GLYCOL 3350 17 GRAM(S): 17 POWDER, FOR SOLUTION ORAL at 21:14

## 2023-01-20 RX ADMIN — Medication 25 MILLIGRAM(S): at 18:29

## 2023-01-20 RX ADMIN — Medication 2: at 12:02

## 2023-01-20 RX ADMIN — GABAPENTIN 100 MILLIGRAM(S): 400 CAPSULE ORAL at 13:59

## 2023-01-20 RX ADMIN — ATORVASTATIN CALCIUM 80 MILLIGRAM(S): 80 TABLET, FILM COATED ORAL at 21:14

## 2023-01-20 RX ADMIN — Medication 1 DROP(S): at 14:02

## 2023-01-20 RX ADMIN — POLYETHYLENE GLYCOL 3350 17 GRAM(S): 17 POWDER, FOR SOLUTION ORAL at 14:04

## 2023-01-20 RX ADMIN — Medication 650 MILLIGRAM(S): at 20:31

## 2023-01-20 RX ADMIN — Medication 1 DROP(S): at 05:18

## 2023-01-20 RX ADMIN — Medication 2: at 08:13

## 2023-01-20 RX ADMIN — SODIUM ZIRCONIUM CYCLOSILICATE 5 GRAM(S): 10 POWDER, FOR SUSPENSION ORAL at 14:06

## 2023-01-20 RX ADMIN — Medication 650 MILLIGRAM(S): at 21:31

## 2023-01-20 RX ADMIN — GABAPENTIN 100 MILLIGRAM(S): 400 CAPSULE ORAL at 21:13

## 2023-01-20 RX ADMIN — SODIUM CHLORIDE 2 GRAM(S): 9 INJECTION INTRAMUSCULAR; INTRAVENOUS; SUBCUTANEOUS at 14:03

## 2023-01-20 RX ADMIN — Medication 81 MILLIGRAM(S): at 14:01

## 2023-01-20 RX ADMIN — Medication 60 MILLIGRAM(S): at 14:05

## 2023-01-20 RX ADMIN — Medication 8: at 21:13

## 2023-01-20 RX ADMIN — Medication 60 MILLIGRAM(S): at 05:15

## 2023-01-20 RX ADMIN — Medication 2: at 01:20

## 2023-01-20 RX ADMIN — PANTOPRAZOLE SODIUM 40 MILLIGRAM(S): 20 TABLET, DELAYED RELEASE ORAL at 08:12

## 2023-01-20 NOTE — DISCHARGE NOTE PROVIDER - HOSPITAL COURSE
Patient NERY LEONARD is a 67y (1955) Rh danette speaking man with CAD s/p 1 stent on brilinta, DM, CKD on HD, ,HTN, HLD presenting with Left eye ptosis and diplopia. Patient reports 10-12 days ago having left sided headache. At that time, he noted drooping of left eyelid. Over the course of the last 1 week hit has gotten progressively worse. 5 days ago he noticed double vision and eyes was dysconjugate. When closing one at a time diplopia resolves. He continues to have headache controlled with tylenol rated at 2/10. Headache onset was initially 2/10 then progressively worsened within several hours, maximum intensity of 7/10 over several hours. He saw opthomologist, who noted retinal scarring bilaterally. 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  Denies positional headache, neck stiffness, weakness, numbness, changes to speech, dysphagia, difficulty ambulating.    Since then has been started on Solumedrol for presumed GCA and having improvement in pain but not ptosis. Since starting on steroids BG very elevated and also hyponatremic. Patient was recommended for a GCA biopsy, which required being off Brilinta for 5 days. Rheumatology, ophthalmology, and neurology were consulted and recommended GCA biopsy with steroid treatment. Neurological workup was otherwise negative. Rheumatology recommended pulse dose steroids with subsequent steroid taper. Patient's pain improved after starting on Gabapentin. After temporal artery biopsy, patient was deemed medically stable for discharge, with rheumatology, vascular surgery, and ophthalmology all recommending discharge with outpatient follow-up. Per rheum recommendations, patient was discharged on a steroid taper of Solumedrol 50 mg qd x 2 weeks and then Solumedrol 40 mg qd x 2 weeks. Patient should follow-up with a rheumatologist after discharge. Patient NERY LEONARD is a 67y (1955) Rh danette speaking man with CAD s/p 1 stent on brilinta, DM, CKD on HD, ,HTN, HLD presenting with Left eye ptosis and diplopia. Patient reports 10-12 days ago having left sided headache. At that time, he noted drooping of left eyelid. Over the course of the last 1 week hit has gotten progressively worse. 5 days ago he noticed double vision and eyes was dysconjugate. When closing one at a time diplopia resolves. He continues to have headache controlled with tylenol rated at 2/10. Headache onset was initially 2/10 then progressively worsened within several hours, maximum intensity of 7/10 over several hours. He saw opthomologist, who noted retinal scarring bilaterally. 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  Denies positional headache, neck stiffness, weakness, numbness, changes to speech, dysphagia, difficulty ambulating.    Since then has been started on Solumedrol for presumed GCA and having improvement in pain but not ptosis. Since starting on steroids BG very elevated and also hyponatremic. Patient was recommended for a GCA biopsy, which required being off Brilinta for 5 days. Rheumatology, ophthalmology, and neurology were consulted and recommended GCA biopsy with steroid treatment. Neurological workup was otherwise negative. Rheumatology recommended pulse dose steroids with subsequent steroid taper. Patient's pain improved after starting on Gabapentin. After temporal artery biopsy, patient was deemed medically stable for discharge, with rheumatology, vascular surgery, and ophthalmology all recommending discharge with outpatient follow-up. Per rheum recommendations, patient was discharged on a steroid taper of Solumedrol 50 mg qd x 2 weeks and then Solumedrol 40 mg qd x 2 weeks. Patient should follow-up with a rheumatologist after discharge.     Outpatient discharge Insulin Regimen:   While on prednisone 50mg po qd: Tresiba pen 30 units sq qhs and Humalog kwikpen 10 units sq TID AC   At home, Patient should check FSBG premeals and bedtime. Pt should call their doctor when FSBG <70 or above >400 and or consistently above 200s as changes in the regimen will have to be made.  Recommend outpatient routine dilated eye exam/ophthalmology f/u; podiatry referral and endocrinology   -Follow up appt at [facility] Endocrine Dr Zuly Boyle or Dr Liliana Hanna   [address] [place] [address] [id number] [phone]  -needs close follow up for insulin titration adjustments when steroids are tapered- if unable to follow with endocrine when steroid tapered please ensure pt has appropriate f/u with PCP for insulin titrations   - Patient will need opthalmology and podiatry/ Renal follow up as outpatient.   Patient NERY LEONARD is a 67y (1955) Rh danette speaking man with CAD s/p 1 stent on brilinta, DM, CKD on HD, ,HTN, HLD presenting with Left eye ptosis and diplopia. Patient reports 10-12 days ago having left sided headache. At that time, he noted drooping of left eyelid. Over the course of the last 1 week hit has gotten progressively worse. 5 days ago he noticed double vision and eyes was dysconjugate. When closing one at a time diplopia resolves. He continues to have headache controlled with tylenol rated at 2/10. Headache onset was initially 2/10 then progressively worsened within several hours, maximum intensity of 7/10 over several hours. He saw opthomologist, who noted retinal scarring bilaterally. 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  Denies positional headache, neck stiffness, weakness, numbness, changes to speech, dysphagia, difficulty ambulating.    Since then has been started on Solumedrol for presumed GCA and having improvement in pain but not ptosis. Since starting on steroids BG very elevated and also hyponatremic. Patient was recommended for a GCA biopsy, which required being off Brilinta for 5 days. Rheumatology, ophthalmology, and neurology were consulted and recommended GCA biopsy with steroid treatment. Neurological workup was otherwise negative. Rheumatology recommended pulse dose steroids with subsequent steroid taper. Patient's pain improved after starting on Gabapentin. After temporal artery biopsy, patient was deemed medically stable for discharge, with rheumatology, vascular surgery, and ophthalmology all recommending discharge with outpatient follow-up. Per rheum recommendations, patient was discharged on a steroid taper of  Prednisone 50 mg qd x 2 weeks and then Prednisone 40 mg qd x 2 weeks. Patient should follow-up with a rheumatologist after discharge.     Outpatient discharge Insulin Regimen:   While on prednisone 50mg po qd: Tresiba pen 30 units sq qhs and Humalog kwikpen 10 units sq TID AC   At home, Patient should check FSBG premeals and bedtime. Pt should call their doctor when FSBG <70 or above >400 and or consistently above 200s as changes in the regimen will have to be made.  Recommend outpatient routine dilated eye exam/ophthalmology f/u; podiatry referral and endocrinology   -Follow up appt at [facility] Endocrine Dr Zuly Boyle or Dr Liliana Hanna   [address], [place] [id number] [phone]  -needs close follow up for insulin titration adjustments when steroids are tapered- if unable to follow with endocrine when steroid tapered please ensure pt has appropriate f/u with PCP for insulin titrations   - Patient will need opthalmology and podiatry/ Renal follow up as outpatient.

## 2023-01-20 NOTE — PROGRESS NOTE ADULT - PROBLEM SELECTOR PLAN 2
Takes Tresiba 35-40 units QHS at home   Per pt at home FS  max.  Current regimen:   Lantus 30 units qHS (28u while NPO)   Admelog 10 units AC meals  - Hyperglycemia likely 2/2 steroids   - Endocrinology consulted, recs appreciated. Per endocrinology, change Admelog to 10u qAC and Lantus to 28u qHS while NPO at midnight   - C/w Admelog moderate correction scale AC and moderate HS scale   - C/w 2AM moderate scale to correct >250mg/dl

## 2023-01-20 NOTE — CHART NOTE - NSCHARTNOTEFT_GEN_A_CORE
Surgery Post op Note    Procedure: left temporal artery biopsy    SUBJECTIVE: Patient seen and evaluated at the bedside.   SOB:  [ ] YES [X ] NO  Chest Discomfort: [ ] YES [X ] NO    Nausea: [ ] YES [X ] NO           Vomiting: [ ] YES [X ] NO  Pain Control Adequate: [X] YES [ ] NO    Objective:   Vital Signs Last 24 Hrs  T(C): 36.4 (20 Jan 2023 13:26), Max: 36.8 (19 Jan 2023 21:20)  T(F): 97.6 (20 Jan 2023 13:26), Max: 98.2 (19 Jan 2023 21:20)  HR: 60 (20 Jan 2023 13:26) (57 - 76)  BP: 164/69 (20 Jan 2023 13:26) (123/61 - 179/82)  BP(mean): 107 (20 Jan 2023 13:00) (89 - 107)  RR: 18 (20 Jan 2023 13:26) (14 - 18)  SpO2: 96% (20 Jan 2023 13:26) (96% - 100%)    Parameters below as of 20 Jan 2023 13:26  Patient On (Oxygen Delivery Method): room air      I&O's Summary    19 Jan 2023 07:01  -  20 Jan 2023 07:00  --------------------------------------------------------  IN: 1630 mL / OUT: 3200 mL / NET: -1570 mL      I&O's Detail    19 Jan 2023 07:01  -  20 Jan 2023 07:00  --------------------------------------------------------  IN:    Oral Fluid: 830 mL    Other (mL): 800 mL  Total IN: 1630 mL    OUT:    Other (mL): 3200 mL  Total OUT: 3200 mL    Total NET: -1570 mL            LABS:                        9.5    9.35  )-----------( 151      ( 20 Jan 2023 01:22 )             29.4     01-20    136  |  94<L>  |  49<H>  ----------------------------<  251<H>  3.7   |  25  |  6.29<H>    Ca    8.6      20 Jan 2023 01:22  Phos  4.9     01-20  Mg     2.7     01-20      PT/INR - ( 20 Jan 2023 01:22 )   PT: 11.1 sec;   INR: 0.96 ratio         PTT - ( 20 Jan 2023 01:22 )  PTT:27.6 sec      MEDICATIONS  (STANDING):  artificial  tears Solution 1 Drop(s) Both EYES three times a day  aspirin enteric coated 81 milliGRAM(s) Oral daily  atorvastatin 80 milliGRAM(s) Oral at bedtime  dextrose 5%. 1000 milliLiter(s) (100 mL/Hr) IV Continuous <Continuous>  dextrose 5%. 1000 milliLiter(s) (50 mL/Hr) IV Continuous <Continuous>  dextrose 50% Injectable 25 Gram(s) IV Push once  dextrose 50% Injectable 25 Gram(s) IV Push once  dextrose 50% Injectable 12.5 Gram(s) IV Push once  gabapentin 100 milliGRAM(s) Oral three times a day  glucagon  Injectable 1 milliGRAM(s) IntraMuscular once  heparin   Injectable 5000 Unit(s) SubCutaneous every 12 hours  insulin glargine Injectable (LANTUS) 30 Unit(s) SubCutaneous at bedtime  insulin lispro (ADMELOG) corrective regimen sliding scale   SubCutaneous three times a day before meals  insulin lispro (ADMELOG) corrective regimen sliding scale   SubCutaneous <User Schedule>  insulin lispro Injectable (ADMELOG) 10 Unit(s) SubCutaneous three times a day before meals  melatonin 3 milliGRAM(s) Oral at bedtime  metoprolol tartrate 25 milliGRAM(s) Oral two times a day  pantoprazole    Tablet 40 milliGRAM(s) Oral before breakfast  polyethylene glycol 3350 17 Gram(s) Oral at bedtime  polyethylene glycol 3350 17 Gram(s) Oral daily  senna 2 Tablet(s) Oral at bedtime  sodium chloride 2 Gram(s) Oral three times a day  sodium zirconium cyclosilicate 5 Gram(s) Oral daily  trimethoprim   80 mG/sulfamethoxazole 400 mG 1 Tablet(s) Oral <User Schedule>    MEDICATIONS  (PRN):  acetaminophen     Tablet .. 650 milliGRAM(s) Oral every 6 hours PRN Moderate Pain (4 - 6), Severe Pain (7 - 10)  dextrose Oral Gel 15 Gram(s) Oral once PRN Blood Glucose LESS THAN 70 milliGRAM(s)/deciliter      Physical exam  General- no acute distress, resting  Abdomen- soft, non-tender, non-distended  Respiratory- unlabored respirations  Extremities- moving spontaneously  Neurological- no focal deficits   Vascular- right temporal artery dressing without collections or strikethrough. Left carotid pulse palpable    Assessment/Plan: 67y Male  s/p   - Diet: regular  - Activity- OOB with assistance  - Labs: in am  - Pain medication as needed   - DVT ppx  - incentive spirometry

## 2023-01-20 NOTE — DISCHARGE NOTE NURSING/CASE MANAGEMENT/SOCIAL WORK - PATIENT PORTAL LINK FT
You can access the FollowMyHealth Patient Portal offered by [facility] by registering at the following website: [email or web address]. By joining [facility]’s FollowMyHealth portal, you will also be able to view your health information using other applications (apps) compatible with our system.

## 2023-01-20 NOTE — DISCHARGE NOTE PROVIDER - NSDCMRMEDTOKEN_GEN_ALL_CORE_FT
atorvastatin 40 mg oral tablet: 1 tab(s) orally once a day  Brilinta (ticagrelor) 60 mg oral tablet: 1 dose(s) orally 2 times a day  famotidine 20 mg oral tablet: 1 dose(s) orally once a day  metoprolol tartrate 25 mg oral tablet: 1 tab(s) orally 2 times a day  pantoprazole 20 mg oral delayed release tablet: 1 tab(s) orally once a day   acetaminophen 325 mg oral tablet: 2 tab(s) orally every 6 hours, As needed, Moderate Pain (4 - 6), Severe Pain (7 - 10)  alcohol swabs : Apply topically to affected area 4 times a day   atorvastatin 40 mg oral tablet: 1 tab(s) orally once a day  Brilinta (ticagrelor) 60 mg oral tablet: 1 dose(s) orally 2 times a day  calcium acetate 667 mg oral tablet: 1 tab(s) orally 3 times a day   famotidine 20 mg oral tablet: 1 dose(s) orally once a day  glucometer (per patient&#x27;s insurance): Test blood sugars four times a day. Dispense #1 glucometer.  HumaLOG KwikPen 100 units/mL injectable solution: 10 unit(s) injectable 3 times a day   Insulin Pen Needles, 4mm: 1 application subcutaneously 4 times a day. ** Use with insulin pen **   lancets: 1 application subcutaneously 4 times a day   metoprolol tartrate 25 mg oral tablet: 1 tab(s) orally 2 times a day  Neurontin 100 mg oral capsule: 2 cap(s) orally 3 times a day   ocular lubricant ophthalmic solution: 1 drop(s) to each affected eye 3 times a day  pantoprazole 40 mg oral delayed release tablet: 1 tab(s) orally once a day (before a meal)  predniSONE 10 mg oral tablet: 5 tab(s) orally once a day for two weeks (until 2/4/2023), then 4 tabs orally once a day for two weeks (until 2/18/2023) MDD:50 mg  sodium chloride 1 g oral tablet: 2 tab(s) orally 3 times a day  sulfamethoxazole-trimethoprim 400 mg-80 mg oral tablet: 1 tab(s) orally 3 times a week   test strips (per patient&#x27;s insurance): 1 application subcutaneously 4 times a day. ** Compatible with patient&#x27;s glucometer **  Tresiba 100 units/mL subcutaneous solution: 30 unit(s) subcutaneous once a day (at bedtime)   Vitamin D3 50 mcg (2000 intl units) oral capsule: 1 cap(s) orally once a day

## 2023-01-20 NOTE — PROGRESS NOTE ADULT - SUBJECTIVE AND OBJECTIVE BOX
PROGRESS NOTE:     Patient is a 67y old  Male who presents with a chief complaint of Left CN 3 palsy (19 Jan 2023 14:26)      SUBJECTIVE / OVERNIGHT EVENTS:    No acute events overnight. Patient examined at bedside with no acute complaints.     Pain:  Bowel Movements:  Urination:  OOB:  PT:    REVIEW OF SYSTEMS:    CONSTITUTIONAL: No weakness, fevers or chills  EYES/ENT: No visual changes;  No vertigo or throat pain   NECK: No pain or stiffness  RESPIRATORY: No cough, wheezing, hemoptysis; No shortness of breath  CARDIOVASCULAR: No chest pain or palpitations  GASTROINTESTINAL: No abdominal or epigastric pain. No nausea, vomiting, or hematemesis; No diarrhea or constipation. No melena or hematochezia.  GENITOURINARY: No dysuria, frequency or hematuria  NEUROLOGICAL: No numbness or weakness  SKIN: No itching, rashes      MEDICATIONS  (STANDING):  artificial  tears Solution 1 Drop(s) Both EYES three times a day  aspirin enteric coated 81 milliGRAM(s) Oral daily  atorvastatin 80 milliGRAM(s) Oral at bedtime  dextrose 5%. 1000 milliLiter(s) (100 mL/Hr) IV Continuous <Continuous>  dextrose 5%. 1000 milliLiter(s) (50 mL/Hr) IV Continuous <Continuous>  dextrose 50% Injectable 25 Gram(s) IV Push once  dextrose 50% Injectable 12.5 Gram(s) IV Push once  dextrose 50% Injectable 25 Gram(s) IV Push once  gabapentin 100 milliGRAM(s) Oral three times a day  glucagon  Injectable 1 milliGRAM(s) IntraMuscular once  heparin   Injectable 5000 Unit(s) SubCutaneous every 12 hours  insulin glargine Injectable (LANTUS) 28 Unit(s) SubCutaneous at bedtime  insulin lispro (ADMELOG) corrective regimen sliding scale   SubCutaneous three times a day before meals  insulin lispro (ADMELOG) corrective regimen sliding scale   SubCutaneous <User Schedule>  insulin lispro Injectable (ADMELOG) 10 Unit(s) SubCutaneous three times a day before meals  melatonin 3 milliGRAM(s) Oral at bedtime  metoprolol tartrate 25 milliGRAM(s) Oral two times a day  pantoprazole    Tablet 40 milliGRAM(s) Oral before breakfast  polyethylene glycol 3350 17 Gram(s) Oral at bedtime  polyethylene glycol 3350 17 Gram(s) Oral daily  predniSONE   Tablet 60 milliGRAM(s) Oral daily  senna 2 Tablet(s) Oral at bedtime  sodium chloride 2 Gram(s) Oral three times a day  sodium zirconium cyclosilicate 5 Gram(s) Oral daily    MEDICATIONS  (PRN):  acetaminophen     Tablet .. 650 milliGRAM(s) Oral every 6 hours PRN Moderate Pain (4 - 6), Severe Pain (7 - 10)  dextrose Oral Gel 15 Gram(s) Oral once PRN Blood Glucose LESS THAN 70 milliGRAM(s)/deciliter      CAPILLARY BLOOD GLUCOSE      POCT Blood Glucose.: 253 mg/dL (20 Jan 2023 01:17)  POCT Blood Glucose.: 171 mg/dL (19 Jan 2023 21:01)  POCT Blood Glucose.: 197 mg/dL (19 Jan 2023 12:16)  POCT Blood Glucose.: 177 mg/dL (19 Jan 2023 08:05)    I&O's Summary    18 Jan 2023 07:01  -  19 Jan 2023 07:00  --------------------------------------------------------  IN: 730 mL / OUT: 3600 mL / NET: -2870 mL    19 Jan 2023 07:01  -  20 Jan 2023 06:58  --------------------------------------------------------  IN: 1630 mL / OUT: 3200 mL / NET: -1570 mL        VITALS:   T(C): 36.5 (01-20-23 @ 04:28), Max: 36.8 (01-19-23 @ 21:20)  HR: 66 (01-20-23 @ 04:28) (66 - 76)  BP: 144/67 (01-20-23 @ 04:28) (123/61 - 179/82)  RR: 18 (01-20-23 @ 04:28) (17 - 18)  SpO2: 96% (01-20-23 @ 04:28) (96% - 99%)    GENERAL: NAD, lying in bed comfortably  HEAD:  Atraumatic, normocephalic  EYES: EOMI, PERRLA, conjunctiva and sclera clear  ENT: Moist mucous membranes  NECK: Supple, no JVD  HEART: Regular rate and rhythm, no murmurs, rubs, or gallops  LUNGS: Unlabored respirations.  Clear to auscultation bilaterally, no crackles, wheezing, or rhonchi  ABDOMEN: Soft, nontender, nondistended, +BS  EXTREMITIES: 2+ peripheral pulses bilaterally. No clubbing, cyanosis, or edema  NERVOUS SYSTEM:  A&Ox3, no focal deficits   SKIN: No rashes or lesions    LABS:                        9.5    9.35  )-----------( 151      ( 20 Jan 2023 01:22 )             29.4     01-20    136  |  94<L>  |  49<H>  ----------------------------<  251<H>  3.7   |  25  |  6.29<H>    Ca    8.6      20 Jan 2023 01:22  Phos  4.9     01-20  Mg     2.7     01-20      PT/INR - ( 20 Jan 2023 01:22 )   PT: 11.1 sec;   INR: 0.96 ratio         PTT - ( 20 Jan 2023 01:22 )  PTT:27.6 sec            RADIOLOGY & ADDITIONAL TESTS:  Results Reviewed:   Imaging Personally Reviewed:  Electrocardiogram Personally Reviewed:    COORDINATION OF CARE:  Care Discussed with Consultants/Other Providers [Y/N]:  Prior or Outpatient Records Reviewed [Y/N]:   PROGRESS NOTE:     Patient is a 67y old  Male who presents with a chief complaint of Left CN 3 palsy (19 Jan 2023 14:26)      SUBJECTIVE / OVERNIGHT EVENTS:    No acute events overnight. Patient examined at bedside with no acute complaints. Patient reports improvement in pain with Gabapentin.     REVIEW OF SYSTEMS:    CONSTITUTIONAL: No weakness, fevers or chills  EYES/ENT: No visual changes;  No vertigo or throat pain   NECK: No pain or stiffness  RESPIRATORY: No cough, wheezing, hemoptysis; No shortness of breath  CARDIOVASCULAR: No chest pain or palpitations  GASTROINTESTINAL: No abdominal or epigastric pain. No nausea, vomiting, or hematemesis; No diarrhea or constipation. No melena or hematochezia.  GENITOURINARY: No dysuria, frequency or hematuria  NEUROLOGICAL: No numbness or weakness  SKIN: No itching, rashes      MEDICATIONS  (STANDING):  artificial  tears Solution 1 Drop(s) Both EYES three times a day  aspirin enteric coated 81 milliGRAM(s) Oral daily  atorvastatin 80 milliGRAM(s) Oral at bedtime  dextrose 5%. 1000 milliLiter(s) (100 mL/Hr) IV Continuous <Continuous>  dextrose 5%. 1000 milliLiter(s) (50 mL/Hr) IV Continuous <Continuous>  dextrose 50% Injectable 25 Gram(s) IV Push once  dextrose 50% Injectable 12.5 Gram(s) IV Push once  dextrose 50% Injectable 25 Gram(s) IV Push once  gabapentin 100 milliGRAM(s) Oral three times a day  glucagon  Injectable 1 milliGRAM(s) IntraMuscular once  heparin   Injectable 5000 Unit(s) SubCutaneous every 12 hours  insulin glargine Injectable (LANTUS) 28 Unit(s) SubCutaneous at bedtime  insulin lispro (ADMELOG) corrective regimen sliding scale   SubCutaneous three times a day before meals  insulin lispro (ADMELOG) corrective regimen sliding scale   SubCutaneous <User Schedule>  insulin lispro Injectable (ADMELOG) 10 Unit(s) SubCutaneous three times a day before meals  melatonin 3 milliGRAM(s) Oral at bedtime  metoprolol tartrate 25 milliGRAM(s) Oral two times a day  pantoprazole    Tablet 40 milliGRAM(s) Oral before breakfast  polyethylene glycol 3350 17 Gram(s) Oral at bedtime  polyethylene glycol 3350 17 Gram(s) Oral daily  predniSONE   Tablet 60 milliGRAM(s) Oral daily  senna 2 Tablet(s) Oral at bedtime  sodium chloride 2 Gram(s) Oral three times a day  sodium zirconium cyclosilicate 5 Gram(s) Oral daily    MEDICATIONS  (PRN):  acetaminophen     Tablet .. 650 milliGRAM(s) Oral every 6 hours PRN Moderate Pain (4 - 6), Severe Pain (7 - 10)  dextrose Oral Gel 15 Gram(s) Oral once PRN Blood Glucose LESS THAN 70 milliGRAM(s)/deciliter      CAPILLARY BLOOD GLUCOSE      POCT Blood Glucose.: 253 mg/dL (20 Jan 2023 01:17)  POCT Blood Glucose.: 171 mg/dL (19 Jan 2023 21:01)  POCT Blood Glucose.: 197 mg/dL (19 Jan 2023 12:16)  POCT Blood Glucose.: 177 mg/dL (19 Jan 2023 08:05)    I&O's Summary    18 Jan 2023 07:01  -  19 Jan 2023 07:00  --------------------------------------------------------  IN: 730 mL / OUT: 3600 mL / NET: -2870 mL    19 Jan 2023 07:01  -  20 Jan 2023 06:58  --------------------------------------------------------  IN: 1630 mL / OUT: 3200 mL / NET: -1570 mL        VITALS:   T(C): 36.5 (01-20-23 @ 04:28), Max: 36.8 (01-19-23 @ 21:20)  HR: 66 (01-20-23 @ 04:28) (66 - 76)  BP: 144/67 (01-20-23 @ 04:28) (123/61 - 179/82)  RR: 18 (01-20-23 @ 04:28) (17 - 18)  SpO2: 96% (01-20-23 @ 04:28) (96% - 99%)    GENERAL: NAD, lying in bed comfortably  HEAD:  Atraumatic, normocephalic  EYES: EOMI, PERRLA, conjunctiva and sclera clear  ENT: Moist mucous membranes  NECK: Supple, no JVD  HEART: Regular rate and rhythm, no murmurs, rubs, or gallops  LUNGS: Unlabored respirations.  Clear to auscultation bilaterally, no crackles, wheezing, or rhonchi  ABDOMEN: Soft, nontender, nondistended, +BS  EXTREMITIES: 2+ peripheral pulses bilaterally. No clubbing, cyanosis, or edema  NERVOUS SYSTEM:  A&Ox3, no focal deficits   SKIN: No rashes or lesions    LABS:                        9.5    9.35  )-----------( 151      ( 20 Jan 2023 01:22 )             29.4     01-20    136  |  94<L>  |  49<H>  ----------------------------<  251<H>  3.7   |  25  |  6.29<H>    Ca    8.6      20 Jan 2023 01:22  Phos  4.9     01-20  Mg     2.7     01-20      PT/INR - ( 20 Jan 2023 01:22 )   PT: 11.1 sec;   INR: 0.96 ratio         PTT - ( 20 Jan 2023 01:22 )  PTT:27.6 sec            RADIOLOGY & ADDITIONAL TESTS:  Results Reviewed:   Imaging Personally Reviewed:  Electrocardiogram Personally Reviewed:    COORDINATION OF CARE:  Care Discussed with Consultants/Other Providers [Y/N]:  Prior or Outpatient Records Reviewed [Y/N]:

## 2023-01-20 NOTE — DISCHARGE NOTE PROVIDER - NSDCCPCAREPLAN_GEN_ALL_CORE_FT
PRINCIPAL DISCHARGE DIAGNOSIS  Diagnosis: Left eye pain  Assessment and Plan of Treatment: You were admitted for left eye pain and a closed eyelid. You were treated with steroids and pain medicaiton. You also underwent a procedure to biopsy your left temporal artery. You should follow-up with a rheumatologist after leaving the hospital. You should follow-up with your PCP after leaving the hospital. You should follow-up with a ophthalmologist after leaving the hospital. You should follow-up with a neurologist after leaving the hospital.   YOU SHOULD CONTINUE TAKING YOUR STEROID EVERY DAY. YOU SHOULD TAKE YOUR SOLUMEDROL 50 MG ONE PILL ONCE A DAY FOR TWO WEEKS. THEN YOU SHOULD TAKE YOUR SOLUMEDROL 40 MG ONE PILL ONCE A DAY FOR TWO WEEKS. YOU SHOULD THEN SEE YOUR RHEUMATOLOGIST TO FURTHER MANAGE YOUR STEROIDS.       PRINCIPAL DISCHARGE DIAGNOSIS  Diagnosis: Left eye pain  Assessment and Plan of Treatment: You were admitted for left eye pain and a closed eyelid. You were treated with steroids and pain medicaiton. You also underwent a procedure to biopsy your left temporal artery. You should follow-up with a rheumatologist after leaving the hospital. You should follow-up with your PCP after leaving the hospital. You should follow-up with a ophthalmologist after leaving the hospital. You should follow-up with a neurologist after leaving the hospital.   YOU SHOULD CONTINUE TAKING YOUR STEROID EVERY DAY. YOU SHOULD TAKE YOUR SOLUMEDROL 50 MG ONE PILL ONCE A DAY FOR TWO WEEKS. THEN YOU SHOULD TAKE YOUR SOLUMEDROL 40 MG ONE PILL ONCE A DAY FOR TWO WEEKS. YOU SHOULD THEN SEE YOUR RHEUMATOLOGIST TO FURTHER MANAGE YOUR STEROIDS.      SECONDARY DISCHARGE DIAGNOSES  Diagnosis: Type 2 diabetes mellitus  Assessment and Plan of Treatment: After you were admitted, your blood sugars were managed by an endocrinologist. At home, you should check your blood sugars premeals and at bedtime. You should call your doctor when blood sugars are <70 or above >400 and/or consistently above 200s as changes in your medications will have to be made.  -Follow up appt at [facility] Endocrine Dr Zuly Boyle or Dr Liliana Hanna   [address] [place] [address]  [phone]  if you do not hear from office please call office in 1-2 days for appt info   You will need opthalmology and podiatry/ Renal follow up as outpatient     PRINCIPAL DISCHARGE DIAGNOSIS  Diagnosis: Left eye pain  Assessment and Plan of Treatment: You were admitted for left eye pain and a closed eyelid. You were treated with steroids and pain medicaiton. You also underwent a procedure to biopsy your left temporal artery. You should follow-up with a rheumatologist after leaving the hospital. You should follow-up with your PCP after leaving the hospital. You should follow-up with a ophthalmologist after leaving the hospital. You should follow-up with a neurologist after leaving the hospital.   YOU SHOULD CONTINUE TAKING YOUR STEROID EVERY DAY. YOU SHOULD TAKE YOUR SOLUMEDROL 50 MG ONE PILL ONCE A DAY FOR TWO WEEKS. THEN YOU SHOULD TAKE YOUR SOLUMEDROL 40 MG ONE PILL ONCE A DAY FOR TWO WEEKS. YOU SHOULD THEN SEE YOUR RHEUMATOLOGIST TO FURTHER MANAGE YOUR STEROIDS.      SECONDARY DISCHARGE DIAGNOSES  Diagnosis: Type 2 diabetes mellitus  Assessment and Plan of Treatment: After you were admitted, your blood sugars were managed by an endocrinologist. At home, you should check your blood sugars premeals and at bedtime. You should call your doctor when blood sugars are <70 or above >400 and/or consistently above 200s as changes in your medications will have to be made.  -Follow up appt at [facility] Endocrine Dr Zuly Boyle or Dr Liliana Hanna   [address] [place] [address]  [phone]  if you do not hear from office please call office in 1-2 days for appt info. If you are unable to see an endocrinologist before your steroids are decreased from 50 to 40 mg then please arrange to follow up with your PCP before 2/4 to adjust your insulin.   You will need opthalmology and podiatry/ Renal follow up as outpatient

## 2023-01-20 NOTE — PROGRESS NOTE ADULT - PROBLEM SELECTOR PLAN 1
Current DDX include atypical presentation of GCA vs. Tolossa Guerra (but MRI negative)   MRA brain done, no signs for aneurysm   - Temporal artery biopsy planned for today (requires 5 days off Brilinta)   - Neuro following for alternate CNIII palsy dx- ruled out stroke, large tumor, hemorrhage, trauma) possibly other type inflammatory vs infection vs demyelination? Recs appreciated   - Rheumatology consulted, recs appreciated. Per rheumatology, less likely to be GCA but will continue to pursue biopsy. Per rheumatology, c/w solumedrol 60 mg qd.   - Ophthalmology consulted, recs appreciated. Per ophthalmology, likely microvascular disease   - Will start Gabapentin 100 mg TID for pain Current DDX include atypical presentation of GCA vs. Tolossa Guerra (but MRI negative)   MRA brain done, no signs for aneurysm   - Temporal artery biopsy planned for today (requires 5 days off Brilinta)   - Neuro following for alternate CNIII palsy dx- ruled out stroke, large tumor, hemorrhage, trauma) possibly other type inflammatory vs infection vs demyelination? Recs appreciated   - Rheumatology consulted, recs appreciated. Per rheumatology, less likely to be GCA but will continue to pursue biopsy. Per rheumatology, c/w solumedrol 60 mg qd.   - Ophthalmology consulted, recs appreciated. Per ophthalmology, likely microvascular disease   - C/w Gabapentin 100 mg TID for pain

## 2023-01-20 NOTE — PROGRESS NOTE ADULT - SUBJECTIVE AND OBJECTIVE BOX
Surgery Progress Note    INTERVAl/SUBJECTIVE: No acute event overnight. Patient seen and examined in am rounds, found to be without acute distress.      Vital Signs Last 24 Hrs  T(C): 36.5 (20 Jan 2023 04:28), Max: 36.8 (19 Jan 2023 21:20)  T(F): 97.7 (20 Jan 2023 04:28), Max: 98.2 (19 Jan 2023 21:20)  HR: 66 (20 Jan 2023 04:28) (66 - 76)  BP: 144/67 (20 Jan 2023 04:28) (123/61 - 179/82)  BP(mean): --  RR: 18 (20 Jan 2023 04:28) (17 - 18)  SpO2: 96% (20 Jan 2023 04:28) (96% - 99%)    Parameters below as of 20 Jan 2023 04:28  Patient On (Oxygen Delivery Method): room air        Physical Exam:  General: Appears well, NAD, left eye ptosis, blurring vesion  CHEST: breathing comfortably  CV: appears well perfused  Abdomen: soft, nontender, nondistended, no rebound or guarding  Extremities: Grossly symmetric    LABS:                        9.5    9.35  )-----------( 151      ( 20 Jan 2023 01:22 )             29.4     01-20    136  |  94<L>  |  49<H>  ----------------------------<  251<H>  3.7   |  25  |  6.29<H>    Ca    8.6      20 Jan 2023 01:22  Phos  4.9     01-20  Mg     2.7     01-20      PT/INR - ( 20 Jan 2023 01:22 )   PT: 11.1 sec;   INR: 0.96 ratio         PTT - ( 20 Jan 2023 01:22 )  PTT:27.6 sec      INs and OUTs:    01-19-23 @ 07:01  -  01-20-23 @ 07:00  --------------------------------------------------------  IN: 1630 mL / OUT: 3200 mL / NET: -1570 mL

## 2023-01-20 NOTE — DISCHARGE NOTE PROVIDER - NSDCFUADDAPPT_GEN_ALL_CORE_FT
APPTS ARE READY TO BE MADE: [X] YES    Best Family or Patient Contact (if needed): Rola Mittal [phone]     Additional Information about above appointments (if needed):    1: Rheumatology within 1 month   2: Neurology within 2 weeks   3: Ophthalmology within 1 week     Other comments or requests:    APPTS ARE READY TO BE MADE: [X] YES    Best Family or Patient Contact (if needed): Rola Mittal [phone]     Additional Information about above appointments (if needed):    1: Rheumatology within 1 month   2: Neurology within 2 weeks   3: Ophthalmology within 1 week   4. Endocrinology within 2 weeks    Other comments or requests:    APPTS ARE READY TO BE MADE: [X] YES    Best Family or Patient Contact (if needed): Rola Mittal [phone]     Additional Information about above appointments (if needed):    1: Rheumatology within 1 month   2: Neurology within 2 weeks   3: Ophthalmology within 1 week   4. Endocrinology within 2 weeks    Other comments or requests:   Patient scheduled with Dr. Parr in Ophthalmology on 2/14 10am  Patient scheduled with Dr. Mo in Rheumatology on 2/3 1:20pm  Patient was provided with follow up request details for Endocrinology and Neurology and was advised to call to schedule follow up within specified time frame.

## 2023-01-20 NOTE — PROGRESS NOTE ADULT - PROBLEM SELECTOR PLAN 7
Hyponatremia, with Na to 123 on 1/16   Now improved to 130 on 1/17 s/p HD on 1/16   - Hyponatremia likely in part 2/2 hyperglycemia vs. volume overload   - Nephrology consulted, recs appreciated. Hyponatremia, with Na to 123 on 1/16   Now improved to 136 on 1/20   - Hyponatremia likely in part 2/2 hyperglycemia vs. volume overload   - Nephrology consulted, recs appreciated.

## 2023-01-20 NOTE — PROGRESS NOTE ADULT - ATTENDING COMMENTS
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

## 2023-01-20 NOTE — DISCHARGE NOTE PROVIDER - NSFOLLOWUPCLINICS_GEN_ALL_ED_FT
[facility] - Ophthalmology  Ophthalmology  [address]  [place] [address]  Phone: [phone]  Fax:   Follow Up Time: 1 week    [facility] Rheumatology  Rheumatology  [address]  [place] [address]  Phone: [phone]  Fax:   Follow Up Time: 1 month    [facility]  Neurology  [address] [place] [address]  Phone: [phone]  Fax:   Follow Up Time: 2 weeks     [facility] - Ophthalmology  Ophthalmology  [address]  [place] [address]  Phone: [phone]  Fax:   Follow Up Time: 1 week    [facility] Rheumatology  Rheumatology  [address]  [place] [address]  Phone: [phone]  Fax:   Follow Up Time: 1 month    [facility]  Neurology  [address] [place] [address]  Phone: [phone]  Fax:   Follow Up Time: 2 weeks    [facility]- Endocrine  Endocrinology  [address]  [place] [address]  Phone: [phone]  Fax: [phone]

## 2023-01-20 NOTE — PRE-ANESTHESIA EVALUATION ADULT - NSANTHOSAYNRD_GEN_A_CORE
No. ALLISON screening performed.  STOP BANG Legend: 0-2 = LOW Risk; 3-4 = INTERMEDIATE Risk; 5-8 = HIGH Risk

## 2023-01-20 NOTE — CHART NOTE - NSCHARTNOTEFT_GEN_A_CORE
Pt off unit having bx  68 y/o M w/h/o T2DM> unknown control due to anemia of chronic disease. On Tresiba 30 to 40 units at hs based on hs BG, Reports FBG 50s to mid 100s at home. DM c/b CAD> stents and CKD > HD. Also h/o HTN, HLD. Here Left eye ptosis and diplopia/pain. On steroids for possible GCA. Endocrinology consulted for uncontrolled glucose in the setting of high dose steroid. Tolerating POs with BG values mostly at goal except today due to NPO status with BG 100s to 200s. Pt having L temporal artery bx today. On Prednisone 60mg til today> coming down to 50mg daily tomorrow.    POCT Blood Glucose.: 261 mg/dL (01-20-23 @ 16:27)  POCT Blood Glucose.: 159 mg/dL (01-20-23 @ 11:30)  POCT Blood Glucose.: 171 mg/dL (01-20-23 @ 07:45)  POCT Blood Glucose.: 253 mg/dL (01-20-23 @ 01:17)  POCT Blood Glucose.: 171 mg/dL (01-19-23 @ 21:01)  POCT Blood Glucose.: 197 mg/dL (01-19-23 @ 12:16)  POCT Blood Glucose.: 177 mg/dL (01-19-23 @ 08:05)  POCT Blood Glucose.: 111 mg/dL (01-19-23 @ 02:18)  POCT Blood Glucose.: 267 mg/dL (01-18-23 @ 21:06)      MEDICATIONS:  atorvastatin 80 milliGRAM(s) Oral at bedtime  insulin glargine Injectable (LANTUS) 30 Unit(s) SubCutaneous at bedtime  insulin lispro (ADMELOG) corrective regimen sliding scale   SubCutaneous three times a day before meals  insulin lispro (ADMELOG) corrective regimen sliding scale   SubCutaneous <User Schedule>  insulin lispro Injectable (ADMELOG) 10 Unit(s) SubCutaneous three times a day before meals  trimethoprim   80 mG/sulfamethoxazole 400 mG 1 Tablet(s) Oral <User Schedule>    PLAN:  test BG AC/HS/2AM. Can change to Q6h when NPO  -C/w Lantus 30 units qhs  -Restart Admelog 10 units AC meals for now.   -c/w Admelog moderate correction scale AC and mod HS scale. Can change to q6h scale when NPO  -Please contact endo team with changes on steroid doses. Starting Prednisone 50mg daily tomorrow  -Will evalaute BG while in lower steroid doses and adjust insulin as needed  Discharge planning:  -Will be determined according to BG/insulin needs/PO intake and steroid therapy at time of discharge.  -basal/bolus versus basal/orals.    -F/u with endo if pt wishes [address] [place] [address] [phone]  - Patient will need opthalmology and podiatry/ Renal follow up as outpatient.  - Make sure pt has Rx for all DM supplies and insulin/ DM meds.

## 2023-01-20 NOTE — PROGRESS NOTE ADULT - PROBLEM SELECTOR PLAN 3
Patient has hx of CAD and is on Brilinta   - Cardiology consulted for preprocedural evaluation, recs appreciated. Per cardiology, can hold Brilinta for 5 days prior to biopsy. Will f/u cardiology re: continuing with Brilinta after procedure given that stent was in 2016 per family Patient has hx of CAD and is on Brilinta   - Cardiology consulted for preprocedural evaluation, recs appreciated. Per cardiology, can hold Brilinta for 5 days prior to biopsy. Will f/u cardiology re: continuing with Brilinta after procedure given that stent was in 2016 per family  - Per vascular surgery, will restart Brilinta after post-op evaluation tomorrow

## 2023-01-20 NOTE — PROGRESS NOTE ADULT - ASSESSMENT
67y Male with possible left temporal arteritis. Vascular consulted for left TA biopsy. OR Friday for TA biopsy    Plan:  - OR today for L temporal A biopsy  - last HD yesterday  - labs reviewed  - appreciate cardiology and medicine stratificaton/optimization    - NPO at midnight  - hold brillinta for 5 days before TA biopsy  - follow up with rheumatology recs  - care per primary team      Vascular  [id number]

## 2023-01-20 NOTE — DISCHARGE NOTE PROVIDER - NSFOLLOWUPCLINICSTOKEN_GEN_ALL_ED_FT
[id number]:1 week|| ||00\01||False;819458:1 month|| ||00\01||False;691415:2 weeks|| ||00\01||False; 138962:1 week|| ||00\01||False;221277:1 month|| ||00\01||False;564781:2 weeks|| ||00\01||False;122936: || ||00\01||False;

## 2023-01-20 NOTE — PROGRESS NOTE ADULT - SUBJECTIVE AND OBJECTIVE BOX
Cardiovascular Disease Progress Note  Date of service: 23 @ 07:59    Overnight events: No acute events overnight.  Pt is in no distress.   Otherwise review of systems negative    Objective Findings:  T(C): 36.5 (23 @ 04:28), Max: 36.8 (23 @ 21:20)  HR: 66 (23 @ 04:28) (66 - 76)  BP: 144/67 (23 @ 04:28) (123/61 - 179/82)  RR: 18 (23 @ 04:28) (17 - 18)  SpO2: 96% (23 @ 04:28) (96% - 99%)  Wt(kg): --  Daily     Daily Weight in k.4 (2023 19:45)      Physical Exam:  Gen: NAD; Patient resting comfortably  HEENT: EOMI, Normocephalic/ atraumatic, L ptosis  CV: RRR, normal S1 + S2, no m/r/g  Lungs:  Normal respiratory effort; clear to auscultation bilaterally  Abd: soft, non-tender; bowel sounds present  Ext: No edema; warm and well perfused    Telemetry: N/a    Laboratory Data:                        9.5    9.35  )-----------( 151      ( 2023 01:22 )             29.4     -20    136  |  94<L>  |  49<H>  ----------------------------<  251<H>  3.7   |  25  |  6.29<H>    Ca    8.6      2023 01:22  Phos  4.9       Mg     2.7     20      PT/INR - ( 2023 01:22 )   PT: 11.1 sec;   INR: 0.96 ratio         PTT - ( 2023 01:22 )  PTT:27.6 sec          Inpatient Medications:  MEDICATIONS  (STANDING):  artificial  tears Solution 1 Drop(s) Both EYES three times a day  aspirin enteric coated 81 milliGRAM(s) Oral daily  atorvastatin 80 milliGRAM(s) Oral at bedtime  dextrose 5%. 1000 milliLiter(s) (100 mL/Hr) IV Continuous <Continuous>  dextrose 5%. 1000 milliLiter(s) (50 mL/Hr) IV Continuous <Continuous>  dextrose 50% Injectable 25 Gram(s) IV Push once  dextrose 50% Injectable 12.5 Gram(s) IV Push once  dextrose 50% Injectable 25 Gram(s) IV Push once  gabapentin 100 milliGRAM(s) Oral three times a day  glucagon  Injectable 1 milliGRAM(s) IntraMuscular once  heparin   Injectable 5000 Unit(s) SubCutaneous every 12 hours  insulin glargine Injectable (LANTUS) 28 Unit(s) SubCutaneous at bedtime  insulin lispro (ADMELOG) corrective regimen sliding scale   SubCutaneous three times a day before meals  insulin lispro (ADMELOG) corrective regimen sliding scale   SubCutaneous <User Schedule>  insulin lispro Injectable (ADMELOG) 10 Unit(s) SubCutaneous three times a day before meals  melatonin 3 milliGRAM(s) Oral at bedtime  metoprolol tartrate 25 milliGRAM(s) Oral two times a day  pantoprazole    Tablet 40 milliGRAM(s) Oral before breakfast  polyethylene glycol 3350 17 Gram(s) Oral at bedtime  polyethylene glycol 3350 17 Gram(s) Oral daily  predniSONE   Tablet 60 milliGRAM(s) Oral daily  senna 2 Tablet(s) Oral at bedtime  sodium chloride 2 Gram(s) Oral three times a day  sodium zirconium cyclosilicate 5 Gram(s) Oral daily      Assessment:  67y M  CAD s/p PCI to LAD in  now on Brilinta, DM, ESRD on HTN, HLD presenting with Left eye ptosis and diplopia.    Plan of Care:    #Pre-op risk stratification  - Pt to undergo temporal artery biopsy today  - Mr. Ramirez EKG from 1/15 shows sinus rhythm with no acute ischemic changes  - TTE from  shows normal LV systolic function with no significant structural disease  - Pt displays no evidence of pre-op coronary ischemia  - From a cardiac standpoint, Mr. Ramirez is optimized to proceed with biopsy at the discretion of the vascular team.  - Pt is low to intermediate risk for vascular procedure  - Agree with Brilinta being held for upcoming procedure (stent placed in 2016)  - Will resume post-procedure once deemed safe by vascular team     #CAD   - S/p PCI in 2016 to LAD  - Brilinta held for biopsy   - Continue statin and BB    #ESRD  - HD as per renal    #HTN  BP acceptable on current regimen    #HLD  - Statin as ordered            Over 25 minutes spent on total encounter; more than 50% of the visit was spent counseling and/or coordinating care by the attending physician.      Luigi Barnes,  [facility]  Cardiovascular Disease  [phone]

## 2023-01-20 NOTE — PROGRESS NOTE ADULT - SUBJECTIVE AND OBJECTIVE BOX
NERY MICHAELL  [id number]    INTERVAL HPI/OVERNIGHT EVENTS:        PMHx/PSHx/FamHx/SocHx reviewed and no significant changes    REVIEW OF SYSTEMS   - reviewed with patient and  negative other than as above or previously documented.     MEDICATIONS  (STANDING):  artificial  tears Solution 1 Drop(s) Both EYES three times a day  aspirin enteric coated 81 milliGRAM(s) Oral daily  atorvastatin 80 milliGRAM(s) Oral at bedtime  dextrose 5%. 1000 milliLiter(s) (100 mL/Hr) IV Continuous <Continuous>  dextrose 5%. 1000 milliLiter(s) (50 mL/Hr) IV Continuous <Continuous>  dextrose 50% Injectable 25 Gram(s) IV Push once  dextrose 50% Injectable 25 Gram(s) IV Push once  dextrose 50% Injectable 12.5 Gram(s) IV Push once  gabapentin 100 milliGRAM(s) Oral three times a day  glucagon  Injectable 1 milliGRAM(s) IntraMuscular once  heparin   Injectable 5000 Unit(s) SubCutaneous every 12 hours  insulin glargine Injectable (LANTUS) 30 Unit(s) SubCutaneous at bedtime  insulin lispro (ADMELOG) corrective regimen sliding scale   SubCutaneous three times a day before meals  insulin lispro (ADMELOG) corrective regimen sliding scale   SubCutaneous <User Schedule>  insulin lispro Injectable (ADMELOG) 10 Unit(s) SubCutaneous three times a day before meals  melatonin 3 milliGRAM(s) Oral at bedtime  metoprolol tartrate 25 milliGRAM(s) Oral two times a day  pantoprazole    Tablet 40 milliGRAM(s) Oral before breakfast  polyethylene glycol 3350 17 Gram(s) Oral at bedtime  polyethylene glycol 3350 17 Gram(s) Oral daily  senna 2 Tablet(s) Oral at bedtime  sodium chloride 2 Gram(s) Oral three times a day  sodium zirconium cyclosilicate 5 Gram(s) Oral daily  trimethoprim   80 mG/sulfamethoxazole 400 mG 1 Tablet(s) Oral <User Schedule>    MEDICATIONS  (PRN):  acetaminophen     Tablet .. 650 milliGRAM(s) Oral every 6 hours PRN Moderate Pain (4 - 6), Severe Pain (7 - 10)  dextrose Oral Gel 15 Gram(s) Oral once PRN Blood Glucose LESS THAN 70 milliGRAM(s)/deciliter      Allergies    No Known Allergies    Intolerances          Vital Signs Last 24 Hrs  T(C): 36.4 (20 Jan 2023 13:26), Max: 36.8 (19 Jan 2023 21:20)  T(F): 97.6 (20 Jan 2023 13:26), Max: 98.2 (19 Jan 2023 21:20)  HR: 60 (20 Jan 2023 13:26) (57 - 76)  BP: 164/69 (20 Jan 2023 13:26) (123/61 - 179/82)  BP(mean): 107 (20 Jan 2023 13:00) (89 - 107)  RR: 18 (20 Jan 2023 13:26) (14 - 18)  SpO2: 96% (20 Jan 2023 13:26) (96% - 100%)    Parameters below as of 20 Jan 2023 13:26  Patient On (Oxygen Delivery Method): room air        Physical Exam:  General: NAD  HEENT: EOMI, MMM  Cardio: +S1/S2, RRR  Resp: CTA b/l  GI: +BS, soft, NT/ND  MSK:  Neuro: AAOx3  Psych: wnl    LABS:                        9.5    9.35  )-----------( 151      ( 20 Jan 2023 01:22 )             29.4     01-20    136  |  94<L>  |  49<H>  ----------------------------<  251<H>  3.7   |  25  |  6.29<H>    Ca    8.6      20 Jan 2023 01:22  Phos  4.9     01-20  Mg     2.7     01-20      PT/INR - ( 20 Jan 2023 01:22 )   PT: 11.1 sec;   INR: 0.96 ratio         PTT - ( 20 Jan 2023 01:22 )  PTT:27.6 sec        RADIOLOGY & ADDITIONAL TESTS:

## 2023-01-20 NOTE — PRE-ANESTHESIA EVALUATION ADULT - NSANTHPMHFT_GEN_ALL_CORE
67y (1955) Rh danette speaking man with CAD s/p 1 stent on brilinta, DM, CKD on HD, ,HTN, HLD presenting with Left eye ptosis and diplopia.  last HD yesterday    denies CP/SOB

## 2023-01-21 PROBLEM — E11.9 TYPE 2 DIABETES MELLITUS WITHOUT COMPLICATIONS: Chronic | Status: ACTIVE | Noted: 2022-06-27

## 2023-01-21 PROBLEM — N18.6 END STAGE RENAL DISEASE: Chronic | Status: ACTIVE | Noted: 2022-06-27

## 2023-01-21 PROBLEM — E78.5 HYPERLIPIDEMIA, UNSPECIFIED: Chronic | Status: ACTIVE | Noted: 2022-06-27

## 2023-01-21 PROBLEM — I25.10 ATHEROSCLEROTIC HEART DISEASE OF NATIVE CORONARY ARTERY WITHOUT ANGINA PECTORIS: Chronic | Status: ACTIVE | Noted: 2022-06-27

## 2023-01-21 PROBLEM — K21.9 GASTRO-ESOPHAGEAL REFLUX DISEASE WITHOUT ESOPHAGITIS: Chronic | Status: ACTIVE | Noted: 2022-06-27

## 2023-01-21 LAB
ANION GAP SERPL CALC-SCNC: 19 MMOL/L — HIGH (ref 5–17)
BUN SERPL-MCNC: 93 MG/DL — HIGH (ref 7–23)
CALCIUM SERPL-MCNC: 8.1 MG/DL — LOW (ref 8.4–10.5)
CHLORIDE SERPL-SCNC: 92 MMOL/L — LOW (ref 96–108)
CO2 SERPL-SCNC: 22 MMOL/L — SIGNIFICANT CHANGE UP (ref 22–31)
CREAT SERPL-MCNC: 9.7 MG/DL — HIGH (ref 0.5–1.3)
EGFR: 5 ML/MIN/1.73M2 — LOW
GLUCOSE BLDC GLUCOMTR-MCNC: 133 MG/DL — HIGH (ref 70–99)
GLUCOSE BLDC GLUCOMTR-MCNC: 190 MG/DL — HIGH (ref 70–99)
GLUCOSE BLDC GLUCOMTR-MCNC: 203 MG/DL — HIGH (ref 70–99)
GLUCOSE BLDC GLUCOMTR-MCNC: 255 MG/DL — HIGH (ref 70–99)
GLUCOSE BLDC GLUCOMTR-MCNC: 390 MG/DL — HIGH (ref 70–99)
GLUCOSE SERPL-MCNC: 274 MG/DL — HIGH (ref 70–99)
HCT VFR BLD CALC: 27 % — LOW (ref 39–50)
HGB BLD-MCNC: 9 G/DL — LOW (ref 13–17)
MAGNESIUM SERPL-MCNC: 3.2 MG/DL — HIGH (ref 1.6–2.6)
MCHC RBC-ENTMCNC: 32.1 PG — SIGNIFICANT CHANGE UP (ref 27–34)
MCHC RBC-ENTMCNC: 33.3 GM/DL — SIGNIFICANT CHANGE UP (ref 32–36)
MCV RBC AUTO: 96.4 FL — SIGNIFICANT CHANGE UP (ref 80–100)
NRBC # BLD: 0 /100 WBCS — SIGNIFICANT CHANGE UP (ref 0–0)
PHOSPHATE SERPL-MCNC: 6.5 MG/DL — HIGH (ref 2.5–4.5)
PLATELET # BLD AUTO: 142 K/UL — LOW (ref 150–400)
POTASSIUM SERPL-MCNC: 4.4 MMOL/L — SIGNIFICANT CHANGE UP (ref 3.5–5.3)
POTASSIUM SERPL-SCNC: 4.4 MMOL/L — SIGNIFICANT CHANGE UP (ref 3.5–5.3)
RBC # BLD: 2.8 M/UL — LOW (ref 4.2–5.8)
RBC # FLD: 15.5 % — HIGH (ref 10.3–14.5)
SODIUM SERPL-SCNC: 133 MMOL/L — LOW (ref 135–145)
WBC # BLD: 9.31 K/UL — SIGNIFICANT CHANGE UP (ref 3.8–10.5)
WBC # FLD AUTO: 9.31 K/UL — SIGNIFICANT CHANGE UP (ref 3.8–10.5)

## 2023-01-21 PROCEDURE — 99232 SBSQ HOSP IP/OBS MODERATE 35: CPT

## 2023-01-21 PROCEDURE — 99239 HOSP IP/OBS DSCHRG MGMT >30: CPT

## 2023-01-21 RX ORDER — CALCIUM ACETATE 667 MG
1 TABLET ORAL
Qty: 90 | Refills: 0
Start: 2023-01-21 | End: 2023-02-19

## 2023-01-21 RX ORDER — ISOPROPYL ALCOHOL, BENZOCAINE .7; .06 ML/ML; ML/ML
1 SWAB TOPICAL
Qty: 100 | Refills: 1
Start: 2023-01-21 | End: 2023-03-11

## 2023-01-21 RX ORDER — SODIUM CHLORIDE 9 MG/ML
2 INJECTION INTRAMUSCULAR; INTRAVENOUS; SUBCUTANEOUS
Qty: 180 | Refills: 0
Start: 2023-01-21 | End: 2023-02-19

## 2023-01-21 RX ORDER — PANTOPRAZOLE SODIUM 20 MG/1
1 TABLET, DELAYED RELEASE ORAL
Qty: 30 | Refills: 0
Start: 2023-01-21 | End: 2023-02-19

## 2023-01-21 RX ORDER — ACETAMINOPHEN 500 MG
2 TABLET ORAL
Qty: 240 | Refills: 0
Start: 2023-01-21 | End: 2023-02-19

## 2023-01-21 RX ORDER — GABAPENTIN 400 MG/1
2 CAPSULE ORAL
Qty: 180 | Refills: 0
Start: 2023-01-21 | End: 2023-02-19

## 2023-01-21 RX ORDER — INSULIN LISPRO 100/ML
12 VIAL (ML) SUBCUTANEOUS
Refills: 0 | Status: DISCONTINUED | OUTPATIENT
Start: 2023-01-21 | End: 2023-01-22

## 2023-01-21 RX ORDER — TICAGRELOR 90 MG/1
60 TABLET ORAL EVERY 12 HOURS
Refills: 0 | Status: DISCONTINUED | OUTPATIENT
Start: 2023-01-21 | End: 2023-01-22

## 2023-01-21 RX ORDER — CHOLECALCIFEROL (VITAMIN D3) 125 MCG
1 CAPSULE ORAL
Qty: 30 | Refills: 0
Start: 2023-01-21 | End: 2023-02-19

## 2023-01-21 RX ORDER — INSULIN DEGLUDEC 100 U/ML
30 INJECTION, SOLUTION SUBCUTANEOUS
Qty: 900 | Refills: 0
Start: 2023-01-21 | End: 2023-02-19

## 2023-01-21 RX ORDER — GABAPENTIN 400 MG/1
200 CAPSULE ORAL THREE TIMES A DAY
Refills: 0 | Status: DISCONTINUED | OUTPATIENT
Start: 2023-01-21 | End: 2023-01-22

## 2023-01-21 RX ORDER — LIDOCAINE 4 G/100G
1 CREAM TOPICAL ONCE
Refills: 0 | Status: DISCONTINUED | OUTPATIENT
Start: 2023-01-21 | End: 2023-01-22

## 2023-01-21 RX ORDER — INSULIN LISPRO 100/ML
10 VIAL (ML) SUBCUTANEOUS
Qty: 900 | Refills: 0
Start: 2023-01-21 | End: 2023-02-19

## 2023-01-21 RX ADMIN — Medication 12 UNIT(S): at 18:41

## 2023-01-21 RX ADMIN — SODIUM CHLORIDE 2 GRAM(S): 9 INJECTION INTRAMUSCULAR; INTRAVENOUS; SUBCUTANEOUS at 18:44

## 2023-01-21 RX ADMIN — SENNA PLUS 2 TABLET(S): 8.6 TABLET ORAL at 21:52

## 2023-01-21 RX ADMIN — Medication 2: at 08:26

## 2023-01-21 RX ADMIN — Medication 6: at 02:32

## 2023-01-21 RX ADMIN — Medication 1 DROP(S): at 21:52

## 2023-01-21 RX ADMIN — Medication 25 MILLIGRAM(S): at 05:00

## 2023-01-21 RX ADMIN — Medication 650 MILLIGRAM(S): at 09:15

## 2023-01-21 RX ADMIN — POLYETHYLENE GLYCOL 3350 17 GRAM(S): 17 POWDER, FOR SOLUTION ORAL at 21:53

## 2023-01-21 RX ADMIN — PANTOPRAZOLE SODIUM 40 MILLIGRAM(S): 20 TABLET, DELAYED RELEASE ORAL at 05:01

## 2023-01-21 RX ADMIN — Medication 3 MILLIGRAM(S): at 21:52

## 2023-01-21 RX ADMIN — Medication 10 UNIT(S): at 11:28

## 2023-01-21 RX ADMIN — HEPARIN SODIUM 5000 UNIT(S): 5000 INJECTION INTRAVENOUS; SUBCUTANEOUS at 18:41

## 2023-01-21 RX ADMIN — SODIUM CHLORIDE 2 GRAM(S): 9 INJECTION INTRAMUSCULAR; INTRAVENOUS; SUBCUTANEOUS at 21:52

## 2023-01-21 RX ADMIN — Medication 1 DROP(S): at 18:46

## 2023-01-21 RX ADMIN — GABAPENTIN 100 MILLIGRAM(S): 400 CAPSULE ORAL at 05:00

## 2023-01-21 RX ADMIN — SODIUM CHLORIDE 2 GRAM(S): 9 INJECTION INTRAMUSCULAR; INTRAVENOUS; SUBCUTANEOUS at 05:00

## 2023-01-21 RX ADMIN — Medication 650 MILLIGRAM(S): at 08:26

## 2023-01-21 RX ADMIN — Medication 6: at 11:28

## 2023-01-21 RX ADMIN — POLYETHYLENE GLYCOL 3350 17 GRAM(S): 17 POWDER, FOR SOLUTION ORAL at 18:40

## 2023-01-21 RX ADMIN — Medication 50 MILLIGRAM(S): at 05:00

## 2023-01-21 RX ADMIN — Medication 10 UNIT(S): at 08:25

## 2023-01-21 RX ADMIN — GABAPENTIN 200 MILLIGRAM(S): 400 CAPSULE ORAL at 18:41

## 2023-01-21 RX ADMIN — HEPARIN SODIUM 5000 UNIT(S): 5000 INJECTION INTRAVENOUS; SUBCUTANEOUS at 05:00

## 2023-01-21 RX ADMIN — TICAGRELOR 60 MILLIGRAM(S): 90 TABLET ORAL at 18:47

## 2023-01-21 RX ADMIN — ATORVASTATIN CALCIUM 80 MILLIGRAM(S): 80 TABLET, FILM COATED ORAL at 21:52

## 2023-01-21 RX ADMIN — INSULIN GLARGINE 30 UNIT(S): 100 INJECTION, SOLUTION SUBCUTANEOUS at 21:52

## 2023-01-21 RX ADMIN — GABAPENTIN 200 MILLIGRAM(S): 400 CAPSULE ORAL at 21:52

## 2023-01-21 RX ADMIN — Medication 1 DROP(S): at 05:01

## 2023-01-21 RX ADMIN — Medication 25 MILLIGRAM(S): at 18:42

## 2023-01-21 NOTE — PROGRESS NOTE ADULT - PROBLEM SELECTOR PLAN 7
Hyponatremia, with Na to 123 on 1/16   Now improved to 136 on 1/20   - Hyponatremia likely in part 2/2 hyperglycemia vs. volume overload   - Nephrology consulted, recs appreciated.

## 2023-01-21 NOTE — PROGRESS NOTE ADULT - ATTENDING COMMENTS
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  hyperglycemia   Prednisone taper for  presumed GCA, rheum OP follow up   stop ASA, resume Brilinta prior to d/c   f/u Endocrine recs   D/c home

## 2023-01-21 NOTE — PROGRESS NOTE ADULT - PROBLEM SELECTOR PLAN 1
Current DDX include atypical presentation of GCA vs. Tolossa Guerra (but MRI negative)   MRA brain done, no signs for aneurysm   - Temporal artery biopsy planned for today (requires 5 days off Brilinta)   - Neuro following for alternate CNIII palsy dx- ruled out stroke, large tumor, hemorrhage, trauma) possibly other type inflammatory vs infection vs demyelination? Recs appreciated   - Rheumatology consulted, recs appreciated. Per rheumatology, less likely to be GCA but will continue to pursue biopsy. Per rheumatology, c/w solumedrol 60 mg qd.   - Ophthalmology consulted, recs appreciated. Per ophthalmology, likely microvascular disease   - C/w Gabapentin 100 mg TID for pain Current DDX include atypical presentation of GCA vs. Tolossa Guerra (but MRI negative)   MRA brain done, no signs for aneurysm   - Temporal artery biopsy planned for today (requires 5 days off Brilinta)   - Neuro following for alternate CNIII palsy dx- ruled out stroke, large tumor, hemorrhage, trauma) possibly other type inflammatory vs infection vs demyelination? Recs appreciated   - Rheumatology consulted, recs appreciated. Per rheumatology, less likely to be GCA but will continue to pursue biopsy. Per rheumatology, c/w solumedrol 60 mg qd.   - Ophthalmology consulted, recs appreciated. Per ophthalmology, likely microvascular disease   - Increase Gabapentin to 200 mg TID for pain

## 2023-01-21 NOTE — PROGRESS NOTE ADULT - SUBJECTIVE AND OBJECTIVE BOX
Cardiovascular Disease Progress Note  Date of service: 01-21-23 @ 09:42    Overnight events: No acute events overnight.  Pt complaining of headache and jaw pain this morning,. Unchanged from yesterday  Otherwise review of systems negative    Objective Findings:  T(C): 36.7 (01-21-23 @ 04:33), Max: 37.1 (01-20-23 @ 21:45)  HR: 71 (01-21-23 @ 04:33) (57 - 72)  BP: 164/67 (01-21-23 @ 04:33) (127/65 - 173/66)  RR: 18 (01-21-23 @ 04:33) (14 - 18)  SpO2: 95% (01-21-23 @ 04:33) (95% - 100%)  Wt(kg): --  Daily Height in cm: 175.26 (20 Jan 2023 09:45)    Daily       Physical Exam:  Gen: NAD; Patient resting comfortably  HEENT: EOMI, Normocephalic/ atraumatic, L ptosis  CV: RRR, normal S1 + S2, no m/r/g  Lungs:  Normal respiratory effort; clear to auscultation bilaterally  Abd: soft, non-tender; bowel sounds present  Ext: No edema; warm and well perfused    Telemetry:  N.a    Laboratory Data:                        9.0    9.31  )-----------( 142      ( 21 Jan 2023 06:17 )             27.0     01-21    133<L>  |  92<L>  |  93<H>  ----------------------------<  274<H>  4.4   |  22  |  9.70<H>    Ca    8.1<L>      21 Jan 2023 06:17  Phos  6.5     01-21  Mg     3.2     01-21      PT/INR - ( 20 Jan 2023 01:22 )   PT: 11.1 sec;   INR: 0.96 ratio         PTT - ( 20 Jan 2023 01:22 )  PTT:27.6 sec          Inpatient Medications:  MEDICATIONS  (STANDING):  artificial  tears Solution 1 Drop(s) Both EYES three times a day  aspirin enteric coated 81 milliGRAM(s) Oral daily  atorvastatin 80 milliGRAM(s) Oral at bedtime  dextrose 5%. 1000 milliLiter(s) (100 mL/Hr) IV Continuous <Continuous>  dextrose 5%. 1000 milliLiter(s) (50 mL/Hr) IV Continuous <Continuous>  dextrose 50% Injectable 12.5 Gram(s) IV Push once  dextrose 50% Injectable 25 Gram(s) IV Push once  dextrose 50% Injectable 25 Gram(s) IV Push once  gabapentin 100 milliGRAM(s) Oral three times a day  glucagon  Injectable 1 milliGRAM(s) IntraMuscular once  heparin   Injectable 5000 Unit(s) SubCutaneous every 12 hours  insulin glargine Injectable (LANTUS) 30 Unit(s) SubCutaneous at bedtime  insulin lispro (ADMELOG) corrective regimen sliding scale   SubCutaneous three times a day before meals  insulin lispro (ADMELOG) corrective regimen sliding scale   SubCutaneous <User Schedule>  insulin lispro Injectable (ADMELOG) 10 Unit(s) SubCutaneous three times a day before meals  melatonin 3 milliGRAM(s) Oral at bedtime  metoprolol tartrate 25 milliGRAM(s) Oral two times a day  pantoprazole    Tablet 40 milliGRAM(s) Oral before breakfast  polyethylene glycol 3350 17 Gram(s) Oral at bedtime  polyethylene glycol 3350 17 Gram(s) Oral daily  predniSONE   Tablet 50 milliGRAM(s) Oral daily  senna 2 Tablet(s) Oral at bedtime  sodium chloride 2 Gram(s) Oral three times a day  sodium zirconium cyclosilicate 5 Gram(s) Oral daily  trimethoprim   80 mG/sulfamethoxazole 400 mG 1 Tablet(s) Oral <User Schedule>      Assessment:  67y M  CAD s/p PCI to LAD in 2016 now on Brilinta, DM, ESRD on HTN, HLD presenting with Left eye ptosis and diplopia.    Plan of Care:    #Post-op risk stratification  - Pt s/p temporal artery biopsy today  - Mr. Ramirez EKG from 1/15 shows sinus rhythm with no acute ischemic changes  - TTE from 1/13 shows normal LV systolic function with no significant structural disease  - Pt displays no evidence of post-op coronary ischemia  - Continue current management    #CAD   - S/p PCI in 2016 to LAD  - Continue statin and BB  - DAPT no longer required since stent was placed ni 2016  - Pt is currently on ASA. This is adequate antiplatelet therapy.  Ideally would prefer Brilinta over ASA.     #ESRD  - HD as per renal    #HTN  BP acceptable on current regimen    #HLD  - Statin as ordered        Over 25 minutes spent on total encounter; more than 50% of the visit was spent counseling and/or coordinating care by the attending physician.      Luigi Barnes,  [facility]  Cardiovascular Disease  [phone]

## 2023-01-21 NOTE — PROGRESS NOTE ADULT - ASSESSMENT
66 y/o M w/h/o T2DM> unknown control due to anemia of chronic disease. On Tresiba 30 to 40 units at hs based on hs BG, Reports FBG 50s to mid 100s at home. DM c/b CAD> stents and CKD > HD. Also h/o HTN, HLD. Here Left eye ptosis and diplopia/pain. On steroids for possible GCA. Endocrinology consulted for uncontrolled glucose in the setting of high dose steroid. Now on Prednisone 50mg daily w/BG values variable on present insulin regimen. Tolerating POs. BG goal (100-180mg/dl).       Home DM meds: Tresiba 30-40 units QHS     Type 2 diabetes mellitus with hyperglycemia.   BG severely elevated overnight 2/2 po intake without insulin coverage.   Expect improvement in BG as steroid dose lowered to 50mg today   -test BG AC/HS/2AM. Can change to Q6h when NPO  -c/w Lantus 30 units tonight for NPO status  -c/w Admelog 10 units AC meals for now  -c/w Admelog moderate correction scale AC and mod HS scale. Can change to q6h scale when NPO  -cons carb renal diet  -Please contact endo team with changes on steroid doses. Currently on Prednisone 50mg daily  - counseling provided on difference between basal/bolus insulin & need to adhere to CHO diet to promote glycemic control  Discharge planning:  basal bolus    Discharge Planning:  While on prednisone 50mg po qd: Tresiba pen 30 units sq qhs and Humalog kwikpen 10 units sq TID AC  Send Rx for glucometer, test strips, lancets, alcohol pads, BD nicki pen needles.- reports his glucometer is >2 yrs old please send new RX   At home, Patient should check FSBG premeals and bedtime. Pt should call their doctor when FSBG <70 or above >400 and or consistently above 200s as changes in the regimen will have to be made.  Recommend outpatient routine dilated eye exam/ophthalmology f/u; podiatry referral and endocrinology   -Emailed office for follow up appt at [facility] Endocrine Dr Zuly Boyle or Dr Liliana Hanna   [address] [place] [id number] [phone]  if you do not hear from office please call office in 1-2 days for appt info   -needs close follow up for insulin titration adjustments when steroids are tapered- if unable to follow with endocrine when steroid tapered please ensure pt has appropriate f/u with PCP for insulin titrations   - Patient will need opthalmology and podiatry/ Renal follow up as outpatient.  - Make sure pt has Rx for all DM supplies and insulin/ DM meds.     Problem/Plan - 2:  ·  Problem: Hypertension.   ·  Plan: BP goal 130/80  - Manage per primary team.     Problem/Plan - 3:  ·  Problem: Hyperlipidemia.   ·  Plan: Continue with atorvastatin 80 mg daily   Follow up levels as out pt.        discussed with patient and team Dr kaye  Contact via [employer] Teams during business hours  On evenings and weekends (or if no response on [employer] Teams) please call [phone] or page endocrine fellow on call.   For nonurgent matters please email [email or web address]    Please note that this patient may be followed by different provider tomorrow.  Notify endocrine 24 hours prior to discharge for final recommendations    greater than 50% of the encounter was spent counseling and/or coordination of care.  26 minutes spent on total encounter; The necessity of the time spent during the encounter on this date of service was due to development of plan of care/coordination of care/glycemic control through review of labs, blood glucose values and vital signs.

## 2023-01-21 NOTE — PROGRESS NOTE ADULT - SUBJECTIVE AND OBJECTIVE BOX
PROGRESS NOTE:     Patient is a 67y old  Male who presents with a chief complaint of Left CN 3 palsy (20 Jan 2023 19:13)      SUBJECTIVE / OVERNIGHT EVENTS:    No acute events overnight. Patient examined at bedside with no acute complaints.     Pain:  Bowel Movements:  Urination:  OOB:  PT:    REVIEW OF SYSTEMS:    CONSTITUTIONAL: No weakness, fevers or chills  EYES/ENT: No visual changes;  No vertigo or throat pain   NECK: No pain or stiffness  RESPIRATORY: No cough, wheezing, hemoptysis; No shortness of breath  CARDIOVASCULAR: No chest pain or palpitations  GASTROINTESTINAL: No abdominal or epigastric pain. No nausea, vomiting, or hematemesis; No diarrhea or constipation. No melena or hematochezia.  GENITOURINARY: No dysuria, frequency or hematuria  NEUROLOGICAL: No numbness or weakness  SKIN: No itching, rashes      MEDICATIONS  (STANDING):  artificial  tears Solution 1 Drop(s) Both EYES three times a day  aspirin enteric coated 81 milliGRAM(s) Oral daily  atorvastatin 80 milliGRAM(s) Oral at bedtime  dextrose 5%. 1000 milliLiter(s) (100 mL/Hr) IV Continuous <Continuous>  dextrose 5%. 1000 milliLiter(s) (50 mL/Hr) IV Continuous <Continuous>  dextrose 50% Injectable 25 Gram(s) IV Push once  dextrose 50% Injectable 25 Gram(s) IV Push once  dextrose 50% Injectable 12.5 Gram(s) IV Push once  gabapentin 100 milliGRAM(s) Oral three times a day  glucagon  Injectable 1 milliGRAM(s) IntraMuscular once  heparin   Injectable 5000 Unit(s) SubCutaneous every 12 hours  insulin glargine Injectable (LANTUS) 30 Unit(s) SubCutaneous at bedtime  insulin lispro (ADMELOG) corrective regimen sliding scale   SubCutaneous three times a day before meals  insulin lispro (ADMELOG) corrective regimen sliding scale   SubCutaneous <User Schedule>  insulin lispro Injectable (ADMELOG) 10 Unit(s) SubCutaneous three times a day before meals  melatonin 3 milliGRAM(s) Oral at bedtime  metoprolol tartrate 25 milliGRAM(s) Oral two times a day  pantoprazole    Tablet 40 milliGRAM(s) Oral before breakfast  polyethylene glycol 3350 17 Gram(s) Oral at bedtime  polyethylene glycol 3350 17 Gram(s) Oral daily  predniSONE   Tablet 50 milliGRAM(s) Oral daily  senna 2 Tablet(s) Oral at bedtime  sodium chloride 2 Gram(s) Oral three times a day  sodium zirconium cyclosilicate 5 Gram(s) Oral daily  trimethoprim   80 mG/sulfamethoxazole 400 mG 1 Tablet(s) Oral <User Schedule>    MEDICATIONS  (PRN):  acetaminophen     Tablet .. 650 milliGRAM(s) Oral every 6 hours PRN Moderate Pain (4 - 6), Severe Pain (7 - 10)  dextrose Oral Gel 15 Gram(s) Oral once PRN Blood Glucose LESS THAN 70 milliGRAM(s)/deciliter      CAPILLARY BLOOD GLUCOSE      POCT Blood Glucose.: 390 mg/dL (21 Jan 2023 01:41)  POCT Blood Glucose.: 543 mg/dL (20 Jan 2023 21:09)  POCT Blood Glucose.: 509 mg/dL (20 Jan 2023 21:08)  POCT Blood Glucose.: 261 mg/dL (20 Jan 2023 16:27)  POCT Blood Glucose.: 159 mg/dL (20 Jan 2023 11:30)  POCT Blood Glucose.: 171 mg/dL (20 Jan 2023 07:45)    I&O's Summary    20 Jan 2023 07:01  -  21 Jan 2023 07:00  --------------------------------------------------------  IN: 480 mL / OUT: 0 mL / NET: 480 mL        VITALS:   T(C): 36.7 (01-21-23 @ 04:33), Max: 37.1 (01-20-23 @ 21:45)  HR: 71 (01-21-23 @ 04:33) (57 - 72)  BP: 164/67 (01-21-23 @ 04:33) (127/65 - 173/66)  RR: 18 (01-21-23 @ 04:33) (14 - 18)  SpO2: 95% (01-21-23 @ 04:33) (95% - 100%)    GENERAL: NAD, lying in bed comfortably  HEAD:  Atraumatic, normocephalic  EYES: EOMI, PERRLA, conjunctiva and sclera clear  ENT: Moist mucous membranes  NECK: Supple, no JVD  HEART: Regular rate and rhythm, no murmurs, rubs, or gallops  LUNGS: Unlabored respirations.  Clear to auscultation bilaterally, no crackles, wheezing, or rhonchi  ABDOMEN: Soft, nontender, nondistended, +BS  EXTREMITIES: 2+ peripheral pulses bilaterally. No clubbing, cyanosis, or edema  NERVOUS SYSTEM:  A&Ox3, no focal deficits   SKIN: No rashes or lesions    LABS:                        9.0    9.31  )-----------( 142      ( 21 Jan 2023 06:17 )             27.0     01-21    133<L>  |  92<L>  |  93<H>  ----------------------------<  274<H>  4.4   |  22  |  9.70<H>    Ca    8.1<L>      21 Jan 2023 06:17  Phos  6.5     01-21  Mg     3.2     01-21      PT/INR - ( 20 Jan 2023 01:22 )   PT: 11.1 sec;   INR: 0.96 ratio         PTT - ( 20 Jan 2023 01:22 )  PTT:27.6 sec            RADIOLOGY & ADDITIONAL TESTS:  Results Reviewed:   Imaging Personally Reviewed:  Electrocardiogram Personally Reviewed:    COORDINATION OF CARE:  Care Discussed with Consultants/Other Providers [Y/N]:  Prior or Outpatient Records Reviewed [Y/N]:   PROGRESS NOTE:     Patient is a 67y old  Male who presents with a chief complaint of Left CN 3 palsy (20 Jan 2023 19:13)      SUBJECTIVE / OVERNIGHT EVENTS:    No acute events overnight. Patient examined at bedside with no acute complaints. Patient reports worsening ptosis.       REVIEW OF SYSTEMS:    CONSTITUTIONAL: No weakness, fevers or chills  EYES/ENT: No vertigo or throat pain   NECK: No pain or stiffness  RESPIRATORY: No cough, wheezing, hemoptysis; No shortness of breath  CARDIOVASCULAR: No chest pain or palpitations  GASTROINTESTINAL: No abdominal or epigastric pain. No nausea, vomiting, or hematemesis; No diarrhea or constipation. No melena or hematochezia.  GENITOURINARY: No dysuria, frequency or hematuria  NEUROLOGICAL: No numbness or weakness  SKIN: No itching, rashes      MEDICATIONS  (STANDING):  artificial  tears Solution 1 Drop(s) Both EYES three times a day  aspirin enteric coated 81 milliGRAM(s) Oral daily  atorvastatin 80 milliGRAM(s) Oral at bedtime  dextrose 5%. 1000 milliLiter(s) (100 mL/Hr) IV Continuous <Continuous>  dextrose 5%. 1000 milliLiter(s) (50 mL/Hr) IV Continuous <Continuous>  dextrose 50% Injectable 25 Gram(s) IV Push once  dextrose 50% Injectable 25 Gram(s) IV Push once  dextrose 50% Injectable 12.5 Gram(s) IV Push once  gabapentin 100 milliGRAM(s) Oral three times a day  glucagon  Injectable 1 milliGRAM(s) IntraMuscular once  heparin   Injectable 5000 Unit(s) SubCutaneous every 12 hours  insulin glargine Injectable (LANTUS) 30 Unit(s) SubCutaneous at bedtime  insulin lispro (ADMELOG) corrective regimen sliding scale   SubCutaneous three times a day before meals  insulin lispro (ADMELOG) corrective regimen sliding scale   SubCutaneous <User Schedule>  insulin lispro Injectable (ADMELOG) 10 Unit(s) SubCutaneous three times a day before meals  melatonin 3 milliGRAM(s) Oral at bedtime  metoprolol tartrate 25 milliGRAM(s) Oral two times a day  pantoprazole    Tablet 40 milliGRAM(s) Oral before breakfast  polyethylene glycol 3350 17 Gram(s) Oral at bedtime  polyethylene glycol 3350 17 Gram(s) Oral daily  predniSONE   Tablet 50 milliGRAM(s) Oral daily  senna 2 Tablet(s) Oral at bedtime  sodium chloride 2 Gram(s) Oral three times a day  sodium zirconium cyclosilicate 5 Gram(s) Oral daily  trimethoprim   80 mG/sulfamethoxazole 400 mG 1 Tablet(s) Oral <User Schedule>    MEDICATIONS  (PRN):  acetaminophen     Tablet .. 650 milliGRAM(s) Oral every 6 hours PRN Moderate Pain (4 - 6), Severe Pain (7 - 10)  dextrose Oral Gel 15 Gram(s) Oral once PRN Blood Glucose LESS THAN 70 milliGRAM(s)/deciliter      CAPILLARY BLOOD GLUCOSE      POCT Blood Glucose.: 390 mg/dL (21 Jan 2023 01:41)  POCT Blood Glucose.: 543 mg/dL (20 Jan 2023 21:09)  POCT Blood Glucose.: 509 mg/dL (20 Jan 2023 21:08)  POCT Blood Glucose.: 261 mg/dL (20 Jan 2023 16:27)  POCT Blood Glucose.: 159 mg/dL (20 Jan 2023 11:30)  POCT Blood Glucose.: 171 mg/dL (20 Jan 2023 07:45)    I&O's Summary    20 Jan 2023 07:01  -  21 Jan 2023 07:00  --------------------------------------------------------  IN: 480 mL / OUT: 0 mL / NET: 480 mL        VITALS:   T(C): 36.7 (01-21-23 @ 04:33), Max: 37.1 (01-20-23 @ 21:45)  HR: 71 (01-21-23 @ 04:33) (57 - 72)  BP: 164/67 (01-21-23 @ 04:33) (127/65 - 173/66)  RR: 18 (01-21-23 @ 04:33) (14 - 18)  SpO2: 95% (01-21-23 @ 04:33) (95% - 100%)    GENERAL: NAD, lying in bed   HEAD:  Atraumatic, normocephalic  EYES: EOM impaired b/l, Pupils fixed and uneven, Left eye ptosis worsened   ENT: Moist mucous membranes  NECK: Left neck tender to palpation   HEART: Regular rate and rhythm, no murmurs, rubs, or gallops  LUNGS: Unlabored respirations.  Clear to auscultation bilaterally, no crackles, wheezing, or rhonchi  ABDOMEN: Soft, nontender, nondistended, +BS  EXTREMITIES: 2+ peripheral pulses bilaterally. No clubbing, cyanosis, or edema  NERVOUS SYSTEM:  A&Ox3, worsening left eye ptosis, pupils fixed   SKIN: No rashes or lesions    LABS:                        9.0    9.31  )-----------( 142      ( 21 Jan 2023 06:17 )             27.0     01-21    133<L>  |  92<L>  |  93<H>  ----------------------------<  274<H>  4.4   |  22  |  9.70<H>    Ca    8.1<L>      21 Jan 2023 06:17  Phos  6.5     01-21  Mg     3.2     01-21      PT/INR - ( 20 Jan 2023 01:22 )   PT: 11.1 sec;   INR: 0.96 ratio         PTT - ( 20 Jan 2023 01:22 )  PTT:27.6 sec            RADIOLOGY & ADDITIONAL TESTS:  Results Reviewed:   Imaging Personally Reviewed:  Electrocardiogram Personally Reviewed:    COORDINATION OF CARE:  Care Discussed with Consultants/Other Providers [Y/N]:  Prior or Outpatient Records Reviewed [Y/N]:

## 2023-01-21 NOTE — PROGRESS NOTE ADULT - ASSESSMENT
Patient NERY LEONARD is a 67y (1955) Rh danette speaking man with CAD s/p 1 stent on brilinta, DM, CKD on HD, ,HTN, HLD presenting with Left eye ptosis and diplopia. Patient reports 10-12 days ago having left sided headache. At that time, he noted drooping of left eyelid. Over the course of the last 1 week hit has gotten progressively worse. 5 days ago he noticed double vision and eyes was dysconjugate. When closing one at a time diplopia resolves. He continues to have headache controlled with tylenol rated at 2/10. Headache onset was initially 2/10 then progressively worsened within several hours, maximum intensity of 7/10 over several hours. He saw opthomologist, who noted retinal scarring bilaterally. 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  Denies positional headache, neck stiffness, weakness, numbness, changes to speech, dysphagia, difficulty ambulating.      esrd on hd   Excess fluids and waste products will be removed from your blood; your electrolytes will be balanced; your blood pressure will be controlled.    BP monitoring,continue current antihypertensive meds, low salt diet,followup with PMD in 1-2 weeks      ANEMIA PLAN:  Anemia of chronic disease:  We will continue epo aiming for a HCT of 32-36 %.   We will monitor Iron stores, B12 and RBC folate .

## 2023-01-21 NOTE — PROGRESS NOTE ADULT - SUBJECTIVE AND OBJECTIVE BOX
seen earlier today     Chief Complaint: Type 2 Diabetes Mellitus     INTERVAL HX: granddaughter Lang at bedside interpreting per pt request, pt tolerating PO offers no complaints, family brought turkey sandwhich from cafeteria last night he ate this around 9 pm , bedtime BG severely elevated to 543 after eating without insulin coverage. pt verbalizing not understanding why BG was so high, explained that having extra food while also on steroids without insulin coverage can increase BG and pt needs to inform staff if eating so can add admelog if needed. Counseled pt /family on insulin therapy and discharge plan from endocrine family /pt in agreement . counseled pt /family about need for insulin adjustments when steroids tapered as outpatient pt interested in following with [facility] endocrine      Review of Systems:  General: As above  Cardiovascular: No chest pain  Respiratory: No SOB  GI: No nausea, vomiting  Endocrine: no  S&Sx of hypoglycemia    Allergies    No Known Allergies    Intolerances      MEDICATIONS  (STANDING):  artificial  tears Solution 1 Drop(s) Both EYES three times a day  aspirin enteric coated 81 milliGRAM(s) Oral daily  atorvastatin 80 milliGRAM(s) Oral at bedtime  dextrose 5%. 1000 milliLiter(s) (100 mL/Hr) IV Continuous <Continuous>  dextrose 5%. 1000 milliLiter(s) (50 mL/Hr) IV Continuous <Continuous>  dextrose 50% Injectable 12.5 Gram(s) IV Push once  dextrose 50% Injectable 25 Gram(s) IV Push once  dextrose 50% Injectable 25 Gram(s) IV Push once  gabapentin 100 milliGRAM(s) Oral three times a day  glucagon  Injectable 1 milliGRAM(s) IntraMuscular once  heparin   Injectable 5000 Unit(s) SubCutaneous every 12 hours  insulin glargine Injectable (LANTUS) 30 Unit(s) SubCutaneous at bedtime  insulin lispro (ADMELOG) corrective regimen sliding scale   SubCutaneous three times a day before meals  insulin lispro (ADMELOG) corrective regimen sliding scale   SubCutaneous <User Schedule>  insulin lispro Injectable (ADMELOG) 10 Unit(s) SubCutaneous three times a day before meals  melatonin 3 milliGRAM(s) Oral at bedtime  metoprolol tartrate 25 milliGRAM(s) Oral two times a day  pantoprazole    Tablet 40 milliGRAM(s) Oral before breakfast  polyethylene glycol 3350 17 Gram(s) Oral at bedtime  polyethylene glycol 3350 17 Gram(s) Oral daily  predniSONE   Tablet 50 milliGRAM(s) Oral daily  senna 2 Tablet(s) Oral at bedtime  sodium chloride 2 Gram(s) Oral three times a day  sodium zirconium cyclosilicate 5 Gram(s) Oral daily  trimethoprim   80 mG/sulfamethoxazole 400 mG 1 Tablet(s) Oral <User Schedule>        atorvastatin   80 milliGRAM(s) Oral (01-20-23 @ 21:14)    insulin glargine Injectable (LANTUS)   30 Unit(s) SubCutaneous (01-20-23 @ 21:20)    insulin lispro (ADMELOG) corrective regimen sliding scale   6 Unit(s) SubCutaneous (01-21-23 @ 11:28)   2 Unit(s) SubCutaneous (01-21-23 @ 08:26)   6 Unit(s) SubCutaneous (01-20-23 @ 16:32)    insulin lispro (ADMELOG) corrective regimen sliding scale   6 Unit(s) SubCutaneous (01-21-23 @ 02:32)   8 Unit(s) SubCutaneous (01-20-23 @ 21:13)    insulin lispro Injectable (ADMELOG)   10 Unit(s) SubCutaneous (01-21-23 @ 11:28)   10 Unit(s) SubCutaneous (01-21-23 @ 08:25)   10 Unit(s) SubCutaneous (01-20-23 @ 16:31)    predniSONE   Tablet   60 milliGRAM(s) Oral (01-20-23 @ 14:05)    predniSONE   Tablet   50 milliGRAM(s) Oral (01-21-23 @ 05:00)        PHYSICAL EXAM:  VITALS: T(C): 36.7 (01-21-23 @ 04:33)  T(F): 98 (01-21-23 @ 04:33), Max: 98.8 (01-20-23 @ 21:45)  HR: 71 (01-21-23 @ 04:33) (59 - 72)  BP: 164/67 (01-21-23 @ 04:33) (142/69 - 173/66)  RR:  (16 - 18)  SpO2:  (95% - 100%)  Wt(kg): --  GENERAL: male sitting in bed in NAD, left eye pstosis   Respiratory: Respirations unlabored   Extremities: Warm, no edema  NEURO: Alert , appropriate     LABS:  POCT Blood Glucose.: 255 mg/dL (01-21-23 @ 11:26)  POCT Blood Glucose.: 190 mg/dL (01-21-23 @ 07:46)  POCT Blood Glucose.: 390 mg/dL (01-21-23 @ 01:41)  POCT Blood Glucose.: 543 mg/dL (01-20-23 @ 21:09)  POCT Blood Glucose.: 509 mg/dL (01-20-23 @ 21:08)  POCT Blood Glucose.: 261 mg/dL (01-20-23 @ 16:27)  POCT Blood Glucose.: 159 mg/dL (01-20-23 @ 11:30)  POCT Blood Glucose.: 171 mg/dL (01-20-23 @ 07:45)  POCT Blood Glucose.: 253 mg/dL (01-20-23 @ 01:17)  POCT Blood Glucose.: 171 mg/dL (01-19-23 @ 21:01)  POCT Blood Glucose.: 197 mg/dL (01-19-23 @ 12:16)  POCT Blood Glucose.: 177 mg/dL (01-19-23 @ 08:05)  POCT Blood Glucose.: 111 mg/dL (01-19-23 @ 02:18)  POCT Blood Glucose.: 267 mg/dL (01-18-23 @ 21:06)  POCT Blood Glucose.: 176 mg/dL (01-18-23 @ 16:13)  POCT Blood Glucose.: 86 mg/dL (01-18-23 @ 14:27)  POCT Blood Glucose.: 79 mg/dL (01-18-23 @ 14:24)                          9.0    9.31  )-----------( 142      ( 21 Jan 2023 06:17 )             27.0     01-21    133<L>  |  92<L>  |  93<H>  ----------------------------<  274<H>  4.4   |  22  |  9.70<H>    Ca    8.1<L>      21 Jan 2023 06:17  Phos  6.5     01-21  Mg     3.2     01-21      eGFR: 5 mL/min/1.73m2 (21 Jan 2023 06:17)    01-13 Chol 136 Direct LDL -- LDL calculated 60 HDL 36<L> Trig 200<H>  Thyroid Function Tests:  01-12 @ 19:44 TSH 2.00 FreeT4 -- T3 -- Anti TPO -- Anti Thyroglobulin Ab -- TSI --      A1C with Estimated Average Glucose Result: 6.4 % (01-13-23 @ 06:51)    Estimated Average Glucose: 137 mg/dL (01-13-23 @ 06:51)        Diet, Regular:   Consistent Carbohydrate No Snacks (CSTCHO)  No Beef  No Pork (01-20-23 @ 11:45) [Active]

## 2023-01-21 NOTE — PROGRESS NOTE ADULT - SUBJECTIVE AND OBJECTIVE BOX
Patient is a 67y Male whom presented to the hospital with esrd on hd     PAST MEDICAL & SURGICAL HISTORY:      MEDICATIONS  (STANDING):  artificial  tears Solution 1 Drop(s) Both EYES three times a day  aspirin enteric coated 81 milliGRAM(s) Oral daily  atorvastatin 80 milliGRAM(s) Oral at bedtime  dextrose 5%. 1000 milliLiter(s) (50 mL/Hr) IV Continuous <Continuous>  dextrose 5%. 1000 milliLiter(s) (100 mL/Hr) IV Continuous <Continuous>  dextrose 50% Injectable 25 Gram(s) IV Push once  dextrose 50% Injectable 12.5 Gram(s) IV Push once  dextrose 50% Injectable 25 Gram(s) IV Push once  enoxaparin Injectable 30 milliGRAM(s) SubCutaneous every 24 hours  glucagon  Injectable 1 milliGRAM(s) IntraMuscular once  insulin lispro (ADMELOG) corrective regimen sliding scale   SubCutaneous at bedtime  insulin lispro (ADMELOG) corrective regimen sliding scale   SubCutaneous three times a day before meals  metoprolol tartrate 25 milliGRAM(s) Oral two times a day  pantoprazole    Tablet 40 milliGRAM(s) Oral before breakfast  polyethylene glycol 3350 17 Gram(s) Oral at bedtime  senna 2 Tablet(s) Oral at bedtime      Allergies    No Known Allergies    Intolerances        SOCIAL HISTORY:  Denies ETOh,Smoking,     FAMILY HISTORY:      REVIEW OF SYSTEMS:    CONSTITUTIONAL: No weakness, fevers or chills  RESPIRATORY: No cough, wheezing, hemoptysis; No shortness of breath  CARDIOVASCULAR: No chest pain or palpitations  GASTROINTESTINAL: No abdominal or epigastric pain. No nausea, vomiting,     No diarrhea or constipation. No melena   GENITOURINARY: No dysuria, frequency or hematuria  NEUROLOGICAL: No numbness or weakness                              9.0    9.31  )-----------( 142      ( 21 Jan 2023 06:17 )             27.0       CBC Full  -  ( 21 Jan 2023 06:17 )  WBC Count : 9.31 K/uL  RBC Count : 2.80 M/uL  Hemoglobin : 9.0 g/dL  Hematocrit : 27.0 %  Platelet Count - Automated : 142 K/uL  Mean Cell Volume : 96.4 fl  Mean Cell Hemoglobin : 32.1 pg  Mean Cell Hemoglobin Concentration : 33.3 gm/dL  Auto Neutrophil # : x  Auto Lymphocyte # : x  Auto Monocyte # : x  Auto Eosinophil # : x  Auto Basophil # : x  Auto Neutrophil % : x  Auto Lymphocyte % : x  Auto Monocyte % : x  Auto Eosinophil % : x  Auto Basophil % : x      01-21    133<L>  |  92<L>  |  93<H>  ----------------------------<  274<H>  4.4   |  22  |  9.70<H>    Ca    8.1<L>      21 Jan 2023 06:17  Phos  6.5     01-21  Mg     3.2     01-21        CAPILLARY BLOOD GLUCOSE      POCT Blood Glucose.: 255 mg/dL (21 Jan 2023 11:26)  POCT Blood Glucose.: 190 mg/dL (21 Jan 2023 07:46)  POCT Blood Glucose.: 390 mg/dL (21 Jan 2023 01:41)  POCT Blood Glucose.: 543 mg/dL (20 Jan 2023 21:09)  POCT Blood Glucose.: 509 mg/dL (20 Jan 2023 21:08)  POCT Blood Glucose.: 261 mg/dL (20 Jan 2023 16:27)      Vital Signs Last 24 Hrs  T(C): 36.7 (21 Jan 2023 04:33), Max: 37.1 (20 Jan 2023 21:45)  T(F): 98 (21 Jan 2023 04:33), Max: 98.8 (20 Jan 2023 21:45)  HR: 71 (21 Jan 2023 04:33) (60 - 72)  BP: 164/67 (21 Jan 2023 04:33) (155/76 - 173/66)  BP(mean): --  RR: 18 (21 Jan 2023 04:33) (18 - 18)  SpO2: 95% (21 Jan 2023 04:33) (95% - 98%)    Parameters below as of 21 Jan 2023 04:33  Patient On (Oxygen Delivery Method): room air            PT/INR - ( 20 Jan 2023 01:22 )   PT: 11.1 sec;   INR: 0.96 ratio         PTT - ( 20 Jan 2023 01:22 )  PTT:27.6 sec          PHYSICAL EXAM:    Constitutional: NAD  HEENT: conjunctive   clear   Neck:  No JVD  Respiratory: CTAB  Cardiovascular: S1 and S2  Gastrointestinal: BS+, soft, NT/ND  Extremities: No peripheral edema  Neurological: no focal deficits  Access: Not applicable

## 2023-01-21 NOTE — PROGRESS NOTE ADULT - PROBLEM SELECTOR PLAN 3
Patient has hx of CAD and is on Brilinta   - Cardiology consulted for preprocedural evaluation, recs appreciated. Per cardiology, can hold Brilinta for 5 days prior to biopsy. Will f/u cardiology re: continuing with Brilinta after procedure given that stent was in 2016 per family  - Per vascular surgery, will restart Brilinta after post-op evaluation tomorrow

## 2023-01-21 NOTE — PROGRESS NOTE ADULT - SUBJECTIVE AND OBJECTIVE BOX
PROGRESS NOTE:     Patient is a 67y old  Male who presents with a chief complaint of Left CN 3 palsy (21 Jan 2023 07:31)      SUBJECTIVE / OVERNIGHT EVENTS:    No acute events overnight. Patient examined at bedside with no acute complaints.     Pain:  Bowel Movements:  Urination:  OOB:  PT:    REVIEW OF SYSTEMS:    CONSTITUTIONAL: No weakness, fevers or chills  EYES/ENT: No visual changes;  No vertigo or throat pain   NECK: No pain or stiffness  RESPIRATORY: No cough, wheezing, hemoptysis; No shortness of breath  CARDIOVASCULAR: No chest pain or palpitations  GASTROINTESTINAL: No abdominal or epigastric pain. No nausea, vomiting, or hematemesis; No diarrhea or constipation. No melena or hematochezia.  GENITOURINARY: No dysuria, frequency or hematuria  NEUROLOGICAL: No numbness or weakness  SKIN: No itching, rashes      MEDICATIONS  (STANDING):  artificial  tears Solution 1 Drop(s) Both EYES three times a day  aspirin enteric coated 81 milliGRAM(s) Oral daily  atorvastatin 80 milliGRAM(s) Oral at bedtime  dextrose 5%. 1000 milliLiter(s) (100 mL/Hr) IV Continuous <Continuous>  dextrose 5%. 1000 milliLiter(s) (50 mL/Hr) IV Continuous <Continuous>  dextrose 50% Injectable 25 Gram(s) IV Push once  dextrose 50% Injectable 25 Gram(s) IV Push once  dextrose 50% Injectable 12.5 Gram(s) IV Push once  gabapentin 100 milliGRAM(s) Oral three times a day  glucagon  Injectable 1 milliGRAM(s) IntraMuscular once  heparin   Injectable 5000 Unit(s) SubCutaneous every 12 hours  insulin glargine Injectable (LANTUS) 30 Unit(s) SubCutaneous at bedtime  insulin lispro (ADMELOG) corrective regimen sliding scale   SubCutaneous three times a day before meals  insulin lispro (ADMELOG) corrective regimen sliding scale   SubCutaneous <User Schedule>  insulin lispro Injectable (ADMELOG) 10 Unit(s) SubCutaneous three times a day before meals  melatonin 3 milliGRAM(s) Oral at bedtime  metoprolol tartrate 25 milliGRAM(s) Oral two times a day  pantoprazole    Tablet 40 milliGRAM(s) Oral before breakfast  polyethylene glycol 3350 17 Gram(s) Oral at bedtime  polyethylene glycol 3350 17 Gram(s) Oral daily  predniSONE   Tablet 50 milliGRAM(s) Oral daily  senna 2 Tablet(s) Oral at bedtime  sodium chloride 2 Gram(s) Oral three times a day  sodium zirconium cyclosilicate 5 Gram(s) Oral daily  trimethoprim   80 mG/sulfamethoxazole 400 mG 1 Tablet(s) Oral <User Schedule>    MEDICATIONS  (PRN):  acetaminophen     Tablet .. 650 milliGRAM(s) Oral every 6 hours PRN Moderate Pain (4 - 6), Severe Pain (7 - 10)  dextrose Oral Gel 15 Gram(s) Oral once PRN Blood Glucose LESS THAN 70 milliGRAM(s)/deciliter      CAPILLARY BLOOD GLUCOSE      POCT Blood Glucose.: 390 mg/dL (21 Jan 2023 01:41)  POCT Blood Glucose.: 543 mg/dL (20 Jan 2023 21:09)  POCT Blood Glucose.: 509 mg/dL (20 Jan 2023 21:08)  POCT Blood Glucose.: 261 mg/dL (20 Jan 2023 16:27)  POCT Blood Glucose.: 159 mg/dL (20 Jan 2023 11:30)  POCT Blood Glucose.: 171 mg/dL (20 Jan 2023 07:45)    I&O's Summary    20 Jan 2023 07:01  -  21 Jan 2023 07:00  --------------------------------------------------------  IN: 480 mL / OUT: 0 mL / NET: 480 mL        VITALS:   T(C): 36.7 (01-21-23 @ 04:33), Max: 37.1 (01-20-23 @ 21:45)  HR: 71 (01-21-23 @ 04:33) (57 - 72)  BP: 164/67 (01-21-23 @ 04:33) (127/65 - 173/66)  RR: 18 (01-21-23 @ 04:33) (14 - 18)  SpO2: 95% (01-21-23 @ 04:33) (95% - 100%)    GENERAL: NAD, lying in bed comfortably  HEAD:  Atraumatic, normocephalic  EYES: EOMI, PERRLA, conjunctiva and sclera clear  ENT: Moist mucous membranes  NECK: Supple, no JVD  HEART: Regular rate and rhythm, no murmurs, rubs, or gallops  LUNGS: Unlabored respirations.  Clear to auscultation bilaterally, no crackles, wheezing, or rhonchi  ABDOMEN: Soft, nontender, nondistended, +BS  EXTREMITIES: 2+ peripheral pulses bilaterally. No clubbing, cyanosis, or edema  NERVOUS SYSTEM:  A&Ox3, no focal deficits   SKIN: No rashes or lesions    LABS:                        9.0    9.31  )-----------( 142      ( 21 Jan 2023 06:17 )             27.0     01-21    133<L>  |  92<L>  |  93<H>  ----------------------------<  274<H>  4.4   |  22  |  9.70<H>    Ca    8.1<L>      21 Jan 2023 06:17  Phos  6.5     01-21  Mg     3.2     01-21      PT/INR - ( 20 Jan 2023 01:22 )   PT: 11.1 sec;   INR: 0.96 ratio         PTT - ( 20 Jan 2023 01:22 )  PTT:27.6 sec            RADIOLOGY & ADDITIONAL TESTS:  Results Reviewed:   Imaging Personally Reviewed:  Electrocardiogram Personally Reviewed:    COORDINATION OF CARE:  Care Discussed with Consultants/Other Providers [Y/N]:  Prior or Outpatient Records Reviewed [Y/N]:

## 2023-01-21 NOTE — PROGRESS NOTE ADULT - PROBLEM SELECTOR PLAN 1
Current DDX include atypical presentation of GCA vs. Tolossa Guerra (but MRI negative)   MRA brain done, no signs for aneurysm   - Temporal artery biopsy planned for today (requires 5 days off Brilinta)   - Neuro following for alternate CNIII palsy dx- ruled out stroke, large tumor, hemorrhage, trauma) possibly other type inflammatory vs infection vs demyelination? Recs appreciated   - Rheumatology consulted, recs appreciated. Per rheumatology, less likely to be GCA but will continue to pursue biopsy. Per rheumatology, c/w solumedrol 60 mg qd.   - Ophthalmology consulted, recs appreciated. Per ophthalmology, likely microvascular disease   - C/w Gabapentin 100 mg TID for pain

## 2023-01-22 VITALS
RESPIRATION RATE: 20 BRPM | TEMPERATURE: 98 F | DIASTOLIC BLOOD PRESSURE: 70 MMHG | OXYGEN SATURATION: 97 % | HEART RATE: 68 BPM | SYSTOLIC BLOOD PRESSURE: 164 MMHG

## 2023-01-22 LAB
ANION GAP SERPL CALC-SCNC: 14 MMOL/L — SIGNIFICANT CHANGE UP (ref 5–17)
BUN SERPL-MCNC: 67 MG/DL — HIGH (ref 7–23)
CALCIUM SERPL-MCNC: 8.3 MG/DL — LOW (ref 8.4–10.5)
CHLORIDE SERPL-SCNC: 97 MMOL/L — SIGNIFICANT CHANGE UP (ref 96–108)
CO2 SERPL-SCNC: 27 MMOL/L — SIGNIFICANT CHANGE UP (ref 22–31)
CREAT SERPL-MCNC: 7.59 MG/DL — HIGH (ref 0.5–1.3)
EGFR: 7 ML/MIN/1.73M2 — LOW
GLUCOSE BLDC GLUCOMTR-MCNC: 119 MG/DL — HIGH (ref 70–99)
GLUCOSE BLDC GLUCOMTR-MCNC: 268 MG/DL — HIGH (ref 70–99)
GLUCOSE BLDC GLUCOMTR-MCNC: 98 MG/DL — SIGNIFICANT CHANGE UP (ref 70–99)
GLUCOSE SERPL-MCNC: 148 MG/DL — HIGH (ref 70–99)
HCT VFR BLD CALC: 28.4 % — LOW (ref 39–50)
HGB BLD-MCNC: 9.1 G/DL — LOW (ref 13–17)
MAGNESIUM SERPL-MCNC: 3 MG/DL — HIGH (ref 1.6–2.6)
MCHC RBC-ENTMCNC: 32 GM/DL — SIGNIFICANT CHANGE UP (ref 32–36)
MCHC RBC-ENTMCNC: 32 PG — SIGNIFICANT CHANGE UP (ref 27–34)
MCV RBC AUTO: 100 FL — SIGNIFICANT CHANGE UP (ref 80–100)
NRBC # BLD: 0 /100 WBCS — SIGNIFICANT CHANGE UP (ref 0–0)
PHOSPHATE SERPL-MCNC: 6 MG/DL — HIGH (ref 2.5–4.5)
PLATELET # BLD AUTO: 139 K/UL — LOW (ref 150–400)
POTASSIUM SERPL-MCNC: 3.9 MMOL/L — SIGNIFICANT CHANGE UP (ref 3.5–5.3)
POTASSIUM SERPL-SCNC: 3.9 MMOL/L — SIGNIFICANT CHANGE UP (ref 3.5–5.3)
RBC # BLD: 2.84 M/UL — LOW (ref 4.2–5.8)
RBC # FLD: 15.7 % — HIGH (ref 10.3–14.5)
SODIUM SERPL-SCNC: 138 MMOL/L — SIGNIFICANT CHANGE UP (ref 135–145)
WBC # BLD: 9.38 K/UL — SIGNIFICANT CHANGE UP (ref 3.8–10.5)
WBC # FLD AUTO: 9.38 K/UL — SIGNIFICANT CHANGE UP (ref 3.8–10.5)

## 2023-01-22 PROCEDURE — 83735 ASSAY OF MAGNESIUM: CPT

## 2023-01-22 PROCEDURE — 82746 ASSAY OF FOLIC ACID SERUM: CPT

## 2023-01-22 PROCEDURE — 83605 ASSAY OF LACTIC ACID: CPT

## 2023-01-22 PROCEDURE — 85730 THROMBOPLASTIN TIME PARTIAL: CPT

## 2023-01-22 PROCEDURE — 84100 ASSAY OF PHOSPHORUS: CPT

## 2023-01-22 PROCEDURE — 92523 SPEECH SOUND LANG COMPREHEN: CPT

## 2023-01-22 PROCEDURE — 88313 SPECIAL STAINS GROUP 2: CPT

## 2023-01-22 PROCEDURE — 36415 COLL VENOUS BLD VENIPUNCTURE: CPT

## 2023-01-22 PROCEDURE — 85018 HEMOGLOBIN: CPT

## 2023-01-22 PROCEDURE — 82164 ANGIOTENSIN I ENZYME TEST: CPT

## 2023-01-22 PROCEDURE — 82947 ASSAY GLUCOSE BLOOD QUANT: CPT

## 2023-01-22 PROCEDURE — 83550 IRON BINDING TEST: CPT

## 2023-01-22 PROCEDURE — 83036 HEMOGLOBIN GLYCOSYLATED A1C: CPT

## 2023-01-22 PROCEDURE — 97161 PT EVAL LOW COMPLEX 20 MIN: CPT

## 2023-01-22 PROCEDURE — 70546 MR ANGIOGRAPH HEAD W/O&W/DYE: CPT

## 2023-01-22 PROCEDURE — 85610 PROTHROMBIN TIME: CPT

## 2023-01-22 PROCEDURE — 97165 OT EVAL LOW COMPLEX 30 MIN: CPT

## 2023-01-22 PROCEDURE — 70450 CT HEAD/BRAIN W/O DYE: CPT

## 2023-01-22 PROCEDURE — 82803 BLOOD GASES ANY COMBINATION: CPT

## 2023-01-22 PROCEDURE — 85652 RBC SED RATE AUTOMATED: CPT

## 2023-01-22 PROCEDURE — 82962 GLUCOSE BLOOD TEST: CPT

## 2023-01-22 PROCEDURE — 80048 BASIC METABOLIC PNL TOTAL CA: CPT

## 2023-01-22 PROCEDURE — 70496 CT ANGIOGRAPHY HEAD: CPT | Mod: MA

## 2023-01-22 PROCEDURE — 86901 BLOOD TYPING SEROLOGIC RH(D): CPT

## 2023-01-22 PROCEDURE — 97116 GAIT TRAINING THERAPY: CPT

## 2023-01-22 PROCEDURE — 86803 HEPATITIS C AB TEST: CPT

## 2023-01-22 PROCEDURE — 86140 C-REACTIVE PROTEIN: CPT

## 2023-01-22 PROCEDURE — 70551 MRI BRAIN STEM W/O DYE: CPT

## 2023-01-22 PROCEDURE — 86036 ANCA SCREEN EACH ANTIBODY: CPT

## 2023-01-22 PROCEDURE — 86618 LYME DISEASE ANTIBODY: CPT

## 2023-01-22 PROCEDURE — 86041 ACETYLCHOLN RCPTR BNDNG ANTB: CPT

## 2023-01-22 PROCEDURE — 99261: CPT

## 2023-01-22 PROCEDURE — 71046 X-RAY EXAM CHEST 2 VIEWS: CPT

## 2023-01-22 PROCEDURE — 80053 COMPREHEN METABOLIC PANEL: CPT

## 2023-01-22 PROCEDURE — 86043 ACETYLCHOLN RCPTR MODLG ANTB: CPT

## 2023-01-22 PROCEDURE — 70553 MRI BRAIN STEM W/O & W/DYE: CPT

## 2023-01-22 PROCEDURE — 85027 COMPLETE CBC AUTOMATED: CPT

## 2023-01-22 PROCEDURE — 93005 ELECTROCARDIOGRAM TRACING: CPT

## 2023-01-22 PROCEDURE — C1889: CPT

## 2023-01-22 PROCEDURE — 80061 LIPID PANEL: CPT

## 2023-01-22 PROCEDURE — 84132 ASSAY OF SERUM POTASSIUM: CPT

## 2023-01-22 PROCEDURE — 82330 ASSAY OF CALCIUM: CPT

## 2023-01-22 PROCEDURE — 88305 TISSUE EXAM BY PATHOLOGIST: CPT

## 2023-01-22 PROCEDURE — 85025 COMPLETE CBC W/AUTO DIFF WBC: CPT

## 2023-01-22 PROCEDURE — U0005: CPT

## 2023-01-22 PROCEDURE — 82435 ASSAY OF BLOOD CHLORIDE: CPT

## 2023-01-22 PROCEDURE — 86225 DNA ANTIBODY NATIVE: CPT

## 2023-01-22 PROCEDURE — 93998 UNLISTD NONINVAS VASC DX STD: CPT

## 2023-01-22 PROCEDURE — U0003: CPT

## 2023-01-22 PROCEDURE — 84443 ASSAY THYROID STIM HORMONE: CPT

## 2023-01-22 PROCEDURE — 85045 AUTOMATED RETICULOCYTE COUNT: CPT

## 2023-01-22 PROCEDURE — 86038 ANTINUCLEAR ANTIBODIES: CPT

## 2023-01-22 PROCEDURE — 84295 ASSAY OF SERUM SODIUM: CPT

## 2023-01-22 PROCEDURE — 82728 ASSAY OF FERRITIN: CPT

## 2023-01-22 PROCEDURE — 86850 RBC ANTIBODY SCREEN: CPT

## 2023-01-22 PROCEDURE — 87476 LYME DIS DNA AMP PROBE: CPT

## 2023-01-22 PROCEDURE — 85014 HEMATOCRIT: CPT

## 2023-01-22 PROCEDURE — 93306 TTE W/DOPPLER COMPLETE: CPT

## 2023-01-22 PROCEDURE — 99285 EMERGENCY DEPT VISIT HI MDM: CPT

## 2023-01-22 PROCEDURE — 86042 ACETYLCHOLN RCPTR BLCKG ANTB: CPT

## 2023-01-22 PROCEDURE — 87637 SARSCOV2&INF A&B&RSV AMP PRB: CPT

## 2023-01-22 PROCEDURE — 82010 KETONE BODYS QUAN: CPT

## 2023-01-22 PROCEDURE — 82607 VITAMIN B-12: CPT

## 2023-01-22 PROCEDURE — 86900 BLOOD TYPING SEROLOGIC ABO: CPT

## 2023-01-22 PROCEDURE — 97110 THERAPEUTIC EXERCISES: CPT

## 2023-01-22 PROCEDURE — 70498 CT ANGIOGRAPHY NECK: CPT | Mod: MA

## 2023-01-22 PROCEDURE — A9585: CPT

## 2023-01-22 PROCEDURE — 86366 MUSCLE-SPECIFIC KINASE ANTB: CPT

## 2023-01-22 PROCEDURE — 83540 ASSAY OF IRON: CPT

## 2023-01-22 PROCEDURE — 70543 MRI ORBT/FAC/NCK W/O &W/DYE: CPT

## 2023-01-22 RX ADMIN — Medication 650 MILLIGRAM(S): at 09:31

## 2023-01-22 RX ADMIN — SODIUM CHLORIDE 2 GRAM(S): 9 INJECTION INTRAMUSCULAR; INTRAVENOUS; SUBCUTANEOUS at 14:25

## 2023-01-22 RX ADMIN — Medication 650 MILLIGRAM(S): at 09:01

## 2023-01-22 RX ADMIN — HEPARIN SODIUM 5000 UNIT(S): 5000 INJECTION INTRAVENOUS; SUBCUTANEOUS at 06:04

## 2023-01-22 RX ADMIN — Medication 25 MILLIGRAM(S): at 06:05

## 2023-01-22 RX ADMIN — PANTOPRAZOLE SODIUM 40 MILLIGRAM(S): 20 TABLET, DELAYED RELEASE ORAL at 06:05

## 2023-01-22 RX ADMIN — TICAGRELOR 60 MILLIGRAM(S): 90 TABLET ORAL at 06:05

## 2023-01-22 RX ADMIN — SODIUM CHLORIDE 2 GRAM(S): 9 INJECTION INTRAMUSCULAR; INTRAVENOUS; SUBCUTANEOUS at 06:05

## 2023-01-22 RX ADMIN — Medication 2: at 01:41

## 2023-01-22 RX ADMIN — POLYETHYLENE GLYCOL 3350 17 GRAM(S): 17 POWDER, FOR SOLUTION ORAL at 11:31

## 2023-01-22 RX ADMIN — GABAPENTIN 200 MILLIGRAM(S): 400 CAPSULE ORAL at 14:26

## 2023-01-22 RX ADMIN — Medication 650 MILLIGRAM(S): at 00:06

## 2023-01-22 RX ADMIN — Medication 50 MILLIGRAM(S): at 06:05

## 2023-01-22 RX ADMIN — Medication 650 MILLIGRAM(S): at 01:00

## 2023-01-22 RX ADMIN — SODIUM ZIRCONIUM CYCLOSILICATE 5 GRAM(S): 10 POWDER, FOR SUSPENSION ORAL at 11:31

## 2023-01-22 RX ADMIN — Medication 1 DROP(S): at 06:05

## 2023-01-22 RX ADMIN — Medication 12 UNIT(S): at 11:28

## 2023-01-22 RX ADMIN — Medication 1 DROP(S): at 14:26

## 2023-01-22 RX ADMIN — Medication 12 UNIT(S): at 07:45

## 2023-01-22 RX ADMIN — GABAPENTIN 200 MILLIGRAM(S): 400 CAPSULE ORAL at 06:04

## 2023-01-22 NOTE — PROGRESS NOTE ADULT - ATTENDING SUPERVISION STATEMENT
Resident
Resident
Fellow
Resident
Resident
Fellow
Resident
Discharged

## 2023-01-22 NOTE — PROGRESS NOTE ADULT - PROBLEM SELECTOR PLAN 2
Takes Tresiba 35-40 units QHS at home   Per pt at home FS  max.  Current regimen:   Lantus 30 units qHS (28u while NPO)   Admelog 10 units AC meals  - Hyperglycemia likely 2/2 steroids   - C/w Admelog moderate correction scale AC and moderate HS scale   - C/w 2AM moderate scale to correct >250mg/dl  -Discharge: While on prednisone 50mg po qd: Tresiba pen 30 units sq qhs and Humalog kwikpen 10 units sq TID AC  -endo will try to arrange for outpatient followup before taper to 40mg, if unable to schedule an appointment patient instructed to follow up with PCP

## 2023-01-22 NOTE — PROGRESS NOTE ADULT - PROVIDER SPECIALTY LIST ADULT
Neurology
Neurology
Rheumatology
Vascular Surgery
Vascular Surgery
Cardiology
Cardiology
Internal Medicine
Rheumatology
Rheumatology
Cardiology
Internal Medicine
Nephrology
Neurology
Rheumatology
Vascular Surgery
Nephrology
Neurology
Neurology
Ophthalmology
Nephrology
Nephrology
Internal Medicine
Endocrinology
Internal Medicine

## 2023-01-22 NOTE — PROGRESS NOTE ADULT - PROBLEM SELECTOR PLAN 8
DVT: Lovenox   Diet: Consistent carbohydrate   Dispo: pending clinical outcome (GCA evaluation)
DVT: Lovenox   Diet: Consistent carbohydrate   Dispo: discharge home today

## 2023-01-22 NOTE — PROGRESS NOTE ADULT - PROBLEM SELECTOR PROBLEM 3
Hyperlipidemia
CAD (coronary artery disease)
Hyperlipidemia
Hyperlipidemia
CAD (coronary artery disease)
Hyperlipidemia
CAD (coronary artery disease)
Hyperlipidemia
CAD (coronary artery disease)

## 2023-01-22 NOTE — PROGRESS NOTE ADULT - PROBLEM SELECTOR PLAN 1
Current DDX include atypical presentation of GCA vs. Tolossa Guerra (but MRI negative)   MRA brain done, no signs for aneurysm   - Temporal artery biopsy planned for today (requires 5 days off Brilinta)   - Neuro following for alternate CNIII palsy dx- ruled out stroke, large tumor, hemorrhage, trauma) possibly other type inflammatory vs infection vs demyelination? Recs appreciated   - Rheumatology consulted, recs appreciated. Per rheumatology, less likely to be GCA but will continue to pursue biopsy. Per rheumatology, s/p solumedrol 60 mg qd.   -patient to be discharged on steroid taper   - Ophthalmology consulted, recs appreciated. Per ophthalmology, likely microvascular disease   - c/w Gabapentin to 200 mg TID for pain

## 2023-01-22 NOTE — PROGRESS NOTE ADULT - PROBLEM/PLAN-5
DISPLAY PLAN FREE TEXT
[No] : No

## 2023-01-22 NOTE — PROGRESS NOTE ADULT - REASON FOR ADMISSION
Left CN 3 palsy

## 2023-01-22 NOTE — PROGRESS NOTE ADULT - SUBJECTIVE AND OBJECTIVE BOX
Cardiovascular Disease Progress Note  Date of service: 23 @ 11:09    Overnight events: No acute events overnight.  Pt is in no distress.   Otherwise review of systems negative    Objective Findings:  T(C): 36.6 (23 @ 09:06), Max: 36.9 (23 @ 21:49)  HR: 62 (23 @ 09:06) (57 - 78)  BP: 132/65 (23 @ 09:06) (132/65 - 159/72)  RR: 18 (23 @ 09:06) (16 - 18)  SpO2: 99% (23 @ 09:06) (96% - 99%)  Wt(kg): --  Daily     Daily Weight in k (2023 16:45)      Physical Exam:  Gen: NAD; Patient resting comfortably  HEENT: EOMI, Normocephalic/ atraumatic. L ptosis  CV: RRR, normal S1 + S2, no m/r/g  Lungs:  Normal respiratory effort; clear to auscultation bilaterally  Abd: soft, non-tender; bowel sounds present  Ext: No edema; warm and well perfused    Telemetry: N/a    Laboratory Data:                        9.1    9.38  )-----------( 139      ( 2023 06:25 )             28.4         138  |  97  |  67<H>  ----------------------------<  148<H>  3.9   |  27  |  7.59<H>    Ca    8.3<L>      2023 06:25  Phos  6.0       Mg     3.0                     Inpatient Medications:  MEDICATIONS  (STANDING):  artificial  tears Solution 1 Drop(s) Both EYES three times a day  atorvastatin 80 milliGRAM(s) Oral at bedtime  dextrose 5%. 1000 milliLiter(s) (100 mL/Hr) IV Continuous <Continuous>  dextrose 5%. 1000 milliLiter(s) (50 mL/Hr) IV Continuous <Continuous>  dextrose 50% Injectable 12.5 Gram(s) IV Push once  dextrose 50% Injectable 25 Gram(s) IV Push once  dextrose 50% Injectable 25 Gram(s) IV Push once  gabapentin 200 milliGRAM(s) Oral three times a day  glucagon  Injectable 1 milliGRAM(s) IntraMuscular once  heparin   Injectable 5000 Unit(s) SubCutaneous every 12 hours  insulin glargine Injectable (LANTUS) 30 Unit(s) SubCutaneous at bedtime  insulin lispro (ADMELOG) corrective regimen sliding scale   SubCutaneous three times a day before meals  insulin lispro (ADMELOG) corrective regimen sliding scale   SubCutaneous <User Schedule>  insulin lispro Injectable (ADMELOG) 12 Unit(s) SubCutaneous three times a day before meals  lidocaine   4% Patch 1 Patch Transdermal once  melatonin 3 milliGRAM(s) Oral at bedtime  metoprolol tartrate 25 milliGRAM(s) Oral two times a day  pantoprazole    Tablet 40 milliGRAM(s) Oral before breakfast  polyethylene glycol 3350 17 Gram(s) Oral at bedtime  polyethylene glycol 3350 17 Gram(s) Oral daily  predniSONE   Tablet 50 milliGRAM(s) Oral daily  senna 2 Tablet(s) Oral at bedtime  sodium chloride 2 Gram(s) Oral three times a day  sodium zirconium cyclosilicate 5 Gram(s) Oral daily  ticagrelor 60 milliGRAM(s) Oral every 12 hours  trimethoprim   80 mG/sulfamethoxazole 400 mG 1 Tablet(s) Oral <User Schedule>      Assessment:  67y M  CAD s/p PCI to LAD in 2016 now on Brilinta, DM, ESRD on HTN, HLD presenting with Left eye ptosis and diplopia.    Plan of Care:    #Post-op risk stratification  - Pt s/p temporal artery biopsy today  - Mr. Ramirez EKG from 1/15 shows sinus rhythm with no acute ischemic changes  - TTE from  shows normal LV systolic function with no significant structural disease  - Pt displays no evidence of post-op coronary ischemia  - Continue current management    #CAD   - S/p PCI in 2016 to LAD  - Continue statin and BB  - DAPT no longer required since stent was placed in 2016  - Pt resumed on Brilinta  - Continue current management    #ESRD  - HD as per renal    #HTN  BP acceptable on current regimen    #HLD  - Statin as ordered              Over 25 minutes spent on total encounter; more than 50% of the visit was spent counseling and/or coordinating care by the attending physician.      Luigi Barnes DO [facility]  Cardiovascular Disease  [phone] Cardiovascular Disease Progress Note  Date of service: 23 @ 11:09    Overnight events: No acute events overnight.  Pt is in no distress.   Otherwise review of systems negative    Objective Findings:  T(C): 36.6 (23 @ 09:06), Max: 36.9 (23 @ 21:49)  HR: 62 (23 @ 09:06) (57 - 78)  BP: 132/65 (23 @ 09:06) (132/65 - 159/72)  RR: 18 (23 @ 09:06) (16 - 18)  SpO2: 99% (23 @ 09:06) (96% - 99%)  Wt(kg): --  Daily     Daily Weight in k (2023 16:45)      Physical Exam:  Gen: NAD; Patient resting comfortably  HEENT: EOMI, Normocephalic/ atraumatic. L ptosis  CV: RRR, normal S1 + S2, no m/r/g  Lungs:  Normal respiratory effort; clear to auscultation bilaterally  Abd: soft, non-tender; bowel sounds present  Ext: No edema; warm and well perfused    Telemetry: N/a    Laboratory Data:                        9.1    9.38  )-----------( 139      ( 2023 06:25 )             28.4         138  |  97  |  67<H>  ----------------------------<  148<H>  3.9   |  27  |  7.59<H>    Ca    8.3<L>      2023 06:25  Phos  6.0       Mg     3.0                     Inpatient Medications:  MEDICATIONS  (STANDING):  artificial  tears Solution 1 Drop(s) Both EYES three times a day  atorvastatin 80 milliGRAM(s) Oral at bedtime  dextrose 5%. 1000 milliLiter(s) (100 mL/Hr) IV Continuous <Continuous>  dextrose 5%. 1000 milliLiter(s) (50 mL/Hr) IV Continuous <Continuous>  dextrose 50% Injectable 12.5 Gram(s) IV Push once  dextrose 50% Injectable 25 Gram(s) IV Push once  dextrose 50% Injectable 25 Gram(s) IV Push once  gabapentin 200 milliGRAM(s) Oral three times a day  glucagon  Injectable 1 milliGRAM(s) IntraMuscular once  heparin   Injectable 5000 Unit(s) SubCutaneous every 12 hours  insulin glargine Injectable (LANTUS) 30 Unit(s) SubCutaneous at bedtime  insulin lispro (ADMELOG) corrective regimen sliding scale   SubCutaneous three times a day before meals  insulin lispro (ADMELOG) corrective regimen sliding scale   SubCutaneous <User Schedule>  insulin lispro Injectable (ADMELOG) 12 Unit(s) SubCutaneous three times a day before meals  lidocaine   4% Patch 1 Patch Transdermal once  melatonin 3 milliGRAM(s) Oral at bedtime  metoprolol tartrate 25 milliGRAM(s) Oral two times a day  pantoprazole    Tablet 40 milliGRAM(s) Oral before breakfast  polyethylene glycol 3350 17 Gram(s) Oral at bedtime  polyethylene glycol 3350 17 Gram(s) Oral daily  predniSONE   Tablet 50 milliGRAM(s) Oral daily  senna 2 Tablet(s) Oral at bedtime  sodium chloride 2 Gram(s) Oral three times a day  sodium zirconium cyclosilicate 5 Gram(s) Oral daily  ticagrelor 60 milliGRAM(s) Oral every 12 hours  trimethoprim   80 mG/sulfamethoxazole 400 mG 1 Tablet(s) Oral <User Schedule>      Assessment:  67y M  CAD s/p PCI to LAD in  now on Brilinta, DM, ESRD on HTN, HLD presenting with Left eye ptosis and diplopia.    Plan of Care:    #Post-op risk stratification  - Pt s/p temporal artery biopsy  - Mr. James EKG from 1/15 shows sinus rhythm with no acute ischemic changes  - TTE from  shows normal LV systolic function with no significant structural disease  - Pt displays no evidence of post-op coronary ischemia  - Continue current management    #CAD   - S/p PCI in 2016 to LAD  - Continue statin and BB  - DAPT no longer required since stent was placed in 2016  - Pt resumed on Brilinta  - Continue current management    #ESRD  - HD as per renal    #HTN  BP acceptable on current regimen    #HLD  - Statin as ordered              Over 25 minutes spent on total encounter; more than 50% of the visit was spent counseling and/or coordinating care by the attending physician.      Luigi Barnes DO [facility]  Cardiovascular Disease  [phone]

## 2023-01-22 NOTE — PROGRESS NOTE ADULT - SUBJECTIVE AND OBJECTIVE BOX
Patient is a 67y Male whom presented to the hospital with esrd on hd     PAST MEDICAL & SURGICAL HISTORY:      MEDICATIONS  (STANDING):  artificial  tears Solution 1 Drop(s) Both EYES three times a day  aspirin enteric coated 81 milliGRAM(s) Oral daily  atorvastatin 80 milliGRAM(s) Oral at bedtime  dextrose 5%. 1000 milliLiter(s) (50 mL/Hr) IV Continuous <Continuous>  dextrose 5%. 1000 milliLiter(s) (100 mL/Hr) IV Continuous <Continuous>  dextrose 50% Injectable 25 Gram(s) IV Push once  dextrose 50% Injectable 12.5 Gram(s) IV Push once  dextrose 50% Injectable 25 Gram(s) IV Push once  enoxaparin Injectable 30 milliGRAM(s) SubCutaneous every 24 hours  glucagon  Injectable 1 milliGRAM(s) IntraMuscular once  insulin lispro (ADMELOG) corrective regimen sliding scale   SubCutaneous at bedtime  insulin lispro (ADMELOG) corrective regimen sliding scale   SubCutaneous three times a day before meals  metoprolol tartrate 25 milliGRAM(s) Oral two times a day  pantoprazole    Tablet 40 milliGRAM(s) Oral before breakfast  polyethylene glycol 3350 17 Gram(s) Oral at bedtime  senna 2 Tablet(s) Oral at bedtime      Allergies    No Known Allergies    Intolerances        SOCIAL HISTORY:  Denies ETOh,Smoking,     FAMILY HISTORY:      REVIEW OF SYSTEMS:    CONSTITUTIONAL: No weakness, fevers or chills  RESPIRATORY: No cough, wheezing, hemoptysis; No shortness of breath  CARDIOVASCULAR: No chest pain or palpitations  GASTROINTESTINAL: No abdominal or epigastric pain. No nausea, vomiting,     No diarrhea or constipation. No melena   GENITOURINARY: No dysuria, frequency or hematuria  NEUROLOGICAL: No numbness or weakness                                                    9.1    9.38  )-----------( 139      ( 22 Jan 2023 06:25 )             28.4       CBC Full  -  ( 22 Jan 2023 06:25 )  WBC Count : 9.38 K/uL  RBC Count : 2.84 M/uL  Hemoglobin : 9.1 g/dL  Hematocrit : 28.4 %  Platelet Count - Automated : 139 K/uL  Mean Cell Volume : 100.0 fl  Mean Cell Hemoglobin : 32.0 pg  Mean Cell Hemoglobin Concentration : 32.0 gm/dL  Auto Neutrophil # : x  Auto Lymphocyte # : x  Auto Monocyte # : x  Auto Eosinophil # : x  Auto Basophil # : x  Auto Neutrophil % : x  Auto Lymphocyte % : x  Auto Monocyte % : x  Auto Eosinophil % : x  Auto Basophil % : x      01-22    138  |  97  |  67<H>  ----------------------------<  148<H>  3.9   |  27  |  7.59<H>    Ca    8.3<L>      22 Jan 2023 06:25  Phos  6.0     01-22  Mg     3.0     01-22        CAPILLARY BLOOD GLUCOSE      POCT Blood Glucose.: 119 mg/dL (22 Jan 2023 11:15)  POCT Blood Glucose.: 98 mg/dL (22 Jan 2023 07:42)  POCT Blood Glucose.: 268 mg/dL (22 Jan 2023 01:37)  POCT Blood Glucose.: 203 mg/dL (21 Jan 2023 21:26)      Vital Signs Last 24 Hrs  T(C): 36.7 (22 Jan 2023 12:40), Max: 36.9 (21 Jan 2023 21:49)  T(F): 98 (22 Jan 2023 12:40), Max: 98.4 (21 Jan 2023 21:49)  HR: 68 (22 Jan 2023 12:40) (57 - 68)  BP: 164/70 (22 Jan 2023 12:40) (132/65 - 164/70)  BP(mean): --  RR: 20 (22 Jan 2023 12:40) (18 - 20)  SpO2: 97% (22 Jan 2023 12:40) (97% - 99%)    Parameters below as of 22 Jan 2023 12:40  Patient On (Oxygen Delivery Method): room air                    PHYSICAL EXAM:    Constitutional: NAD  HEENT: conjunctive   clear   Neck:  No JVD  Respiratory: CTAB  Cardiovascular: S1 and S2  Gastrointestinal: BS+, soft, NT/ND  Extremities: No peripheral edema  Neurological: no focal deficits  Access: Not applicable

## 2023-01-22 NOTE — PROGRESS NOTE ADULT - ATTENDING COMMENTS
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  Prednisone taper for presumed GCA, rheum OP follow up   f/u Endocrine recs   D/c home

## 2023-01-22 NOTE — PROGRESS NOTE ADULT - PROBLEM SELECTOR PLAN 3
Patient has hx of CAD and is on Brilinta   - Cardiology consulted for preprocedural evaluation, recs appreciated. Per cardiology, can hold Brilinta for 5 days prior to biopsy. Will f/u cardiology re: continuing with Brilinta after procedure given that stent was in 2016 per family  - Per vascular surgery recs appreciated  -c/w Brillinta

## 2023-01-22 NOTE — PROGRESS NOTE ADULT - PROBLEM SELECTOR PROBLEM 2
Type 2 diabetes mellitus
Hypertension
Type 2 diabetes mellitus
Hypertension
Type 2 diabetes mellitus

## 2023-01-22 NOTE — PROGRESS NOTE ADULT - PROBLEM SELECTOR PLAN 5
- C/w Atorvastatin 80mg PO daily

## 2023-01-22 NOTE — PROGRESS NOTE ADULT - PROBLEM SELECTOR PROBLEM 6
Hyperkalemia

## 2023-01-22 NOTE — PROGRESS NOTE ADULT - PROBLEM SELECTOR PROBLEM 1
Cranial nerve III palsy, left
Type 2 diabetes mellitus with hyperglycemia
Type 2 diabetes mellitus with hyperglycemia
Cranial nerve III palsy, left
Type 2 diabetes mellitus with hyperglycemia
Type 2 diabetes mellitus with hyperglycemia
Cranial nerve III palsy, left
Type 2 diabetes mellitus with hyperglycemia
Cranial nerve III palsy, left

## 2023-01-22 NOTE — PROGRESS NOTE ADULT - NUTRITIONAL ASSESSMENT
MEDICATIONS  (STANDING):  artificial  tears Solution 1 Drop(s) Both EYES three times a day  aspirin enteric coated 81 milliGRAM(s) Oral daily  atorvastatin 80 milliGRAM(s) Oral at bedtime  dextrose 5%. 1000 milliLiter(s) (50 mL/Hr) IV Continuous <Continuous>  dextrose 5%. 1000 milliLiter(s) (100 mL/Hr) IV Continuous <Continuous>  dextrose 50% Injectable 25 Gram(s) IV Push once  dextrose 50% Injectable 12.5 Gram(s) IV Push once  dextrose 50% Injectable 25 Gram(s) IV Push once  enoxaparin Injectable 30 milliGRAM(s) SubCutaneous every 24 hours  glucagon  Injectable 1 milliGRAM(s) IntraMuscular once  insulin glargine Injectable (LANTUS) 30 Unit(s) SubCutaneous at bedtime  insulin lispro (ADMELOG) corrective regimen sliding scale   SubCutaneous <User Schedule>  insulin lispro (ADMELOG) corrective regimen sliding scale   SubCutaneous three times a day before meals  insulin lispro Injectable (ADMELOG) 15 Unit(s) SubCutaneous three times a day before meals  melatonin 3 milliGRAM(s) Oral at bedtime  metoprolol tartrate 25 milliGRAM(s) Oral two times a day  pantoprazole    Tablet 40 milliGRAM(s) Oral before breakfast  polyethylene glycol 3350 17 Gram(s) Oral daily  polyethylene glycol 3350 17 Gram(s) Oral at bedtime  senna 2 Tablet(s) Oral at bedtime  sodium chloride 2 Gram(s) Oral three times a day  sodium zirconium cyclosilicate 5 Gram(s) Oral daily
Diet, Consistent Carbohydrate Renal/No Snacks:   No Beef  No Pork (01-13-23 @ 16:48) [Active]    Needs RD consult
MEDICATIONS  (STANDING):  artificial  tears Solution 1 Drop(s) Both EYES three times a day  aspirin enteric coated 81 milliGRAM(s) Oral daily  atorvastatin 80 milliGRAM(s) Oral at bedtime  dextrose 5%. 1000 milliLiter(s) (50 mL/Hr) IV Continuous <Continuous>  dextrose 5%. 1000 milliLiter(s) (100 mL/Hr) IV Continuous <Continuous>  dextrose 50% Injectable 25 Gram(s) IV Push once  dextrose 50% Injectable 12.5 Gram(s) IV Push once  dextrose 50% Injectable 25 Gram(s) IV Push once  enoxaparin Injectable 30 milliGRAM(s) SubCutaneous every 24 hours  glucagon  Injectable 1 milliGRAM(s) IntraMuscular once  insulin glargine Injectable (LANTUS) 30 Unit(s) SubCutaneous at bedtime  insulin lispro (ADMELOG) corrective regimen sliding scale   SubCutaneous <User Schedule>  insulin lispro (ADMELOG) corrective regimen sliding scale   SubCutaneous three times a day before meals  insulin lispro Injectable (ADMELOG) 15 Unit(s) SubCutaneous three times a day before meals  melatonin 3 milliGRAM(s) Oral at bedtime  metoprolol tartrate 25 milliGRAM(s) Oral two times a day  pantoprazole    Tablet 40 milliGRAM(s) Oral before breakfast  polyethylene glycol 3350 17 Gram(s) Oral daily  polyethylene glycol 3350 17 Gram(s) Oral at bedtime  senna 2 Tablet(s) Oral at bedtime  sodium chloride 2 Gram(s) Oral three times a day  sodium zirconium cyclosilicate 5 Gram(s) Oral daily
Diet, Consistent Carbohydrate Renal/No Snacks:   No Beef  No Pork (01-13-23 @ 16:48) [Active]
Diet, NPO:   NPO for Procedure/Test     NPO Start Date: 19-Jan-2023,   NPO Start Time: 23:59  Except Medications  With Ice Chips/Sips of Water (01-19-23 @ 08:23) [Active]  Diet, Consistent Carbohydrate Renal/No Snacks:   No Beef  No Pork (01-13-23 @ 16:48) [Active]
Diet, Consistent Carbohydrate Renal/No Snacks:   No Beef  No Pork (01-13-23 @ 16:48) [Active]      Needs RD consult
Diet, NPO:   NPO for Procedure/Test     NPO Start Date: 19-Jan-2023,   NPO Start Time: 23:59  Except Medications  With Ice Chips/Sips of Water (01-19-23 @ 08:23) [Active]  Diet, Consistent Carbohydrate Renal/No Snacks:   No Beef  No Pork (01-13-23 @ 16:48) [Active]

## 2023-01-22 NOTE — PROGRESS NOTE ADULT - SUBJECTIVE AND OBJECTIVE BOX
PROGRESS NOTE:    Gautam Mercado MD  Internal Medicine PGY-2  Available on [employer] Teams      Patient is a 67y old  Male who presents with a chief complaint of Left CN 3 palsy (21 Jan 2023 12:02)      SUBJECTIVE / OVERNIGHT EVENTS: No acute overnight events. Pt seen and examined. Denies fevers, chills, CP, SOB, Abdominal pain, N/V, Constipation, Diarrhea      MEDICATIONS  (STANDING):  artificial  tears Solution 1 Drop(s) Both EYES three times a day  atorvastatin 80 milliGRAM(s) Oral at bedtime  dextrose 5%. 1000 milliLiter(s) (100 mL/Hr) IV Continuous <Continuous>  dextrose 5%. 1000 milliLiter(s) (50 mL/Hr) IV Continuous <Continuous>  dextrose 50% Injectable 25 Gram(s) IV Push once  dextrose 50% Injectable 25 Gram(s) IV Push once  dextrose 50% Injectable 12.5 Gram(s) IV Push once  gabapentin 200 milliGRAM(s) Oral three times a day  glucagon  Injectable 1 milliGRAM(s) IntraMuscular once  heparin   Injectable 5000 Unit(s) SubCutaneous every 12 hours  insulin glargine Injectable (LANTUS) 30 Unit(s) SubCutaneous at bedtime  insulin lispro (ADMELOG) corrective regimen sliding scale   SubCutaneous three times a day before meals  insulin lispro (ADMELOG) corrective regimen sliding scale   SubCutaneous <User Schedule>  insulin lispro Injectable (ADMELOG) 12 Unit(s) SubCutaneous three times a day before meals  lidocaine   4% Patch 1 Patch Transdermal once  melatonin 3 milliGRAM(s) Oral at bedtime  metoprolol tartrate 25 milliGRAM(s) Oral two times a day  pantoprazole    Tablet 40 milliGRAM(s) Oral before breakfast  polyethylene glycol 3350 17 Gram(s) Oral at bedtime  polyethylene glycol 3350 17 Gram(s) Oral daily  predniSONE   Tablet 50 milliGRAM(s) Oral daily  senna 2 Tablet(s) Oral at bedtime  sodium chloride 2 Gram(s) Oral three times a day  sodium zirconium cyclosilicate 5 Gram(s) Oral daily  ticagrelor 60 milliGRAM(s) Oral every 12 hours  trimethoprim   80 mG/sulfamethoxazole 400 mG 1 Tablet(s) Oral <User Schedule>    MEDICATIONS  (PRN):  acetaminophen     Tablet .. 650 milliGRAM(s) Oral every 6 hours PRN Moderate Pain (4 - 6), Severe Pain (7 - 10)  dextrose Oral Gel 15 Gram(s) Oral once PRN Blood Glucose LESS THAN 70 milliGRAM(s)/deciliter      I&O's Summary    20 Jan 2023 07:01  -  21 Jan 2023 07:00  --------------------------------------------------------  IN: 480 mL / OUT: 0 mL / NET: 480 mL    21 Jan 2023 07:01  -  22 Jan 2023 06:57  --------------------------------------------------------  IN: 120 mL / OUT: 3300 mL / NET: -3180 mL        Vital Signs Last 24 Hrs  T(C): 36.6 (22 Jan 2023 05:19), Max: 36.9 (21 Jan 2023 21:49)  T(F): 97.8 (22 Jan 2023 05:19), Max: 98.4 (21 Jan 2023 21:49)  HR: 67 (22 Jan 2023 05:19) (57 - 78)  BP: 155/67 (22 Jan 2023 05:19) (144/61 - 159/72)  BP(mean): --  RR: 18 (22 Jan 2023 05:19) (16 - 18)  SpO2: 99% (22 Jan 2023 05:19) (96% - 99%)    Parameters below as of 22 Jan 2023 05:19  Patient On (Oxygen Delivery Method): room air        =================PHYSICAL EXAM=================    GENERAL: NAD, lying in bed comfortably  HEAD:  Atraumatic, Normocephalic  EYES: EOMI, PERRLA, conjunctiva and sclera clear  ENT: Moist mucous membranes  NECK: Supple, No JVD  CHEST/LUNG: Clear to auscultation bilaterally; No rales, rhonchi, wheezing, or rubs. Unlabored respirations  HEART: Regular rate and rhythm; No murmurs, rubs, or gallops  ABDOMEN: Bowel sounds present; Soft, Nontender, Nondistended. No hepatomegally  EXTREMITIES:  2+ Peripheral Pulses, brisk capillary refill. No clubbing, cyanosis, or edema  NERVOUS SYSTEM:  Alert & Oriented X3, speech clear. No deficits   MSK: FROM all 4 extremities, full and equal strength  SKIN: No rashes or lesions    =================================================    LABS:                        9.0    9.31  )-----------( 142      ( 21 Jan 2023 06:17 )             27.0     Auto Eosinophil # x     / Auto Eosinophil % x     / Auto Neutrophil # x     / Auto Neutrophil % x     / BANDS % x        01-22    138  |  97  |  67<H>  ----------------------------<  148<H>  3.9   |  27  |  7.59<H>  01-21    133<L>  |  92<L>  |  93<H>  ----------------------------<  274<H>  4.4   |  22  |  9.70<H>    Ca    8.3<L>      22 Jan 2023 06:25  Mg     3.0     01-22  Phos  6.0     01-22                  RADIOLOGY & ADDITIONAL TESTS:    Imaging Personally Reviewed:    Consultant(s) Notes Reviewed:      Care Discussed with Consultants/Other Providers:   PROGRESS NOTE:    Gautam Mercado MD  Internal Medicine PGY-2  Available on [employer] Teams      Patient is a 67y old  Male who presents with a chief complaint of Left CN 3 palsy (21 Jan 2023 12:02)      SUBJECTIVE / OVERNIGHT EVENTS: No acute overnight events. Pt seen and examined. He still endorses left sided eye pain. Denies fevers, chills, CP, SOB, Abdominal pain, N/V, Constipation, Diarrhea      MEDICATIONS  (STANDING):  artificial  tears Solution 1 Drop(s) Both EYES three times a day  atorvastatin 80 milliGRAM(s) Oral at bedtime  dextrose 5%. 1000 milliLiter(s) (100 mL/Hr) IV Continuous <Continuous>  dextrose 5%. 1000 milliLiter(s) (50 mL/Hr) IV Continuous <Continuous>  dextrose 50% Injectable 25 Gram(s) IV Push once  dextrose 50% Injectable 25 Gram(s) IV Push once  dextrose 50% Injectable 12.5 Gram(s) IV Push once  gabapentin 200 milliGRAM(s) Oral three times a day  glucagon  Injectable 1 milliGRAM(s) IntraMuscular once  heparin   Injectable 5000 Unit(s) SubCutaneous every 12 hours  insulin glargine Injectable (LANTUS) 30 Unit(s) SubCutaneous at bedtime  insulin lispro (ADMELOG) corrective regimen sliding scale   SubCutaneous three times a day before meals  insulin lispro (ADMELOG) corrective regimen sliding scale   SubCutaneous <User Schedule>  insulin lispro Injectable (ADMELOG) 12 Unit(s) SubCutaneous three times a day before meals  lidocaine   4% Patch 1 Patch Transdermal once  melatonin 3 milliGRAM(s) Oral at bedtime  metoprolol tartrate 25 milliGRAM(s) Oral two times a day  pantoprazole    Tablet 40 milliGRAM(s) Oral before breakfast  polyethylene glycol 3350 17 Gram(s) Oral at bedtime  polyethylene glycol 3350 17 Gram(s) Oral daily  predniSONE   Tablet 50 milliGRAM(s) Oral daily  senna 2 Tablet(s) Oral at bedtime  sodium chloride 2 Gram(s) Oral three times a day  sodium zirconium cyclosilicate 5 Gram(s) Oral daily  ticagrelor 60 milliGRAM(s) Oral every 12 hours  trimethoprim   80 mG/sulfamethoxazole 400 mG 1 Tablet(s) Oral <User Schedule>    MEDICATIONS  (PRN):  acetaminophen     Tablet .. 650 milliGRAM(s) Oral every 6 hours PRN Moderate Pain (4 - 6), Severe Pain (7 - 10)  dextrose Oral Gel 15 Gram(s) Oral once PRN Blood Glucose LESS THAN 70 milliGRAM(s)/deciliter      I&O's Summary    20 Jan 2023 07:01  -  21 Jan 2023 07:00  --------------------------------------------------------  IN: 480 mL / OUT: 0 mL / NET: 480 mL    21 Jan 2023 07:01  -  22 Jan 2023 06:57  --------------------------------------------------------  IN: 120 mL / OUT: 3300 mL / NET: -3180 mL        Vital Signs Last 24 Hrs  T(C): 36.6 (22 Jan 2023 05:19), Max: 36.9 (21 Jan 2023 21:49)  T(F): 97.8 (22 Jan 2023 05:19), Max: 98.4 (21 Jan 2023 21:49)  HR: 67 (22 Jan 2023 05:19) (57 - 78)  BP: 155/67 (22 Jan 2023 05:19) (144/61 - 159/72)  BP(mean): --  RR: 18 (22 Jan 2023 05:19) (16 - 18)  SpO2: 99% (22 Jan 2023 05:19) (96% - 99%)    Parameters below as of 22 Jan 2023 05:19  Patient On (Oxygen Delivery Method): room air        =================PHYSICAL EXAM=================    GENERAL: NAD, lying in bed comfortably  HEAD:  Atraumatic, Normocephalic  EYES: EOMI intact of right eye, EOMI of left eye limited, left ptosis, conjunctiva and sclera clear   CHEST/LUNG: Clear to auscultation bilaterally; No rales, rhonchi, wheezing, or rubs. Unlabored respirations  HEART: Regular rate and rhythm; No murmurs, rubs, or gallops  ABDOMEN: Bowel sounds present; Soft, Nontender, Nondistended. No hepatomegally  EXTREMITIES:  2+ Peripheral Pulses, brisk capillary refill. No clubbing, cyanosis, or edema  NERVOUS SYSTEM:  Alert & Oriented X3, speech clear. No deficits   MSK: FROM all 4 extremities, full and equal strength  SKIN: No rashes or lesions    =================================================    LABS:                        9.0    9.31  )-----------( 142      ( 21 Jan 2023 06:17 )             27.0     Auto Eosinophil # x     / Auto Eosinophil % x     / Auto Neutrophil # x     / Auto Neutrophil % x     / BANDS % x        01-22    138  |  97  |  67<H>  ----------------------------<  148<H>  3.9   |  27  |  7.59<H>  01-21    133<L>  |  92<L>  |  93<H>  ----------------------------<  274<H>  4.4   |  22  |  9.70<H>    Ca    8.3<L>      22 Jan 2023 06:25  Mg     3.0     01-22  Phos  6.0     01-22                  RADIOLOGY & ADDITIONAL TESTS:    Imaging Personally Reviewed:    Consultant(s) Notes Reviewed:      Care Discussed with Consultants/Other Providers:

## 2023-01-23 NOTE — CHART NOTE - NSCHARTNOTESELECT_GEN_ALL_CORE
Event Note
Post-operative examination
Rheumatology/Event Note
d/c appt/Event Note
endocrine/Event Note
Endocrine/Event Note
Event Note
Medicine Pre-operative Risk Stratification/Event Note
d/c appt/Event Note
d/c appt/Event Note

## 2023-01-26 LAB — MUSK IGG SER IA-MCNC: SIGNIFICANT CHANGE UP

## 2023-02-01 ENCOUNTER — APPOINTMENT (OUTPATIENT)
Dept: ENDOCRINOLOGY | Facility: CLINIC | Age: 68
End: 2023-02-01
Payer: MEDICARE

## 2023-02-01 VITALS
OXYGEN SATURATION: 98 % | DIASTOLIC BLOOD PRESSURE: 76 MMHG | BODY MASS INDEX: 26.04 KG/M2 | HEIGHT: 71 IN | SYSTOLIC BLOOD PRESSURE: 161 MMHG | WEIGHT: 186 LBS | HEART RATE: 66 BPM

## 2023-02-01 DIAGNOSIS — E11.9 TYPE 2 DIABETES MELLITUS W/OUT COMPLICATIONS: ICD-10-CM

## 2023-02-01 PROCEDURE — 99204 OFFICE O/P NEW MOD 45 MIN: CPT | Mod: 25

## 2023-02-01 PROCEDURE — 82962 GLUCOSE BLOOD TEST: CPT

## 2023-02-01 RX ORDER — PEN NEEDLE, DIABETIC 29 G X1/2"
31G X 8 MM NEEDLE, DISPOSABLE MISCELLANEOUS
Qty: 200 | Refills: 0 | Status: DISCONTINUED | COMMUNITY
Start: 2018-03-19 | End: 2023-02-01

## 2023-02-01 RX ORDER — TICAGRELOR 90 MG/1
90 TABLET ORAL
Qty: 180 | Refills: 0 | Status: DISCONTINUED | COMMUNITY
Start: 2017-11-17 | End: 2023-02-01

## 2023-02-01 RX ORDER — LUBIPROSTONE 24 UG/1
24 CAPSULE, GELATIN COATED ORAL
Qty: 60 | Refills: 0 | Status: DISCONTINUED | COMMUNITY
Start: 2017-12-12 | End: 2023-02-01

## 2023-02-01 RX ORDER — INSULIN GLARGINE 100 [IU]/ML
100 INJECTION, SOLUTION SUBCUTANEOUS
Qty: 3 | Refills: 0 | Status: DISCONTINUED | COMMUNITY
Start: 2017-11-17 | End: 2023-02-01

## 2023-02-01 RX ORDER — NIFEDIPINE 90 MG/1
90 TABLET, EXTENDED RELEASE ORAL
Qty: 90 | Refills: 0 | Status: DISCONTINUED | COMMUNITY
Start: 2017-11-17 | End: 2023-02-01

## 2023-02-01 RX ORDER — BLOOD SUGAR DIAGNOSTIC
STRIP MISCELLANEOUS
Qty: 300 | Refills: 0 | Status: DISCONTINUED | COMMUNITY
Start: 2018-03-08 | End: 2023-02-01

## 2023-02-01 RX ORDER — CALCITRIOL 0.5 UG/1
0.5 CAPSULE, LIQUID FILLED ORAL
Qty: 90 | Refills: 0 | Status: DISCONTINUED | COMMUNITY
Start: 2017-10-03 | End: 2023-02-01

## 2023-02-01 RX ORDER — RANITIDINE 150 MG/1
150 TABLET ORAL
Qty: 60 | Refills: 0 | Status: DISCONTINUED | COMMUNITY
Start: 2017-10-30 | End: 2023-02-01

## 2023-02-01 RX ORDER — POLYETHYLENE GLYCOL-3350 AND ELECTROLYTES WITH FLAVOR PACK 240; 5.84; 2.98; 6.72; 22.72 G/278.26G; G/278.26G; G/278.26G; G/278.26G; G/278.26G
240 POWDER, FOR SOLUTION ORAL
Qty: 4000 | Refills: 0 | Status: DISCONTINUED | COMMUNITY
Start: 2017-12-05 | End: 2023-02-01

## 2023-02-01 RX ORDER — TAMSULOSIN HYDROCHLORIDE 0.4 MG/1
0.4 CAPSULE ORAL
Qty: 60 | Refills: 0 | Status: DISCONTINUED | COMMUNITY
Start: 2017-11-19 | End: 2023-02-01

## 2023-02-01 RX ORDER — HYDRALAZINE HYDROCHLORIDE 25 MG/1
25 TABLET ORAL
Refills: 0 | Status: DISCONTINUED | COMMUNITY
End: 2023-02-01

## 2023-02-01 RX ORDER — EZETIMIBE 10 MG/1
10 TABLET ORAL
Refills: 0 | Status: DISCONTINUED | COMMUNITY
End: 2023-02-01

## 2023-02-01 RX ORDER — ZOLPIDEM TARTRATE 5 MG/1
5 TABLET ORAL
Qty: 30 | Refills: 0 | Status: DISCONTINUED | COMMUNITY
Start: 2017-10-09 | End: 2023-02-01

## 2023-02-03 ENCOUNTER — APPOINTMENT (OUTPATIENT)
Dept: RHEUMATOLOGY | Facility: CLINIC | Age: 68
End: 2023-02-03
Payer: MEDICARE

## 2023-02-03 DIAGNOSIS — R51.9 HEADACHE, UNSPECIFIED: ICD-10-CM

## 2023-02-03 PROBLEM — E11.9 DIABETES: Status: ACTIVE | Noted: 2018-04-03

## 2023-02-03 PROCEDURE — 99213 OFFICE O/P EST LOW 20 MIN: CPT

## 2023-02-03 RX ORDER — ATORVASTATIN CALCIUM 20 MG/1
20 TABLET, FILM COATED ORAL
Refills: 0 | Status: DISCONTINUED | COMMUNITY
End: 2023-02-03

## 2023-02-03 RX ORDER — TICAGRELOR 60 MG/1
60 TABLET ORAL
Qty: 180 | Refills: 0 | Status: DISCONTINUED | COMMUNITY
Start: 2017-10-23 | End: 2023-02-03

## 2023-02-03 RX ORDER — INSULIN HUMAN 100 [IU]/ML
100 INJECTION, SUSPENSION SUBCUTANEOUS
Qty: 45 | Refills: 0 | Status: DISCONTINUED | COMMUNITY
Start: 2018-03-18 | End: 2023-02-03

## 2023-02-03 RX ORDER — METOPROLOL TARTRATE 50 MG/1
50 TABLET, FILM COATED ORAL
Qty: 180 | Refills: 0 | Status: DISCONTINUED | COMMUNITY
Start: 2018-01-18 | End: 2023-02-03

## 2023-02-03 RX ORDER — METOPROLOL TARTRATE 100 MG/1
100 TABLET, FILM COATED ORAL
Qty: 180 | Refills: 0 | Status: DISCONTINUED | COMMUNITY
Start: 2017-12-19 | End: 2023-02-03

## 2023-02-03 NOTE — ASSESSMENT
[FreeTextEntry1] : 67 year old male with past medical history of Diabetes Mellitus Type 2, ESRD on Dialysis (M,W,F) who presents for management of his diabetes\par \par 1. DM 2- uncontrolled, uncomplicated\par Patient counseled on the importance of diabetic control and risk of complications. We discussed about microvascular disease and macrovascular disease. We discussed the importance of opthalmology evaluations annually or more frequent as necessary. We also discussed the importance of diabetes foot care. Some form of glucose monitoring is always advised. Maintaining a low carbohydrate/ADA diet and physical activity was discussed. Patient's diet and the necessary changes discussed. Reviewed over glycemic goals. \par \par Dec Tresiba 34 units QHS\par Cont Humalog 10 TID \par Check fsg TID or more\par Cont ADA diet\par Cont exercise\par Will refer to CDE/RD\par Rec Dexcom\par Opthalmology Visit up to date\par Foot Exam at follow up\par \par 2. HLD- stable\par Cont Atorvastatin 20 mg QD\par \par \par

## 2023-02-03 NOTE — HISTORY OF PRESENT ILLNESS
[FreeTextEntry1] : Mr. NERY LEONARD  is a 67 year old male with past medical history of Diabetes Mellitus Type 2, ESRD on Dialysis (M,W,F) who presents for management of his diabetes. Patient his diagnosed with GCA. He had biopsy and was started on steroids. He is currently on prednisone 50 mg QD. He takes it in the morning. He will likely be tapered. Patient denies any history of diabetic retinopathy or neuropathy. He denies any blurry vision, polyuria, polydipsia and numbness/tingling in the extremities.\par \par \par POCT Glucose: 185 mg/dL\par POCT Hga1c: %\par \par \par Diabetes first diagnosed:more then 30 years\par Type: 2\par \par Current diabetic regimen:\par Tresiba 40 units QHS\par Humalog 10 units TID\par Other relevant medications:\par \par Self monitoring blood glucose : Glucometer: 4x times per day \par \par SMBG:\par Pre-breakfast:50-70 or 100s\par Pre-lunch:\par Pre-dinner:\par bedtime:\par \par Hypoglycemia:yes in the morning\par \par Diet:\par eats low carb, renal diet\par \par Exercise:walks a lot\par \par Urine micro:ESRD\par lipid profile:NA\par last hba1c:NA\par eye exam:+ retinopathy +cataracts every couple months\par diabetic foot exam/podiatry: no neuropathy\par \par \par \par \par

## 2023-02-04 NOTE — HISTORY OF PRESENT ILLNESS
[FreeTextEntry1] : 68 yo M PMH danette speaking, current smoker, CAD s/p 1 stent on brilinta, DM, CKD on HD, ,HTN, HLD presenting with Left eye ptosis and diplopia. Rheumatology consulted for r/o GCA \par \par # concern for GCA - low to moderate suspicion \par =p/w 10-12 days of L sided headache, L eye ptosis 2/2 CN 3 palsy\par =CTA head/neck w/o significant stenosis, did show moderate narrowing of segment of R vertebral artery\par =MR brain w/o contrast unremarkable\par =MR orbits w/ mild atrophy and small vessel white matter ischemic changes - which are non-specific findings\par = ESR borderline elevated, CRP wnl\par = US temporal arteries negative for halo sign  \par = s/p pulse steroid (1/14-16), now transition to high dose steroid \par = s/p  L. temporal bx on 1/20\par = start PCP prophylaxis (Bactrim SS 3x times per week), Protonix for GI prophylaxis,  vitamin D, Calcium \par \par Currently taking prednisone 40 mg daily \par \par Temporal artery biopsy reviewed: negative for inflammation; calcific atheroma seen. \par Headaches improves but still with discomfort in the temporal regions \par pain on chewing when eating hard food only and at the beginning of the meal; no claudication. \par tolerating steroids and follows with endocrine\par ptosis unchanged\par \par Cardiology: David Shaw -- [id number] [facility]\par last seen 12/2022\par \par \par \par \par \par

## 2023-02-04 NOTE — PHYSICAL EXAM
[General Appearance - Alert] : alert [General Appearance - In No Acute Distress] : in no acute distress [Abnormal Walk] : normal gait [Nail Clubbing] : no clubbing  or cyanosis of the fingernails [Musculoskeletal - Swelling] : no joint swelling seen [Motor Tone] : muscle strength and tone were normal [Oriented To Time, Place, And Person] : oriented to person, place, and time [Impaired Insight] : insight and judgment were intact [Affect] : the affect was normal [FreeTextEntry1] : normal temporal pulses, no tenderness

## 2023-02-04 NOTE — ASSESSMENT
[FreeTextEntry1] : Pt with temporal headache and CN 3 palsy \par temporal artery biopsy without evidence of temporal arteritis. and there is evidence of calcified plaque\par gum pain--not consistent with claudication; f/u with dentist \par taper steroids as follows: \par Prednisone 40 mg daily x 5 days, then \par Prednisone 30 mg daily x 5 days, then \par Prednisone 20 mg daily x 5 days, then \par Prednisone 10 mg daily x 5 days, then \par Prednisone 5 mg daily  x 5 days, then STOP\par neuro-ophthalmology f/u recommended \par f/u rheumatology as needed \par \par  \par \par

## 2023-02-10 RX ORDER — FLASH GLUCOSE SENSOR
KIT MISCELLANEOUS
Qty: 1 | Refills: 1 | Status: ACTIVE | COMMUNITY
Start: 2023-02-10 | End: 1900-01-01

## 2023-02-10 RX ORDER — FLASH GLUCOSE SCANNING READER
EACH MISCELLANEOUS
Qty: 1 | Refills: 0 | Status: ACTIVE | COMMUNITY
Start: 2023-02-10 | End: 1900-01-01

## 2023-02-13 ENCOUNTER — NON-APPOINTMENT (OUTPATIENT)
Age: 68
End: 2023-02-13

## 2023-02-14 ENCOUNTER — APPOINTMENT (OUTPATIENT)
Dept: OPHTHALMOLOGY | Facility: CLINIC | Age: 68
End: 2023-02-14

## 2023-02-14 ENCOUNTER — APPOINTMENT (OUTPATIENT)
Dept: OPHTHALMOLOGY | Facility: CLINIC | Age: 68
End: 2023-02-14
Payer: MEDICARE

## 2023-02-15 ENCOUNTER — NON-APPOINTMENT (OUTPATIENT)
Age: 68
End: 2023-02-15

## 2023-02-15 PROCEDURE — 99204 OFFICE O/P NEW MOD 45 MIN: CPT

## 2023-02-16 ENCOUNTER — APPOINTMENT (OUTPATIENT)
Dept: RHEUMATOLOGY | Facility: CLINIC | Age: 68
End: 2023-02-16

## 2023-02-28 ENCOUNTER — APPOINTMENT (OUTPATIENT)
Dept: ENDOCRINOLOGY | Facility: CLINIC | Age: 68
End: 2023-02-28

## 2023-03-09 ENCOUNTER — APPOINTMENT (OUTPATIENT)
Dept: ENDOCRINOLOGY | Facility: CLINIC | Age: 68
End: 2023-03-09

## 2023-04-01 RX ORDER — TICAGRELOR 90 MG/1
1 TABLET ORAL
Qty: 0 | Refills: 0 | DISCHARGE

## 2023-04-01 RX ORDER — PANTOPRAZOLE SODIUM 20 MG/1
1 TABLET, DELAYED RELEASE ORAL
Qty: 0 | Refills: 0 | DISCHARGE

## 2023-04-04 ENCOUNTER — APPOINTMENT (OUTPATIENT)
Dept: OPHTHALMOLOGY | Facility: CLINIC | Age: 68
End: 2023-04-04

## 2023-04-07 ENCOUNTER — APPOINTMENT (OUTPATIENT)
Dept: CARE COORDINATION | Facility: HOME HEALTH | Age: 68
End: 2023-04-07
Payer: MEDICARE

## 2023-04-07 DIAGNOSIS — F33.1 MAJOR DEPRESSIVE DISORDER, RECURRENT, MODERATE: ICD-10-CM

## 2023-04-07 DIAGNOSIS — K59.09 OTHER CONSTIPATION: ICD-10-CM

## 2023-04-07 DIAGNOSIS — K21.9 GASTRO-ESOPHAGEAL REFLUX DISEASE W/OUT ESOPHAGITIS: ICD-10-CM

## 2023-04-07 DIAGNOSIS — H04.129 DRY EYE SYNDROME OF UNSPECIFIED LACRIMAL GLAND: ICD-10-CM

## 2023-04-07 DIAGNOSIS — Z85.46 PERSONAL HISTORY OF MALIGNANT NEOPLASM OF PROSTATE: ICD-10-CM

## 2023-04-07 DIAGNOSIS — E78.5 HYPERLIPIDEMIA, UNSPECIFIED: ICD-10-CM

## 2023-04-07 DIAGNOSIS — Z95.5 PRESENCE OF CORONARY ANGIOPLASTY IMPLANT AND GRAFT: ICD-10-CM

## 2023-04-07 PROCEDURE — 99342 HOME/RES VST NEW LOW MDM 30: CPT | Mod: 95

## 2023-04-07 RX ORDER — METOPROLOL TARTRATE 25 MG/1
25 TABLET, FILM COATED ORAL
Qty: 60 | Refills: 0 | Status: ACTIVE | COMMUNITY
Start: 2023-04-07

## 2023-04-07 RX ORDER — CALCIUM ACETATE 667 MG/1
667 TABLET ORAL
Refills: 0 | Status: DISCONTINUED | COMMUNITY
End: 2023-04-07

## 2023-04-07 RX ORDER — PANTOPRAZOLE 40 MG/1
40 TABLET, DELAYED RELEASE ORAL
Refills: 0 | Status: DISCONTINUED | COMMUNITY
End: 2023-04-07

## 2023-04-07 RX ORDER — ATORVASTATIN CALCIUM 40 MG/1
40 TABLET, FILM COATED ORAL
Qty: 1 | Refills: 3 | Status: ACTIVE | COMMUNITY
Start: 2023-04-07

## 2023-04-07 RX ORDER — SULFAMETHOXAZOLE AND TRIMETHOPRIM 400; 80 MG/1; MG/1
400-80 TABLET ORAL
Refills: 0 | Status: DISCONTINUED | COMMUNITY
End: 2023-04-07

## 2023-04-07 RX ORDER — FAMOTIDINE 20 MG/1
20 TABLET, FILM COATED ORAL
Qty: 30 | Refills: 0 | Status: DISCONTINUED | COMMUNITY
Start: 2018-01-18 | End: 2023-04-07

## 2023-04-07 RX ORDER — TICAGRELOR 60 MG/1
60 TABLET ORAL TWICE DAILY
Qty: 1 | Refills: 0 | Status: ACTIVE | COMMUNITY
Start: 2023-04-07

## 2023-04-07 RX ORDER — MELATONIN 3 MG
25 MCG TABLET ORAL
Refills: 0 | Status: DISCONTINUED | COMMUNITY
End: 2023-04-07

## 2023-04-07 RX ORDER — FOLIC ACID/VIT B COMPLEX AND C 0.8 MG
TABLET ORAL
Qty: 30 | Refills: 0 | Status: ACTIVE | COMMUNITY
Start: 2023-04-07

## 2023-04-07 RX ORDER — LINACLOTIDE 145 UG/1
145 CAPSULE, GELATIN COATED ORAL DAILY
Qty: 1 | Refills: 0 | Status: ACTIVE | COMMUNITY
Start: 2023-04-07

## 2023-04-07 RX ORDER — PREDNISONE 10 MG/1
10 TABLET ORAL
Qty: 60 | Refills: 0 | Status: DISCONTINUED | COMMUNITY
End: 2023-04-07

## 2023-04-07 RX ORDER — NORMAL SALT TABLETS 1 G/G
1 TABLET ORAL
Refills: 0 | Status: DISCONTINUED | COMMUNITY
End: 2023-04-07

## 2023-04-07 RX ORDER — PANTOPRAZOLE 20 MG/1
20 TABLET, DELAYED RELEASE ORAL DAILY
Qty: 1 | Refills: 0 | Status: ACTIVE | COMMUNITY
Start: 2023-04-07

## 2023-04-07 RX ORDER — FERRIC CITRATE 210 MG/1
1 GM TABLET, COATED ORAL EVERY 8 HOURS
Qty: 1 | Refills: 0 | Status: ACTIVE | COMMUNITY
Start: 2023-04-07

## 2023-04-07 RX ORDER — FAMOTIDINE 20 MG/1
20 TABLET, FILM COATED ORAL DAILY
Qty: 90 | Refills: 0 | Status: ACTIVE | COMMUNITY
Start: 2023-04-07

## 2023-04-07 RX ORDER — IMIPRAMINE HYDROCHLORIDE 25 MG/1
25 TABLET, FILM COATED ORAL TWICE DAILY
Qty: 60 | Refills: 5 | Status: DISCONTINUED | COMMUNITY
Start: 2018-04-24 | End: 2023-04-07

## 2023-04-07 RX ORDER — INSULIN DEGLUDEC INJECTION 100 U/ML
100 INJECTION, SOLUTION SUBCUTANEOUS AT BEDTIME
Refills: 0 | Status: ACTIVE | COMMUNITY

## 2023-04-07 RX ORDER — GABAPENTIN 100 MG/1
100 CAPSULE ORAL
Refills: 0 | Status: DISCONTINUED | COMMUNITY
End: 2023-04-07

## 2023-04-07 RX ORDER — ACETAMINOPHEN 325 MG/1
325 TABLET ORAL
Refills: 0 | Status: DISCONTINUED | COMMUNITY
End: 2023-04-07

## 2023-04-08 PROBLEM — Z85.46 HISTORY OF MALIGNANT NEOPLASM OF PROSTATE: Status: RESOLVED | Noted: 2018-04-03 | Resolved: 2023-04-08

## 2023-04-08 PROBLEM — F33.1 MODERATE EPISODE OF RECURRENT MAJOR DEPRESSIVE DISORDER: Status: ACTIVE | Noted: 2023-04-08

## 2023-04-08 RX ORDER — CARBOXYMETHYLCELLULOSE SODIUM 5 MG/ML
0.5 SOLUTION/ DROPS OPHTHALMIC DAILY
Qty: 1 | Refills: 0 | Status: ACTIVE | COMMUNITY
Start: 2023-04-07

## 2023-04-08 RX ORDER — CYCLOSPORINE 0.5 MG/ML
0.05 EMULSION OPHTHALMIC
Qty: 1 | Refills: 0 | Status: ACTIVE | COMMUNITY
Start: 2023-04-07

## 2023-04-08 RX ORDER — NEOMYCIN SULFATE, POLYMYXIN B SULFATE AND HYDROCORTISONE 3.5; 10000; 1 MG/ML; [USP'U]/ML; MG/ML
3.5-10000-1 SUSPENSION OPHTHALMIC EVERY 4 HOURS
Qty: 1 | Refills: 0 | Status: ACTIVE | COMMUNITY
Start: 2023-04-07

## 2023-04-08 NOTE — REVIEW OF SYSTEMS
[Vision Problems] : vision problems [Constipation] : constipation [Insomnia] : insomnia [Depression] : depression [Negative] : Heme/Lymph [Suicidal] : not suicidal [Anxiety] : no anxiety [de-identified] : b/l upper extremities numbness

## 2023-04-08 NOTE — PHYSICAL EXAM
[de-identified] : Telehealth precludes traditional, comprehensive physical exam. Patient appeared stable and alert.

## 2023-04-08 NOTE — HISTORY OF PRESENT ILLNESS
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 Granddaughter assist with video visit and translation. Patient report doing well. No complaints offered at this time. Patient receive 36 hrs of HHA services. Patient receive HD on M,W,F, He reports B/L upper extremity polyneuropathy, which is seen to worsen after HD therapy. Patient admits to monitoring BS daily, range 90 - 100 AM. Awake, alert and oriented x4, in no acute distress. Denies any chest pain, shortness of breath, palpitation or dizziness. Patient verified medications and medical diagnosis. Report taking medications as prescribed. Today PHQ9 (10), denies SI/HI. Patient is not followed by psych, patient decline referral, state he have family support.

## 2023-04-08 NOTE — HEALTH RISK ASSESSMENT
[No falls in past year] : Patient reported no falls in the past year [None] : None [With Family] : lives with family [] :  [# Of Children ___] : has [unfilled] children [Feels Safe at Home] : Feels safe at home [Smoke Detector] : smoke detector [Carbon Monoxide Detector] : carbon monoxide detector [Seat Belt] :  uses seat belt [Patient/Caregiver unclear of wishes] : , patient/caregiver unclear of wishes [Audit-CScore] : 0 [de-identified] : good [de-identified] : Refer to  Flowsheet - Disregard initials entry - PHQ9 (10) [Change in mental status noted] : No change in mental status noted [Reports changes in vision] : Reports no changes in vision [Reports changes in hearing] : Reports no changes in hearing [Reports normal functional visual acuity (ie: able to read med bottle)] : Reports poor functional visual acuity.  [Reports changes in dental health] : Reports no changes in dental health [Guns at Home] : no guns at home [Travel to Developing Areas] : does not  travel to developing areas [TB Exposure] : is not being exposed to tuberculosis [AdvancecareDate] : 03/23

## 2023-05-26 ENCOUNTER — APPOINTMENT (OUTPATIENT)
Dept: NEUROLOGY | Facility: CLINIC | Age: 68
End: 2023-05-26

## 2023-08-08 ENCOUNTER — OUTPATIENT (OUTPATIENT)
Dept: OUTPATIENT SERVICES | Facility: HOSPITAL | Age: 68
LOS: 1 days | End: 2023-08-08
Payer: COMMERCIAL

## 2023-08-08 DIAGNOSIS — Z98.89 OTHER SPECIFIED POSTPROCEDURAL STATES: Chronic | ICD-10-CM

## 2023-08-08 DIAGNOSIS — K02.9 DENTAL CARIES, UNSPECIFIED: ICD-10-CM

## 2023-08-08 PROCEDURE — D0140: CPT

## 2023-08-11 ENCOUNTER — OUTPATIENT (OUTPATIENT)
Dept: OUTPATIENT SERVICES | Facility: HOSPITAL | Age: 68
LOS: 1 days | End: 2023-08-11
Payer: COMMERCIAL

## 2023-08-11 DIAGNOSIS — K01.1 IMPACTED TEETH: ICD-10-CM

## 2023-08-11 DIAGNOSIS — Z98.89 OTHER SPECIFIED POSTPROCEDURAL STATES: Chronic | ICD-10-CM

## 2023-08-11 DIAGNOSIS — K02.53 DENTAL CARIES ON PIT AND FISSURE SURFACE PENETRATING INTO PULP: ICD-10-CM

## 2023-08-11 PROCEDURE — D7140: CPT

## 2023-08-17 DIAGNOSIS — K02.9 DENTAL CARIES, UNSPECIFIED: ICD-10-CM

## 2024-01-26 ENCOUNTER — APPOINTMENT (OUTPATIENT)
Dept: CARE COORDINATION | Facility: HOME HEALTH | Age: 69
End: 2024-01-26
Payer: MEDICARE

## 2024-01-26 VITALS — BODY MASS INDEX: 26.04 KG/M2 | WEIGHT: 186 LBS | HEIGHT: 71 IN

## 2024-01-26 DIAGNOSIS — Z13.31 ENCOUNTER FOR SCREENING FOR DEPRESSION: ICD-10-CM

## 2024-01-26 DIAGNOSIS — N18.6 END STAGE RENAL DISEASE: ICD-10-CM

## 2024-01-26 DIAGNOSIS — D63.1 END STAGE RENAL DISEASE: ICD-10-CM

## 2024-01-26 DIAGNOSIS — Z79.4 TYPE 2 DIABETES MELLITUS WITH DIABETIC POLYNEUROPATHY: ICD-10-CM

## 2024-01-26 DIAGNOSIS — H49.02 THIRD [OCULOMOTOR] NERVE PALSY, LEFT EYE: ICD-10-CM

## 2024-01-26 DIAGNOSIS — E11.42 TYPE 2 DIABETES MELLITUS WITH DIABETIC POLYNEUROPATHY: ICD-10-CM

## 2024-01-26 DIAGNOSIS — I12.0 TYPE 2 DIABETES MELLITUS WITH DIABETIC CHRONIC KIDNEY DISEASE: ICD-10-CM

## 2024-01-26 DIAGNOSIS — Z99.2 END STAGE RENAL DISEASE: ICD-10-CM

## 2024-01-26 DIAGNOSIS — E11.22 TYPE 2 DIABETES MELLITUS WITH DIABETIC CHRONIC KIDNEY DISEASE: ICD-10-CM

## 2024-01-26 DIAGNOSIS — N18.5 TYPE 2 DIABETES MELLITUS WITH DIABETIC CHRONIC KIDNEY DISEASE: ICD-10-CM

## 2024-01-26 PROCEDURE — 99350 HOME/RES VST EST HIGH MDM 60: CPT | Mod: 25

## 2024-01-26 PROCEDURE — G0444 DEPRESSION SCREEN ANNUAL: CPT | Mod: 59,95

## 2024-02-01 NOTE — COUNSELING
[Fall prevention counseling provided] : Fall prevention counseling provided [Adequate lighting] : Adequate lighting [No throw rugs] : No throw rugs [Use proper foot wear] : Use proper foot wear [Use recommended devices] : Use recommended devices [Behavioral health counseling provided] : Behavioral health counseling provided [Sleep ___ hours/day] : Sleep [unfilled] hours/day [Engage in a relaxing activity] : Engage in a relaxing activity [Plan in advance] : Plan in advance [No] : Risk of tobacco use and health benefits of smoking cessation discussed: No [Potential consequences of obesity discussed] : Potential consequences of obesity discussed [Benefits of weight loss discussed] : Benefits of weight loss discussed [Encouraged to maintain food diary] : Encouraged to maintain food diary [Encouraged to increase physical activity] : Encouraged to increase physical activity [Encouraged to use exercise tracking device] : Encouraged to use exercise tracking device

## 2024-02-02 PROBLEM — E11.42 TYPE 2 DIABETES MELLITUS WITH DIABETIC POLYNEUROPATHY, WITH LONG-TERM CURRENT USE OF INSULIN: Status: ACTIVE | Noted: 2023-04-08

## 2024-02-02 PROBLEM — H49.02 LEFT OCULOMOTOR NERVE PALSY: Status: ACTIVE | Noted: 2023-04-08

## 2024-02-02 PROBLEM — N18.6 ANEMIA DUE TO CHRONIC KIDNEY DISEASE, ON CHRONIC DIALYSIS: Status: ACTIVE | Noted: 2023-04-07

## 2024-02-02 PROBLEM — Z13.31 ENCOUNTER FOR SCREENING FOR DEPRESSION: Status: ACTIVE | Noted: 2024-02-02

## 2024-02-02 PROBLEM — E11.22 TYPE 2 DM WITH CKD STAGE 5 AND HYPERTENSION: Status: ACTIVE | Noted: 2023-04-08

## 2024-02-02 NOTE — PLAN
[FreeTextEntry1] : Patient seen in home, enforced w/ pt the need for daily weight/bp monitoring/blood glucose monitoring, low salt diet and educated pt to avoid processed/canned food and limit take out, increase vegetable/fiber and fruit intake (portion control).  Daily exercise w/ easy to reach realistic goals.  Continue w/ current regimen and medication as ordered.  Adhere to follow up w/ pcp/endo/opth/nephro/dpm and referral to  to be provided.  Pt advised to call with any questions/concerns. Depression screening time spent >15 mins.

## 2024-02-02 NOTE — REVIEW OF SYSTEMS
[Vision Problems] : vision problems [Back Pain] : back pain [Unsteady Walk] : ataxia [Negative] : Psychiatric [FreeTextEntry3] : w/ glasses [FreeTextEntry9] : AVF LFA

## 2024-02-02 NOTE — HISTORY OF PRESENT ILLNESS
[Family Member] : family member [FreeTextEntry1] : This visit was provided via telehealth using real-time 2-way audio visual technology. The patient, NERY LEONARD was located at home, [address] [place] [address] , at the time of the visit.   The provider, Justin GARZON, was located at [facility] at the time of the visit.  The patient, NERY LEONARD and provider participated in the telehealth encounter.  Verbal consent for telehealth services was given at time prior to the start of visit. [de-identified] : HFI. This is NERY LEONARD 68 year English, M, presented for virtual visit as stated above.  Recently reported vitals in flow sheet. Medication reconciliation performed.  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 recent labs 01/22/2023 BUN 67, Cret 7.59, glu 148, barney 8.3, eGFR 7 A1C 6.4 NERY LEONARD, is seen via telecommunication as stated above, appears pleasant and in nad.  Daughter and spouse is present. Has active home care services, w/ HHA scheduled: 36 hrs a week. AAO (see PE). Patient denies any feeling of acute exacerbation of depression, and HI/SI. Patient denies any f/c, sob, cp, dizziness, lightheadedness, abnormal bruising/bleeding, n/v/d, or abdominal pain.

## 2024-02-02 NOTE — PHYSICAL EXAM
[Normal Sclera/Conjunctiva] : normal sclera/conjunctiva [Normal Outer Ear/Nose] : the outer ears and nose were normal in appearance [Supple] : supple [No Respiratory Distress] : no respiratory distress  [No Accessory Muscle Use] : no accessory muscle use [Soft] : abdomen soft [Non Tender] : non-tender [Non-distended] : non-distended [No Masses] : no abdominal mass palpated [Speech Grossly Normal] : speech grossly normal [Memory Grossly Normal] : memory grossly normal [Alert and Oriented x3] : oriented to person, place, and time [Normal Mood] : the mood was normal [Normal] : affect was normal and insight and judgment were intact [de-identified] : w/ glasses [de-identified] : lbp [de-identified] : AVF LFA [de-identified] : poor gait

## 2024-02-02 NOTE — HEALTH RISK ASSESSMENT
[Good] : ~his/her~  mood as  good [No] : No [None] : None [With Significant Other] : lives with significant other [Retired] : retired [] :  [Feels Safe at Home] : Feels safe at home [Reports changes in vision] : Reports changes in vision [Reports normal functional visual acuity (ie: able to read med bottle)] : Reports normal functional visual acuity [Smoke Detector] : smoke detector [Carbon Monoxide Detector] : carbon monoxide detector [With Patient/Caregiver] : , with patient/caregiver [Designated Healthcare Proxy] : Designated healthcare proxy [Name: ___] : Health Care Proxy's Name: [unfilled]  [Relationship: ___] : Relationship: [unfilled] [I will adhere to the patient's wishes.] : I will adhere to the patient's wishes. [Time Spent: ___ minutes] : Time Spent: [unfilled] minutes [Former] : Former [< 15 Years] : < 15 Years [Little interest or pleasure doing things] : 1) Little interest or pleasure doing things [Feeling down, depressed, or hopeless] : 2) Feeling down, depressed, or hopeless [0] : 2) Feeling down, depressed, or hopeless: Not at all (0) [PHQ-2 Negative - No further assessment needed] : PHQ-2 Negative - No further assessment needed [Not at All (0)] : 9.) Thoughts that you would be off dead or of hurting yourself in some way? Not at all [I have developed a follow-up plan documented below in the note.] : I have developed a follow-up plan documented below in the note. [Audit-CScore] : 0 [[id number]] : 0 [[id number]] : 0 [Change in mental status noted] : No change in mental status noted [Language] : denies difficulty with language [Behavior] : denies difficulty with behavior [Learning/Retaining New Information] : denies difficulty learning/retaining new information [Handling Complex Tasks] : denies difficulty handling complex tasks [Reasoning] : denies difficulty with reasoning [Spatial Ability and Orientation] : denies difficulty with spatial ability and orientation [Reports changes in hearing] : Reports no changes in hearing [Reports changes in dental health] : Reports no changes in dental health [de-identified] : assist from family [de-identified] : assist from family [de-identified] : w/ glasses [AdvancecareDate] : 01/24 [FreeTextEntry4] : hcp completed, content not disclosed

## 2024-03-08 NOTE — ED PROVIDER NOTE - WR ORDER NAME 1
OB/GYN  PN Visit  Francisca Chua  [id number]  3/11/2024  8:14 AM  Linda Flores Linda Flores PA-C  3/11/2024  8:14 AM    
Xray Chest 2 Views PA/Lat

## 2024-04-14 ENCOUNTER — INPATIENT (INPATIENT)
Facility: HOSPITAL | Age: 69
LOS: 3 days | Discharge: HOME | DRG: 204 | End: 2024-04-18
Attending: STUDENT IN AN ORGANIZED HEALTH CARE EDUCATION/TRAINING PROGRAM | Admitting: STUDENT IN AN ORGANIZED HEALTH CARE EDUCATION/TRAINING PROGRAM
Payer: MEDICARE

## 2024-04-14 VITALS
SYSTOLIC BLOOD PRESSURE: 155 MMHG | TEMPERATURE: 97 F | DIASTOLIC BLOOD PRESSURE: 98 MMHG | HEART RATE: 67 BPM | RESPIRATION RATE: 20 BRPM | HEIGHT: 69 IN | WEIGHT: 184.97 LBS | OXYGEN SATURATION: 95 %

## 2024-04-14 DIAGNOSIS — I10 ESSENTIAL (PRIMARY) HYPERTENSION: ICD-10-CM

## 2024-04-14 DIAGNOSIS — N18.6 END STAGE RENAL DISEASE: ICD-10-CM

## 2024-04-14 DIAGNOSIS — Z29.9 ENCOUNTER FOR PROPHYLACTIC MEASURES, UNSPECIFIED: ICD-10-CM

## 2024-04-14 DIAGNOSIS — Z79.899 OTHER LONG TERM (CURRENT) DRUG THERAPY: ICD-10-CM

## 2024-04-14 DIAGNOSIS — E87.8 OTHER DISORDERS OF ELECTROLYTE AND FLUID BALANCE, NOT ELSEWHERE CLASSIFIED: ICD-10-CM

## 2024-04-14 DIAGNOSIS — I25.10 ATHEROSCLEROTIC HEART DISEASE OF NATIVE CORONARY ARTERY WITHOUT ANGINA PECTORIS: ICD-10-CM

## 2024-04-14 DIAGNOSIS — R06.02 SHORTNESS OF BREATH: ICD-10-CM

## 2024-04-14 DIAGNOSIS — Z98.89 OTHER SPECIFIED POSTPROCEDURAL STATES: Chronic | ICD-10-CM

## 2024-04-14 DIAGNOSIS — K21.9 GASTRO-ESOPHAGEAL REFLUX DISEASE WITHOUT ESOPHAGITIS: ICD-10-CM

## 2024-04-14 DIAGNOSIS — I50.9 HEART FAILURE, UNSPECIFIED: ICD-10-CM

## 2024-04-14 DIAGNOSIS — E78.5 HYPERLIPIDEMIA, UNSPECIFIED: ICD-10-CM

## 2024-04-14 DIAGNOSIS — E11.9 TYPE 2 DIABETES MELLITUS WITHOUT COMPLICATIONS: ICD-10-CM

## 2024-04-14 LAB
ALBUMIN SERPL ELPH-MCNC: 3 G/DL — LOW (ref 3.3–5)
ALBUMIN SERPL ELPH-MCNC: 3.1 G/DL — LOW (ref 3.3–5)
ALP SERPL-CCNC: 102 U/L — SIGNIFICANT CHANGE UP (ref 40–120)
ALP SERPL-CCNC: 109 U/L — SIGNIFICANT CHANGE UP (ref 40–120)
ALT FLD-CCNC: 19 U/L — SIGNIFICANT CHANGE UP (ref 12–78)
ALT FLD-CCNC: 20 U/L — SIGNIFICANT CHANGE UP (ref 12–78)
ANION GAP SERPL CALC-SCNC: 6 MMOL/L — SIGNIFICANT CHANGE UP (ref 5–17)
ANION GAP SERPL CALC-SCNC: 9 MMOL/L — SIGNIFICANT CHANGE UP (ref 5–17)
APTT BLD: 35.5 SEC — SIGNIFICANT CHANGE UP (ref 24.5–35.6)
AST SERPL-CCNC: 13 U/L — LOW (ref 15–37)
AST SERPL-CCNC: 24 U/L — SIGNIFICANT CHANGE UP (ref 15–37)
BASE EXCESS BLDV CALC-SCNC: 4.1 MMOL/L — HIGH (ref -2–3)
BASOPHILS # BLD AUTO: 0.11 K/UL — SIGNIFICANT CHANGE UP (ref 0–0.2)
BASOPHILS NFR BLD AUTO: 1.1 % — SIGNIFICANT CHANGE UP (ref 0–2)
BILIRUB SERPL-MCNC: 0.7 MG/DL — SIGNIFICANT CHANGE UP (ref 0.2–1.2)
BILIRUB SERPL-MCNC: 0.7 MG/DL — SIGNIFICANT CHANGE UP (ref 0.2–1.2)
BLOOD GAS COMMENTS, VENOUS: SIGNIFICANT CHANGE UP
BUN SERPL-MCNC: 35 MG/DL — HIGH (ref 7–23)
BUN SERPL-MCNC: 37 MG/DL — HIGH (ref 7–23)
CALCIUM SERPL-MCNC: 8.9 MG/DL — SIGNIFICANT CHANGE UP (ref 8.5–10.1)
CALCIUM SERPL-MCNC: 9 MG/DL — SIGNIFICANT CHANGE UP (ref 8.5–10.1)
CHLORIDE SERPL-SCNC: 102 MMOL/L — SIGNIFICANT CHANGE UP (ref 96–108)
CHLORIDE SERPL-SCNC: 103 MMOL/L — SIGNIFICANT CHANGE UP (ref 96–108)
CO2 SERPL-SCNC: 27 MMOL/L — SIGNIFICANT CHANGE UP (ref 22–31)
CO2 SERPL-SCNC: 31 MMOL/L — SIGNIFICANT CHANGE UP (ref 22–31)
CREAT SERPL-MCNC: 8.1 MG/DL — HIGH (ref 0.5–1.3)
CREAT SERPL-MCNC: 8.4 MG/DL — HIGH (ref 0.5–1.3)
D DIMER BLD IA.RAPID-MCNC: 239 NG/ML DDU — HIGH
EGFR: 6 ML/MIN/1.73M2 — LOW
EGFR: 7 ML/MIN/1.73M2 — LOW
EOSINOPHIL # BLD AUTO: 0.24 K/UL — SIGNIFICANT CHANGE UP (ref 0–0.5)
EOSINOPHIL NFR BLD AUTO: 2.5 % — SIGNIFICANT CHANGE UP (ref 0–6)
FLUAV AG NPH QL: SIGNIFICANT CHANGE UP
FLUBV AG NPH QL: SIGNIFICANT CHANGE UP
GLUCOSE SERPL-MCNC: 101 MG/DL — HIGH (ref 70–99)
GLUCOSE SERPL-MCNC: 181 MG/DL — HIGH (ref 70–99)
HCO3 BLDV-SCNC: 27 MMOL/L — SIGNIFICANT CHANGE UP (ref 22–29)
HCT VFR BLD CALC: 34.5 % — LOW (ref 39–50)
HGB BLD-MCNC: 10.5 G/DL — LOW (ref 13–17)
IMM GRANULOCYTES NFR BLD AUTO: 0.4 % — SIGNIFICANT CHANGE UP (ref 0–0.9)
INR BLD: 0.88 RATIO — SIGNIFICANT CHANGE UP (ref 0.85–1.18)
LACTATE SERPL-SCNC: 0.9 MMOL/L — SIGNIFICANT CHANGE UP (ref 0.7–2)
LYMPHOCYTES # BLD AUTO: 1.35 K/UL — SIGNIFICANT CHANGE UP (ref 1–3.3)
LYMPHOCYTES # BLD AUTO: 14 % — SIGNIFICANT CHANGE UP (ref 13–44)
MCHC RBC-ENTMCNC: 29.8 PG — SIGNIFICANT CHANGE UP (ref 27–34)
MCHC RBC-ENTMCNC: 30.4 GM/DL — LOW (ref 32–36)
MCV RBC AUTO: 98 FL — SIGNIFICANT CHANGE UP (ref 80–100)
MONOCYTES # BLD AUTO: 0.74 K/UL — SIGNIFICANT CHANGE UP (ref 0–0.9)
MONOCYTES NFR BLD AUTO: 7.7 % — SIGNIFICANT CHANGE UP (ref 2–14)
NEUTROPHILS # BLD AUTO: 7.15 K/UL — SIGNIFICANT CHANGE UP (ref 1.8–7.4)
NEUTROPHILS NFR BLD AUTO: 74.3 % — SIGNIFICANT CHANGE UP (ref 43–77)
NRBC # BLD: 0 /100 WBCS — SIGNIFICANT CHANGE UP (ref 0–0)
NT-PROBNP SERPL-SCNC: HIGH PG/ML (ref 0–125)
PCO2 BLDV: 36 MMHG — LOW (ref 42–55)
PH BLDV: 7.49 — HIGH (ref 7.32–7.43)
PLATELET # BLD AUTO: 167 K/UL — SIGNIFICANT CHANGE UP (ref 150–400)
PO2 BLDV: 191 MMHG — HIGH (ref 25–45)
POTASSIUM SERPL-MCNC: 5.3 MMOL/L — SIGNIFICANT CHANGE UP (ref 3.5–5.3)
POTASSIUM SERPL-MCNC: 6 MMOL/L — HIGH (ref 3.5–5.3)
POTASSIUM SERPL-SCNC: 5.3 MMOL/L — SIGNIFICANT CHANGE UP (ref 3.5–5.3)
POTASSIUM SERPL-SCNC: 6 MMOL/L — HIGH (ref 3.5–5.3)
PROT SERPL-MCNC: 6.5 G/DL — SIGNIFICANT CHANGE UP (ref 6–8.3)
PROT SERPL-MCNC: 6.7 G/DL — SIGNIFICANT CHANGE UP (ref 6–8.3)
PROTHROM AB SERPL-ACNC: 10.3 SEC — SIGNIFICANT CHANGE UP (ref 9.5–13)
RBC # BLD: 3.52 M/UL — LOW (ref 4.2–5.8)
RBC # FLD: 14.1 % — SIGNIFICANT CHANGE UP (ref 10.3–14.5)
RSV RNA NPH QL NAA+NON-PROBE: SIGNIFICANT CHANGE UP
SAO2 % BLDV: 99.8 % — HIGH (ref 67–88)
SARS-COV-2 RNA SPEC QL NAA+PROBE: SIGNIFICANT CHANGE UP
SODIUM SERPL-SCNC: 138 MMOL/L — SIGNIFICANT CHANGE UP (ref 135–145)
SODIUM SERPL-SCNC: 140 MMOL/L — SIGNIFICANT CHANGE UP (ref 135–145)
TROPONIN I, HIGH SENSITIVITY RESULT: 16 NG/L — SIGNIFICANT CHANGE UP
WBC # BLD: 9.63 K/UL — SIGNIFICANT CHANGE UP (ref 3.8–10.5)
WBC # FLD AUTO: 9.63 K/UL — SIGNIFICANT CHANGE UP (ref 3.8–10.5)

## 2024-04-14 PROCEDURE — 99285 EMERGENCY DEPT VISIT HI MDM: CPT

## 2024-04-14 PROCEDURE — 71046 X-RAY EXAM CHEST 2 VIEWS: CPT | Mod: 26

## 2024-04-14 PROCEDURE — 99223 1ST HOSP IP/OBS HIGH 75: CPT

## 2024-04-14 PROCEDURE — 71250 CT THORAX DX C-: CPT | Mod: 26

## 2024-04-14 RX ORDER — TICAGRELOR 90 MG/1
60 TABLET ORAL
Refills: 0 | Status: DISCONTINUED | OUTPATIENT
Start: 2024-04-14 | End: 2024-04-18

## 2024-04-14 RX ORDER — FUROSEMIDE 40 MG
40 TABLET ORAL DAILY
Refills: 0 | Status: DISCONTINUED | OUTPATIENT
Start: 2024-04-14 | End: 2024-04-18

## 2024-04-14 RX ORDER — HEPARIN SODIUM 5000 [USP'U]/ML
5000 INJECTION INTRAVENOUS; SUBCUTANEOUS EVERY 8 HOURS
Refills: 0 | Status: DISCONTINUED | OUTPATIENT
Start: 2024-04-14 | End: 2024-04-18

## 2024-04-14 RX ORDER — HYDRALAZINE HCL 50 MG
5 TABLET ORAL ONCE
Refills: 0 | Status: COMPLETED | OUTPATIENT
Start: 2024-04-14 | End: 2024-04-14

## 2024-04-14 RX ORDER — INSULIN LISPRO 100/ML
VIAL (ML) SUBCUTANEOUS
Refills: 0 | Status: DISCONTINUED | OUTPATIENT
Start: 2024-04-14 | End: 2024-04-18

## 2024-04-14 RX ORDER — ATORVASTATIN CALCIUM 80 MG/1
40 TABLET, FILM COATED ORAL AT BEDTIME
Refills: 0 | Status: DISCONTINUED | OUTPATIENT
Start: 2024-04-14 | End: 2024-04-18

## 2024-04-14 RX ORDER — TICAGRELOR 90 MG/1
1 TABLET ORAL
Qty: 0 | Refills: 0 | DISCHARGE

## 2024-04-14 RX ORDER — ERYTHROPOIETIN 10000 [IU]/ML
10000 INJECTION, SOLUTION INTRAVENOUS; SUBCUTANEOUS
Refills: 0 | Status: DISCONTINUED | OUTPATIENT
Start: 2024-04-14 | End: 2024-04-18

## 2024-04-14 RX ORDER — ERYTHROPOIETIN 10000 [IU]/ML
10000 INJECTION, SOLUTION INTRAVENOUS; SUBCUTANEOUS
Refills: 0 | Status: DISCONTINUED | OUTPATIENT
Start: 2024-04-14 | End: 2024-04-14

## 2024-04-14 RX ORDER — GLUCAGON INJECTION, SOLUTION 0.5 MG/.1ML
1 INJECTION, SOLUTION SUBCUTANEOUS ONCE
Refills: 0 | Status: DISCONTINUED | OUTPATIENT
Start: 2024-04-14 | End: 2024-04-18

## 2024-04-14 RX ORDER — DEXTROSE 50 % IN WATER 50 %
25 SYRINGE (ML) INTRAVENOUS ONCE
Refills: 0 | Status: DISCONTINUED | OUTPATIENT
Start: 2024-04-14 | End: 2024-04-18

## 2024-04-14 RX ORDER — ONDANSETRON 8 MG/1
4 TABLET, FILM COATED ORAL EVERY 8 HOURS
Refills: 0 | Status: DISCONTINUED | OUTPATIENT
Start: 2024-04-14 | End: 2024-04-14

## 2024-04-14 RX ORDER — DEXTROSE 50 % IN WATER 50 %
12.5 SYRINGE (ML) INTRAVENOUS ONCE
Refills: 0 | Status: DISCONTINUED | OUTPATIENT
Start: 2024-04-14 | End: 2024-04-18

## 2024-04-14 RX ORDER — ATORVASTATIN CALCIUM 80 MG/1
1 TABLET, FILM COATED ORAL
Qty: 0 | Refills: 0 | DISCHARGE

## 2024-04-14 RX ORDER — PANTOPRAZOLE SODIUM 20 MG/1
1 TABLET, DELAYED RELEASE ORAL
Qty: 0 | Refills: 0 | DISCHARGE

## 2024-04-14 RX ORDER — METOPROLOL TARTRATE 50 MG
25 TABLET ORAL
Refills: 0 | Status: DISCONTINUED | OUTPATIENT
Start: 2024-04-14 | End: 2024-04-18

## 2024-04-14 RX ORDER — PANTOPRAZOLE SODIUM 20 MG/1
40 TABLET, DELAYED RELEASE ORAL
Refills: 0 | Status: DISCONTINUED | OUTPATIENT
Start: 2024-04-14 | End: 2024-04-18

## 2024-04-14 RX ORDER — METOPROLOL TARTRATE 50 MG
1 TABLET ORAL
Qty: 0 | Refills: 0 | DISCHARGE

## 2024-04-14 RX ORDER — SODIUM CHLORIDE 9 MG/ML
1000 INJECTION, SOLUTION INTRAVENOUS
Refills: 0 | Status: DISCONTINUED | OUTPATIENT
Start: 2024-04-14 | End: 2024-04-18

## 2024-04-14 RX ORDER — DEXTROSE 50 % IN WATER 50 %
15 SYRINGE (ML) INTRAVENOUS ONCE
Refills: 0 | Status: DISCONTINUED | OUTPATIENT
Start: 2024-04-14 | End: 2024-04-18

## 2024-04-14 RX ORDER — ACETAMINOPHEN 500 MG
650 TABLET ORAL EVERY 6 HOURS
Refills: 0 | Status: DISCONTINUED | OUTPATIENT
Start: 2024-04-14 | End: 2024-04-18

## 2024-04-14 RX ORDER — LABETALOL HCL 100 MG
10 TABLET ORAL ONCE
Refills: 0 | Status: COMPLETED | OUTPATIENT
Start: 2024-04-14 | End: 2024-04-14

## 2024-04-14 RX ORDER — DEXTROSE 10 % IN WATER 10 %
125 INTRAVENOUS SOLUTION INTRAVENOUS ONCE
Refills: 0 | Status: DISCONTINUED | OUTPATIENT
Start: 2024-04-14 | End: 2024-04-18

## 2024-04-14 RX ORDER — FAMOTIDINE 10 MG/ML
1 INJECTION INTRAVENOUS
Qty: 0 | Refills: 0 | DISCHARGE

## 2024-04-14 RX ORDER — ALBUTEROL 90 UG/1
2 AEROSOL, METERED ORAL EVERY 6 HOURS
Refills: 0 | Status: DISCONTINUED | OUTPATIENT
Start: 2024-04-14 | End: 2024-04-18

## 2024-04-14 RX ORDER — LANOLIN ALCOHOL/MO/W.PET/CERES
3 CREAM (GRAM) TOPICAL AT BEDTIME
Refills: 0 | Status: DISCONTINUED | OUTPATIENT
Start: 2024-04-14 | End: 2024-04-18

## 2024-04-14 RX ORDER — ACETAMINOPHEN 500 MG
1000 TABLET ORAL ONCE
Refills: 0 | Status: COMPLETED | OUTPATIENT
Start: 2024-04-14 | End: 2024-04-14

## 2024-04-14 RX ORDER — LANOLIN ALCOHOL/MO/W.PET/CERES
5 CREAM (GRAM) TOPICAL ONCE
Refills: 0 | Status: COMPLETED | OUTPATIENT
Start: 2024-04-14 | End: 2024-04-14

## 2024-04-14 RX ORDER — EZETIMIBE 10 MG/1
10 TABLET ORAL DAILY
Refills: 0 | Status: DISCONTINUED | OUTPATIENT
Start: 2024-04-14 | End: 2024-04-18

## 2024-04-14 RX ADMIN — ATORVASTATIN CALCIUM 40 MILLIGRAM(S): 80 TABLET, FILM COATED ORAL at 22:09

## 2024-04-14 RX ADMIN — Medication 25 MILLIGRAM(S): at 20:03

## 2024-04-14 RX ADMIN — Medication 10 MILLIGRAM(S): at 22:08

## 2024-04-14 RX ADMIN — Medication 5 MILLIGRAM(S): at 20:47

## 2024-04-14 RX ADMIN — HEPARIN SODIUM 5000 UNIT(S): 5000 INJECTION INTRAVENOUS; SUBCUTANEOUS at 22:09

## 2024-04-14 NOTE — ED PROVIDER NOTE - CLINICAL SUMMARY MEDICAL DECISION MAKING FREE TEXT BOX
Patient is a 68-year-old male with a history of diabetes CKD.on dialysis Monday Wednesday Friday, HTN, HLD, GERD.  Recently returned to the [place] from [place].  While traveling he developed shortness of breath.  He was getting regular dialysis there.  He felt unwell and they thought that he was getting too much fluid removed.  He had continued shortness of breath.  Despite adjustments in his dialysis.  And so he was urged by his family to come back to [place].  Here in [place] he still short of breath.  He denies calf pain or calf swelling.  He denies trauma, fever, chills.  But endorses a cough with mild sputum.  The sputum is not blood-tinged.  He becomes dyspneic with minimal exertion.  On examination is a pale male who appears slightly dyspneic.  His oxygen saturation is 99% on room air.  He has pursed lip excursions.  He has no JVD visible.  His cardiac exam is regular rate and rhythm with JUANI.  His lung sounds he has bilateral adventitial sounds greatest on the left base posteriorly.  With good aeration.  His abdomen is soft and nontender without guarding or rebound.  His cardiovascular exam demonstrates soft murmur.  He is got a AV graft in his left biceps region with a normal thrill.  He has no calf pain or calf tenderness.  There is no pedal edema.  He does have pallor to his conjunctiva.  Plan of care.  Rule out flu rule out COVID rule out pneumonia rule out CHF or other volume load.  Rule out metabolic derangement.  Rule out need for urgent hemodialysis.  EKG, chest x-ray, oxygen therapy, blood cultures and lactate.  Empiric antibiotic coverage.  Albuterol for the cough and CHF.  Patient is likely volume overloaded based on the chest x-ray.  Unable to exclude a concomitant pneumonia.  Patient would likely need to be admitted to the facility.  This chart was made with dictation software and may contain typographical errors.

## 2024-04-14 NOTE — ED PROVIDER NOTE - CARDIAC, MLM
Normal rate, regular rhythm.  Heart sounds S1, S2.  No murmurs, rubs or gallops. LEft arm fistula with thrill.

## 2024-04-14 NOTE — CONSULT NOTE ADULT - SUBJECTIVE AND OBJECTIVE BOX
Date/Time Patient Seen:  		  Referring MD:   Data Reviewed	       Patient is a 68y old  Male who presents with a chief complaint of SOB (14 Apr 2024 19:15)      Subjective/HPI   69 yo male with PMHX T2DM, CKD on HD (M,W,F-[facility]), HTN, HLD, GERD, present to ED after having traveled to [facility] c/o SOB, worsening x months  PAST MEDICAL & SURGICAL HISTORY:  HTN (hypertension)    DM2 (diabetes mellitus, type 2)    Hypercholesterolemia    CKD (chronic kidney disease)    Anemia    ESRD on dialysis    T2DM (type 2 diabetes mellitus)    CAD (coronary artery disease)    HLD (hyperlipidemia)    GERD (gastroesophageal reflux disease)    H/O eye surgery       Review of Systems:  Review of Systems: REVIEW OF SYSTEMS:    CONSTITUTIONAL:  No weakness, fevers or chills  EYES/ENT:  No visual changes;  No vertigo or throat pain   NECK:  No pain or stiffness  RESPIRATORY:  + SOB, +orthopnea, + wheezing. No cough  CARDIOVASCULAR:  No chest pain or palpitations  GASTROINTESTINAL:  No abdominal or epigastric pain. No n/v/d/c  GENITOURINARY:  +decreased urinary frequency. No dysuria  NEUROLOGICAL:  No numbness or weakness      Allergies and Intolerances:        Allergies:  	No Known Allergies:     Home Medications:   * Patient Currently Takes Medications as of 22-Jan-2023 14:12 documented in Structured Notes  · 	Insulin Pen Needles, 4mm: 1 application subcutaneously 4 times a day. ** Use with insulin pen **   · 	test strips (per patient's insurance): 1 application subcutaneously 4 times a day. ** Compatible with patient's glucometer **  · 	lancets: 1 application subcutaneously 4 times a day   · 	glucometer (per patient's insurance): Test blood sugars four times a day. Dispense #1 glucometer.  · 	alcohol swabs : Apply topically to affected area 4 times a day   · 	calcium acetate 667 mg oral tablet: 1 tab(s) orally 3 times a day   · 	Vitamin D3 50 mcg (2000 intl units) oral capsule: 1 cap(s) orally once a day   · 	pantoprazole 40 mg oral delayed release tablet: 1 tab(s) orally once a day (before a meal)  · 	predniSONE 10 mg oral tablet: 5 tab(s) orally once a day for two weeks (until 2/4/2023), then 4 tabs orally once a day for two weeks (until 2/18/2023) MDD:50 mg  · 	sulfamethoxazole-trimethoprim 400 mg-80 mg oral tablet: 1 tab(s) orally 3 times a week   · 	HumaLOG KwikPen 100 units/mL injectable solution: 10 unit(s) injectable 3 times a day   · 	Tresiba 100 units/mL subcutaneous solution: 30 unit(s) subcutaneous once a day (at bedtime)   · 	ocular lubricant ophthalmic solution: 1 drop(s) to each affected eye 3 times a day  · 	sodium chloride 1 g oral tablet: 2 tab(s) orally 3 times a day  · 	Neurontin 100 mg oral capsule: 2 cap(s) orally 3 times a day   · 	acetaminophen 325 mg oral tablet: 2 tab(s) orally every 6 hours, As needed, Moderate Pain (4 - 6), Severe Pain (7 - 10)  · 	insulin isophane-insulin regular human recombinant 70 units-30 units/mL subcutaneous suspension: 45 unit(s) subcutaneous once a day before dinner  · 	insulin isophane-insulin regular human recombinant 70 units-30 units/mL subcutaneous suspension: 55 unit(s) subcutaneous once a day before breakfast  · 	ticagrelor 90 mg oral tablet: 1 tab(s) orally 2 times a day  · 	ranolazine 500 mg oral tablet, extended release: 1 tab(s) orally 2 times a day  · 	Metoprolol Succinate ER 25 mg oral tablet, extended release: 1 tab(s) orally once a day  · 	metoprolol tartrate 50 mg oral tablet: 1 tab(s) orally 2 times a day  · 	Pepcid 20 mg oral tablet: 1 tab(s) orally 2 times a day  · 	omeprazole 40 mg oral delayed release capsule: 1 cap(s) orally once a day  · 	Zetia 10 mg oral tablet: 1 tab(s) orally once a day  · 	Co Q-10 100 mg oral capsule: 1 cap(s) orally once a day  · 	Nephro-Beronica oral tablet: 1 tab(s) orally once a day  · 	Diovan 320 mg oral tablet: 1 tab(s) orally once a day  · 	atorvastatin 40 mg oral tablet: 1 tab(s) orally once a day  · 	ferrous sulfate 325 mg (65 mg elemental iron) oral tablet: 1 tab(s) orally once a day  · 	calcium acetate 667 mg oral capsule: 1 cap(s) orally 3 times a day  · 	atorvastatin 40 mg oral tablet: 1 tab(s) orally once a day (at bedtime)  · 	calcitriol 0.25 mcg oral capsule: 1 cap(s) orally once a day  · 	amLODIPine 10 mg oral tablet: 1 tab(s) orally once a day  · 	sodium bicarbonate 650 mg oral tablet: 1 tab(s) orally 3 times a day  · 	atorvastatin 20 mg oral tablet: 1 tab(s) orally once a day (at bedtime)  · 	folic acid 1 mg oral tablet: 1 tab(s) orally once a day  · 	aspirin 81 mg oral tablet: 1 tab(s) orally once a day  · 	famotidine 20 mg oral tablet: 1 dose(s) orally once a day  · 	metoprolol tartrate 25 mg oral tablet: 1 tab(s) orally 2 times a day  · 	hydrALAZINE 25 mg oral tablet: 1 tab(s) orally 2 times a day  · 	Brilinta (ticagrelor) 60 mg oral tablet: 1 dose(s) orally 2 times a day  · 	Brilinta (ticagrelor) 60 mg oral tablet: 1 tab(s) orally 2 times a day  · 	Protonix 20 mg oral delayed release tablet: 1 tab(s) orally once a day    .    Patient History:    Social History:  · Substance use	No  · Social History (marital status, living situation, occupation, and sexual history)	Lives: At home with son  ADLs: Completely independent w/ ambulation and ADLs.  Diet: No restrictions  Occupation: Retired  Alcohol Use: Denies  Tobacco Use: 1/4 ppd for 30 years  Recreational Drug Use: Denies     Tobacco Screening:  · Core Measure Site	Yes  · Has the patient used tobacco in the past 30 days?	No    Risk Assessment:    Present on Admission:  Deep Venous Thrombosis	no  Pulmonary Embolus	no     HIV Screening:  · In accordance with [place] State law, we offer every patient who comes to our ED an HIV test. Would you like to be tested today?	Opt out        Medication list         MEDICATIONS  (STANDING):  atorvastatin 40 milliGRAM(s) Oral at bedtime  dextrose 10% Bolus 125 milliLiter(s) IV Bolus once  dextrose 5%. 1000 milliLiter(s) (100 mL/Hr) IV Continuous <Continuous>  dextrose 5%. 1000 milliLiter(s) (50 mL/Hr) IV Continuous <Continuous>  dextrose 50% Injectable 25 Gram(s) IV Push once  dextrose 50% Injectable 12.5 Gram(s) IV Push once  epoetin demetrius-epbx (RETACRIT) Injectable 77098 Unit(s) IV Push <User Schedule>  ezetimibe 10 milliGRAM(s) Oral daily  furosemide    Tablet 40 milliGRAM(s) Oral daily  glucagon  Injectable 1 milliGRAM(s) IntraMuscular once  heparin   Injectable 5000 Unit(s) SubCutaneous every 8 hours  hydrALAZINE Injectable 5 milliGRAM(s) IV Push once  insulin lispro (ADMELOG) corrective regimen sliding scale   SubCutaneous three times a day before meals  metoprolol tartrate 25 milliGRAM(s) Oral two times a day  pantoprazole    Tablet 40 milliGRAM(s) Oral before breakfast  ticagrelor 60 milliGRAM(s) Oral two times a day    MEDICATIONS  (PRN):  acetaminophen     Tablet .. 650 milliGRAM(s) Oral every 6 hours PRN Temp greater or equal to 38C (100.4F), Mild Pain (1 - 3)  aluminum hydroxide/magnesium hydroxide/simethicone Suspension 30 milliLiter(s) Oral every 4 hours PRN Dyspepsia  dextrose Oral Gel 15 Gram(s) Oral once PRN Blood Glucose LESS THAN 70 milliGRAM(s)/deciliter  melatonin 3 milliGRAM(s) Oral at bedtime PRN Insomnia  trimethobenzamide Injectable 200 milliGRAM(s) IntraMuscular every 8 hours PRN Nausea and/or Vomiting         Vitals log        ICU Vital Signs Last 24 Hrs  T(C): 36.8 (14 Apr 2024 19:56), Max: 36.8 (14 Apr 2024 19:56)  T(F): 98.2 (14 Apr 2024 19:56), Max: 98.2 (14 Apr 2024 19:56)  HR: 74 (14 Apr 2024 19:56) (67 - 74)  BP: 191/73 (14 Apr 2024 19:56) (155/98 - 191/73)  BP(mean): --  ABP: --  ABP(mean): --  RR: 18 (14 Apr 2024 19:56) (18 - 20)  SpO2: 99% (14 Apr 2024 19:56) (95% - 99%)    O2 Parameters below as of 14 Apr 2024 12:18  Patient On (Oxygen Delivery Method): room air                 Input and Output:  I&O's Detail      Lab Data                        10.5   9.63  )-----------( 167      ( 14 Apr 2024 14:14 )             34.5     04-14    140  |  103  |  37<H>  ----------------------------<  101<H>  5.3   |  31  |  8.40<H>    Ca    8.9      14 Apr 2024 16:05    TPro  6.5  /  Alb  3.0<L>  /  TBili  0.7  /  DBili  x   /  AST  13<L>  /  ALT  19  /  AlkPhos  102  04-14            Review of Systems	  sob      Objective     Physical Examination        Pertinent Lab findings & Imaging      Lechuga:  NO   Adequate UO     I&O's Detail           Discussed with:     Cultures:	        Radiology      ACC: [id number] EXAM:  XR CHEST PA LAT 2V   ORDERED BY: DANE BRAND     PROCEDURE DATE:  04/14/2024          INTERPRETATION:  XR CHEST PA AND LATERAL dated 4/14/2024 1:58 PM    CLINICAL INFORMATION: Male, 68 years old.  Sepsis.    PRIOR STUDIES: 1/13/2023    FINDINGS/  IMPRESSION: There has been interval development of a pulmonary edema   pattern. No consolidation or pleural collections. No pneumothorax.   Left-sided subclavian vascular stent redemonstrated. Prominent heart size.    --- End of Report ---            GILDARDO DEWITT MD; Attending Radiologist  This document has been electronically signed. Apr 14 2024  4:24PM

## 2024-04-14 NOTE — ED PROVIDER NOTE - OBJECTIVE STATEMENT
67 yo male with PMHX T2DM, CKD on HD ([place]), HTN, HLD, GERD, present to ED after having traveled to [facility] c/o SOB, worsening x months, worst this AM. PCP Dr. Jacile Mercado.   Per patient he recently traveled to [facility] on 2/5/2024. While in [facility] he had increasing SOB and generalized weakness x 1 month. Per the patient, while he was receiving HD in [facility] he was told that the prior dialyses center in [facility] removed too much fluid from blood and that was the reason for weakness and SOB. On subsequent visits it HD they removed less and he felt a bit better. [place] ago and has had worsening SOB over that time. He came ot ED today because SOB is worse and he could not even sleep last night.  Patient is unable to speak in full sentences without feeling SOB.   Denies fever, chills, HA, dizziness or lightheadedness. ++ Generalized weakness.   Denies vision change or hearing change.   Denies throat pain, ++ difficulty speaking in full sentences secondary to SOB.   Denies CP, palpitations, ++SOB, Denies cough.   Denies abd pain, n/v/d/c.   Denies new onset muscle or onofre pain.   Denies other complaints.

## 2024-04-14 NOTE — H&P ADULT - PROBLEM SELECTOR PLAN 4
- S/p PCI in 2016 to LAD  - EKG without concern for CAD, denies CP  - c/w statin, BB, brilinta   - Continue current management

## 2024-04-14 NOTE — ED ADULT NURSE NOTE - OBJECTIVE STATEMENT
pt received in 8B 68y male AXO 4 from home is ambulatory c/o SOB for two weeks. PMH DM, HTN and receives dialysis M/W/F. Pt states he went to [place] 2 weeks ago and since then patient developed SOB. Upon assessment pt is resting in the stretcher showing no s/s of acute respiratory stress Pt is on continuous pulse ox saturating 95% and higher and patient is speaking in full sentences. Denies chest pain. Pt on cardiac monitor showing NSR. Pending MD orders. pt received in 8B 68y male AXO 4 from home is ambulatory c/o SOB for two weeks. PMH DM, HTN and receives dialysis M/W/F. Pt states he went to [place] 2 weeks ago and since then patient developed SOB. Upon assessment pt is resting in the stretcher showing no s/s of acute respiratory stress Pt is on continuous pulse ox saturating 95% and higher and patient is speaking in full sentences. Denies chest pain. Pt on cardiac monitor showing NSR. right 22 AC placed, labs drawn.

## 2024-04-14 NOTE — CHART NOTE - NSCHARTNOTEFT_GEN_A_CORE
RN called for elevated BP. Patient is on home Metoprolol 25mg BID but never received his doses in the ED. BP was elevated to 191/73 and RN was instructed to give patient his home meds. Patient was given his home Metoprolol but BP remained elevated. Patient was given IV Hydralazine 5mg x1 and BP decreased to 167/83. BP then increased to 198/88 and IV labetalol 10mg x1 was given. BP decreased to 179/79. RN to continue to monitor patient. Patient scheduled to get HD in AM. Plan discussed with attending. RN called for elevated BP. Patient is on home Metoprolol 25mg BID but never received his doses in the ED. BP was elevated to 191/73 and RN was instructed to give patient his home meds. Patient was given his home Metoprolol but BP remained elevated. Patient was given IV Hydralazine 5mg x1 and BP decreased to 167/83. BP then increased to 198/88 and IV labetalol 10mg x1 was given. BP decreased to 179/79. RN to continue to monitor patient. Patient scheduled to get HD in AM. Plan discussed with attending.    --------------------------  Addendum (11:57)  RN called again for elevated BP. RN informed resident that patient was agitated and kept on getting out of bed complaining that he has been unable to sleep and that he has not been moved to a room upstairs yet. Resident saw patient at bedside. Patient sitting up in bed complaining of lack of sleep and posterior neck pain from poor bed positioning. Resident reclined bed, gave patient some pillows and blankets, shut the lights and ordered melatonin for sleep and Ofirmev for neck pain. Neuro exam was done on the patient and was unremarkable. RN to continue to monitor BP.     T(C): 36.8 (04-14-24 @ 19:56), Max: 36.8 (04-14-24 @ 19:56)  HR: 66 (04-14-24 @ 22:50) (66 - 80)  BP: 198/84 (04-14-24 @ 22:50) (155/98 - 198/88)  RR: 18 (04-14-24 @ 19:56) (18 - 20)  SpO2: 99% (04-14-24 @ 19:56) (95% - 99%)    GENERAL: patient appears well sitting in bed  EYES: sclera clear, no exudates, TREVER, EOMI  ENMT: oropharynx clear without erythema, no exudates, moist mucous membranes  NECK: supple, soft, no thyromegaly noted  LUNGS: good air entry bilaterally, clear to auscultation, symmetric breath sounds, no wheezing or rhonchi appreciated  HEART: soft S1/S2, regular rate and rhythm, no murmurs noted, no lower extremity edema  GASTROINTESTINAL: +mild distention, abdomen is soft, nontender, normoactive bowel sounds, no palpable masses  INTEGUMENT: good skin turgor, warm skin, appears well perfused  MUSCULOSKELETAL: no clubbing or cyanosis, no obvious deformity, leroy neck ROM  NEUROLOGIC: awake, alert, oriented x3, good muscle tone in 4 extremities, no obvious sensory deficits, no pronator drift, ROM normal and equal in all 4 extremities   PSYCHIATRIC: mood is annoyed   HEME/LYMPH: no palpable supraclavicular nodules, no obvious ecchymosis or petechiae RN called for elevated BP. Patient is on home Metoprolol 25mg BID but never received his doses in the ED. BP was elevated to 191/73 and RN was instructed to give patient his home meds. Patient was given his home Metoprolol but BP remained elevated. Patient was given IV Hydralazine 5mg x1 and BP decreased to 167/83. BP then increased to 198/88 and IV labetalol 10mg x1 was given. BP decreased to 179/79. RN to continue to monitor patient. Patient scheduled to get HD in AM. Plan discussed with attending.    --------------------------  Addendum (11:57)  RN called again for elevated BP. RN informed resident that patient was agitated and kept on getting out of bed complaining that he has been unable to sleep and that he has not been moved to a room upstairs yet. Resident saw patient at bedside. Patient sitting up in bed complaining of lack of sleep and posterior neck pain and headache from poor bed positioning. Resident reclined bed, gave patient some pillows and blankets, shut the lights and ordered melatonin for sleep and Ofirmev for neck pain. Neuro exam was done on the patient and was unremarkable. Because of elevated pressures and headache, CT head non con ordered. RN to continue to monitor BP.     T(C): 36.8 (04-14-24 @ 19:56), Max: 36.8 (04-14-24 @ 19:56)  HR: 66 (04-14-24 @ 22:50) (66 - 80)  BP: 198/84 (04-14-24 @ 22:50) (155/98 - 198/88)  RR: 18 (04-14-24 @ 19:56) (18 - 20)  SpO2: 99% (04-14-24 @ 19:56) (95% - 99%)    GENERAL: patient appears well sitting in bed  EYES: sclera clear, no exudates, TREVER, EOMI  ENMT: oropharynx clear without erythema, no exudates, moist mucous membranes  NECK: supple, soft, no thyromegaly noted  LUNGS: good air entry bilaterally, clear to auscultation, symmetric breath sounds, no wheezing or rhonchi appreciated  HEART: soft S1/S2, regular rate and rhythm, no murmurs noted, no lower extremity edema  GASTROINTESTINAL: +mild distention, abdomen is soft, nontender, normoactive bowel sounds, no palpable masses  INTEGUMENT: good skin turgor, warm skin, appears well perfused  MUSCULOSKELETAL: no clubbing or cyanosis, no obvious deformity, leroy neck ROM  NEUROLOGIC: awake, alert, oriented x3, good muscle tone in 4 extremities, no obvious sensory deficits, no pronator drift, ROM normal and equal in all 4 extremities   PSYCHIATRIC: mood is annoyed   HEME/LYMPH: no palpable supraclavicular nodules, no obvious ecchymosis or petechiae RN called for elevated BP. Patient is on home Metoprolol 25mg BID but never received his doses in the ED. BP was elevated to 191/73 and RN was instructed to give patient his home meds. Patient was given his home Metoprolol but BP remained elevated. Patient was given IV Hydralazine 5mg x1 and BP decreased to 167/83. BP then increased to 198/88 and IV labetalol 10mg x1 was given. BP decreased to 179/79. RN to continue to monitor patient. Patient scheduled to get HD in AM. Plan discussed with attending.    --------------------------  Addendum (11:57)  RN called again for elevated BP. RN informed resident that patient was agitated and kept on getting out of bed complaining that he has been unable to sleep and that he has not been moved to a room upstairs yet. Resident saw patient at bedside. Patient sitting up in bed complaining of lack of sleep and posterior neck pain and headache from poor bed positioning. Resident reclined bed, gave patient some pillows and blankets, shut the lights and ordered melatonin for sleep and Ofirmev for neck pain. Neuro exam was done on the patient and was unremarkable. Because of elevated pressures and headache, CT head non con ordered. RN to continue to monitor BP.     T(C): 36.8 (04-14-24 @ 19:56), Max: 36.8 (04-14-24 @ 19:56)  HR: 66 (04-14-24 @ 22:50) (66 - 80)  BP: 198/84 (04-14-24 @ 22:50) (155/98 - 198/88)  RR: 18 (04-14-24 @ 19:56) (18 - 20)  SpO2: 99% (04-14-24 @ 19:56) (95% - 99%)    GENERAL: patient appears well sitting in bed  EYES: sclera clear, no exudates, TREVER, EOMI  ENMT: oropharynx clear without erythema, no exudates, moist mucous membranes  NECK: supple, soft, no thyromegaly noted  LUNGS: good air entry bilaterally, clear to auscultation, symmetric breath sounds, no wheezing or rhonchi appreciated  HEART: soft S1/S2, regular rate and rhythm, no murmurs noted, no lower extremity edema  GASTROINTESTINAL: +mild distention, abdomen is soft, nontender, normoactive bowel sounds, no palpable masses  INTEGUMENT: good skin turgor, warm skin, appears well perfused  MUSCULOSKELETAL: no clubbing or cyanosis, no obvious deformity, leroy neck ROM  NEUROLOGIC: awake, alert, oriented x3, good muscle tone in 4 extremities, no obvious sensory deficits, no pronator drift, ROM normal and equal in all 4 extremities   PSYCHIATRIC: mood is annoyed   HEME/LYMPH: no palpable supraclavicular nodules, no obvious ecchymosis or petechiae    ------------------  Addendum (2:05)  RN called as patient still complaining he can not sleep and that he is anxious. CT head was negative. Patient persistently getting out of bed and making himself agitated, which is probably contributing to his elevated BP. Resident to give a 1 time dose of Xanax 0.5mg. RN to monitor

## 2024-04-14 NOTE — ED PROVIDER NOTE - CONSTITUTIONAL, MLM
Chronically Ill appearing, awake, alert, oriented to person, place, time/situation. Patient appeasers to be SOB while speaking. Needs to stop to take breath between sentences. normal...

## 2024-04-14 NOTE — H&P ADULT - NSHPPHYSICALEXAM_GEN_ALL_CORE
T(C): 36.3 (04-14-24 @ 12:18), Max: 36.3 (04-14-24 @ 12:18)  HR: 67 (04-14-24 @ 12:18) (67 - 67)  BP: 155/98 (04-14-24 @ 12:18) (155/98 - 155/98)  RR: 20 (04-14-24 @ 12:18) (20 - 20)  SpO2: 95% (04-14-24 @ 12:18) (95% - 95%)    GENERAL: patient appears well, no acute distress, conversant  EYES: anicteric sclerae, no exudates  ENMT: oropharynx clear without erythema, no exudates, moist mucous membranes  NECK: supple, soft, no thyromegaly noted  LUNGS: clear to auscultation, no intercostal retractions  HEART: S1/S2, regular rate and rhythm, no murmurs noted, no lower extremity edema  GASTROINTESTINAL: abdomen is soft, nontender, nondistended, normoactive bowel sounds, no palpable masses  INTEGUMENT: good skin turgor, warm skin, appears well perfused  MUSCULOSKELETAL: no clubbing or cyanosis, no obvious deformity  NEUROLOGIC: awake, alert, oriented x3, good muscle tone in 4 extremities, no obvious sensory deficits  PSYCHIATRIC: mood is good, affect is congruent, linear and logical thought process  HEME/LYMPH: no palpable supraclavicular nodules, no obvious ecchymosis or petechiae T(C): 36.3 (04-14-24 @ 12:18), Max: 36.3 (04-14-24 @ 12:18)  HR: 67 (04-14-24 @ 12:18) (67 - 67)  BP: 155/98 (04-14-24 @ 12:18) (155/98 - 155/98)  RR: 20 (04-14-24 @ 12:18) (20 - 20)  SpO2: 95% (04-14-24 @ 12:18) (95% - 95%)    GENERAL: patient appears well, no acute distress, conversive  EYES: EOMI. no scleral icterus  ENMT:  moist mucous membranes  LUNGS: +mild b/l crackles at b/l bases. No wheezes or rhonchi. No accessory muscle use  HEART: S1/S2, RRR, no murmurs noted, no lower extremity edema  GASTROINTESTINAL: +obese. Soft. NT/ND  INTEGUMENT: warm skin, appears well perfused  NEUROLOGIC: A&Ox3, no focal deficits.   PSYCHIATRIC: mildly anxious

## 2024-04-14 NOTE — H&P ADULT - PROBLEM SELECTOR PLAN 5
Chronic, History of DM2  - Hold home medications  - Continue   - Low dose insulin corrective scale  - Hypoglycemia protocol, Accuchecks AC&HS  - RENAL diet with DASH/TLC  - F/u HbA1c AM Chronic, History of DM2  - Hold home medications  - Per medication review, pt on tresiba 45U at bedtime, however pt unsure  - LDISS only for now as pt with glucose 89   - Hypoglycemia protocol, Accuchecks AC&HS  - RENAL diet with DASH/TLC  - F/u HbA1c AM

## 2024-04-14 NOTE — CONSULT NOTE ADULT - ASSESSMENT
67 yo male with PMHX T2DM, CKD on HD (M,W,F-[facility]), HTN, HLD, GERD, present to ED after having traveled to [place] c/o SOB, worsening x months    CKD  Anemia  Pulm Edema  OP  OA  Smoker  DM  HTN  HLD  GERD    smoking cess ed  proventil PRN for son and or wheeze  monitor VS and HD and Sat  goal sat > 88 pct  cxr - c/w Pulm Edema  HD as per RENAL recs  I and O  on lasix - [facility] rx regimen - BP control -   D dimer normal  TTE -   PFT as outpatient

## 2024-04-14 NOTE — H&P ADULT - PROBLEM SELECTOR PLAN 3
- K on admission 6.0-->5.3 on repeat, no intervention necessary  - Lokelma/Insulin/calcium gluconate as necessary   - EKG: sr with apcs. Possible Left atrial enlargement. QTC:499, VR: 65 Left bundle branch block  - f/u daily BMP and EKG - K on admission 6.0-->5.3 on repeat, no intervention necessary  - Lokelma/Insulin/calcium gluconate as necessary   - EKG: sr with apcs. Possible Left atrial enlargement. QTC:499, VR: 65 Left bundle branch block  - Plan for HD tm  - f/u daily BMP and EKG

## 2024-04-14 NOTE — H&P ADULT - NSHPREVIEWOFSYSTEMS_GEN_ALL_CORE
REVIEW OF SYSTEMS:    CONSTITUTIONAL:  No weakness, fevers or chills  EYES/ENT:  No visual changes;  No vertigo or throat pain   NECK:  No pain or stiffness  RESPIRATORY:  No cough, wheezing, hemoptysis; No shortness of breath  CARDIOVASCULAR:  No chest pain or palpitations  GASTROINTESTINAL:  No abdominal or epigastric pain. No nausea, vomiting, or hematemesis; No diarrhea or constipation. No melena or hematochezia.  GENITOURINARY:  No dysuria, frequency or hematuria  NEUROLOGICAL:  No numbness or weakness  SKIN:  No itching, rashes REVIEW OF SYSTEMS:    CONSTITUTIONAL:  No weakness, fevers or chills  EYES/ENT:  No visual changes;  No vertigo or throat pain   NECK:  No pain or stiffness  RESPIRATORY:  + SOB, +orthopnea, + wheezing. No cough  CARDIOVASCULAR:  No chest pain or palpitations  GASTROINTESTINAL:  No abdominal or epigastric pain. No n/v/d/c  GENITOURINARY:  +decreased urinary frequency. No dysuria  NEUROLOGICAL:  No numbness or weakness

## 2024-04-14 NOTE — ED ADULT NURSE NOTE - SKIN TEMPERATURE MOISTURE
activity; assistance is required to complete the activity  Dependent = pt requires total physical assistance to accomplish the task warm

## 2024-04-14 NOTE — H&P ADULT - PROBLEM SELECTOR PLAN 2
- Follows with Dr. Shoemaker, was planned for HD tomorrow- usual M/W/F  - last dialysis was Friday- per pt, he thinks they removed "less fluid than usual"  - Cr: 8.4 on admission,  - plan for HD tomorrow - Follows with Dr. Shoemaker, was planned for HD tomorrow- usual M/W/F,  - last dialysis was Friday- per pt, he thinks they removed "less fluid than usual". Pt also w/ poor urine output  - Cr: 8.4 on admission,  - plan for HD tomorrow

## 2024-04-14 NOTE — H&P ADULT - PROBLEM SELECTOR PLAN 9
- heparin subq  for dvt ppx - Per medication review, pt on tresiba 45U at bedtime, however pt unsure  - Pt gets medications from [facility] ([phone]). Attempted to contact but closed   - LDISS only for now as pt with glucose 89   - Primary team

## 2024-04-14 NOTE — ED ADULT TRIAGE NOTE - CHIEF COMPLAINT QUOTE
pt was brought in by EMS, C/O SOB worsening over the last 2 weeks, recently returned from [place]. RR even and unlabored, pt is speaking in full sentences. hx of dialysis M/W/F left upper arm fistula noted, DMII, HTN, and cardiac sent.

## 2024-04-14 NOTE — H&P ADULT - NSHPSOCIALHISTORY_GEN_ALL_CORE
Lives:  ADLs:  Diet:  Vaccination:  Occupation:  Alcohol Use:  Tobacco Use:  Recreational Drug Use: Lives: At home with son  ADLs: Completely independent w/ ambulation and ADLs.  Diet: No restrictions  Occupation: Retired  Alcohol Use: Denies  Tobacco Use: 1/4 ppd for 30 years  Recreational Drug Use: Denies

## 2024-04-14 NOTE — CONSULT NOTE ADULT - ASSESSMENT
HPI:  69 yo male with PMHX T2DM, CKD on HD ([facility]), HTN, HLD, GERD, present to ED after having traveled to [place] c/o SOB, worsening x months, worst this AM.     Denies fever, chills, chest pain, palpitations, SOB, cough, abdominal pain, nausea, vomiting, diarrhea, constipation, urinary frequency, urgency, or dysuria, headaches, changes in vision, dizziness, numbness, tingling.  Denies recent travel, recent antibiotic use, or sick contacts.    ED Course:   Vitals: BP: 155/98 , HR: 67, Temp: 97.3, RR: 20 , SpO2: 95% on RA   Labs:  WBC: 9.63, H/H: 10.5/34.5, K 6.0, Cr. 8.10, BNP: 23,411, RVP: Negative  UA: pending  CXR:There has been interval development of a pulmonary edema   pattern. No consolidation or pleural collections. No pneumothorax.   Left-sided subclavian vascular stent redemonstrated. Prominent heart size.  EKG: sr with apcs. Possible Left atrial enlargement. QTC:499, VR: 65 Left bundle branch block        edrs  69 yo male with PMHX T2DM, CKD on HD (M,W,F-[facility]), HTN, HLD, GERD, present to ED after having traveled to [place] c/o SOB, worsening x months, worst this AM.   Denies fever, chills, chest pain, palpitations, SOB, cough, abdominal pain, nausea, vomiting, diarrhea, constipation, urinary frequency, urgency, or dysuria, headaches, changes in vision, dizziness, numbness, tingling.  Denies recent travel, recent antibiotic use, or sick contacts.  ED Course: Vitals: BP: 155/98 , HR: 67, Temp: 97.3, RR: 20 , SpO2: 95% on RA   Labs:  WBC: 9.63, H/H: 10.5/34.5, K 6.0, Cr. 8.10, BNP: 23,411, RVP: Negative  UA: pending CXR:There has been interval development of a pulmonary edema   pattern. No consolidation or pleural collections. No pneumothorax.   Left-sided subclavian vascular stent redemonstrated. Prominent heart size.  EKG: sr with apcs. Possible Left atrial enlargement. QTC:499, VR: 65 Left bundle branch block      esrd on hd mwf plan for hd in am , fluid removal at hd in am   Excess fluids and waste products will be removed from your blood; your electrolytes will be balanced; your blood pressure will be controlled.     ANEMIA PLAN:  Anemia of chronic disease:  Well controlled by Aranesp  H and H subtherapeutic .  We will continue epo aiming for a HCT of 32-36 %.   We will monitor Iron stores, B12 and RBC folate .    BP monitoring,continue current antihypertensive meds, low salt diet,followup with PMD in 1-2 weeks  metoprolol tartrate 25 milliGRAM(s) Oral two times a day

## 2024-04-14 NOTE — H&P ADULT - HISTORY OF PRESENT ILLNESS
69 yo male with PMHX T2DM, [facility] HTN, HLD, GERD, present to ED after having traveled to [place] c/o SOB, worsening x months, worst this AM.     Denies fever, chills, chest pain, palpitations, SOB, cough, abdominal pain, nausea, vomiting, diarrhea, constipation, urinary frequency, urgency, or dysuria, headaches, changes in vision, dizziness, numbness, tingling.  Denies recent travel, recent antibiotic use, or sick contacts.    ED Course:   Vitals: BP: 155/98 , HR: 67, Temp: 97.3, RR: 20 , SpO2: 95% on RA   Labs:  WBC: 9.63, H/H: 10.5/34.5, K 6.0, Cr. 8.10, BNP: 23,411, RVP: Negative  UA: pending  CXR:There has been interval development of a pulmonary edema   pattern. No consolidation or pleural collections. No pneumothorax.   Left-sided subclavian vascular stent redemonstrated. Prominent heart size.  EKG: sr with apcs. Possible Left atrial enlargement. QTC:499, VR: 65 Left bundle branch block

## 2024-04-14 NOTE — CONSULT NOTE ADULT - SUBJECTIVE AND OBJECTIVE BOX
Patient is a 68y Male whom presented to the hospital with     PAST MEDICAL & SURGICAL HISTORY:  HTN (hypertension)      DM2 (diabetes mellitus, type 2)      Hypercholesterolemia      CKD (chronic kidney disease)      Anemia      ESRD on dialysis      T2DM (type 2 diabetes mellitus)      CAD (coronary artery disease)      HLD (hyperlipidemia)      GERD (gastroesophageal reflux disease)      H/O eye surgery          MEDICATIONS  (STANDING):  atorvastatin 40 milliGRAM(s) Oral at bedtime  dextrose 10% Bolus 125 milliLiter(s) IV Bolus once  dextrose 5%. 1000 milliLiter(s) (100 mL/Hr) IV Continuous <Continuous>  dextrose 5%. 1000 milliLiter(s) (50 mL/Hr) IV Continuous <Continuous>  dextrose 50% Injectable 25 Gram(s) IV Push once  dextrose 50% Injectable 12.5 Gram(s) IV Push once  ezetimibe 10 milliGRAM(s) Oral daily  glucagon  Injectable 1 milliGRAM(s) IntraMuscular once  heparin   Injectable 5000 Unit(s) SubCutaneous every 8 hours  insulin lispro (ADMELOG) corrective regimen sliding scale   SubCutaneous three times a day before meals  metoprolol tartrate 25 milliGRAM(s) Oral two times a day  pantoprazole    Tablet 40 milliGRAM(s) Oral before breakfast  ticagrelor 60 milliGRAM(s) Oral two times a day      Allergies    No Known Allergies    Intolerances        SOCIAL HISTORY:  Denies ETOh,Smoking,     FAMILY HISTORY:  No pertinent family history in first degree relatives        REVIEW OF SYSTEMS:    CONSTITUTIONAL: No weakness, fevers or chills  EYES/ENT: No visual changes;  no throat pain   NECK: No pain or stiffness  RESPIRATORY: No cough, wheezing, hemoptysis; No shortness of breath  CARDIOVASCULAR: No chest pain or palpitations  GASTROINTESTINAL: No abdominal or epigastric pain. No nausea, vomiting,     No diarrhea or constipation. No melena   GENITOURINARY: No dysuria, frequency or hematuria  NEUROLOGICAL: No numbness or weakness  SKIN: dry      VITAL:  T(C): , Max: 36.3 (04-14-24 @ 12:18)  T(F): , Max: 97.3 (04-14-24 @ 12:18)  HR: 67 (04-14-24 @ 12:18)  BP: 155/98 (04-14-24 @ 12:18)  BP(mean): --  RR: 20 (04-14-24 @ 12:18)  SpO2: 95% (04-14-24 @ 12:18)  Wt(kg): --    I and O's:    Height (cm): 175.3 (04-14 @ 12:18)  Weight (kg): 83.9 (04-14 @ 12:18)  BMI (kg/m2): 27.3 (04-14 @ 12:18)  BSA (m2): 2 (04-14 @ 12:18)    PHYSICAL EXAM:    Constitutional: NAD  HEENT: conjunctive   clear   Neck:  No JVD  Respiratory: CTAB  Cardiovascular: S1 and S2  Gastrointestinal: BS+, soft, NT/ND  Extremities: No peripheral edema  Neurological: A/O x 3, no focal deficits  Psychiatric: Normal mood, normal affect  : No Lechuga  Skin: No rashes  Access: Not applicable    LABS:                        10.5   9.63  )-----------( 167      ( 14 Apr 2024 14:14 )             34.5     04-14    140  |  103  |  37<H>  ----------------------------<  101<H>  5.3   |  31  |  8.40<H>    Ca    8.9      14 Apr 2024 16:05    TPro  6.5  /  Alb  3.0<L>  /  TBili  0.7  /  DBili  x   /  AST  13<L>  /  ALT  19  /  AlkPhos  102  04-14      Urine Studies:  Urinalysis Basic - ( 14 Apr 2024 16:05 )    Color: x / Appearance: x / SG: x / pH: x  Gluc: 101 mg/dL / Ketone: x  / Bili: x / Urobili: x   Blood: x / Protein: x / Nitrite: x   Leuk Esterase: x / RBC: x / WBC x   Sq Epi: x / Non Sq Epi: x / Bacteria: x            RADIOLOGY & ADDITIONAL STUDIES:                   Patient is a 68y Male whom presented to the hospital with esrd on hd     PAST MEDICAL & SURGICAL HISTORY:  HTN (hypertension)      DM2 (diabetes mellitus, type 2)      Hypercholesterolemia      CKD (chronic kidney disease)      Anemia      ESRD on dialysis      T2DM (type 2 diabetes mellitus)      CAD (coronary artery disease)      HLD (hyperlipidemia)      GERD (gastroesophageal reflux disease)      H/O eye surgery          MEDICATIONS  (STANDING):  atorvastatin 40 milliGRAM(s) Oral at bedtime  dextrose 10% Bolus 125 milliLiter(s) IV Bolus once  dextrose 5%. 1000 milliLiter(s) (100 mL/Hr) IV Continuous <Continuous>  dextrose 5%. 1000 milliLiter(s) (50 mL/Hr) IV Continuous <Continuous>  dextrose 50% Injectable 25 Gram(s) IV Push once  dextrose 50% Injectable 12.5 Gram(s) IV Push once  ezetimibe 10 milliGRAM(s) Oral daily  glucagon  Injectable 1 milliGRAM(s) IntraMuscular once  heparin   Injectable 5000 Unit(s) SubCutaneous every 8 hours  insulin lispro (ADMELOG) corrective regimen sliding scale   SubCutaneous three times a day before meals  metoprolol tartrate 25 milliGRAM(s) Oral two times a day  pantoprazole    Tablet 40 milliGRAM(s) Oral before breakfast  ticagrelor 60 milliGRAM(s) Oral two times a day      Allergies    No Known Allergies    Intolerances        SOCIAL HISTORY:  Denies ETOh,Smoking,     FAMILY HISTORY:  No pertinent family history in first degree relatives        REVIEW OF SYSTEMS:    CONSTITUTIONAL: No weakness, fevers or chills  RESPIRATORY: No cough, wheezing, hemoptysis; No shortness of breath  CARDIOVASCULAR: No chest pain or palpitations  GASTROINTESTINAL: No abdominal or epigastric pain. No nausea, vomiting,     No diarrhea or constipation. No melena   SKIN: dry      VITAL:  T(C): , Max: 36.3 (04-14-24 @ 12:18)  T(F): , Max: 97.3 (04-14-24 @ 12:18)  HR: 67 (04-14-24 @ 12:18)  BP: 155/98 (04-14-24 @ 12:18)  BP(mean): --  RR: 20 (04-14-24 @ 12:18)  SpO2: 95% (04-14-24 @ 12:18)  Wt(kg): --    I and O's:    Height (cm): 175.3 (04-14 @ 12:18)  Weight (kg): 83.9 (04-14 @ 12:18)  BMI (kg/m2): 27.3 (04-14 @ 12:18)  BSA (m2): 2 (04-14 @ 12:18)    PHYSICAL EXAM:    Constitutional: NAD  HEENT: conjunctive   clear   Neck:  No JVD  Respiratory: decreased bs b/l   Cardiovascular: S1 and S2  Gastrointestinal: BS+, soft, NT/ND  Extremities: No peripheral edema  Neurological:  no focal deficits  Psychiatric: Normal mood, normal affect  : No Lechuga  Skin: No rashes  Access: pos fistula     LABS:                        10.5   9.63  )-----------( 167      ( 14 Apr 2024 14:14 )             34.5     04-14    140  |  103  |  37<H>  ----------------------------<  101<H>  5.3   |  31  |  8.40<H>    Ca    8.9      14 Apr 2024 16:05    TPro  6.5  /  Alb  3.0<L>  /  TBili  0.7  /  DBili  x   /  AST  13<L>  /  ALT  19  /  AlkPhos  102  04-14      Urine Studies:  Urinalysis Basic - ( 14 Apr 2024 16:05 )    Color: x / Appearance: x / SG: x / pH: x  Gluc: 101 mg/dL / Ketone: x  / Bili: x / Urobili: x   Blood: x / Protein: x / Nitrite: x   Leuk Esterase: x / RBC: x / WBC x   Sq Epi: x / Non Sq Epi: x / Bacteria: x            RADIOLOGY & ADDITIONAL STUDIES:        MEDICATIONS  (STANDING):  atorvastatin 40 milliGRAM(s) Oral at bedtime  dextrose 10% Bolus 125 milliLiter(s) IV Bolus once  dextrose 5%. 1000 milliLiter(s) (100 mL/Hr) IV Continuous <Continuous>  dextrose 5%. 1000 milliLiter(s) (50 mL/Hr) IV Continuous <Continuous>  dextrose 50% Injectable 25 Gram(s) IV Push once  dextrose 50% Injectable 12.5 Gram(s) IV Push once  ezetimibe 10 milliGRAM(s) Oral daily  glucagon  Injectable 1 milliGRAM(s) IntraMuscular once  heparin   Injectable 5000 Unit(s) SubCutaneous every 8 hours  insulin lispro (ADMELOG) corrective regimen sliding scale   SubCutaneous three times a day before meals  metoprolol tartrate 25 milliGRAM(s) Oral two times a day  pantoprazole    Tablet 40 milliGRAM(s) Oral before breakfast  ticagrelor 60 milliGRAM(s) Oral two times a day

## 2024-04-14 NOTE — ED ADULT NURSE NOTE - NSFALLHARMRISKINTERV_ED_ALL_ED
Assistance OOB with selected safe patient handling equipment if applicable/Communicate risk of Fall with Harm to all staff, patient, and family/Provide visual cue: red socks, yellow wristband, yellow gown, etc/Reinforce activity limits and safety measures with patient and family/Bed in lowest position, wheels locked, appropriate side rails in place/Call bell, personal items and telephone in reach/Instruct patient to call for assistance before getting out of bed/chair/stretcher/Non-slip footwear applied when patient is off stretcher/[place] to call system/Physically safe environment - no spills, clutter or unnecessary equipment/Purposeful Proactive Rounding/Room/bathroom lighting operational, light cord in reach

## 2024-04-14 NOTE — H&P ADULT - PROBLEM SELECTOR PLAN 1
- Pt presenting with SOB x 1 months, worsened over 2 weeks, significantly worse this AM  - likely in the setting of need for HD vs CHF (does not have diagnosis of such) vs COPD vs infection vs. other  - CXR showing: pulmonary edema  pattern. No consolidation or pleural collections. No pneumothorax. Left-sided subclavian vascular stent redemonstrated.  - VSS, 99% on RA  - O2 NC as needed   - last TTE from 1/23: EF 63%- no known CHF  - BNP: 23,411  - Infection less likely, lactate WNL, afebrile, WC WNL  - f/u daily cbc/bmp    - f/u repeat TTE  - current smoker, recommend counseling,- outpatient PFTs   - nephro Dr. Shoemaker consulted, f/u reccs Pt p/w  SOB x 1 month, worsened over 2 weeks, significantly worse this AM  - Likely in the setting of need for HD vs CHF (does not have diagnosis, BNP: 23,411) vs COPD vs infection  - CXR showing: pulmonary edema  pattern. No consolidation or pleural collections  - CT chest NON-CON ordered  - VSS, 99% on RA, supplemental O2 PRN  - last TTE from 1/23: EF 63%- no known CHF. Per pt follows Dr. Shaw and has no cardiac hx beyond CAD and HTN  - f/u repeat TTE  - Infection less likely, lactate WNL, afebrile, WC WNL  - f/u daily cbc/bmp    - current smoker, counseled on cessation, will start nicotine patch  - nephro (Dr. Shoemaker) consulted, f/u reccs  - Pulm (Dr. Vazquez) consulted, recs appreciated

## 2024-04-14 NOTE — H&P ADULT - ASSESSMENT
67 yo male with PMHX T2DM, [facility] HTN, HLD, GERD, present to ED after having traveled to [place] c/o SOB, worsening x months, worst this AM.     Denies fever, chills, chest pain, palpitations, SOB, cough, abdominal pain, nausea, vomiting, diarrhea, constipation, urinary frequency, urgency, or dysuria, headaches, changes in vision, dizziness, numbness, tingling.  Denies recent travel, recent antibiotic use, or sick contacts.    ED Course:   Vitals: BP: 155/98 , HR: 67, Temp: 97.3, RR: 20 , SpO2: 95% on RA   Labs:  WBC: 9.63, H/H: 10.5/34.5, K 6.0, Cr. 8.10, BNP: 23,411, RVP: Negative  UA: pending  CXR:There has been interval development of a pulmonary edema   pattern. No consolidation or pleural collections. No pneumothorax.   Left-sided subclavian vascular stent redemonstrated. Prominent heart size.  EKG: sr with apcs. Possible Left atrial enlargement. QTC:499, VR: 65 Left bundle branch block 67 yo male with PMHX T2DM, CKD on HD (M,W,F-[facility]), HTN, HLD, GERD, present to ED after having traveled to [place] c/o SOB, worsening x months, worst this AM.

## 2024-04-14 NOTE — H&P ADULT - ATTENDING COMMENTS
67 yo male with PMHX T2DM, CKD on HD (M,W,F-[facility]), HTN, HLD, GERD, present to ED after having traveled to [place] c/o SOB, worsening x months, worst this AM. CXR  and physical exam mostly unrevealing especially considering patient reported SOB. Plan to obtain TTE and CT of the chest given etiology at this time for his SOB unclear. Rest per resident note as detailed above. 67 yo male with PMHX T2DM, CKD on HD (M,W,F-[facility]), HTN, HLD, GERD, present to ED after having traveled to [place] c/o SOB, worsening x months, worst this AM. CXR  and physical exam mostly unrevealing especially considering patient reported SOB. Plan to obtain TTE and CT of the chest given etiology at this time for his SOB unclear. Normocytic Anemia appreciated, most likely in setting of ESRD. Rest per resident note as detailed above.

## 2024-04-15 ENCOUNTER — RESULT REVIEW (OUTPATIENT)
Age: 69
End: 2024-04-15

## 2024-04-15 LAB
A1C WITH ESTIMATED AVERAGE GLUCOSE RESULT: 6.2 % — HIGH (ref 4–5.6)
ANION GAP SERPL CALC-SCNC: 9 MMOL/L — SIGNIFICANT CHANGE UP (ref 5–17)
APPEARANCE UR: CLEAR — SIGNIFICANT CHANGE UP
BASOPHILS # BLD AUTO: 0.11 K/UL — SIGNIFICANT CHANGE UP (ref 0–0.2)
BASOPHILS NFR BLD AUTO: 1.1 % — SIGNIFICANT CHANGE UP (ref 0–2)
BILIRUB UR-MCNC: NEGATIVE — SIGNIFICANT CHANGE UP
BUN SERPL-MCNC: 42 MG/DL — HIGH (ref 7–23)
CALCIUM SERPL-MCNC: 8.4 MG/DL — LOW (ref 8.5–10.1)
CHLORIDE SERPL-SCNC: 97 MMOL/L — SIGNIFICANT CHANGE UP (ref 96–108)
CO2 SERPL-SCNC: 28 MMOL/L — SIGNIFICANT CHANGE UP (ref 22–31)
COLOR SPEC: YELLOW — SIGNIFICANT CHANGE UP
CREAT SERPL-MCNC: 9 MG/DL — HIGH (ref 0.5–1.3)
DIFF PNL FLD: NEGATIVE — SIGNIFICANT CHANGE UP
EGFR: 6 ML/MIN/1.73M2 — LOW
EOSINOPHIL # BLD AUTO: 0.2 K/UL — SIGNIFICANT CHANGE UP (ref 0–0.5)
EOSINOPHIL NFR BLD AUTO: 2 % — SIGNIFICANT CHANGE UP (ref 0–6)
ESTIMATED AVERAGE GLUCOSE: 131 MG/DL — HIGH (ref 68–114)
GLUCOSE SERPL-MCNC: 176 MG/DL — HIGH (ref 70–99)
GLUCOSE UR QL: NEGATIVE MG/DL — SIGNIFICANT CHANGE UP
HCT VFR BLD CALC: 34.5 % — LOW (ref 39–50)
HGB BLD-MCNC: 10.5 G/DL — LOW (ref 13–17)
IMM GRANULOCYTES NFR BLD AUTO: 0.5 % — SIGNIFICANT CHANGE UP (ref 0–0.9)
INR BLD: 0.92 RATIO — SIGNIFICANT CHANGE UP (ref 0.85–1.18)
KETONES UR-MCNC: NEGATIVE MG/DL — SIGNIFICANT CHANGE UP
LEUKOCYTE ESTERASE UR-ACNC: ABNORMAL
LYMPHOCYTES # BLD AUTO: 1.23 K/UL — SIGNIFICANT CHANGE UP (ref 1–3.3)
LYMPHOCYTES # BLD AUTO: 12.1 % — LOW (ref 13–44)
MCHC RBC-ENTMCNC: 29.9 PG — SIGNIFICANT CHANGE UP (ref 27–34)
MCHC RBC-ENTMCNC: 30.4 GM/DL — LOW (ref 32–36)
MCV RBC AUTO: 98.3 FL — SIGNIFICANT CHANGE UP (ref 80–100)
MONOCYTES # BLD AUTO: 0.61 K/UL — SIGNIFICANT CHANGE UP (ref 0–0.9)
MONOCYTES NFR BLD AUTO: 6 % — SIGNIFICANT CHANGE UP (ref 2–14)
NEUTROPHILS # BLD AUTO: 7.98 K/UL — HIGH (ref 1.8–7.4)
NEUTROPHILS NFR BLD AUTO: 78.3 % — HIGH (ref 43–77)
NITRITE UR-MCNC: NEGATIVE — SIGNIFICANT CHANGE UP
NRBC # BLD: 0 /100 WBCS — SIGNIFICANT CHANGE UP (ref 0–0)
PH UR: 8.5 (ref 5–8)
PLATELET # BLD AUTO: 173 K/UL — SIGNIFICANT CHANGE UP (ref 150–400)
POTASSIUM SERPL-MCNC: 5.3 MMOL/L — SIGNIFICANT CHANGE UP (ref 3.5–5.3)
POTASSIUM SERPL-SCNC: 5.3 MMOL/L — SIGNIFICANT CHANGE UP (ref 3.5–5.3)
PROT UR-MCNC: 300 MG/DL
PROTHROM AB SERPL-ACNC: 10.8 SEC — SIGNIFICANT CHANGE UP (ref 9.5–13)
RBC # BLD: 3.51 M/UL — LOW (ref 4.2–5.8)
RBC # FLD: 14.1 % — SIGNIFICANT CHANGE UP (ref 10.3–14.5)
SODIUM SERPL-SCNC: 134 MMOL/L — LOW (ref 135–145)
SP GR SPEC: 1.01 — SIGNIFICANT CHANGE UP (ref 1–1.03)
UROBILINOGEN FLD QL: 0.2 MG/DL — SIGNIFICANT CHANGE UP (ref 0.2–1)
WBC # BLD: 10.18 K/UL — SIGNIFICANT CHANGE UP (ref 3.8–10.5)
WBC # FLD AUTO: 10.18 K/UL — SIGNIFICANT CHANGE UP (ref 3.8–10.5)

## 2024-04-15 PROCEDURE — 99232 SBSQ HOSP IP/OBS MODERATE 35: CPT

## 2024-04-15 PROCEDURE — 70450 CT HEAD/BRAIN W/O DYE: CPT | Mod: 26

## 2024-04-15 PROCEDURE — 93306 TTE W/DOPPLER COMPLETE: CPT | Mod: 26

## 2024-04-15 RX ORDER — SENNA PLUS 8.6 MG/1
2 TABLET ORAL AT BEDTIME
Refills: 0 | Status: DISCONTINUED | OUTPATIENT
Start: 2024-04-15 | End: 2024-04-18

## 2024-04-15 RX ORDER — POLYETHYLENE GLYCOL 3350 17 G/17G
17 POWDER, FOR SOLUTION ORAL DAILY
Refills: 0 | Status: DISCONTINUED | OUTPATIENT
Start: 2024-04-15 | End: 2024-04-18

## 2024-04-15 RX ORDER — FUROSEMIDE 40 MG
40 TABLET ORAL ONCE
Refills: 0 | Status: COMPLETED | OUTPATIENT
Start: 2024-04-15 | End: 2024-04-15

## 2024-04-15 RX ORDER — ALPRAZOLAM 0.25 MG
0.5 TABLET ORAL ONCE
Refills: 0 | Status: DISCONTINUED | OUTPATIENT
Start: 2024-04-15 | End: 2024-04-15

## 2024-04-15 RX ORDER — LANOLIN ALCOHOL/MO/W.PET/CERES
5 CREAM (GRAM) TOPICAL ONCE
Refills: 0 | Status: COMPLETED | OUTPATIENT
Start: 2024-04-15 | End: 2024-04-15

## 2024-04-15 RX ORDER — CALCITRIOL 0.5 UG/1
0.25 CAPSULE ORAL DAILY
Refills: 0 | Status: DISCONTINUED | OUTPATIENT
Start: 2024-04-15 | End: 2024-04-18

## 2024-04-15 RX ADMIN — Medication 40 MILLIGRAM(S): at 05:24

## 2024-04-15 RX ADMIN — Medication 25 MILLIGRAM(S): at 05:24

## 2024-04-15 RX ADMIN — Medication 5 MILLIGRAM(S): at 01:05

## 2024-04-15 RX ADMIN — SENNA PLUS 2 TABLET(S): 8.6 TABLET ORAL at 21:59

## 2024-04-15 RX ADMIN — Medication 1000 MILLIGRAM(S): at 01:40

## 2024-04-15 RX ADMIN — PANTOPRAZOLE SODIUM 40 MILLIGRAM(S): 20 TABLET, DELAYED RELEASE ORAL at 05:23

## 2024-04-15 RX ADMIN — Medication 3 MILLIGRAM(S): at 21:23

## 2024-04-15 RX ADMIN — EZETIMIBE 10 MILLIGRAM(S): 10 TABLET ORAL at 17:49

## 2024-04-15 RX ADMIN — HEPARIN SODIUM 5000 UNIT(S): 5000 INJECTION INTRAVENOUS; SUBCUTANEOUS at 05:24

## 2024-04-15 RX ADMIN — Medication 40 MILLIGRAM(S): at 17:49

## 2024-04-15 RX ADMIN — ATORVASTATIN CALCIUM 40 MILLIGRAM(S): 80 TABLET, FILM COATED ORAL at 21:23

## 2024-04-15 RX ADMIN — TICAGRELOR 60 MILLIGRAM(S): 90 TABLET ORAL at 05:24

## 2024-04-15 RX ADMIN — TICAGRELOR 60 MILLIGRAM(S): 90 TABLET ORAL at 17:58

## 2024-04-15 RX ADMIN — HEPARIN SODIUM 5000 UNIT(S): 5000 INJECTION INTRAVENOUS; SUBCUTANEOUS at 21:24

## 2024-04-15 RX ADMIN — ERYTHROPOIETIN 10000 UNIT(S): 10000 INJECTION, SOLUTION INTRAVENOUS; SUBCUTANEOUS at 15:57

## 2024-04-15 RX ADMIN — Medication 25 MILLIGRAM(S): at 17:59

## 2024-04-15 RX ADMIN — Medication 2: at 17:16

## 2024-04-15 RX ADMIN — Medication 400 MILLIGRAM(S): at 01:05

## 2024-04-15 RX ADMIN — Medication 0.5 MILLIGRAM(S): at 02:22

## 2024-04-15 NOTE — PROGRESS NOTE ADULT - SUBJECTIVE AND OBJECTIVE BOX
PROGRESS NOTE:   Authored by Dr. John Pacheco MD, Available on MS Teams    Patient is a 68y old  Male who presents with a chief complaint of SOB, (15 Apr 2024 12:13)      SUBJECTIVE / OVERNIGHT EVENTS: Patient has shortness of breath, Still makes urine    ADDITIONAL REVIEW OF SYSTEMS:    MEDICATIONS  (STANDING):  atorvastatin 40 milliGRAM(s) Oral at bedtime  dextrose 10% Bolus 125 milliLiter(s) IV Bolus once  dextrose 5%. 1000 milliLiter(s) (50 mL/Hr) IV Continuous <Continuous>  dextrose 5%. 1000 milliLiter(s) (100 mL/Hr) IV Continuous <Continuous>  dextrose 50% Injectable 25 Gram(s) IV Push once  dextrose 50% Injectable 12.5 Gram(s) IV Push once  epoetin demetrius-epbx (RETACRIT) Injectable 76875 Unit(s) IV Push <User Schedule>  ezetimibe 10 milliGRAM(s) Oral daily  furosemide    Tablet 40 milliGRAM(s) Oral daily  furosemide   Injectable 40 milliGRAM(s) IV Push once  glucagon  Injectable 1 milliGRAM(s) IntraMuscular once  heparin   Injectable 5000 Unit(s) SubCutaneous every 8 hours  insulin lispro (ADMELOG) corrective regimen sliding scale   SubCutaneous three times a day before meals  metoprolol tartrate 25 milliGRAM(s) Oral two times a day  pantoprazole    Tablet 40 milliGRAM(s) Oral before breakfast  ticagrelor 60 milliGRAM(s) Oral two times a day    MEDICATIONS  (PRN):  acetaminophen     Tablet .. 650 milliGRAM(s) Oral every 6 hours PRN Temp greater or equal to 38C (100.4F), Mild Pain (1 - 3)  albuterol    90 MICROgram(s) HFA Inhaler 2 Puff(s) Inhalation every 6 hours PRN Shortness of Breath and/or Wheezing  aluminum hydroxide/magnesium hydroxide/simethicone Suspension 30 milliLiter(s) Oral every 4 hours PRN Dyspepsia  dextrose Oral Gel 15 Gram(s) Oral once PRN Blood Glucose LESS THAN 70 milliGRAM(s)/deciliter  melatonin 3 milliGRAM(s) Oral at bedtime PRN Insomnia  trimethobenzamide Injectable 200 milliGRAM(s) IntraMuscular every 8 hours PRN Nausea and/or Vomiting      CAPILLARY BLOOD GLUCOSE      POCT Blood Glucose.: 224 mg/dL (15 Apr 2024 11:36)  POCT Blood Glucose.: 136 mg/dL (15 Apr 2024 07:54)  POCT Blood Glucose.: 128 mg/dL (14 Apr 2024 20:59)  POCT Blood Glucose.: 86 mg/dL (14 Apr 2024 20:08)    I&O's Summary      PHYSICAL EXAM:  Vital Signs Last 24 Hrs  T(C): 36.8 (15 Apr 2024 12:40), Max: 36.8 (14 Apr 2024 19:56)  T(F): 98.2 (15 Apr 2024 12:40), Max: 98.2 (14 Apr 2024 19:56)  HR: 68 (15 Apr 2024 12:45) (65 - 80)  BP: 144/52 (15 Apr 2024 12:45) (144/52 - 198/88)  BP(mean): --  RR: 16 (15 Apr 2024 12:40) (16 - 20)  SpO2: 97% (15 Apr 2024 12:40) (93% - 100%)    Parameters below as of 15 Apr 2024 12:40  Patient On (Oxygen Delivery Method): room air        CONSTITUTIONAL: NAD  RESPIRATORY: Normal respiratory effort; crackles at the bases  CARDIOVASCULAR: Regular rate and rhythm, normal S1 and S2, no murmur/rub/gallop; No lower extremity edema  ABDOMEN: Nontender to palpation, normoactive bowel sounds, no rebound/guarding  MUSCLOSKELETAL: no clubbing or cyanosis of digits; no joint swelling or tenderness to palpation  PSYCH: A+O to person, place, and time; affect appropriate    LABS:                        10.5   10.18 )-----------( 173      ( 15 Apr 2024 06:32 )             34.5     04-15    134<L>  |  97  |  42<H>  ----------------------------<  176<H>  5.3   |  28  |  9.00<H>    Ca    8.4<L>      15 Apr 2024 06:32    TPro  6.5  /  Alb  3.0<L>  /  TBili  0.7  /  DBili  x   /  AST  13<L>  /  ALT  19  /  AlkPhos  102  04-14    PT/INR - ( 15 Apr 2024 06:32 )   PT: 10.8 sec;   INR: 0.92 ratio         PTT - ( 14 Apr 2024 14:14 )  PTT:35.5 sec      Urinalysis Basic - ( 15 Apr 2024 06:32 )    Color: x / Appearance: x / SG: x / pH: x  Gluc: 176 mg/dL / Ketone: x  / Bili: x / Urobili: x   Blood: x / Protein: x / Nitrite: x   Leuk Esterase: x / RBC: x / WBC x   Sq Epi: x / Non Sq Epi: x / Bacteria: x

## 2024-04-15 NOTE — PROGRESS NOTE ADULT - ASSESSMENT
Straight cath performed per MD Costello order. 100mL urine output. WCTM.   69 yo male with PMHX T2DM, CKD on HD (M,W,F-[facility]), HTN, HLD, GERD, present to ED after having traveled to [place] c/o SOB, worsening x months    CKD  Anemia  Pulm Edema  OP  OA  Smoker  DM  HTN  HLD  GERD    VBG noted  smoking cess ed  proventil PRN for son and or wheeze  monitor VS and HD and Sat  goal sat > 88 pct  cxr - c/w Pulm Edema  HD as per RENAL recs  I and O  on lasix - [facility] rx regimen - BP control -   D dimer normal  TTE -   PFT as outpatient

## 2024-04-15 NOTE — CARE COORDINATION ASSESSMENT. - NSADDITIONAL INFORMATION_FT
69 y/o male admitted from home with PMHx-T2DM, CKD on HD MWF at [facility] in [place] [phone] fax [phone] transported via taxi. Pt lives at home with son, daughter at beside and states son is pt's HHA 6 hrs daily via Consumer Directed Assistance Program (CDPAP). Pt has cane and wheelchair at home, he is able to make his needs known and is alert and oriented.

## 2024-04-15 NOTE — PHYSICAL THERAPY INITIAL EVALUATION ADULT - PERTINENT HX OF CURRENT PROBLEM, REHAB EVAL
Per H&P: pt is a 69 yo male with PMHX T2DM, CKD on HD (M,W,F-[facility]), HTN, HLD, GERD, present to ED after having traveled to [place] c/o SOB, worsening x months.  Denies fever, chills, chest pain, palpitations, SOB, cough, abdominal pain, nausea, vomiting, diarrhea, constipation, urinary frequency, urgency, or dysuria, headaches, changes in vision, dizziness, numbness, tingling.  Denies recent travel, recent antibiotic use, or sick contacts.

## 2024-04-15 NOTE — PHYSICAL THERAPY INITIAL EVALUATION ADULT - PATIENT PROFILE REVIEW, REHAB EVAL
PT orders received: ambulate as tolerated. Consult with RN Nell, pt may participate in PT evaluation./yes

## 2024-04-15 NOTE — PHYSICAL THERAPY INITIAL EVALUATION ADULT - NSPTDISCHREC_GEN_A_CORE
Patient demonstrating safe ambulation and appears to be performing at functional baseline. Pt will not be placed on PT program. Pt encouraged to ambulate on unit with nursing staff. Pt verbalized understanding./No skilled PT needs

## 2024-04-15 NOTE — PROGRESS NOTE ADULT - ASSESSMENT
67 yo male with PMHX T2DM, CKD on HD (M,W,F-[facility]), HTN, HLD, GERD, present to ED after having traveled to [place] c/o SOB, worsening x months, worst this AM.   Denies fever, chills, chest pain, palpitations, SOB, cough, abdominal pain, nausea, vomiting, diarrhea, constipation, urinary frequency, urgency, or dysuria, headaches, changes in vision, dizziness, numbness, tingling.  Denies recent travel, recent antibiotic use, or sick contacts.  ED Course: Vitals: BP: 155/98 , HR: 67, Temp: 97.3, RR: 20 , SpO2: 95% on RA   Labs:  WBC: 9.63, H/H: 10.5/34.5, K 6.0, Cr. 8.10, BNP: 23,411, RVP: Negative  UA: pending CXR:There has been interval development of a pulmonary edema   pattern. No consolidation or pleural collections. No pneumothorax.   Left-sided subclavian vascular stent redemonstrated. Prominent heart size.  EKG: sr with apcs. Possible Left atrial enlargement. QTC:499, VR: 65 Left bundle branch block      esrd on hd mwf plan for hd in am , fluid removal at hd    Excess fluids and waste products will be removed from your blood; your electrolytes will be balanced; your blood pressure will be controlled.     ANEMIA PLAN:  Anemia of chronic disease:  Well controlled by Araemanuel WHITMAN and ANTONETTE subtherapeutic .  We will continue epo aiming for a HCT of 32-36 %.   We will monitor Iron stores, B12 and RBC folate .    BP monitoring,continue current antihypertensive meds, low salt diet,followup with PMD in 1-2 weeks  metoprolol tartrate 25 milliGRAM(s) Oral two times a day    dvt heparin   Injectable 5000 Unit(s) SubCutaneous every 8 hours

## 2024-04-15 NOTE — PROGRESS NOTE ADULT - SUBJECTIVE AND OBJECTIVE BOX
Date/Time Patient Seen:  		  Referring MD:   Data Reviewed	       Patient is a 68y old  Male who presents with a chief complaint of SOB (14 Apr 2024 20:43)      Subjective/HPI     PAST MEDICAL & SURGICAL HISTORY:  HTN (hypertension)    DM2 (diabetes mellitus, type 2)    Hypercholesterolemia    CKD (chronic kidney disease)    Anemia    ESRD on dialysis    T2DM (type 2 diabetes mellitus)    CAD (coronary artery disease)    HLD (hyperlipidemia)    GERD (gastroesophageal reflux disease)    H/O eye surgery          Medication list         MEDICATIONS  (STANDING):  atorvastatin 40 milliGRAM(s) Oral at bedtime  dextrose 10% Bolus 125 milliLiter(s) IV Bolus once  dextrose 5%. 1000 milliLiter(s) (100 mL/Hr) IV Continuous <Continuous>  dextrose 5%. 1000 milliLiter(s) (50 mL/Hr) IV Continuous <Continuous>  dextrose 50% Injectable 25 Gram(s) IV Push once  dextrose 50% Injectable 12.5 Gram(s) IV Push once  epoetin demetrius-epbx (RETACRIT) Injectable 58088 Unit(s) IV Push <User Schedule>  ezetimibe 10 milliGRAM(s) Oral daily  furosemide    Tablet 40 milliGRAM(s) Oral daily  glucagon  Injectable 1 milliGRAM(s) IntraMuscular once  heparin   Injectable 5000 Unit(s) SubCutaneous every 8 hours  insulin lispro (ADMELOG) corrective regimen sliding scale   SubCutaneous three times a day before meals  metoprolol tartrate 25 milliGRAM(s) Oral two times a day  pantoprazole    Tablet 40 milliGRAM(s) Oral before breakfast  ticagrelor 60 milliGRAM(s) Oral two times a day    MEDICATIONS  (PRN):  acetaminophen     Tablet .. 650 milliGRAM(s) Oral every 6 hours PRN Temp greater or equal to 38C (100.4F), Mild Pain (1 - 3)  albuterol    90 MICROgram(s) HFA Inhaler 2 Puff(s) Inhalation every 6 hours PRN Shortness of Breath and/or Wheezing  aluminum hydroxide/magnesium hydroxide/simethicone Suspension 30 milliLiter(s) Oral every 4 hours PRN Dyspepsia  dextrose Oral Gel 15 Gram(s) Oral once PRN Blood Glucose LESS THAN 70 milliGRAM(s)/deciliter  melatonin 3 milliGRAM(s) Oral at bedtime PRN Insomnia  trimethobenzamide Injectable 200 milliGRAM(s) IntraMuscular every 8 hours PRN Nausea and/or Vomiting         Vitals log        ICU Vital Signs Last 24 Hrs  T(C): 36.4 (15 Apr 2024 05:27), Max: 36.8 (14 Apr 2024 19:56)  T(F): 97.5 (15 Apr 2024 05:27), Max: 98.2 (14 Apr 2024 19:56)  HR: 68 (15 Apr 2024 05:27) (66 - 80)  BP: 161/73 (15 Apr 2024 05:27) (155/98 - 198/88)  BP(mean): --  ABP: --  ABP(mean): --  RR: 18 (15 Apr 2024 05:27) (17 - 20)  SpO2: 98% (15 Apr 2024 05:27) (95% - 100%)    O2 Parameters below as of 15 Apr 2024 05:27  Patient On (Oxygen Delivery Method): room air                 Input and Output:  I&O's Detail      Lab Data                        10.5   9.63  )-----------( 167      ( 14 Apr 2024 14:14 )             34.5     04-14    140  |  103  |  37<H>  ----------------------------<  101<H>  5.3   |  31  |  8.40<H>    Ca    8.9      14 Apr 2024 16:05    TPro  6.5  /  Alb  3.0<L>  /  TBili  0.7  /  DBili  x   /  AST  13<L>  /  ALT  19  /  AlkPhos  102  04-14            Review of Systems	      Objective     Physical Examination    heart s1s2  lung dec bS  head nc      Pertinent Lab findings & Imaging      Ankush:  KAROLINE   Adequate UO     I&O's Detail           Discussed with:     Cultures:	        Radiology

## 2024-04-15 NOTE — PHYSICAL THERAPY INITIAL EVALUATION ADULT - ADDITIONAL COMMENTS
Pt lives with his wife and children in a private house, [address]. Patient reports being independent in ADLs and ambulation prior to admission.

## 2024-04-15 NOTE — CARE COORDINATION ASSESSMENT. - NSPASTMEDSURGHISTORY_GEN_ALL_CORE_FT
PAST MEDICAL & SURGICAL HISTORY:  Anemia      CKD (chronic kidney disease)      Hypercholesterolemia      DM2 (diabetes mellitus, type 2)      HTN (hypertension)      H/O eye surgery      GERD (gastroesophageal reflux disease)      HLD (hyperlipidemia)      CAD (coronary artery disease)      T2DM (type 2 diabetes mellitus)      ESRD on dialysis

## 2024-04-15 NOTE — CARE COORDINATION ASSESSMENT. - NSCAREPROVIDERS_GEN_ALL_CORE_FT
CARE PROVIDERS:  Accepting Physician: Terry Driscoll  Administration: John Pacheco  Administration: Endy Forte  Administration: Yonas Reed  Administration: Terry Driscoll  Administration: Cornelia Naylor  Admitting: Terry Driscoll  Attending: John Pacheco  Clinical Doc. Improvement: Grant Don  Consultant: Venkatesh Vazquez  Consultant: Mauricio Grimes  Covering Team: Yann Osorio  Covering Team: Koko Wolf  ED ACP: Aliza Doran  ED Attending: Manjeet Mckinney  ED Nurse: Tyra Hardin  Nurse: Celina Virgen  Nurse: Susy Ewing  Nurse: Bill Dill  Nurse: Anatoliy Maxwell  Nurse: Nell Childs  Nurse: Maggie Ball  Nurse: Hector Armijo  Ordered: ADM, User  Ordered: Pahlavan, Mohsen  Ordered: Doctor, Unknown  Ordered: ServiceAccount, [facility] CenterMLM  Outpatient Provider: Pahlavan, Mohsen  Override: Hector Armijo  PCA/Nursing Assistant: Mallorie Breen  PCA/Nursing Assistant: Manjeet Almaraz  Physical Therapy: Myrna De La Fuente  Primary Team: Tyra Arias  Primary Team: Radha Yoder  Primary Team: Broderick Ralph  Primary Team: Laila Mercado  Primary Team: Barbara Lopez  Primary Team: Wellington Leal  Research: Rola Owen  : Kate Romero  Team: [facility]  Hospitalists, Team

## 2024-04-15 NOTE — PROGRESS NOTE ADULT - SUBJECTIVE AND OBJECTIVE BOX
Patient is a 68y Male whom presented to the hospital with esrd on hd     PAST MEDICAL & SURGICAL HISTORY:  HTN (hypertension)      DM2 (diabetes mellitus, type 2)      Hypercholesterolemia      CKD (chronic kidney disease)      Anemia      ESRD on dialysis      T2DM (type 2 diabetes mellitus)      CAD (coronary artery disease)      HLD (hyperlipidemia)      GERD (gastroesophageal reflux disease)      H/O eye surgery          MEDICATIONS  (STANDING):  atorvastatin 40 milliGRAM(s) Oral at bedtime  dextrose 10% Bolus 125 milliLiter(s) IV Bolus once  dextrose 5%. 1000 milliLiter(s) (100 mL/Hr) IV Continuous <Continuous>  dextrose 5%. 1000 milliLiter(s) (50 mL/Hr) IV Continuous <Continuous>  dextrose 50% Injectable 25 Gram(s) IV Push once  dextrose 50% Injectable 12.5 Gram(s) IV Push once  ezetimibe 10 milliGRAM(s) Oral daily  glucagon  Injectable 1 milliGRAM(s) IntraMuscular once  heparin   Injectable 5000 Unit(s) SubCutaneous every 8 hours  insulin lispro (ADMELOG) corrective regimen sliding scale   SubCutaneous three times a day before meals  metoprolol tartrate 25 milliGRAM(s) Oral two times a day  pantoprazole    Tablet 40 milliGRAM(s) Oral before breakfast  ticagrelor 60 milliGRAM(s) Oral two times a day      Allergies    No Known Allergies    Intolerances        SOCIAL HISTORY:  Denies ETOh,Smoking,     FAMILY HISTORY:  No pertinent family history in first degree relatives        REVIEW OF SYSTEMS:    CONSTITUTIONAL: No weakness, fevers or chills  RESPIRATORY: No cough, wheezing, hemoptysis; No shortness of breath  CARDIOVASCULAR: No chest pain or palpitations  GASTROINTESTINAL: No abdominal or epigastric pain. No nausea, vomiting,     No diarrhea or constipation. No melena   SKIN: dry                          10.5   10.18 )-----------( 173      ( 15 Apr 2024 06:32 )             34.5       CBC Full  -  ( 15 Apr 2024 06:32 )  WBC Count : 10.18 K/uL  RBC Count : 3.51 M/uL  Hemoglobin : 10.5 g/dL  Hematocrit : 34.5 %  Platelet Count - Automated : 173 K/uL  Mean Cell Volume : 98.3 fl  Mean Cell Hemoglobin : 29.9 pg  Mean Cell Hemoglobin Concentration : 30.4 gm/dL  Auto Neutrophil # : 7.98 K/uL  Auto Lymphocyte # : 1.23 K/uL  Auto Monocyte # : 0.61 K/uL  Auto Eosinophil # : 0.20 K/uL  Auto Basophil # : 0.11 K/uL  Auto Neutrophil % : 78.3 %  Auto Lymphocyte % : 12.1 %  Auto Monocyte % : 6.0 %  Auto Eosinophil % : 2.0 %  Auto Basophil % : 1.1 %      04-15    134<L>  |  97  |  42<H>  ----------------------------<  176<H>  5.3   |  28  |  9.00<H>    Ca    8.4<L>      15 Apr 2024 06:32    TPro  6.5  /  Alb  3.0<L>  /  TBili  0.7  /  DBili  x   /  AST  13<L>  /  ALT  19  /  AlkPhos  102  04-14      CAPILLARY BLOOD GLUCOSE      POCT Blood Glucose.: 224 mg/dL (15 Apr 2024 11:36)  POCT Blood Glucose.: 136 mg/dL (15 Apr 2024 07:54)  POCT Blood Glucose.: 128 mg/dL (14 Apr 2024 20:59)  POCT Blood Glucose.: 86 mg/dL (14 Apr 2024 20:08)  POCT Blood Glucose.: 207 mg/dL (14 Apr 2024 12:49)  POCT Blood Glucose.: 196 mg/dL (14 Apr 2024 12:26)      Vital Signs Last 24 Hrs  T(C): 36.4 (15 Apr 2024 06:31), Max: 36.8 (14 Apr 2024 19:56)  T(F): 97.5 (15 Apr 2024 06:31), Max: 98.2 (14 Apr 2024 19:56)  HR: 74 (15 Apr 2024 06:31) (66 - 80)  BP: 173/73 (15 Apr 2024 06:31) (155/98 - 198/88)  BP(mean): --  RR: 18 (15 Apr 2024 06:31) (17 - 20)  SpO2: 93% (15 Apr 2024 10:00) (93% - 100%)    Parameters below as of 15 Apr 2024 10:00  Patient On (Oxygen Delivery Method): room air        Urinalysis Basic - ( 15 Apr 2024 06:32 )    Color: x / Appearance: x / SG: x / pH: x  Gluc: 176 mg/dL / Ketone: x  / Bili: x / Urobili: x   Blood: x / Protein: x / Nitrite: x   Leuk Esterase: x / RBC: x / WBC x   Sq Epi: x / Non Sq Epi: x / Bacteria: x        PT/INR - ( 15 Apr 2024 06:32 )   PT: 10.8 sec;   INR: 0.92 ratio         PTT - ( 14 Apr 2024 14:14 )  PTT:35.5 sec  PHYSICAL EXAM:    Constitutional: NAD  HEENT: conjunctive   clear   Neck:  No JVD  Respiratory: decreased bs b/l   Cardiovascular: S1 and S2  Gastrointestinal: BS+, soft, NT/ND  Extremities: No peripheral edema  Neurological:  no focal deficits  Psychiatric: Normal mood, normal affect  : No Lechuga  Skin: No rashes  Access: pos fistula      Patient is a 68y Male whom presented to the hospital with esrd on hd     PAST MEDICAL & SURGICAL HISTORY:  HTN (hypertension)      DM2 (diabetes mellitus, type 2)      Hypercholesterolemia      CKD (chronic kidney disease)      Anemia      ESRD on dialysis,      T2DM (type 2 diabetes mellitus)      CAD (coronary artery disease)      HLD (hyperlipidemia)      GERD (gastroesophageal reflux disease)      H/O eye surgery          MEDICATIONS  (STANDING):  atorvastatin 40 milliGRAM(s) Oral at bedtime  dextrose 10% Bolus 125 milliLiter(s) IV Bolus once  dextrose 5%. 1000 milliLiter(s) (100 mL/Hr) IV Continuous <Continuous>  dextrose 5%. 1000 milliLiter(s) (50 mL/Hr) IV Continuous <Continuous>  dextrose 50% Injectable 25 Gram(s) IV Push once  dextrose 50% Injectable 12.5 Gram(s) IV Push once  ezetimibe 10 milliGRAM(s) Oral daily  glucagon  Injectable 1 milliGRAM(s) IntraMuscular once  heparin   Injectable 5000 Unit(s) SubCutaneous every 8 hours  insulin lispro (ADMELOG) corrective regimen sliding scale   SubCutaneous three times a day before meals  metoprolol tartrate 25 milliGRAM(s) Oral two times a day  pantoprazole    Tablet 40 milliGRAM(s) Oral before breakfast  ticagrelor 60 milliGRAM(s) Oral two times a day      Allergies    No Known Allergies    Intolerances        SOCIAL HISTORY:  Denies ETOh,Smoking,     FAMILY HISTORY:  No pertinent family history in first degree relatives        REVIEW OF SYSTEMS:    CONSTITUTIONAL: No weakness, fevers or chills  RESPIRATORY: No cough, wheezing, hemoptysis; No shortness of breath  CARDIOVASCULAR: No chest pain or palpitations  GASTROINTESTINAL: No abdominal or epigastric pain. No nausea, vomiting,     No diarrhea or constipation. No melena   SKIN: dry                          10.5   10.18 )-----------( 173      ( 15 Apr 2024 06:32 )             34.5       CBC Full  -  ( 15 Apr 2024 06:32 )  WBC Count : 10.18 K/uL  RBC Count : 3.51 M/uL  Hemoglobin : 10.5 g/dL  Hematocrit : 34.5 %  Platelet Count - Automated : 173 K/uL  Mean Cell Volume : 98.3 fl  Mean Cell Hemoglobin : 29.9 pg  Mean Cell Hemoglobin Concentration : 30.4 gm/dL  Auto Neutrophil # : 7.98 K/uL  Auto Lymphocyte # : 1.23 K/uL  Auto Monocyte # : 0.61 K/uL  Auto Eosinophil # : 0.20 K/uL  Auto Basophil # : 0.11 K/uL  Auto Neutrophil % : 78.3 %  Auto Lymphocyte % : 12.1 %  Auto Monocyte % : 6.0 %  Auto Eosinophil % : 2.0 %  Auto Basophil % : 1.1 %      04-15    134<L>  |  97  |  42<H>  ----------------------------<  176<H>  5.3   |  28  |  9.00<H>    Ca    8.4<L>      15 Apr 2024 06:32    TPro  6.5  /  Alb  3.0<L>  /  TBili  0.7  /  DBili  x   /  AST  13<L>  /  ALT  19  /  AlkPhos  102  04-14      CAPILLARY BLOOD GLUCOSE      POCT Blood Glucose.: 224 mg/dL (15 Apr 2024 11:36)  POCT Blood Glucose.: 136 mg/dL (15 Apr 2024 07:54)  POCT Blood Glucose.: 128 mg/dL (14 Apr 2024 20:59)  POCT Blood Glucose.: 86 mg/dL (14 Apr 2024 20:08)  POCT Blood Glucose.: 207 mg/dL (14 Apr 2024 12:49)  POCT Blood Glucose.: 196 mg/dL (14 Apr 2024 12:26)      Vital Signs Last 24 Hrs  T(C): 36.4 (15 Apr 2024 06:31), Max: 36.8 (14 Apr 2024 19:56)  T(F): 97.5 (15 Apr 2024 06:31), Max: 98.2 (14 Apr 2024 19:56)  HR: 74 (15 Apr 2024 06:31) (66 - 80)  BP: 173/73 (15 Apr 2024 06:31) (155/98 - 198/88)  BP(mean): --  RR: 18 (15 Apr 2024 06:31) (17 - 20)  SpO2: 93% (15 Apr 2024 10:00) (93% - 100%)    Parameters below as of 15 Apr 2024 10:00  Patient On (Oxygen Delivery Method): room air        Urinalysis Basic - ( 15 Apr 2024 06:32 )    Color: x / Appearance: x / SG: x / pH: x  Gluc: 176 mg/dL / Ketone: x  / Bili: x / Urobili: x   Blood: x / Protein: x / Nitrite: x   Leuk Esterase: x / RBC: x / WBC x   Sq Epi: x / Non Sq Epi: x / Bacteria: x        PT/INR - ( 15 Apr 2024 06:32 )   PT: 10.8 sec;   INR: 0.92 ratio         PTT - ( 14 Apr 2024 14:14 )  PTT:35.5 sec  PHYSICAL EXAM:    Constitutional: NAD  HEENT: conjunctive   clear   Neck:  No JVD  Respiratory: decreased bs b/l   Cardiovascular: S1 and S2  Gastrointestinal: BS+, soft, NT/ND  Extremities: No peripheral edema  Neurological:  no focal deficits  Psychiatric: Normal mood, normal affect  : No Lechuga  Skin: No rashes  Access: pos fistula

## 2024-04-15 NOTE — PROGRESS NOTE ADULT - ASSESSMENT
67 yo male with PMHX T2DM, CKD on HD (M,W,F-[facility]), HTN, HLD, GERD, present to ED after having traveled to [place] c/o SOB, worsening x months, worst this AM.

## 2024-04-16 LAB
ANION GAP SERPL CALC-SCNC: 9 MMOL/L — SIGNIFICANT CHANGE UP (ref 5–17)
BUN SERPL-MCNC: 29 MG/DL — HIGH (ref 7–23)
CALCIUM SERPL-MCNC: 8.8 MG/DL — SIGNIFICANT CHANGE UP (ref 8.5–10.1)
CHLORIDE SERPL-SCNC: 98 MMOL/L — SIGNIFICANT CHANGE UP (ref 96–108)
CO2 SERPL-SCNC: 28 MMOL/L — SIGNIFICANT CHANGE UP (ref 22–31)
CREAT SERPL-MCNC: 6.7 MG/DL — HIGH (ref 0.5–1.3)
EGFR: 8 ML/MIN/1.73M2 — LOW
GLUCOSE SERPL-MCNC: 242 MG/DL — HIGH (ref 70–99)
MAGNESIUM SERPL-MCNC: 3 MG/DL — HIGH (ref 1.6–2.6)
PHOSPHATE SERPL-MCNC: 5.2 MG/DL — HIGH (ref 2.5–4.5)
POTASSIUM SERPL-MCNC: 5.3 MMOL/L — SIGNIFICANT CHANGE UP (ref 3.5–5.3)
POTASSIUM SERPL-SCNC: 5.3 MMOL/L — SIGNIFICANT CHANGE UP (ref 3.5–5.3)
SODIUM SERPL-SCNC: 135 MMOL/L — SIGNIFICANT CHANGE UP (ref 135–145)

## 2024-04-16 PROCEDURE — 99223 1ST HOSP IP/OBS HIGH 75: CPT

## 2024-04-16 PROCEDURE — 99232 SBSQ HOSP IP/OBS MODERATE 35: CPT

## 2024-04-16 PROCEDURE — 93010 ELECTROCARDIOGRAM REPORT: CPT

## 2024-04-16 RX ORDER — ALPRAZOLAM 0.25 MG
0.5 TABLET ORAL ONCE
Refills: 0 | Status: DISCONTINUED | OUTPATIENT
Start: 2024-04-16 | End: 2024-04-16

## 2024-04-16 RX ADMIN — Medication 3 MILLIGRAM(S): at 21:11

## 2024-04-16 RX ADMIN — Medication 25 MILLIGRAM(S): at 05:48

## 2024-04-16 RX ADMIN — EZETIMIBE 10 MILLIGRAM(S): 10 TABLET ORAL at 12:39

## 2024-04-16 RX ADMIN — Medication 1: at 12:50

## 2024-04-16 RX ADMIN — ATORVASTATIN CALCIUM 40 MILLIGRAM(S): 80 TABLET, FILM COATED ORAL at 21:09

## 2024-04-16 RX ADMIN — Medication 40 MILLIGRAM(S): at 05:49

## 2024-04-16 RX ADMIN — CALCITRIOL 0.25 MICROGRAM(S): 0.5 CAPSULE ORAL at 12:39

## 2024-04-16 RX ADMIN — HEPARIN SODIUM 5000 UNIT(S): 5000 INJECTION INTRAVENOUS; SUBCUTANEOUS at 14:35

## 2024-04-16 RX ADMIN — SENNA PLUS 2 TABLET(S): 8.6 TABLET ORAL at 21:09

## 2024-04-16 RX ADMIN — Medication 5 MILLIGRAM(S): at 00:56

## 2024-04-16 RX ADMIN — TICAGRELOR 60 MILLIGRAM(S): 90 TABLET ORAL at 05:48

## 2024-04-16 RX ADMIN — Medication 2: at 11:19

## 2024-04-16 RX ADMIN — POLYETHYLENE GLYCOL 3350 17 GRAM(S): 17 POWDER, FOR SOLUTION ORAL at 21:10

## 2024-04-16 RX ADMIN — HEPARIN SODIUM 5000 UNIT(S): 5000 INJECTION INTRAVENOUS; SUBCUTANEOUS at 05:49

## 2024-04-16 RX ADMIN — HEPARIN SODIUM 5000 UNIT(S): 5000 INJECTION INTRAVENOUS; SUBCUTANEOUS at 21:10

## 2024-04-16 RX ADMIN — PANTOPRAZOLE SODIUM 40 MILLIGRAM(S): 20 TABLET, DELAYED RELEASE ORAL at 05:48

## 2024-04-16 RX ADMIN — Medication 0.5 MILLIGRAM(S): at 03:42

## 2024-04-16 NOTE — CONSULT NOTE ADULT - NS ATTEND AMEND GEN_ALL_CORE FT
67 yo male with PMH HTN, HLD, DM, CKD [facility], present to ED c/o SOB, worsening x months. Follows Dr Davdi Shaw for cardiology     - Pt p/w SOB, imaging consistent with pulmonary edema  - Technically difficult ECHO showed normal LV & RV size and function EF 63%, There may be an area of hypokinesis of the basal inferoseptal wall and basal inferior wall on this technically difficult study. Mild LVH, mod LVDD, Trace AR, mild to modd MR, elevated RAP, mild pulm HTN   - Has h/o ESRD and is on HD  - HD per renal for fluid removal. For HD today  - Volume status improving.  Continue to remove with HD  - Continue Lasix 40 mg Po daily   - His LBBB is new since Jan 2023.Called Dr David Shaw for records.   - He reports that he has had recent ischemia testing. To review records once obtained from Dr. Shaw  - Continue Brilinta, statin, Zetia  - Continue BB   - -160s

## 2024-04-16 NOTE — PROGRESS NOTE ADULT - ASSESSMENT
67 yo male with PMHX T2DM, CKD on HD (M,W,F-[facility]), HTN, HLD, GERD, present to ED after having traveled to [place] c/o SOB, worsening x months, worst this AM.   Denies fever, chills, chest pain, palpitations, SOB, cough, abdominal pain, nausea, vomiting, diarrhea, constipation, urinary frequency, urgency, or dysuria, headaches, changes in vision, dizziness, numbness, tingling.  Denies recent travel, recent antibiotic use, or sick contacts.  ED Course: Vitals: BP: 155/98 , HR: 67, Temp: 97.3, RR: 20 , SpO2: 95% on RA   Labs:  WBC: 9.63, H/H: 10.5/34.5, K 6.0, Cr. 8.10, BNP: 23,411, RVP: Negative  UA: pending CXR:There has been interval development of a pulmonary edema   pattern. No consolidation or pleural collections. No pneumothorax.   Left-sided subclavian vascular stent redemonstrated. Prominent heart size.  EKG: sr with apcs. Possible Left atrial enlargement. QTC:499, VR: 65 Left bundle branch block      esrd on hd mwf plan for hd today , fluid removal at hd    Excess fluids and waste products will be removed from your blood; your electrolytes will be balanced; your blood pressure will be controlled.     ANEMIA PLAN:  Anemia of chronic disease:  Well controlled by Aranesp  H and H subtherapeutic .  We will continue epo aiming for a HCT of 32-36 %.   We will monitor Iron stores, B12 and RBC folate .    BP monitoring,continue current antihypertensive meds, low salt diet,followup with PMD in 1-2 weeks  metoprolol tartrate 25 milliGRAM(s) Oral two times a day    dvt heparin   Injectable 5000 Unit(s) SubCutaneous every 8 hours

## 2024-04-16 NOTE — PROGRESS NOTE ADULT - SUBJECTIVE AND OBJECTIVE BOX
PROGRESS NOTE:   Authored by Dr. John Pacheco MD, Available on MS Teams    Patient is a 68y old  Male who presents with a chief complaint of SOB (16 Apr 2024 12:08)      SUBJECTIVE / OVERNIGHT EVENTS:  Patient continues to have shortness of breath, palpitations     ADDITIONAL REVIEW OF SYSTEMS:    MEDICATIONS  (STANDING):  atorvastatin 40 milliGRAM(s) Oral at bedtime  calcitriol   Capsule 0.25 MICROGram(s) Oral daily  dextrose 10% Bolus 125 milliLiter(s) IV Bolus once  dextrose 5%. 1000 milliLiter(s) (100 mL/Hr) IV Continuous <Continuous>  dextrose 5%. 1000 milliLiter(s) (50 mL/Hr) IV Continuous <Continuous>  dextrose 50% Injectable 25 Gram(s) IV Push once  dextrose 50% Injectable 12.5 Gram(s) IV Push once  epoetin demetrius-epbx (RETACRIT) Injectable 46279 Unit(s) IV Push <User Schedule>  ezetimibe 10 milliGRAM(s) Oral daily  furosemide    Tablet 40 milliGRAM(s) Oral daily  glucagon  Injectable 1 milliGRAM(s) IntraMuscular once  heparin   Injectable 5000 Unit(s) SubCutaneous every 8 hours  insulin lispro (ADMELOG) corrective regimen sliding scale   SubCutaneous three times a day before meals  metoprolol tartrate 25 milliGRAM(s) Oral two times a day  pantoprazole    Tablet 40 milliGRAM(s) Oral before breakfast  polyethylene glycol 3350 17 Gram(s) Oral daily  senna 2 Tablet(s) Oral at bedtime  ticagrelor 60 milliGRAM(s) Oral two times a day    MEDICATIONS  (PRN):  acetaminophen     Tablet .. 650 milliGRAM(s) Oral every 6 hours PRN Temp greater or equal to 38C (100.4F), Mild Pain (1 - 3)  albuterol    90 MICROgram(s) HFA Inhaler 2 Puff(s) Inhalation every 6 hours PRN Shortness of Breath and/or Wheezing  aluminum hydroxide/magnesium hydroxide/simethicone Suspension 30 milliLiter(s) Oral every 4 hours PRN Dyspepsia  dextrose Oral Gel 15 Gram(s) Oral once PRN Blood Glucose LESS THAN 70 milliGRAM(s)/deciliter  melatonin 3 milliGRAM(s) Oral at bedtime PRN Insomnia  trimethobenzamide Injectable 200 milliGRAM(s) IntraMuscular every 8 hours PRN Nausea and/or Vomiting      CAPILLARY BLOOD GLUCOSE      POCT Blood Glucose.: 170 mg/dL (16 Apr 2024 12:48)  POCT Blood Glucose.: 222 mg/dL (16 Apr 2024 10:21)  POCT Blood Glucose.: 200 mg/dL (16 Apr 2024 07:58)  POCT Blood Glucose.: 115 mg/dL (15 Apr 2024 21:24)  POCT Blood Glucose.: 211 mg/dL (15 Apr 2024 16:55)  POCT Blood Glucose.: 218 mg/dL (15 Apr 2024 15:55)    I&O's Summary    15 Apr 2024 07:01  -  16 Apr 2024 07:00  --------------------------------------------------------  IN: 0 mL / OUT: 3500 mL / NET: -3500 mL        PHYSICAL EXAM:  Vital Signs Last 24 Hrs  T(C): 36.7 (16 Apr 2024 12:10), Max: 37.3 (15 Apr 2024 20:23)  T(F): 98 (16 Apr 2024 12:10), Max: 99.2 (15 Apr 2024 20:23)  HR: 59 (16 Apr 2024 12:10) (59 - 71)  BP: 164/75 (16 Apr 2024 12:10) (132/78 - 164/75)  BP(mean): --  RR: 18 (16 Apr 2024 12:10) (16 - 18)  SpO2: 98% (16 Apr 2024 12:10) (94% - 98%)    Parameters below as of 16 Apr 2024 12:10  Patient On (Oxygen Delivery Method): room air        CONSTITUTIONAL: NAD  RESPIRATORY: Normal respiratory effort; dec breath sounds, no wheezing  CARDIOVASCULAR: Regular rate and rhythm, normal S1 and S2, no murmur/rub/gallop; No lower extremity edema  ABDOMEN: Nontender to palpation, normoactive bowel sounds, no rebound/guarding  MUSCLOSKELETAL: no clubbing or cyanosis of digits; no joint swelling or tenderness to palpation  PSYCH: A+O to person, place, and time; affect appropriate    LABS:                        10.5   10.18 )-----------( 173      ( 15 Apr 2024 06:32 )             34.5     04-16    135  |  98  |  29<H>  ----------------------------<  242<H>  5.3   |  28  |  6.70<H>    Ca    8.8      16 Apr 2024 09:10  Phos  5.2     04-16  Mg     3.0     04-16    TPro  6.5  /  Alb  3.0<L>  /  TBili  0.7  /  DBili  x   /  AST  13<L>  /  ALT  19  /  AlkPhos  102  04-14    PT/INR - ( 15 Apr 2024 06:32 )   PT: 10.8 sec;   INR: 0.92 ratio         PTT - ( 14 Apr 2024 14:14 )  PTT:35.5 sec      Urinalysis Basic - ( 16 Apr 2024 09:10 )    Color: x / Appearance: x / SG: x / pH: x  Gluc: 242 mg/dL / Ketone: x  / Bili: x / Urobili: x   Blood: x / Protein: x / Nitrite: x   Leuk Esterase: x / RBC: x / WBC x   Sq Epi: x / Non Sq Epi: x / Bacteria: x        Culture - Urine (collected 15 Apr 2024 06:18)  Source: Clean Catch Clean Catch (Midstream)  Preliminary Report (16 Apr 2024 11:51):    50,000 - 99,000 CFU/mL Gram Negative Rods    <10,000 CFU/ml Normal Urogenital hallie present    Culture - Blood (collected 14 Apr 2024 14:30)  Source: .Blood Blood-Peripheral  Preliminary Report (15 Apr 2024 19:02):    No growth at 24 hours    Culture - Blood (collected 14 Apr 2024 14:14)  Source: .Blood Blood-Peripheral  Preliminary Report (15 Apr 2024 19:02):    No growth at 24 hours

## 2024-04-16 NOTE — PROGRESS NOTE ADULT - ASSESSMENT
69 yo male with PMHX T2DM, CKD on HD (M,W,F-[facility]), HTN, HLD, GERD, present to ED after having traveled to [place] c/o SOB, worsening x months    CKD  Anemia  Pulm Edema  OP  OA  Smoker  DM  HTN  HLD  GERD    VBG noted  smoking cess ed  proventil PRN for son and or wheeze  monitor VS and HD and Sat  goal sat > 88 pct  cxr - c/w Pulm Edema  HD as per RENAL recs  I and O  on lasix - [facility] rx regimen - BP control -   D dimer normal  TTE -   PFT as outpatient

## 2024-04-16 NOTE — CONSULT NOTE ADULT - SUBJECTIVE AND OBJECTIVE BOX
[facility] - Karl Jackson, Familia, Donnie, Selin, Yobany, Nabeel; Office Number: [phone]    Initial Consult Note  CHIEF COMPLAINT: Patient is a 68y old  Male who presents with a chief complaint of SOB (16 Apr 2024 10:26)    HPI: 67 yo male with PMH HTN, HLD, DM, CKD on HD (M,W,F-[place]), GERD, present to ED after having traveled to [facility] c/o SOB, worsening x months. CXR showing: pulmonary edema  pattern. CT chest NON-CON concerning for possible CHF, shows b/l pleural effusions. Admitted for volume overload. Renal following for HD.     Allergies  No Known Allergies    PAST MEDICAL & SURGICAL HISTORY:  HTN (hypertension)      DM2 (diabetes mellitus, type 2)      Hypercholesterolemia      CKD (chronic kidney disease)      Anemia      Anemia      ESRD on dialysis      T2DM (type 2 diabetes mellitus)      CAD (coronary artery disease)      HLD (hyperlipidemia)      GERD (gastroesophageal reflux disease)      H/O eye surgery        MEDICATIONS  (STANDING):  atorvastatin 40 milliGRAM(s) Oral at bedtime  calcitriol   Capsule 0.25 MICROGram(s) Oral daily  dextrose 10% Bolus 125 milliLiter(s) IV Bolus once  dextrose 5%. 1000 milliLiter(s) (100 mL/Hr) IV Continuous <Continuous>  dextrose 5%. 1000 milliLiter(s) (50 mL/Hr) IV Continuous <Continuous>  dextrose 50% Injectable 25 Gram(s) IV Push once  dextrose 50% Injectable 12.5 Gram(s) IV Push once  epoetin demetrius-epbx (RETACRIT) Injectable 94500 Unit(s) IV Push <User Schedule>  ezetimibe 10 milliGRAM(s) Oral daily  furosemide    Tablet 40 milliGRAM(s) Oral daily  glucagon  Injectable 1 milliGRAM(s) IntraMuscular once  heparin   Injectable 5000 Unit(s) SubCutaneous every 8 hours  insulin lispro (ADMELOG) corrective regimen sliding scale   SubCutaneous three times a day before meals  metoprolol tartrate 25 milliGRAM(s) Oral two times a day  pantoprazole    Tablet 40 milliGRAM(s) Oral before breakfast  polyethylene glycol 3350 17 Gram(s) Oral daily  senna 2 Tablet(s) Oral at bedtime  ticagrelor 60 milliGRAM(s) Oral two times a day    MEDICATIONS  (PRN):  acetaminophen     Tablet .. 650 milliGRAM(s) Oral every 6 hours PRN Temp greater or equal to 38C (100.4F), Mild Pain (1 - 3)  albuterol    90 MICROgram(s) HFA Inhaler 2 Puff(s) Inhalation every 6 hours PRN Shortness of Breath and/or Wheezing  aluminum hydroxide/magnesium hydroxide/simethicone Suspension 30 milliLiter(s) Oral every 4 hours PRN Dyspepsia  dextrose Oral Gel 15 Gram(s) Oral once PRN Blood Glucose LESS THAN 70 milliGRAM(s)/deciliter  melatonin 3 milliGRAM(s) Oral at bedtime PRN Insomnia  trimethobenzamide Injectable 200 milliGRAM(s) IntraMuscular every 8 hours PRN Nausea and/or Vomiting    FAMILY HISTORY:  No pertinent family history in first degree relatives       No family history of acute MI or sudden cardiac death.    SOCIAL HISTORY: No active tobacco, ethanol, or drug abuse.    REVIEW OF SYSTEMS   All other review of systems is negative unless indicated above.    VITAL SIGNS:   Vital Signs Last 24 Hrs  T(C): 36.6 (16 Apr 2024 04:33), Max: 37.3 (15 Apr 2024 20:23)  T(F): 97.8 (16 Apr 2024 04:33), Max: 99.2 (15 Apr 2024 20:23)  HR: 65 (16 Apr 2024 04:33) (65 - 71)  BP: 157/67 (16 Apr 2024 04:33) (132/78 - 158/58)  BP(mean): --  RR: 18 (16 Apr 2024 04:33) (16 - 18)  SpO2: 97% (16 Apr 2024 04:33) (94% - 98%)    Parameters below as of 16 Apr 2024 04:33  Patient On (Oxygen Delivery Method): room air        Physical Exam:  Constitutional: NAD, awake and alert  HEENT: Moist Mucous Membranes, Anicteric  Pulmonary: Non-labored, breath sounds are clear bilaterally, No wheezing, rales or rhonchi  Cardiovascular: Regular, S1 and S2, No murmurs, No rubs, gallops or clicks  Gastrointestinal: Bowel Sounds present, soft, nontender.   Lymph: No peripheral edema. No lymphadenopathy.  Skin: No visible rashes or ulcers.  Psych:  Mood & affect appropriate    I&O's Summary    15 Apr 2024 07:01  -  16 Apr 2024 07:00  --------------------------------------------------------  IN: 0 mL / OUT: 3500 mL / NET: -3500 mL        LABS: All Labs Reviewed:                        10.5   10.18 )-----------( 173      ( 15 Apr 2024 06:32 )             34.5                         10.5   9.63  )-----------( 167      ( 14 Apr 2024 14:14 )             34.5     16 Apr 2024 09:10    135    |  98     |  29     ----------------------------<  242    5.3     |  28     |  6.70   15 Apr 2024 06:32    134    |  97     |  42     ----------------------------<  176    5.3     |  28     |  9.00   14 Apr 2024 16:05    140    |  103    |  37     ----------------------------<  101    5.3     |  31     |  8.40     Ca    8.8        16 Apr 2024 09:10  Ca    8.4        15 Apr 2024 06:32  Ca    8.9        14 Apr 2024 16:05  Phos  5.2       16 Apr 2024 09:10  Mg     3.0       16 Apr 2024 09:10    TPro  6.5    /  Alb  3.0    /  TBili  0.7    /  DBili  x      /  AST  13     /  ALT  19     /  AlkPhos  102    14 Apr 2024 16:05  TPro  6.7    /  Alb  3.1    /  TBili  0.7    /  DBili  x      /  AST  24     /  ALT  20     /  AlkPhos  109    14 Apr 2024 14:14    PT/INR - ( 15 Apr 2024 06:32 )   PT: 10.8 sec;   INR: 0.92 ratio         PTT - ( 14 Apr 2024 14:14 )  PTT:35.5 secTroponin I, High Sensitivity Result: 16.0 ng/L (04-14-24 @ 14:14)  D-Dimer Assay, Quantitative: 239 ng/mL DDU (04-14-24 @ 14:14)    Organism --  Gram Stain Blood -- Gram Stain --  Specimen Source Clean Catch Clean Catch (Midstream)  Culture-Blood --    Organism --  Gram Stain Blood -- Gram Stain --  Specimen Source .Blood Blood-Peripheral  Culture-Blood --    Organism --  Gram Stain Blood -- Gram Stain --  Specimen Source .Blood Blood-Peripheral  Culture-Blood --      12 Lead ECG:   Ventricular Rate 65 BPM    Atrial Rate 65 BPM    P-R Interval 158 ms    QRS Duration 122 ms    Q-T Interval 480 ms    QTC Calculation(Bazett) 499 ms    P Axis 60 degrees    R Axis -38 degrees    T Axis 103 degrees    Diagnosis Line sr with apcs  Possible Left atrial enlargement  Left bundle branch block  Abnormal ECG    Confirmed by antonina Haywood ([id number]) on 4/14/2024 3:15:09 PM (04-14-24 @ 14:24)      TRANSTHORACIC ECHOCARDIOGRAM REPORT  ________________________________________________________________________________                                      _______       Pt. Name:       NERY LEONARD   Study Date:    4/15/2024  MRN:            [id number]  YOB: 1955  Accession #:    [id number]   Age:           68 years  Account#:       [id number]  Gender:        M  Visit ID#  Heart Rate:                 Height:        68.90 in (175.00 cm)  Rhythm:                     Weight:        185.19 lb (84.00 kg)  Blood Pressure: 173/73 mmHg BSA/BMI:       2.00 m² / 27.43 kg/m²  ________________________________________________________________________________________  Referring Physician:    Laila Mercado MD  Interpreting Physician: Lorie Lees MD  Primary Sonographer:    Anderson Vivas    CPT:               ECHO TTE WO CON COMP W DOPP - 87568.m  Indication(s):     Edema, unspecified - R60.9  Procedure:         Transthoracic echocardiogram with 2-D, M-mode and complete                     spectral and color flow Doppler.  Ordering Location: [facility]  Admission Status:  Inpatient  Study Information: Image quality for this study is technically difficult.    _______________________________________________________________________________________     CONCLUSIONS:      1. Technically difficult image quality.   2. Left ventricular systolic function is normal with an ejection fraction of 63 % by Pompa's method of disks. There may be an area of hypokinesis of the basal inferoseptal wall and basal inferior wall on this technically difficult study.   3. Mild left ventricular hypertrophy.   4. There is moderate (grade 2) left ventricular diastolic dysfunction.   5. Normal right ventricular cavity size and normal systolic function.   6. There iscalcification of the mitral valve annulus.   7. Trace aortic regurgitation.   8. Mild to moderate mitral regurgitation.   9. The inferior vena cava is dilated measuring 2.39 cm in diameter, (dilated >2.1cm) with normal inspiratory collapse (normal >50%) consistent with mildly elevated right atrial pressure (~8, range 5-10mmHg).  10. Estimated pulmonary artery systolic pressure is 40 mmHg, consistent with mild pulmonary hypertension.  11. Small right pleural effusion noted.    ________________________________________________________________________________________  FINDINGS:     Left Ventricle:  Left ventricular systolic function is normal with a calculated ejection fraction of 63 % by the Pompa's biplane method of disks. There is moderate (grade 2) left ventricular diastolic dysfunction. Mild left ventricular hypertrophy.     Right Ventricle:  The right ventricular cavity is normal in size and normal systolic function. Tricuspid annular plane systolic excursion (TAPSE) is 2.2 cm (normal>=1.7 cm).     Left Atrium:  The left atrium is normal in size with an indexed volume of 28.59 ml/m².     Right Atrium:  The right atrium is normal in size.     Aortic Valve:  The aortic valve anatomy cannot be determined. There is mild calcificationof the aortic valve leaflets. There is no aortic valve stenosis. There is trace aortic regurgitation.     Mitral Valve:  There is calcification of the mitral valve annulus. There is mild to moderate mitral regurgitation.     Tricuspid Valve:  There is mild tricuspid regurgitation. Estimated pulmonary artery systolic pressure is 40 mmHg, consistent with mild pulmonary hypertension.     Pulmonic Valve:  The pulmonic valve was not well visualized. There is trace pulmonic regurgitation.     Aorta:  The aortic root at the sinuses of Valsalva is normal in size, measuring 2.90 cm (indexed 1.45 cm/m²).     Pleura:  Small right pleural effusion noted.     Systemic Veins:  The inferior vena cava is dilated measuring 2.39 cm in diameter, (dilated >2.1cm) with normal inspiratory collapse (normal >50%) consistent with mildly elevated right atrial pressure (~8, range 5-10mmHg).  ____________________________________________________________________  QUANTITATIVE DATA:  Left Ventricle Measurements: (Indexed to BSA)     IVSd (2D):   1.1 cm  LVPWd (2D):  1.2 cm  LVIDd (2D):  5.2 cm  LVIDs (2D):  3.2 cm  LV Mass:     232 g  116.4 g/m²  LV Vol d, MOD A2C: 128.0 ml 64.09 ml/m²  LV Vol d, MOD A4C: 74.1 ml  37.10 ml/m²  LV Vol d, MOD BP:  110.5 ml 55.35 ml/m²  LV Vol s, MOD A2C: 49.6 ml  24.84 ml/m²  LV Vol s, MOD A4C: 23.6 ml  11.82 ml/m²  LV Vol s, MOD BP:  41.0 ml  20.53 ml/m²  LVOT SV MOD BP:    69.5 ml  LV EF% MOD BP:     63 %     MV E Vmax:    1.21 m/s  MV A Vmax:    0.74 m/s  MV E/A:       1.64  e' lateral:   7.18 cm/s  e' medial:    7.29 cm/s  E/e' lateral: 16.85  E/e' medial:  16.60  E/e' Average: 16.72  MV DT:        258 msec    Aorta Measurements: (Normal range) (Indexed to BSA)     Sinuses of Valsalva: 2.90 cm (3.1 - 3.7 cm)  Ao Asc prox:        3.40 cm       Left Atrium Measurements: (Indexed to BSA)  LA Diam 2D: 4.20 cm    Right Ventricle Measurements:     TAPSE:            2.2 cm  RV Base (RVID1):  3.2 cm  RV Mid (RVID2):   3.2 cm  RV Major (RVID3): 7.5 cm       LVOT / RVOT/ Qp/QsData: (Indexed to BSA)  LVOT Diameter: 1.80 cm    Mitral Valve Measurements:     MV E Vmax: 1.2 m/s         MR Vmax:          4.84 m/s  MV A Vmax: 0.7 m/s         MR Peak Gradient: 93.7 mmHg  MV E/A:    1.6       Tricuspid Valve Measurements:     TR Vmax:          2.8 m/s  TR Peak Gradient: 31.9 mmHg  RA Pressure:      8 mmHg  PASP:             40 mmHg    ________________________________________________________________________________________  Electronically signed on 4/15/2024 at 3:45:34 PM by Lorie Lees MD         *** Final ***   [facility] - Karl Jackson, Familia, Donnie, Selin, Yobany, Nabeel; Office Number: [phone]    Initial Consult Note  CHIEF COMPLAINT: Patient is a 68y old  Male who presents with a chief complaint of SOB (16 Apr 2024 10:26)    HPI: 69 yo male with PMH HTN, HLD, DM, CKD on HD (M,W,F-[place]), GERD, present to ED after having traveled to [facility] c/o SOB, worsening x months. CXR showing: pulmonary edema  pattern. CT chest NON-CON concerning for possible CHF, shows b/l pleural effusions. Admitted for volume overload. Renal following for HD. Patient seen and examined. Patient awake, alert, resting in bed. No complaints of chest pain, dyspnea, palpitations or dizziness. Tolerating room air. Pt s/p HD yesterday. Reports that he feels ok during day. But he has difficulty sleeping at night. He gets SOB in bed and could not sleep last night. For HD today.     Allergies  No Known Allergies    PAST MEDICAL & SURGICAL HISTORY:  HTN (hypertension)      DM2 (diabetes mellitus, type 2)      Hypercholesterolemia      CKD (chronic kidney disease)      Anemia      Anemia      ESRD on dialysis      T2DM (type 2 diabetes mellitus)      CAD (coronary artery disease)      HLD (hyperlipidemia)      GERD (gastroesophageal reflux disease)      H/O eye surgery        MEDICATIONS  (STANDING):  atorvastatin 40 milliGRAM(s) Oral at bedtime  calcitriol   Capsule 0.25 MICROGram(s) Oral daily  dextrose 10% Bolus 125 milliLiter(s) IV Bolus once  dextrose 5%. 1000 milliLiter(s) (100 mL/Hr) IV Continuous <Continuous>  dextrose 5%. 1000 milliLiter(s) (50 mL/Hr) IV Continuous <Continuous>  dextrose 50% Injectable 25 Gram(s) IV Push once  dextrose 50% Injectable 12.5 Gram(s) IV Push once  epoetin demetrius-epbx (RETACRIT) Injectable 88091 Unit(s) IV Push <User Schedule>  ezetimibe 10 milliGRAM(s) Oral daily  furosemide    Tablet 40 milliGRAM(s) Oral daily  glucagon  Injectable 1 milliGRAM(s) IntraMuscular once  heparin   Injectable 5000 Unit(s) SubCutaneous every 8 hours  insulin lispro (ADMELOG) corrective regimen sliding scale   SubCutaneous three times a day before meals  metoprolol tartrate 25 milliGRAM(s) Oral two times a day  pantoprazole    Tablet 40 milliGRAM(s) Oral before breakfast  polyethylene glycol 3350 17 Gram(s) Oral daily  senna 2 Tablet(s) Oral at bedtime  ticagrelor 60 milliGRAM(s) Oral two times a day    MEDICATIONS  (PRN):  acetaminophen     Tablet .. 650 milliGRAM(s) Oral every 6 hours PRN Temp greater or equal to 38C (100.4F), Mild Pain (1 - 3)  albuterol    90 MICROgram(s) HFA Inhaler 2 Puff(s) Inhalation every 6 hours PRN Shortness of Breath and/or Wheezing  aluminum hydroxide/magnesium hydroxide/simethicone Suspension 30 milliLiter(s) Oral every 4 hours PRN Dyspepsia  dextrose Oral Gel 15 Gram(s) Oral once PRN Blood Glucose LESS THAN 70 milliGRAM(s)/deciliter  melatonin 3 milliGRAM(s) Oral at bedtime PRN Insomnia  trimethobenzamide Injectable 200 milliGRAM(s) IntraMuscular every 8 hours PRN Nausea and/or Vomiting    FAMILY HISTORY:  No pertinent family history in first degree relatives       No family history of acute MI or sudden cardiac death.    SOCIAL HISTORY: No active tobacco, ethanol, or drug abuse.    REVIEW OF SYSTEMS   All other review of systems is negative unless indicated above.    VITAL SIGNS:   Vital Signs Last 24 Hrs  T(C): 36.6 (16 Apr 2024 04:33), Max: 37.3 (15 Apr 2024 20:23)  T(F): 97.8 (16 Apr 2024 04:33), Max: 99.2 (15 Apr 2024 20:23)  HR: 65 (16 Apr 2024 04:33) (65 - 71)  BP: 157/67 (16 Apr 2024 04:33) (132/78 - 158/58)  BP(mean): --  RR: 18 (16 Apr 2024 04:33) (16 - 18)  SpO2: 97% (16 Apr 2024 04:33) (94% - 98%)    Parameters below as of 16 Apr 2024 04:33  Patient On (Oxygen Delivery Method): room air        Physical Exam:  Constitutional: NAD, awake and alert  HEENT: Moist Mucous Membranes, Anicteric  Pulmonary: Non-labored, breath sounds are clear bilaterally, No wheezing, rales or rhonchi  Cardiovascular: Regular, S1 and S2, No murmurs, No rubs, gallops or clicks  Gastrointestinal: Bowel Sounds present, soft, nontender.   Lymph: No peripheral edema. No lymphadenopathy.  Skin: No visible rashes or ulcers.  Psych:  Mood & affect appropriate    I&O's Summary    15 Apr 2024 07:01  -  16 Apr 2024 07:00  --------------------------------------------------------  IN: 0 mL / OUT: 3500 mL / NET: -3500 mL        LABS: All Labs Reviewed:                        10.5   10.18 )-----------( 173      ( 15 Apr 2024 06:32 )             34.5                         10.5   9.63  )-----------( 167      ( 14 Apr 2024 14:14 )             34.5     16 Apr 2024 09:10    135    |  98     |  29     ----------------------------<  242    5.3     |  28     |  6.70   15 Apr 2024 06:32    134    |  97     |  42     ----------------------------<  176    5.3     |  28     |  9.00   14 Apr 2024 16:05    140    |  103    |  37     ----------------------------<  101    5.3     |  31     |  8.40     Ca    8.8        16 Apr 2024 09:10  Ca    8.4        15 Apr 2024 06:32  Ca    8.9        14 Apr 2024 16:05  Phos  5.2       16 Apr 2024 09:10  Mg     3.0       16 Apr 2024 09:10    TPro  6.5    /  Alb  3.0    /  TBili  0.7    /  DBili  x      /  AST  13     /  ALT  19     /  AlkPhos  102    14 Apr 2024 16:05  TPro  6.7    /  Alb  3.1    /  TBili  0.7    /  DBili  x      /  AST  24     /  ALT  20     /  AlkPhos  109    14 Apr 2024 14:14    PT/INR - ( 15 Apr 2024 06:32 )   PT: 10.8 sec;   INR: 0.92 ratio         PTT - ( 14 Apr 2024 14:14 )  PTT:35.5 secTroponin I, High Sensitivity Result: 16.0 ng/L (04-14-24 @ 14:14)  D-Dimer Assay, Quantitative: 239 ng/mL DDU (04-14-24 @ 14:14)    Organism --  Gram Stain Blood -- Gram Stain --  Specimen Source Clean Catch Clean Catch (Midstream)  Culture-Blood --    Organism --  Gram Stain Blood -- Gram Stain --  Specimen Source .Blood Blood-Peripheral  Culture-Blood --    Organism --  Gram Stain Blood -- Gram Stain --  Specimen Source .Blood Blood-Peripheral  Culture-Blood --      12 Lead ECG:   Ventricular Rate 65 BPM    Atrial Rate 65 BPM    P-R Interval 158 ms    QRS Duration 122 ms    Q-T Interval 480 ms    QTC Calculation(Bazett) 499 ms    P Axis 60 degrees    R Axis -38 degrees    T Axis 103 degrees    Diagnosis Line sr with apcs  Possible Left atrial enlargement  Left bundle branch block  Abnormal ECG    Confirmed by antonina Haywood ([id number]) on 4/14/2024 3:15:09 PM (04-14-24 @ 14:24)      TRANSTHORACIC ECHOCARDIOGRAM REPORT  ________________________________________________________________________________                                      _______       Pt. Name:       NERY LEONARD   Study Date:    4/15/2024  MRN:            [id number]  YOB: 1955  Accession #:    [id number]   Age:           68 years  Account#:       [id number]  Gender:        M  Visit ID#  Heart Rate:                 Height:        68.90 in (175.00 cm)  Rhythm:                     Weight:        185.19 lb (84.00 kg)  Blood Pressure: 173/73 mmHg BSA/BMI:       2.00 m² / 27.43 kg/m²  ________________________________________________________________________________________  Referring Physician:    Laila Mercado MD  Interpreting Physician: Lorie Lees MD  Primary Sonographer:    Anderson Vivas    CPT:               ECHO TTE WO CON COMP W DOPP - 17417.m  Indication(s):     Edema, unspecified - R60.9  Procedure:         Transthoracic echocardiogram with 2-D, M-mode and complete                     spectral and color flow Doppler.  Ordering Location: [facility]  Admission Status:  Inpatient  Study Information: Image quality for this study is technically difficult.    _______________________________________________________________________________________     CONCLUSIONS:      1. Technically difficult image quality.   2. Left ventricular systolic function is normal with an ejection fraction of 63 % by Pompa's method of disks. There may be an area of hypokinesis of the basal inferoseptal wall and basal inferior wall on this technically difficult study.   3. Mild left ventricular hypertrophy.   4. There is moderate (grade 2) left ventricular diastolic dysfunction.   5. Normal right ventricular cavity size and normal systolic function.   6. There iscalcification of the mitral valve annulus.   7. Trace aortic regurgitation.   8. Mild to moderate mitral regurgitation.   9. The inferior vena cava is dilated measuring 2.39 cm in diameter, (dilated >2.1cm) with normal inspiratory collapse (normal >50%) consistent with mildly elevated right atrial pressure (~8, range 5-10mmHg).  10. Estimated pulmonary artery systolic pressure is 40 mmHg, consistent with mild pulmonary hypertension.  11. Small right pleural effusion noted.    ________________________________________________________________________________________  FINDINGS:     Left Ventricle:  Left ventricular systolic function is normal with a calculated ejection fraction of 63 % by the Pompa's biplane method of disks. There is moderate (grade 2) left ventricular diastolic dysfunction. Mild left ventricular hypertrophy.     Right Ventricle:  The right ventricular cavity is normal in size and normal systolic function. Tricuspid annular plane systolic excursion (TAPSE) is 2.2 cm (normal>=1.7 cm).     Left Atrium:  The left atrium is normal in size with an indexed volume of 28.59 ml/m².     Right Atrium:  The right atrium is normal in size.     Aortic Valve:  The aortic valve anatomy cannot be determined. There is mild calcificationof the aortic valve leaflets. There is no aortic valve stenosis. There is trace aortic regurgitation.     Mitral Valve:  There is calcification of the mitral valve annulus. There is mild to moderate mitral regurgitation.     Tricuspid Valve:  There is mild tricuspid regurgitation. Estimated pulmonary artery systolic pressure is 40 mmHg, consistent with mild pulmonary hypertension.     Pulmonic Valve:  The pulmonic valve was not well visualized. There is trace pulmonic regurgitation.     Aorta:  The aortic root at the sinuses of Valsalva is normal in size, measuring 2.90 cm (indexed 1.45 cm/m²).     Pleura:  Small right pleural effusion noted.     Systemic Veins:  The inferior vena cava is dilated measuring 2.39 cm in diameter, (dilated >2.1cm) with normal inspiratory collapse (normal >50%) consistent with mildly elevated right atrial pressure (~8, range 5-10mmHg).  ____________________________________________________________________  QUANTITATIVE DATA:  Left Ventricle Measurements: (Indexed to BSA)     IVSd (2D):   1.1 cm  LVPWd (2D):  1.2 cm  LVIDd (2D):  5.2 cm  LVIDs (2D):  3.2 cm  LV Mass:     232 g  116.4 g/m²  LV Vol d, MOD A2C: 128.0 ml 64.09 ml/m²  LV Vol d, MOD A4C: 74.1 ml  37.10 ml/m²  LV Vol d, MOD BP:  110.5 ml 55.35 ml/m²  LV Vol s, MOD A2C: 49.6 ml  24.84 ml/m²  LV Vol s, MOD A4C: 23.6 ml  11.82 ml/m²  LV Vol s, MOD BP:  41.0 ml  20.53 ml/m²  LVOT SV MOD BP:    69.5 ml  LV EF% MOD BP:     63 %     MV E Vmax:    1.21 m/s  MV A Vmax:    0.74 m/s  MV E/A:       1.64  e' lateral:   7.18 cm/s  e' medial:    7.29 cm/s  E/e' lateral: 16.85  E/e' medial:  16.60  E/e' Average: 16.72  MV DT:        258 msec    Aorta Measurements: (Normal range) (Indexed to BSA)     Sinuses of Valsalva: 2.90 cm (3.1 - 3.7 cm)  Ao Asc prox:        3.40 cm       Left Atrium Measurements: (Indexed to BSA)  LA Diam 2D: 4.20 cm    Right Ventricle Measurements:     TAPSE:            2.2 cm  RV Base (RVID1):  3.2 cm  RV Mid (RVID2):   3.2 cm  RV Major (RVID3): 7.5 cm       LVOT / RVOT/ Qp/QsData: (Indexed to BSA)  LVOT Diameter: 1.80 cm    Mitral Valve Measurements:     MV E Vmax: 1.2 m/s         MR Vmax:          4.84 m/s  MV A Vmax: 0.7 m/s         MR Peak Gradient: 93.7 mmHg  MV E/A:    1.6       Tricuspid Valve Measurements:     TR Vmax:          2.8 m/s  TR Peak Gradient: 31.9 mmHg  RA Pressure:      8 mmHg  PASP:             40 mmHg    ________________________________________________________________________________________  Electronically signed on 4/15/2024 at 3:45:34 PM by Lorie Lees MD         *** Final ***

## 2024-04-16 NOTE — PROGRESS NOTE ADULT - SUBJECTIVE AND OBJECTIVE BOX
Patient is a 68y Male whom presented to the hospital with esrd on hd     PAST MEDICAL & SURGICAL HISTORY:  HTN (hypertension)      DM2 (diabetes mellitus, type 2)      Hypercholesterolemia      CKD (chronic kidney disease)      Anemia      ESRD on dialysis,      T2DM (type 2 diabetes mellitus)      CAD (coronary artery disease)      HLD (hyperlipidemia)      GERD (gastroesophageal reflux disease)      H/O eye surgery          MEDICATIONS  (STANDING):  atorvastatin 40 milliGRAM(s) Oral at bedtime  dextrose 10% Bolus 125 milliLiter(s) IV Bolus once  dextrose 5%. 1000 milliLiter(s) (100 mL/Hr) IV Continuous <Continuous>  dextrose 5%. 1000 milliLiter(s) (50 mL/Hr) IV Continuous <Continuous>  dextrose 50% Injectable 25 Gram(s) IV Push once  dextrose 50% Injectable 12.5 Gram(s) IV Push once  ezetimibe 10 milliGRAM(s) Oral daily  glucagon  Injectable 1 milliGRAM(s) IntraMuscular once  heparin   Injectable 5000 Unit(s) SubCutaneous every 8 hours  insulin lispro (ADMELOG) corrective regimen sliding scale   SubCutaneous three times a day before meals  metoprolol tartrate 25 milliGRAM(s) Oral two times a day  pantoprazole    Tablet 40 milliGRAM(s) Oral before breakfast  ticagrelor 60 milliGRAM(s) Oral two times a day      Allergies    No Known Allergies    Intolerances        SOCIAL HISTORY:  Denies ETOh,Smoking,     FAMILY HISTORY:  No pertinent family history in first degree relatives        REVIEW OF SYSTEMS:    CONSTITUTIONAL: No weakness, fevers or chills  RESPIRATORY: No cough, wheezing, hemoptysis; No shortness of breath  CARDIOVASCULAR: No chest pain or palpitations  GASTROINTESTINAL: No abdominal or epigastric pain. No nausea, vomiting,     No diarrhea or constipation. No melena   SKIN: dry                                              10.5   10.18 )-----------( 173      ( 15 Apr 2024 06:32 )             34.5       CBC Full  -  ( 15 Apr 2024 06:32 )  WBC Count : 10.18 K/uL  RBC Count : 3.51 M/uL  Hemoglobin : 10.5 g/dL  Hematocrit : 34.5 %  Platelet Count - Automated : 173 K/uL  Mean Cell Volume : 98.3 fl  Mean Cell Hemoglobin : 29.9 pg  Mean Cell Hemoglobin Concentration : 30.4 gm/dL  Auto Neutrophil # : 7.98 K/uL  Auto Lymphocyte # : 1.23 K/uL  Auto Monocyte # : 0.61 K/uL  Auto Eosinophil # : 0.20 K/uL  Auto Basophil # : 0.11 K/uL  Auto Neutrophil % : 78.3 %  Auto Lymphocyte % : 12.1 %  Auto Monocyte % : 6.0 %  Auto Eosinophil % : 2.0 %  Auto Basophil % : 1.1 %      04-16    135  |  98  |  29<H>  ----------------------------<  242<H>  5.3   |  28  |  6.70<H>    Ca    8.8      16 Apr 2024 09:10  Phos  5.2     04-16  Mg     3.0     04-16    TPro  6.5  /  Alb  3.0<L>  /  TBili  0.7  /  DBili  x   /  AST  13<L>  /  ALT  19  /  AlkPhos  102  04-14      CAPILLARY BLOOD GLUCOSE      POCT Blood Glucose.: 222 mg/dL (16 Apr 2024 10:21)  POCT Blood Glucose.: 200 mg/dL (16 Apr 2024 07:58)  POCT Blood Glucose.: 115 mg/dL (15 Apr 2024 21:24)  POCT Blood Glucose.: 211 mg/dL (15 Apr 2024 16:55)  POCT Blood Glucose.: 218 mg/dL (15 Apr 2024 15:55)  POCT Blood Glucose.: 224 mg/dL (15 Apr 2024 11:36)      Vital Signs Last 24 Hrs  T(C): 36.6 (16 Apr 2024 04:33), Max: 37.3 (15 Apr 2024 20:23)  T(F): 97.8 (16 Apr 2024 04:33), Max: 99.2 (15 Apr 2024 20:23)  HR: 65 (16 Apr 2024 04:33) (65 - 71)  BP: 157/67 (16 Apr 2024 04:33) (132/78 - 169/67)  BP(mean): --  RR: 18 (16 Apr 2024 04:33) (16 - 20)  SpO2: 97% (16 Apr 2024 04:33) (94% - 98%)    Parameters below as of 16 Apr 2024 04:33  Patient On (Oxygen Delivery Method): room air        Urinalysis Basic - ( 16 Apr 2024 09:10 )    Color: x / Appearance: x / SG: x / pH: x  Gluc: 242 mg/dL / Ketone: x  / Bili: x / Urobili: x   Blood: x / Protein: x / Nitrite: x   Leuk Esterase: x / RBC: x / WBC x   Sq Epi: x / Non Sq Epi: x / Bacteria: x        PT/INR - ( 15 Apr 2024 06:32 )   PT: 10.8 sec;   INR: 0.92 ratio         PTT - ( 14 Apr 2024 14:14 )  PTT:35.5 sec          PHYSICAL EXAM:    Constitutional: NAD  HEENT: conjunctive   clear   Neck:  No JVD  Respiratory: decreased bs b/l   Cardiovascular: S1 and S2  Gastrointestinal: BS+, soft, NT/ND  Extremities: No peripheral edema  Neurological:  no focal deficits  Psychiatric: Normal mood, normal affect  : No Lechuga  Skin: No rashes  Access: pos fistula

## 2024-04-16 NOTE — PROGRESS NOTE ADULT - SUBJECTIVE AND OBJECTIVE BOX
Date/Time Patient Seen:  		  Referring MD:   Data Reviewed	       Patient is a 68y old  Male who presents with a chief complaint of SOB (15 Apr 2024 15:27)      Subjective/HPI     PAST MEDICAL & SURGICAL HISTORY:  HTN (hypertension)    DM2 (diabetes mellitus, type 2)    Hypercholesterolemia    CKD (chronic kidney disease)    Anemia    ESRD on dialysis    T2DM (type 2 diabetes mellitus)    CAD (coronary artery disease)    HLD (hyperlipidemia)    GERD (gastroesophageal reflux disease)    H/O eye surgery          Medication list         MEDICATIONS  (STANDING):  atorvastatin 40 milliGRAM(s) Oral at bedtime  calcitriol   Capsule 0.25 MICROGram(s) Oral daily  dextrose 10% Bolus 125 milliLiter(s) IV Bolus once  dextrose 5%. 1000 milliLiter(s) (50 mL/Hr) IV Continuous <Continuous>  dextrose 5%. 1000 milliLiter(s) (100 mL/Hr) IV Continuous <Continuous>  dextrose 50% Injectable 25 Gram(s) IV Push once  dextrose 50% Injectable 12.5 Gram(s) IV Push once  epoetin demetrius-epbx (RETACRIT) Injectable 98607 Unit(s) IV Push <User Schedule>  ezetimibe 10 milliGRAM(s) Oral daily  furosemide    Tablet 40 milliGRAM(s) Oral daily  glucagon  Injectable 1 milliGRAM(s) IntraMuscular once  heparin   Injectable 5000 Unit(s) SubCutaneous every 8 hours  insulin lispro (ADMELOG) corrective regimen sliding scale   SubCutaneous three times a day before meals  metoprolol tartrate 25 milliGRAM(s) Oral two times a day  pantoprazole    Tablet 40 milliGRAM(s) Oral before breakfast  polyethylene glycol 3350 17 Gram(s) Oral daily  senna 2 Tablet(s) Oral at bedtime  ticagrelor 60 milliGRAM(s) Oral two times a day    MEDICATIONS  (PRN):  acetaminophen     Tablet .. 650 milliGRAM(s) Oral every 6 hours PRN Temp greater or equal to 38C (100.4F), Mild Pain (1 - 3)  albuterol    90 MICROgram(s) HFA Inhaler 2 Puff(s) Inhalation every 6 hours PRN Shortness of Breath and/or Wheezing  aluminum hydroxide/magnesium hydroxide/simethicone Suspension 30 milliLiter(s) Oral every 4 hours PRN Dyspepsia  dextrose Oral Gel 15 Gram(s) Oral once PRN Blood Glucose LESS THAN 70 milliGRAM(s)/deciliter  melatonin 3 milliGRAM(s) Oral at bedtime PRN Insomnia  trimethobenzamide Injectable 200 milliGRAM(s) IntraMuscular every 8 hours PRN Nausea and/or Vomiting         Vitals log        ICU Vital Signs Last 24 Hrs  T(C): 36.6 (16 Apr 2024 04:33), Max: 37.3 (15 Apr 2024 20:23)  T(F): 97.8 (16 Apr 2024 04:33), Max: 99.2 (15 Apr 2024 20:23)  HR: 65 (16 Apr 2024 04:33) (65 - 74)  BP: 157/67 (16 Apr 2024 04:33) (132/78 - 173/73)  BP(mean): --  ABP: --  ABP(mean): --  RR: 18 (16 Apr 2024 04:33) (16 - 20)  SpO2: 97% (16 Apr 2024 04:33) (93% - 98%)    O2 Parameters below as of 16 Apr 2024 04:33  Patient On (Oxygen Delivery Method): room air                 Input and Output:  I&O's Detail    15 Apr 2024 07:01  -  16 Apr 2024 05:49  --------------------------------------------------------  IN:  Total IN: 0 mL    OUT:    Other (mL): 3500 mL  Total OUT: 3500 mL    Total NET: -3500 mL          Lab Data                        10.5   10.18 )-----------( 173      ( 15 Apr 2024 06:32 )             34.5     04-15    134<L>  |  97  |  42<H>  ----------------------------<  176<H>  5.3   |  28  |  9.00<H>    Ca    8.4<L>      15 Apr 2024 06:32    TPro  6.5  /  Alb  3.0<L>  /  TBili  0.7  /  DBili  x   /  AST  13<L>  /  ALT  19  /  AlkPhos  102  04-14            Review of Systems	      Objective     Physical Examination    heart s1s2  lung dc BS  head nc      Pertinent Lab findings & Imaging      Ankush:  NO   Adequate UO     I&O's Detail    15 Apr 2024 07:01  -  16 Apr 2024 05:49  --------------------------------------------------------  IN:  Total IN: 0 mL    OUT:    Other (mL): 3500 mL  Total OUT: 3500 mL    Total NET: -3500 mL               Discussed with:     Cultures:	        Radiology

## 2024-04-16 NOTE — CONSULT NOTE ADULT - ASSESSMENT
67 yo male with PMH HTN, HLD, DM, CKD on HD (M,W,F-[facility]), GERD, present to ED c/o SOB, worsening x months.     HTN, HLD, CHFpEF  - Pt p/w SOB  - CXR showing: pulmonary edema  pattern.   - CT chest NON-CON concerning for possible CHF, shows b/l pleural effusions.   - BNP:  <--57714. Elevated likley in the setting of ESRD  - Technically difficult ECHO showed normal LV & RV size and function EF 63%, There may be an area of hypokinesis of the basal inferoseptal wall and basal inferior wall on this technically difficult study. Mild LVH, mod LVDD, Trace AR, mild to modd MR, elevated RAP, mild pulm HTN   - Has h/o ESRD and is on HD  - HD per renal for fluid removal   - Continue Lasix 40 mg Po daily   - Daily weight monitoring, Strict I/O    - Known CAD, S/p PCI in 2016 to LAD  - Troponin: <-16.0  - EKG with   - Continue Brilinta, statin, Zetia  - Continue BB     - -160s     - Monitor and replete lytes, keep K>4, Mg>2.  - Will continue to follow.    Wero Sims, MS FNP, AGACNP  Nurse Practitioner- Cardiology   Please call on TEAMS       69 yo male with PMH HTN, HLD, DM, CKD on HD (M,W,F[facility]), GERD, present to ED c/o SOB, worsening x months. Follows Dr David Shaw for cardiology     HTN, HLD, CHFpEF, Diastolic dysfunction   - Pt p/w SOB  - CXR showing: pulmonary edema  pattern.   - CT chest NON-CON concerning for possible CHF, shows b/l pleural effusions.   - BNP:  <--17692. Elevated likely in the setting of ESRD  - Technically difficult ECHO showed normal LV & RV size and function EF 63%, There may be an area of hypokinesis of the basal inferoseptal wall and basal inferior wall on this technically difficult study. Mild LVH, mod LVDD, Trace AR, mild to modd MR, elevated RAP, mild pulm HTN   - Has h/o ESRD and is on HD  - HD per renal for fluid removal. For HD today  - Volume status improving   - Continue Lasix 40 mg Po daily   - Tele w/ SR 60-70s, nonsustained 120s  - Daily weight monitoring, Strict I/O    - Known CAD, S/p PCI in 2016 to LAD  - Troponin: <-16.0  - EKG with SR @ 68, LAD, LBBB.  - His LBBB is new since Jan 2023.Called Dr David Shaw for records.   - Continue Brilinta, statin, Zetia  - Continue BB   - -160s     - Monitor and replete lytes, keep K>4, Mg>2.  - Will continue to follow.    Wero Sims, MS FNP, AGACNP  Nurse Practitioner- Cardiology   Please call on TEAMS 69 yo male with PMH HTN, HLD, DM, CKD on HD (M,W,F[facility]), GERD, present to ED c/o SOB, worsening x months. Follows Dr David Shaw for cardiology     HTN, HLD, CHFpEF, Diastolic dysfunction   - Pt p/w SOB  - CXR showing: pulmonary edema  pattern.   - CT chest NON-CON concerning for possible CHF, shows b/l pleural effusions.   - BNP:  <--19165. Elevated likely in the setting of ESRD  - Technically difficult ECHO showed normal LV & RV size and function EF 63%, There may be an area of hypokinesis of the basal inferoseptal wall and basal inferior wall on this technically difficult study. Mild LVH, mod LVDD, Trace AR, mild to modd MR, elevated RAP, mild pulm HTN   - Has h/o ESRD and is on HD  - HD per renal for fluid removal. For HD today  - Volume status improving   - Continue Lasix 40 mg Po daily   - Tele w/ SR 60-70s, nonsustained 120s  - Daily weight monitoring, Strict I/O    - Known CAD, S/p PCI in 2016 to LAD  - Troponin: <-16.0  - EKG with SR @ 68, LAD, LBBB.  - His LBBB is new since Jan 2023.Called Dr David Shaw for records. Records reviewed and no LBBB noted in EKG from May 2023.   - Last stress test in 2022. Normal.   - He does have some hypokinesis on ECHO here, May need ischemic evaluation pending clinical course.   - Continue Brilinta, statin, Zetia  - Continue BB   - -160s     - Monitor and replete lytes, keep K>4, Mg>2.  - Will continue to follow.    Wero Sims, MS FNP, AGACNP  Nurse Practitioner- Cardiology   Please call on TEAMS

## 2024-04-16 NOTE — PROGRESS NOTE ADULT - ASSESSMENT
67 yo male with PMHX T2DM, CKD on HD (M,W,F-[facility]), HTN, HLD, GERD, present to ED after having traveled to [facility] c/o SOB, admitted for further workup

## 2024-04-17 LAB
-  AMOXICILLIN/CLAVULANIC ACID: SIGNIFICANT CHANGE UP
-  AMPICILLIN/SULBACTAM: SIGNIFICANT CHANGE UP
-  AMPICILLIN: SIGNIFICANT CHANGE UP
-  AZTREONAM: SIGNIFICANT CHANGE UP
-  CEFAZOLIN: SIGNIFICANT CHANGE UP
-  CEFEPIME: SIGNIFICANT CHANGE UP
-  CEFOXITIN: SIGNIFICANT CHANGE UP
-  CEFTRIAXONE: SIGNIFICANT CHANGE UP
-  CIPROFLOXACIN: SIGNIFICANT CHANGE UP
-  ERTAPENEM: SIGNIFICANT CHANGE UP
-  GENTAMICIN: SIGNIFICANT CHANGE UP
-  LEVOFLOXACIN: SIGNIFICANT CHANGE UP
-  MEROPENEM: SIGNIFICANT CHANGE UP
-  NITROFURANTOIN: SIGNIFICANT CHANGE UP
-  PIPERACILLIN/TAZOBACTAM: SIGNIFICANT CHANGE UP
-  TOBRAMYCIN: SIGNIFICANT CHANGE UP
-  TRIMETHOPRIM/SULFAMETHOXAZOLE: SIGNIFICANT CHANGE UP
ANION GAP SERPL CALC-SCNC: 11 MMOL/L — SIGNIFICANT CHANGE UP (ref 5–17)
BUN SERPL-MCNC: 60 MG/DL — HIGH (ref 7–23)
CALCIUM SERPL-MCNC: 9.2 MG/DL — SIGNIFICANT CHANGE UP (ref 8.5–10.1)
CHLORIDE SERPL-SCNC: 96 MMOL/L — SIGNIFICANT CHANGE UP (ref 96–108)
CO2 SERPL-SCNC: 26 MMOL/L — SIGNIFICANT CHANGE UP (ref 22–31)
CREAT SERPL-MCNC: 8.9 MG/DL — HIGH (ref 0.5–1.3)
CULTURE RESULTS: ABNORMAL
EGFR: 6 ML/MIN/1.73M2 — LOW
GLUCOSE SERPL-MCNC: 203 MG/DL — HIGH (ref 70–99)
MAGNESIUM SERPL-MCNC: 2.9 MG/DL — HIGH (ref 1.6–2.6)
METHOD TYPE: SIGNIFICANT CHANGE UP
ORGANISM # SPEC MICROSCOPIC CNT: ABNORMAL
ORGANISM # SPEC MICROSCOPIC CNT: SIGNIFICANT CHANGE UP
PHOSPHATE SERPL-MCNC: 7.6 MG/DL — HIGH (ref 2.5–4.5)
POTASSIUM SERPL-MCNC: 5.3 MMOL/L — SIGNIFICANT CHANGE UP (ref 3.5–5.3)
POTASSIUM SERPL-SCNC: 5.3 MMOL/L — SIGNIFICANT CHANGE UP (ref 3.5–5.3)
SODIUM SERPL-SCNC: 133 MMOL/L — LOW (ref 135–145)
SPECIMEN SOURCE: SIGNIFICANT CHANGE UP

## 2024-04-17 PROCEDURE — 99232 SBSQ HOSP IP/OBS MODERATE 35: CPT

## 2024-04-17 RX ORDER — LIDOCAINE 4 G/100G
1 CREAM TOPICAL DAILY
Refills: 0 | Status: DISCONTINUED | OUTPATIENT
Start: 2024-04-17 | End: 2024-04-18

## 2024-04-17 RX ADMIN — EZETIMIBE 10 MILLIGRAM(S): 10 TABLET ORAL at 16:58

## 2024-04-17 RX ADMIN — TICAGRELOR 60 MILLIGRAM(S): 90 TABLET ORAL at 05:08

## 2024-04-17 RX ADMIN — Medication 3: at 08:29

## 2024-04-17 RX ADMIN — Medication 25 MILLIGRAM(S): at 17:07

## 2024-04-17 RX ADMIN — HEPARIN SODIUM 5000 UNIT(S): 5000 INJECTION INTRAVENOUS; SUBCUTANEOUS at 21:29

## 2024-04-17 RX ADMIN — Medication 25 MILLIGRAM(S): at 05:04

## 2024-04-17 RX ADMIN — HEPARIN SODIUM 5000 UNIT(S): 5000 INJECTION INTRAVENOUS; SUBCUTANEOUS at 16:58

## 2024-04-17 RX ADMIN — Medication 3 MILLIGRAM(S): at 21:30

## 2024-04-17 RX ADMIN — ATORVASTATIN CALCIUM 40 MILLIGRAM(S): 80 TABLET, FILM COATED ORAL at 21:28

## 2024-04-17 RX ADMIN — CALCITRIOL 0.25 MICROGRAM(S): 0.5 CAPSULE ORAL at 16:59

## 2024-04-17 RX ADMIN — PANTOPRAZOLE SODIUM 40 MILLIGRAM(S): 20 TABLET, DELAYED RELEASE ORAL at 05:04

## 2024-04-17 RX ADMIN — SENNA PLUS 2 TABLET(S): 8.6 TABLET ORAL at 21:28

## 2024-04-17 RX ADMIN — HEPARIN SODIUM 5000 UNIT(S): 5000 INJECTION INTRAVENOUS; SUBCUTANEOUS at 05:04

## 2024-04-17 RX ADMIN — ERYTHROPOIETIN 10000 UNIT(S): 10000 INJECTION, SOLUTION INTRAVENOUS; SUBCUTANEOUS at 11:36

## 2024-04-17 RX ADMIN — Medication 1: at 16:56

## 2024-04-17 RX ADMIN — Medication 40 MILLIGRAM(S): at 05:04

## 2024-04-17 RX ADMIN — TICAGRELOR 60 MILLIGRAM(S): 90 TABLET ORAL at 17:05

## 2024-04-17 NOTE — PROGRESS NOTE ADULT - ASSESSMENT
67 yo male with PMHX T2DM, CKD on HD (M,W,F[facility]), HTN, HLD, GERD, present to ED after having traveled to [facility] c/o SOB, admitted for further workup

## 2024-04-17 NOTE — PROGRESS NOTE ADULT - SUBJECTIVE AND OBJECTIVE BOX
Patient is a 68y Male whom presented to the hospital with esrd on hd     PAST MEDICAL & SURGICAL HISTORY:  HTN (hypertension)      DM2 (diabetes mellitus, type 2)      Hypercholesterolemia      CKD (chronic kidney disease)      Anemia      ESRD on dialysis,      T2DM (type 2 diabetes mellitus)      CAD (coronary artery disease)      HLD (hyperlipidemia)      GERD (gastroesophageal reflux disease)      H/O eye surgery          MEDICATIONS  (STANDING):  atorvastatin 40 milliGRAM(s) Oral at bedtime  dextrose 10% Bolus 125 milliLiter(s) IV Bolus once  dextrose 5%. 1000 milliLiter(s) (100 mL/Hr) IV Continuous <Continuous>  dextrose 5%. 1000 milliLiter(s) (50 mL/Hr) IV Continuous <Continuous>  dextrose 50% Injectable 25 Gram(s) IV Push once  dextrose 50% Injectable 12.5 Gram(s) IV Push once  ezetimibe 10 milliGRAM(s) Oral daily  glucagon  Injectable 1 milliGRAM(s) IntraMuscular once  heparin   Injectable 5000 Unit(s) SubCutaneous every 8 hours  insulin lispro (ADMELOG) corrective regimen sliding scale   SubCutaneous three times a day before meals  metoprolol tartrate 25 milliGRAM(s) Oral two times a day  pantoprazole    Tablet 40 milliGRAM(s) Oral before breakfast  ticagrelor 60 milliGRAM(s) Oral two times a day      Allergies    No Known Allergies    Intolerances        SOCIAL HISTORY:  Denies ETOh,Smoking,     FAMILY HISTORY:  No pertinent family history in first degree relatives        REVIEW OF SYSTEMS:    CONSTITUTIONAL: No weakness, fevers or chills  RESPIRATORY: No cough, wheezing, hemoptysis; No shortness of breath  CARDIOVASCULAR: No chest pain or palpitations  GASTROINTESTINAL: No abdominal or epigastric pain. No nausea, vomiting,     No diarrhea or constipation. No melena   SKIN: dry                                                   04-17    133<L>  |  96  |  60<H>  ----------------------------<  203<H>  5.3   |  26  |  8.90<H>    Ca    9.2      17 Apr 2024 07:05  Phos  7.6     04-17  Mg     2.9     04-17        CAPILLARY BLOOD GLUCOSE      POCT Blood Glucose.: 139 mg/dL (17 Apr 2024 13:34)  POCT Blood Glucose.: 120 mg/dL (17 Apr 2024 11:42)  POCT Blood Glucose.: 251 mg/dL (17 Apr 2024 07:41)  POCT Blood Glucose.: 214 mg/dL (16 Apr 2024 21:16)      Vital Signs Last 24 Hrs  T(C): 36.4 (17 Apr 2024 13:36), Max: 36.9 (16 Apr 2024 18:00)  T(F): 97.5 (17 Apr 2024 13:36), Max: 98.5 (16 Apr 2024 18:00)  HR: 67 (17 Apr 2024 13:36) (61 - 67)  BP: 149/67 (17 Apr 2024 13:36) (147/69 - 174/65)  BP(mean): --  RR: 20 (17 Apr 2024 13:36) (17 - 20)  SpO2: 95% (17 Apr 2024 13:36) (95% - 100%)    Parameters below as of 17 Apr 2024 13:36  Patient On (Oxygen Delivery Method): room air        Urinalysis Basic - ( 17 Apr 2024 07:05 )    Color: x / Appearance: x / SG: x / pH: x  Gluc: 203 mg/dL / Ketone: x  / Bili: x / Urobili: x   Blood: x / Protein: x / Nitrite: x   Leuk Esterase: x / RBC: x / WBC x   Sq Epi: x / Non Sq Epi: x / Bacteria: x              PHYSICAL EXAM:    Constitutional: NAD  HEENT: conjunctive   clear   Neck:  No JVD  Respiratory: decreased bs b/l   Cardiovascular: S1 and S2  Gastrointestinal: BS+, soft, NT/ND  Extremities: No peripheral edema  Neurological:  no focal deficits  Psychiatric: Normal mood, normal affect  : No Lechuga  Skin: No rashes  Access: pos fistula

## 2024-04-17 NOTE — PROGRESS NOTE ADULT - SUBJECTIVE AND OBJECTIVE BOX
Date/Time Patient Seen:  		  Referring MD:   Data Reviewed	       Patient is a 68y old  Male who presents with a chief complaint of SOB (16 Apr 2024 14:07)      Subjective/HPI     PAST MEDICAL & SURGICAL HISTORY:  HTN (hypertension)    DM2 (diabetes mellitus, type 2)    Hypercholesterolemia    CKD (chronic kidney disease)    Anemia    ESRD on dialysis    T2DM (type 2 diabetes mellitus)    CAD (coronary artery disease)    HLD (hyperlipidemia)    GERD (gastroesophageal reflux disease)    H/O eye surgery          Medication list         MEDICATIONS  (STANDING):  atorvastatin 40 milliGRAM(s) Oral at bedtime  calcitriol   Capsule 0.25 MICROGram(s) Oral daily  dextrose 10% Bolus 125 milliLiter(s) IV Bolus once  dextrose 5%. 1000 milliLiter(s) (50 mL/Hr) IV Continuous <Continuous>  dextrose 5%. 1000 milliLiter(s) (100 mL/Hr) IV Continuous <Continuous>  dextrose 50% Injectable 25 Gram(s) IV Push once  dextrose 50% Injectable 12.5 Gram(s) IV Push once  epoetin demetrius-epbx (RETACRIT) Injectable 89149 Unit(s) IV Push <User Schedule>  ezetimibe 10 milliGRAM(s) Oral daily  furosemide    Tablet 40 milliGRAM(s) Oral daily  glucagon  Injectable 1 milliGRAM(s) IntraMuscular once  heparin   Injectable 5000 Unit(s) SubCutaneous every 8 hours  insulin lispro (ADMELOG) corrective regimen sliding scale   SubCutaneous three times a day before meals  metoprolol tartrate 25 milliGRAM(s) Oral two times a day  pantoprazole    Tablet 40 milliGRAM(s) Oral before breakfast  polyethylene glycol 3350 17 Gram(s) Oral daily  senna 2 Tablet(s) Oral at bedtime  ticagrelor 60 milliGRAM(s) Oral two times a day    MEDICATIONS  (PRN):  acetaminophen     Tablet .. 650 milliGRAM(s) Oral every 6 hours PRN Temp greater or equal to 38C (100.4F), Mild Pain (1 - 3)  albuterol    90 MICROgram(s) HFA Inhaler 2 Puff(s) Inhalation every 6 hours PRN Shortness of Breath and/or Wheezing  aluminum hydroxide/magnesium hydroxide/simethicone Suspension 30 milliLiter(s) Oral every 4 hours PRN Dyspepsia  dextrose Oral Gel 15 Gram(s) Oral once PRN Blood Glucose LESS THAN 70 milliGRAM(s)/deciliter  melatonin 3 milliGRAM(s) Oral at bedtime PRN Insomnia  trimethobenzamide Injectable 200 milliGRAM(s) IntraMuscular every 8 hours PRN Nausea and/or Vomiting         Vitals log        ICU Vital Signs Last 24 Hrs  T(C): 36.8 (17 Apr 2024 04:25), Max: 37.1 (16 Apr 2024 15:30)  T(F): 98.2 (17 Apr 2024 04:25), Max: 98.7 (16 Apr 2024 15:30)  HR: 67 (17 Apr 2024 04:25) (59 - 69)  BP: 161/65 (17 Apr 2024 04:25) (156/64 - 164/75)  BP(mean): --  ABP: --  ABP(mean): --  RR: 18 (17 Apr 2024 04:25) (18 - 20)  SpO2: 95% (17 Apr 2024 04:25) (95% - 100%)    O2 Parameters below as of 17 Apr 2024 04:25  Patient On (Oxygen Delivery Method): room air                 Input and Output:  I&O's Detail    15 Apr 2024 07:01  -  16 Apr 2024 07:00  --------------------------------------------------------  IN:  Total IN: 0 mL    OUT:    Other (mL): 3500 mL  Total OUT: 3500 mL    Total NET: -3500 mL      16 Apr 2024 07:01  -  17 Apr 2024 05:38  --------------------------------------------------------  IN:  Total IN: 0 mL    OUT:    Other (mL): 3000 mL  Total OUT: 3000 mL    Total NET: -3000 mL          Lab Data                        10.5   10.18 )-----------( 173      ( 15 Apr 2024 06:32 )             34.5     04-16    135  |  98  |  29<H>  ----------------------------<  242<H>  5.3   |  28  |  6.70<H>    Ca    8.8      16 Apr 2024 09:10  Phos  5.2     04-16  Mg     3.0     04-16              Review of Systems	      Objective     Physical Examination    heart s1s2  lung dc BS  head nc      Pertinent Lab findings & Imaging      Ankush:  NO   Adequate UO     I&O's Detail    15 Apr 2024 07:01  -  16 Apr 2024 07:00  --------------------------------------------------------  IN:  Total IN: 0 mL    OUT:    Other (mL): 3500 mL  Total OUT: 3500 mL    Total NET: -3500 mL      16 Apr 2024 07:01  -  17 Apr 2024 05:38  --------------------------------------------------------  IN:  Total IN: 0 mL    OUT:    Other (mL): 3000 mL  Total OUT: 3000 mL    Total NET: -3000 mL               Discussed with:     Cultures:	        Radiology

## 2024-04-17 NOTE — PROGRESS NOTE ADULT - NUTRITIONAL ASSESSMENT
MEDICATIONS  (STANDING):  atorvastatin 40 milliGRAM(s) Oral at bedtime  dextrose 10% Bolus 125 milliLiter(s) IV Bolus once  dextrose 5%. 1000 milliLiter(s) (100 mL/Hr) IV Continuous <Continuous>  dextrose 5%. 1000 milliLiter(s) (50 mL/Hr) IV Continuous <Continuous>  dextrose 50% Injectable 25 Gram(s) IV Push once  dextrose 50% Injectable 12.5 Gram(s) IV Push once  epoetin demetrius-epbx (RETACRIT) Injectable 59923 Unit(s) IV Push <User Schedule>  ezetimibe 10 milliGRAM(s) Oral daily  furosemide    Tablet 40 milliGRAM(s) Oral daily  furosemide   Injectable 40 milliGRAM(s) IV Push once  glucagon  Injectable 1 milliGRAM(s) IntraMuscular once  heparin   Injectable 5000 Unit(s) SubCutaneous every 8 hours  insulin lispro (ADMELOG) corrective regimen sliding scale   SubCutaneous three times a day before meals  metoprolol tartrate 25 milliGRAM(s) Oral two times a day  pantoprazole    Tablet 40 milliGRAM(s) Oral before breakfast  ticagrelor 60 milliGRAM(s) Oral two times a day
MEDICATIONS  (STANDING):  atorvastatin 40 milliGRAM(s) Oral at bedtime  dextrose 10% Bolus 125 milliLiter(s) IV Bolus once  dextrose 5%. 1000 milliLiter(s) (100 mL/Hr) IV Continuous <Continuous>  dextrose 5%. 1000 milliLiter(s) (50 mL/Hr) IV Continuous <Continuous>  dextrose 50% Injectable 25 Gram(s) IV Push once  dextrose 50% Injectable 12.5 Gram(s) IV Push once  epoetin demetrius-epbx (RETACRIT) Injectable 12639 Unit(s) IV Push <User Schedule>  ezetimibe 10 milliGRAM(s) Oral daily  furosemide    Tablet 40 milliGRAM(s) Oral daily  furosemide   Injectable 40 milliGRAM(s) IV Push once  glucagon  Injectable 1 milliGRAM(s) IntraMuscular once  heparin   Injectable 5000 Unit(s) SubCutaneous every 8 hours  insulin lispro (ADMELOG) corrective regimen sliding scale   SubCutaneous three times a day before meals  metoprolol tartrate 25 milliGRAM(s) Oral two times a day  pantoprazole    Tablet 40 milliGRAM(s) Oral before breakfast  ticagrelor 60 milliGRAM(s) Oral two times a day
MEDICATIONS  (STANDING):  atorvastatin 40 milliGRAM(s) Oral at bedtime  dextrose 10% Bolus 125 milliLiter(s) IV Bolus once  dextrose 5%. 1000 milliLiter(s) (100 mL/Hr) IV Continuous <Continuous>  dextrose 5%. 1000 milliLiter(s) (50 mL/Hr) IV Continuous <Continuous>  dextrose 50% Injectable 25 Gram(s) IV Push once  dextrose 50% Injectable 12.5 Gram(s) IV Push once  epoetin demetrius-epbx (RETACRIT) Injectable 20149 Unit(s) IV Push <User Schedule>  ezetimibe 10 milliGRAM(s) Oral daily  furosemide    Tablet 40 milliGRAM(s) Oral daily  furosemide   Injectable 40 milliGRAM(s) IV Push once  glucagon  Injectable 1 milliGRAM(s) IntraMuscular once  heparin   Injectable 5000 Unit(s) SubCutaneous every 8 hours  insulin lispro (ADMELOG) corrective regimen sliding scale   SubCutaneous three times a day before meals  metoprolol tartrate 25 milliGRAM(s) Oral two times a day  pantoprazole    Tablet 40 milliGRAM(s) Oral before breakfast  ticagrelor 60 milliGRAM(s) Oral two times a day

## 2024-04-17 NOTE — PROGRESS NOTE ADULT - ASSESSMENT
67 yo male with PMHX T2DM, CKD on HD (M,W,F-[facility]), HTN, HLD, GERD, present to ED after having traveled to [place] c/o SOB, worsening x months    CKD  Anemia  Pulm Edema  OP  OA  Smoker  DM  HTN  HLD  GERD    cardio eval noted  vs noted    VBG noted  smoking cess ed  proventil PRN for son and or wheeze  monitor VS and HD and Sat  goal sat > 88 pct  cxr - c/w Pulm Edema  HD as per RENAL recs  I and O  on lasix - [facility] rx regimen - BP control -   D dimer normal  TTE -   PFT as outpatient

## 2024-04-17 NOTE — PROGRESS NOTE ADULT - PROBLEM SELECTOR PLAN 1
- Likely in the setting of need for HD vs CHF (does not have diagnosis, BNP: 23,411) vs COPD vs infection  - CXR showing: pulmonary edema  pattern. No consolidation or pleural collections  - CT chest NON-CON concerning for possible CHF, shows b/l pleural effusions   - VSS, 99% on RA, supplemental O2 PRN  - last TTE from 1/23: EF 63%- no known CHF  - repeat TTE shows nl EF, but area of hypokinesis of the basal inferoseptal wall, basal inferior wall, moderate diastolic dysfunction  - cardiology consulted, f/u recs   - Infection less likely, lactate WNL, afebrile, WC WNL  - f/u daily cbc/bmp    - current smoker, counseled on cessation, continue nicotine patch  - nephro (Dr. Shoemaker) consulted, plan for HD today  - Pulm (Dr. Vazquez) consulted, recs appreciated
Pt p/w  SOB x 1 month, worsened over 2 weeks, significantly worse this AM  - Likely in the setting of need for HD vs CHF (does not have diagnosis, BNP: 23,411) vs COPD vs infection  - CXR showing: pulmonary edema  pattern. No consolidation or pleural collections. lasix x1 today  - CT chest NON-CON concerning for possible CHF, shows b/l pleural effusions   - VSS, 99% on RA, supplemental O2 PRN  - last TTE from 1/23: EF 63%- no known CHF  - repeat TTE shows nl EF, but area of hypokinesis of the basal inferoseptal wall, basal inferior wall, moderate diastolic dysfunction  - cardiology consulted, f/u recs   - Infection less likely, lactate WNL, afebrile, WC WNL  - f/u daily cbc/bmp    - current smoker, counseled on cessation, continue nicotine patch  - nephro (Dr. Shoemaker) consulted, plan for HD today  - Pulm (Dr. Vazquez) consulted, recs appreciated
Pt p/w  SOB x 1 month, worsened over 2 weeks, significantly worse this AM  - Likely in the setting of need for HD vs CHF (does not have diagnosis, BNP: 23,411) vs COPD vs infection  - CXR showing: pulmonary edema  pattern. No consolidation or pleural collections. lasix x1 today  - CT chest NON-CON concerning for possible CHF, shows b/l pleural effusions   - VSS, 99% on RA, supplemental O2 PRN  - last TTE from 1/23: EF 63%- no known CHF. Per pt follows Dr. Shaw and has no cardiac hx beyond CAD and HTN  - f/u repeat TTE  - Infection less likely, lactate WNL, afebrile, WC WNL  - f/u daily cbc/bmp    - current smoker, counseled on cessation, will start nicotine patch  - nephro (Dr. Shoemaker) consulted, f/u reccs  - Pulm (Dr. Vazquez) consulted, recs appreciated

## 2024-04-17 NOTE — PROGRESS NOTE ADULT - PROBLEM SELECTOR PLAN 3
- K on admission 6.0-->5.3 on repeat, no intervention necessary  - Lokelma/Insulin/calcium gluconate as necessary   - EKG: sr with apcs. Possible Left atrial enlargement. QTC:499, VR: 65 Left bundle branch block  - Plan for HD tm  - f/u daily BMP and EKG
- K on admission 6.0-->5.3 on repeat, no intervention necessary  - Lokelma/Insulin/calcium gluconate as necessary   - EKG: sr with apcs. Possible Left atrial enlargement. QTC:499, VR: 65 Left bundle branch block  - Plan for HD today  - f/u daily BMP and EKG
- K on admission 6.0-->5.3 on repeat, no intervention necessary  - Lokelma/Insulin/calcium gluconate as necessary   - EKG: sr with apcs. Possible Left atrial enlargement. QTC:499, VR: 65 Left bundle branch block  - Plan for HD today  - f/u daily BMP and EKG

## 2024-04-17 NOTE — PROGRESS NOTE ADULT - ASSESSMENT
67 yo male with PMHX T2DM, CKD on HD (M,W,F-[facility]), HTN, HLD, GERD, present to ED after having traveled to [place] c/o SOB, worsening x months, worst this AM.   Denies fever, chills, chest pain, palpitations, SOB, cough, abdominal pain, nausea, vomiting, diarrhea, constipation, urinary frequency, urgency, or dysuria, headaches, changes in vision, dizziness, numbness, tingling.  Denies recent travel, recent antibiotic use, or sick contacts.  ED Course: Vitals: BP: 155/98 , HR: 67, Temp: 97.3, RR: 20 , SpO2: 95% on RA   Labs:  WBC: 9.63, H/H: 10.5/34.5, K 6.0, Cr. 8.10, BNP: 23,411, RVP: Negative  UA: pending CXR:There has been interval development of a pulmonary edema   pattern. No consolidation or pleural collections. No pneumothorax.   Left-sided subclavian vascular stent redemonstrated. Prominent heart size.  EKG: sr with apcs. Possible Left atrial enlargement. QTC:499, VR: 65 Left bundle branch block      esrd on hd mwf plan for hd today , fluid removal at hd    Excess fluids and waste products will be removed from your blood; your electrolytes will be balanced; your blood pressure will be controlled.     ANEMIA PLAN:  Anemia of chronic disease:  Well controlled by Aranesp  H and H subtherapeutic .  We will continue epo aiming for a HCT of 32-36 %.   We will monitor Iron stores, B12 and RBC folate .    BP monitoring,continue current antihypertensive meds, low salt diet,followup with PMD in 1-2 weeks  metoprolol tartrate 25 milliGRAM(s) Oral two times a day    dvt heparin   Injectable 5000 Unit(s) SubCutaneous every 8 hours

## 2024-04-17 NOTE — PROGRESS NOTE ADULT - PROBLEM SELECTOR PLAN 9
- Per medication review, pt on tresiba 45U at bedtime, however pt unsure  - Pt gets medications from [facility] ([phone]). Attempted to contact but closed   - LDISS only for now as pt with glucose 89   - Primary team
- Per medication review, pt on tresiba 45U at bedtime, however pt unsure  - Pt gets medications from [facility] ([phone]). Attempted to contact but closed   - LDISS only for now as pt with glucose 89
- heparin subq  for dvt ppx

## 2024-04-17 NOTE — PROGRESS NOTE ADULT - PROBLEM SELECTOR PLAN 2
- Follows with Dr. Shoemaker HD M/W/F  - Cr: 8.4 on admission  - plan for HD again today
- Follows with Dr. Shoemaker HD M/W/F  - Cr: 8.4 on admission  - plan for HD again today
- Follows with Dr. Shoemaker  - last dialysis was Friday- per pt, he thinks they removed "less fluid than usual". Pt also w/ poor urine output  - Cr: 8.4 on admission,  - plan for HD today

## 2024-04-17 NOTE — PROGRESS NOTE ADULT - PROBLEM SELECTOR PLAN 5
Chronic, History of DM2  - Hold home medications  - Per medication review, pt on tresiba 45U at bedtime, however pt unsure  - LDISS only for now as pt with glucose 89   - Hypoglycemia protocol, Accuchecks AC&HS  - RENAL diet with DASH/TLC  - HbA1c 6.2

## 2024-04-17 NOTE — PROGRESS NOTE ADULT - SUBJECTIVE AND OBJECTIVE BOX
[facility] -- Karl Jackson, Donnie Crowe Savella, Goodger, Cohen: Office # [phone]    Follow Up:  SOB, Vol ol     Subjective/Observations: Patient awake, alert, resting in bed. Denies chest pain, palpitations and dizziness. Denies any difficulty breathing. No orthopnea and PND. Tolerating room air. Reports he is feeling better and HD helped.     REVIEW OF SYSTEMS: All other review of systems are negative unless indicated above    PAST MEDICAL & SURGICAL HISTORY:  HTN (hypertension)      DM2 (diabetes mellitus, type 2)      Hypercholesterolemia      CKD (chronic kidney disease)      Anemia      Anemia      ESRD on dialysis      T2DM (type 2 diabetes mellitus)      CAD (coronary artery disease)      HLD (hyperlipidemia)      GERD (gastroesophageal reflux disease)      H/O eye surgery      MEDICATIONS  (STANDING):  atorvastatin 40 milliGRAM(s) Oral at bedtime  calcitriol   Capsule 0.25 MICROGram(s) Oral daily  dextrose 10% Bolus 125 milliLiter(s) IV Bolus once  dextrose 5%. 1000 milliLiter(s) (50 mL/Hr) IV Continuous <Continuous>  dextrose 5%. 1000 milliLiter(s) (100 mL/Hr) IV Continuous <Continuous>  dextrose 50% Injectable 25 Gram(s) IV Push once  dextrose 50% Injectable 12.5 Gram(s) IV Push once  epoetin demetrius-epbx (RETACRIT) Injectable 16762 Unit(s) IV Push <User Schedule>  ezetimibe 10 milliGRAM(s) Oral daily  furosemide    Tablet 40 milliGRAM(s) Oral daily  glucagon  Injectable 1 milliGRAM(s) IntraMuscular once  heparin   Injectable 5000 Unit(s) SubCutaneous every 8 hours  insulin lispro (ADMELOG) corrective regimen sliding scale   SubCutaneous three times a day before meals  metoprolol tartrate 25 milliGRAM(s) Oral two times a day  pantoprazole    Tablet 40 milliGRAM(s) Oral before breakfast  polyethylene glycol 3350 17 Gram(s) Oral daily  senna 2 Tablet(s) Oral at bedtime  ticagrelor 60 milliGRAM(s) Oral two times a day    MEDICATIONS  (PRN):  acetaminophen     Tablet .. 650 milliGRAM(s) Oral every 6 hours PRN Temp greater or equal to 38C (100.4F), Mild Pain (1 - 3)  albuterol    90 MICROgram(s) HFA Inhaler 2 Puff(s) Inhalation every 6 hours PRN Shortness of Breath and/or Wheezing  aluminum hydroxide/magnesium hydroxide/simethicone Suspension 30 milliLiter(s) Oral every 4 hours PRN Dyspepsia  dextrose Oral Gel 15 Gram(s) Oral once PRN Blood Glucose LESS THAN 70 milliGRAM(s)/deciliter  melatonin 3 milliGRAM(s) Oral at bedtime PRN Insomnia  trimethobenzamide Injectable 200 milliGRAM(s) IntraMuscular every 8 hours PRN Nausea and/or Vomiting    Allergies    No Known Allergies    Intolerances      Vital Signs Last 24 Hrs  T(C): 36.4 (17 Apr 2024 09:25), Max: 37.1 (16 Apr 2024 15:30)  T(F): 97.6 (17 Apr 2024 09:25), Max: 98.7 (16 Apr 2024 15:30)  HR: 61 (17 Apr 2024 09:25) (59 - 69)  BP: 147/69 (17 Apr 2024 09:25) (147/69 - 164/75)  BP(mean): --  RR: 17 (17 Apr 2024 09:25) (17 - 20)  SpO2: 97% (17 Apr 2024 09:25) (95% - 100%)    Parameters below as of 17 Apr 2024 09:25  Patient On (Oxygen Delivery Method): room air      I&O's Summary    16 Apr 2024 07:01  -  17 Apr 2024 07:00  --------------------------------------------------------  IN: 0 mL / OUT: 3000 mL / NET: -3000 mL          TELE:  PACs 60s   PHYSICAL EXAM:  Constitutional: NAD, awake and alert  HEENT: Moist Mucous Membranes, Anicteric  Pulmonary: Non-labored, breath sounds are clear bilaterally, No wheezing, rales or rhonchi  Cardiovascular: Regular, S1 and S2, No murmurs, No rubs, gallops or clicks  Gastrointestinal:  soft, nontender, nondistended   Lymph: No peripheral edema. No lymphadenopathy.   Skin: No visible rashes or ulcers.  Psych:  Mood & affect appropriate      LABS: All Labs Reviewed:                        10.5   10.18 )-----------( 173      ( 15 Apr 2024 06:32 )             34.5                         10.5   9.63  )-----------( 167      ( 14 Apr 2024 14:14 )             34.5     17 Apr 2024 07:05    133    |  96     |  60     ----------------------------<  203    5.3     |  26     |  8.90   16 Apr 2024 09:10    135    |  98     |  29     ----------------------------<  242    5.3     |  28     |  6.70   15 Apr 2024 06:32    134    |  97     |  42     ----------------------------<  176    5.3     |  28     |  9.00     Ca    9.2        17 Apr 2024 07:05  Ca    8.8        16 Apr 2024 09:10  Ca    8.4        15 Apr 2024 06:32  Phos  7.6       17 Apr 2024 07:05  Phos  5.2       16 Apr 2024 09:10  Mg     2.9       17 Apr 2024 07:05  Mg     3.0       16 Apr 2024 09:10    TPro  6.5    /  Alb  3.0    /  TBili  0.7    /  DBili  x      /  AST  13     /  ALT  19     /  AlkPhos  102    14 Apr 2024 16:05  TPro  6.7    /  Alb  3.1    /  TBili  0.7    /  DBili  x      /  AST  24     /  ALT  20     /  AlkPhos  109    14 Apr 2024 14:14     Troponin I, High Sensitivity Result: 16.0 ng/L (04-14-24 @ 14:14)  D-Dimer Assay, Quantitative: 239 ng/mL DDU (04-14-24 @ 14:14)  Lactate, Blood: 0.9 mmol/L (04-14-24 @ 14:14)    12 Lead ECG:   Ventricular Rate 68 BPM    Atrial Rate 68 BPM    P-R Interval 166 ms    QRS Duration 126 ms    Q-T Interval 502 ms    QTC Calculation(Bazett) 533 ms    P Axis 14 degrees    R Axis -38 degrees    T Axis 94 degrees    Diagnosis Line Normal sinus rhythm  Left axis deviation  Left bundle branch block    Confirmed by SUE CROWE ([id number]) on 4/16/2024 12:23:12 PM (04-16-24 @ 00:32)      TRANSTHORACIC ECHOCARDIOGRAM REPORT  ________________________________________________________________________________                                      _______       Pt. Name:       NERY LEONARD   Study Date:    4/15/2024  MRN:            [id number]  YOB: 1955  Accession #:    [id number]   Age:           68 years  Account#:       [id number]  Gender:        M  Visit ID#  Heart Rate:                 Height:        68.90 in (175.00 cm)  Rhythm:                     Weight:        185.19 lb (84.00 kg)  Blood Pressure: 173/73 mmHg BSA/BMI:       2.00 m² / 27.43 kg/m²  ________________________________________________________________________________________  Referring Physician:    Laila Mercado MD  Interpreting Physician: Lorie Lees MD  Primary Sonographer:    Anderson Vivas    CPT:               ECHO TTE WO CON COMP W DOPP - 71318.m  Indication(s):     Edema, unspecified - R60.9  Procedure:         Transthoracic echocardiogram with 2-D, M-mode and complete                     spectral and color flow Doppler.  Ordering Location: [facility]  Admission Status:  Inpatient  Study Information: Image quality for this study is technically difficult.    _______________________________________________________________________________________     CONCLUSIONS:      1. Technically difficult image quality.   2. Left ventricular systolic function is normal with an ejection fraction of 63 % by Pompa's method of disks. There may be an area of hypokinesis of the basal inferoseptal wall and basal inferior wall on this technically difficult study.   3. Mild left ventricular hypertrophy.   4. There is moderate (grade 2) left ventricular diastolic dysfunction.   5. Normal right ventricular cavity size and normal systolic function.   6. There iscalcification of the mitral valve annulus.   7. Trace aortic regurgitation.   8. Mild to moderate mitral regurgitation.   9. The inferior vena cava is dilated measuring 2.39 cm in diameter, (dilated >2.1cm) with normal inspiratory collapse (normal >50%) consistent with mildly elevated right atrial pressure (~8, range 5-10mmHg).  10. Estimated pulmonary artery systolic pressure is 40 mmHg, consistent with mild pulmonary hypertension.  11. Small right pleural effusion noted.    ________________________________________________________________________________________  FINDINGS:     Left Ventricle:  Left ventricular systolic function is normal with a calculated ejection fraction of 63 % by the Pompa's biplane method of disks. There is moderate (grade 2) left ventricular diastolic dysfunction. Mild left ventricular hypertrophy.     Right Ventricle:  The right ventricular cavity is normal in size and normal systolic function. Tricuspid annular plane systolic excursion (TAPSE) is 2.2 cm (normal>=1.7 cm).     Left Atrium:  The left atrium is normal in size with an indexed volume of 28.59 ml/m².     Right Atrium:  The right atrium is normal in size.     Aortic Valve:  The aortic valve anatomy cannot be determined. There is mild calcificationof the aortic valve leaflets. There is no aortic valve stenosis. There is trace aortic regurgitation.     Mitral Valve:  There is calcification of the mitral valve annulus. There is mild to moderate mitral regurgitation.     Tricuspid Valve:  There is mild tricuspid regurgitation. Estimated pulmonary artery systolic pressure is 40 mmHg, consistent with mild pulmonary hypertension.     Pulmonic Valve:  The pulmonic valve was not well visualized. There is trace pulmonic regurgitation.     Aorta:  The aortic root at the sinuses of Valsalva is normal in size, measuring 2.90 cm (indexed 1.45 cm/m²).     Pleura:  Small right pleural effusion noted.     Systemic Veins:  The inferior vena cava is dilated measuring 2.39 cm in diameter, (dilated >2.1cm) with normal inspiratory collapse (normal >50%) consistent with mildly elevated right atrial pressure (~8, range 5-10mmHg).  ____________________________________________________________________  QUANTITATIVE DATA:  Left Ventricle Measurements: (Indexed to BSA)     IVSd (2D):   1.1 cm  LVPWd (2D):  1.2 cm  LVIDd (2D):  5.2 cm  LVIDs (2D):  3.2 cm  LV Mass:     232 g  116.4 g/m²  LV Vol d, MOD A2C: 128.0 ml 64.09 ml/m²  LV Vol d, MOD A4C: 74.1 ml  37.10 ml/m²  LV Vol d, MOD BP:  110.5 ml 55.35 ml/m²  LV Vol s, MOD A2C: 49.6 ml  24.84 ml/m²  LV Vol s, MOD A4C: 23.6 ml  11.82 ml/m²  LV Vol s, MOD BP:  41.0 ml  20.53 ml/m²  LVOT SV MOD BP:    69.5 ml  LV EF% MOD BP:     63 %     MV E Vmax:    1.21 m/s  MV A Vmax:    0.74 m/s  MV E/A:       1.64  e' lateral:   7.18 cm/s  e' medial:    7.29 cm/s  E/e' lateral: 16.85  E/e' medial:  16.60  E/e' Average: 16.72  MV DT:        258 msec    Aorta Measurements: (Normal range) (Indexed to BSA)     Sinuses of Valsalva: 2.90 cm (3.1 - 3.7 cm)  Ao Asc prox:        3.40 cm       Left Atrium Measurements: (Indexed to BSA)  LA Diam 2D: 4.20 cm    Right Ventricle Measurements:     TAPSE:            2.2 cm  RV Base (RVID1):  3.2 cm  RV Mid (RVID2):   3.2 cm  RV Major (RVID3): 7.5 cm       LVOT / RVOT/ Qp/QsData: (Indexed to BSA)  LVOT Diameter: 1.80 cm    Mitral Valve Measurements:     MV E Vmax: 1.2 m/s         MR Vmax:          4.84 m/s  MV A Vmax: 0.7 m/s         MR Peak Gradient: 93.7 mmHg  MV E/A:    1.6       Tricuspid Valve Measurements:     TR Vmax:          2.8 m/s  TR Peak Gradient: 31.9 mmHg  RA Pressure:      8 mmHg  PASP:             40 mmHg    ________________________________________________________________________________________  Electronically signed on 4/15/2024 at 3:45:34 PM by Lorie Lees MD         *** Final ***

## 2024-04-17 NOTE — PROGRESS NOTE ADULT - ASSESSMENT
69 yo male with PMH HTN, HLD, DM, CKD on HD (M,W,F-[facility]), GERD, present to ED c/o SOB, worsening x months. Follows Dr David Shaw for cardiology     HTN, HLD, CHFpEF, Diastolic dysfunction   - Pt p/w SOB a/w vol ol   - CXR showing: pulmonary edema  pattern.   - CT chest NON-CON concerning for possible CHF, shows b/l pleural effusions.   - BNP:  <--29482. Elevated likely in the setting of ESRD  - Technically difficult ECHO showed normal LV & RV size and function EF 63%, There may be an area of hypokinesis of the basal inferoseptal wall and basal inferior wall on this technically difficult study. Mild LVH, mod LVDD, Trace AR, mild to modd MR, elevated RAP, mild pulm HTN   - Has h/o ESRD and is on HD  - HD per renal for fluid removal. For HD today  - Volume status improving   - Continue Lasix 40 mg Po daily   - Tele w/ SR 60s, no events   - Daily weight monitoring, Strict I/O    - Known CAD, S/p PCI in 2016 to LAD  - Troponin: <-16.0  - EKG with SR @ 68, LAD, LBBB.  - His LBBB is new since Jan 2023.Called Dr David Shaw for records. Records reviewed and no LBBB noted in EKG from May 2023.   - Last stress test in 2022. Normal.   - He does have some hypokinesis on ECHO here,   - Recommend stress test out patient.,   - Continue Brilinta, statin, Zetia  - Continue BB   - -160s     - Monitor and replete lytes, keep K>4, Mg>2.  - Will continue to follow.    Wero Sims, MS FNP, AGACNP  Nurse Practitioner- Cardiology   Please call on TEAMS     67 yo male with PMH HTN, HLD, DM, CKD on HD (M,W,F-[facility]), GERD, present to ED c/o SOB, worsening x months. Follows Dr David Shaw for cardiology     HTN, HLD, CHFpEF, Diastolic dysfunction   - Pt p/w SOB a/w vol ol   - CXR showing: pulmonary edema  pattern.   - CT chest NON-CON concerning for possible CHF, shows b/l pleural effusions.   - BNP:  <--57120. Elevated likely in the setting of ESRD  - Technically difficult ECHO showed normal LV & RV size and function EF 63%, There may be an area of hypokinesis of the basal inferoseptal wall and basal inferior wall on this technically difficult study. Mild LVH, mod LVDD, Trace AR, mild to modd MR, elevated RAP, mild pulm HTN   - Has h/o ESRD and is on HD  - HD per renal for fluid removal. For HD today  - Volume status improving   - Continue Lasix 40 mg Po daily   - Tele w/ SR 60s, no events   - Daily weight monitoring, Strict I/O    - Known CAD, S/p PCI in 2016 to LAD  - Troponin: <-16.0  - EKG with SR @ 68, LAD, LBBB.  - His LBBB is new since Jan 2023.Called Dr David Shaw for records. Records reviewed and no LBBB noted in EKG from May 2023.   - Last stress test in 2022. Normal.   - Recommend stress test out patient.,   - Continue Brilinta, statin, Zetia  - Continue BB   - -160s     - Monitor and replete lytes, keep K>4, Mg>2.  - Will continue to follow.    Wero Sims, MS FNP, AGACNP  Nurse Practitioner- Cardiology   Please call on TEAMS

## 2024-04-17 NOTE — PROGRESS NOTE ADULT - NS ATTEND AMEND GEN_ALL_CORE FT
67 yo male with PMH HTN, HLD, DM, CKD on HD (M,W,F-[facility]), GERD, present to ED c/o SOB, worsening x months. Follows Dr David Shaw for cardiology     - Pt p/w SOB, imaging consistent with pulmonary edema  - Technically difficult ECHO showed normal LV & RV size and function EF 63%, There may be an area of hypokinesis of the basal inferoseptal wall and basal inferior wall on this technically difficult study. Mild LVH, mod LVDD, Trace AR, mild to modd MR, elevated RAP, mild pulm HTN   - Has h/o ESRD and is on HD  - HD per renal for fluid removal. For HD today  - Volume status improving.  Continue to remove with HD  - Continue Lasix 40 mg Po daily   - His LBBB is new since Jan 2023. Called Dr David Shaw for records.   - He reports that he has had recent ischemia testing. To review records once obtained from Dr. Shaw  - Continue Brilinta, statin, Zetia  - Continue BB   - -160s.  - given improvement in symptoms with HD, negative troponin, and overall normal LV function (septal wall motion abn likely from lbbb), favor outpt pharm stress with his primary cardiologist.

## 2024-04-17 NOTE — PROGRESS NOTE ADULT - SUBJECTIVE AND OBJECTIVE BOX
INTERVAL HPI/OVERNIGHT EVENTS:  Patient seen and examined at bedside. States he is feeling a bit better today. States his breathing is improved, but today complaining of low back pain. States it is dull, achy and started earlier this AM, not relieved by positioning. Denies any chest pain, abd pain at this time.    ROS: All other review of systems is negative unless indicated above.    MEDICATIONS  (STANDING):  atorvastatin 40 milliGRAM(s) Oral at bedtime  calcitriol   Capsule 0.25 MICROGram(s) Oral daily  dextrose 10% Bolus 125 milliLiter(s) IV Bolus once  dextrose 5%. 1000 milliLiter(s) (100 mL/Hr) IV Continuous <Continuous>  dextrose 5%. 1000 milliLiter(s) (50 mL/Hr) IV Continuous <Continuous>  dextrose 50% Injectable 25 Gram(s) IV Push once  dextrose 50% Injectable 12.5 Gram(s) IV Push once  epoetin demetrius-epbx (RETACRIT) Injectable 55235 Unit(s) IV Push <User Schedule>  ezetimibe 10 milliGRAM(s) Oral daily  furosemide    Tablet 40 milliGRAM(s) Oral daily  glucagon  Injectable 1 milliGRAM(s) IntraMuscular once  heparin   Injectable 5000 Unit(s) SubCutaneous every 8 hours  insulin lispro (ADMELOG) corrective regimen sliding scale   SubCutaneous three times a day before meals  metoprolol tartrate 25 milliGRAM(s) Oral two times a day  pantoprazole    Tablet 40 milliGRAM(s) Oral before breakfast  polyethylene glycol 3350 17 Gram(s) Oral daily  senna 2 Tablet(s) Oral at bedtime  ticagrelor 60 milliGRAM(s) Oral two times a day    MEDICATIONS  (PRN):  acetaminophen     Tablet .. 650 milliGRAM(s) Oral every 6 hours PRN Temp greater or equal to 38C (100.4F), Mild Pain (1 - 3)  albuterol    90 MICROgram(s) HFA Inhaler 2 Puff(s) Inhalation every 6 hours PRN Shortness of Breath and/or Wheezing  aluminum hydroxide/magnesium hydroxide/simethicone Suspension 30 milliLiter(s) Oral every 4 hours PRN Dyspepsia  dextrose Oral Gel 15 Gram(s) Oral once PRN Blood Glucose LESS THAN 70 milliGRAM(s)/deciliter  melatonin 3 milliGRAM(s) Oral at bedtime PRN Insomnia  trimethobenzamide Injectable 200 milliGRAM(s) IntraMuscular every 8 hours PRN Nausea and/or Vomiting      Allergies    No Known Allergies    Intolerances            Vital Signs Last 24 Hrs  T(C): 36.8 (17 Apr 2024 19:15), Max: 36.8 (17 Apr 2024 04:25)  T(F): 98.2 (17 Apr 2024 19:15), Max: 98.2 (17 Apr 2024 04:25)  HR: 74 (17 Apr 2024 19:15) (61 - 74)  BP: 96/56 (17 Apr 2024 19:15) (96/56 - 174/65)  BP(mean): --  RR: 18 (17 Apr 2024 19:15) (17 - 20)  SpO2: 96% (17 Apr 2024 19:15) (95% - 100%)    Parameters below as of 17 Apr 2024 19:15  Patient On (Oxygen Delivery Method): room air        04-16 @ 07:01  -  04-17 @ 07:00  --------------------------------------------------------  IN: 0 mL / OUT: 3000 mL / NET: -3000 mL    04-17 @ 07:01  -  04-17 @ 20:46  --------------------------------------------------------  IN: 0 mL / OUT: 3500 mL / NET: -3500 mL      Physical Exam:  General:  laying in bed, NAD  Neurology: A&Ox3, nonfocal, LINARES x 4  Respiratory: CTA B/L  CV: RRR, S1S2, no murmurs, rubs or gallops  Abdominal: Soft, NT, ND +BS  Extremities: No edema, + peripheral pulses      LABS:    04-17    133<L>  |  96  |  60<H>  ----------------------------<  203<H>  5.3   |  26  |  8.90<H>    Ca    9.2      17 Apr 2024 07:05  Phos  7.6     04-17  Mg     2.9     04-17        Urinalysis Basic - ( 17 Apr 2024 07:05 )    Color: x / Appearance: x / SG: x / pH: x  Gluc: 203 mg/dL / Ketone: x  / Bili: x / Urobili: x   Blood: x / Protein: x / Nitrite: x   Leuk Esterase: x / RBC: x / WBC x   Sq Epi: x / Non Sq Epi: x / Bacteria: x        RADIOLOGY & ADDITIONAL TESTS:

## 2024-04-18 ENCOUNTER — TRANSCRIPTION ENCOUNTER (OUTPATIENT)
Age: 69
End: 2024-04-18

## 2024-04-18 VITALS
SYSTOLIC BLOOD PRESSURE: 166 MMHG | HEART RATE: 69 BPM | DIASTOLIC BLOOD PRESSURE: 73 MMHG | TEMPERATURE: 98 F | RESPIRATION RATE: 19 BRPM | OXYGEN SATURATION: 98 %

## 2024-04-18 DIAGNOSIS — E11.65 TYPE 2 DIABETES MELLITUS WITH HYPERGLYCEMIA: ICD-10-CM

## 2024-04-18 LAB
ALBUMIN SERPL ELPH-MCNC: 3.5 G/DL — SIGNIFICANT CHANGE UP (ref 3.3–5)
ALP SERPL-CCNC: 107 U/L — SIGNIFICANT CHANGE UP (ref 40–120)
ALT FLD-CCNC: 22 U/L — SIGNIFICANT CHANGE UP (ref 12–78)
ANION GAP SERPL CALC-SCNC: 11 MMOL/L — SIGNIFICANT CHANGE UP (ref 5–17)
AST SERPL-CCNC: 16 U/L — SIGNIFICANT CHANGE UP (ref 15–37)
BILIRUB SERPL-MCNC: 0.5 MG/DL — SIGNIFICANT CHANGE UP (ref 0.2–1.2)
BUN SERPL-MCNC: 54 MG/DL — HIGH (ref 7–23)
CALCIUM SERPL-MCNC: 8.9 MG/DL — SIGNIFICANT CHANGE UP (ref 8.5–10.1)
CHLORIDE SERPL-SCNC: 94 MMOL/L — LOW (ref 96–108)
CO2 SERPL-SCNC: 28 MMOL/L — SIGNIFICANT CHANGE UP (ref 22–31)
CREAT SERPL-MCNC: 7.7 MG/DL — HIGH (ref 0.5–1.3)
EGFR: 7 ML/MIN/1.73M2 — LOW
GLUCOSE SERPL-MCNC: 196 MG/DL — HIGH (ref 70–99)
HCT VFR BLD CALC: 37.4 % — LOW (ref 39–50)
HGB BLD-MCNC: 11.8 G/DL — LOW (ref 13–17)
MCHC RBC-ENTMCNC: 29.9 PG — SIGNIFICANT CHANGE UP (ref 27–34)
MCHC RBC-ENTMCNC: 31.6 GM/DL — LOW (ref 32–36)
MCV RBC AUTO: 94.7 FL — SIGNIFICANT CHANGE UP (ref 80–100)
NRBC # BLD: 0 /100 WBCS — SIGNIFICANT CHANGE UP (ref 0–0)
PLATELET # BLD AUTO: 161 K/UL — SIGNIFICANT CHANGE UP (ref 150–400)
POTASSIUM SERPL-MCNC: 5 MMOL/L — SIGNIFICANT CHANGE UP (ref 3.5–5.3)
POTASSIUM SERPL-SCNC: 5 MMOL/L — SIGNIFICANT CHANGE UP (ref 3.5–5.3)
PROT SERPL-MCNC: 7.2 G/DL — SIGNIFICANT CHANGE UP (ref 6–8.3)
RBC # BLD: 3.95 M/UL — LOW (ref 4.2–5.8)
RBC # FLD: 13.5 % — SIGNIFICANT CHANGE UP (ref 10.3–14.5)
SODIUM SERPL-SCNC: 133 MMOL/L — LOW (ref 135–145)
WBC # BLD: 8.36 K/UL — SIGNIFICANT CHANGE UP (ref 3.8–10.5)
WBC # FLD AUTO: 8.36 K/UL — SIGNIFICANT CHANGE UP (ref 3.8–10.5)

## 2024-04-18 PROCEDURE — 80053 COMPREHEN METABOLIC PANEL: CPT

## 2024-04-18 PROCEDURE — 93306 TTE W/DOPPLER COMPLETE: CPT

## 2024-04-18 PROCEDURE — 85027 COMPLETE CBC AUTOMATED: CPT

## 2024-04-18 PROCEDURE — 71046 X-RAY EXAM CHEST 2 VIEWS: CPT

## 2024-04-18 PROCEDURE — 87077 CULTURE AEROBIC IDENTIFY: CPT

## 2024-04-18 PROCEDURE — 36415 COLL VENOUS BLD VENIPUNCTURE: CPT

## 2024-04-18 PROCEDURE — 99232 SBSQ HOSP IP/OBS MODERATE 35: CPT

## 2024-04-18 PROCEDURE — 85730 THROMBOPLASTIN TIME PARTIAL: CPT

## 2024-04-18 PROCEDURE — 99239 HOSP IP/OBS DSCHRG MGMT >30: CPT

## 2024-04-18 PROCEDURE — 93005 ELECTROCARDIOGRAM TRACING: CPT

## 2024-04-18 PROCEDURE — 82962 GLUCOSE BLOOD TEST: CPT

## 2024-04-18 PROCEDURE — 87637 SARSCOV2&INF A&B&RSV AMP PRB: CPT

## 2024-04-18 PROCEDURE — 80048 BASIC METABOLIC PNL TOTAL CA: CPT

## 2024-04-18 PROCEDURE — 71250 CT THORAX DX C-: CPT | Mod: MC

## 2024-04-18 PROCEDURE — 85379 FIBRIN DEGRADATION QUANT: CPT

## 2024-04-18 PROCEDURE — 87186 SC STD MICRODIL/AGAR DIL: CPT

## 2024-04-18 PROCEDURE — 84100 ASSAY OF PHOSPHORUS: CPT

## 2024-04-18 PROCEDURE — 99222 1ST HOSP IP/OBS MODERATE 55: CPT

## 2024-04-18 PROCEDURE — 99261: CPT

## 2024-04-18 PROCEDURE — 83735 ASSAY OF MAGNESIUM: CPT

## 2024-04-18 PROCEDURE — 97161 PT EVAL LOW COMPLEX 20 MIN: CPT

## 2024-04-18 PROCEDURE — 83605 ASSAY OF LACTIC ACID: CPT

## 2024-04-18 PROCEDURE — 87086 URINE CULTURE/COLONY COUNT: CPT

## 2024-04-18 PROCEDURE — 83880 ASSAY OF NATRIURETIC PEPTIDE: CPT

## 2024-04-18 PROCEDURE — 99285 EMERGENCY DEPT VISIT HI MDM: CPT

## 2024-04-18 PROCEDURE — 84484 ASSAY OF TROPONIN QUANT: CPT

## 2024-04-18 PROCEDURE — 87040 BLOOD CULTURE FOR BACTERIA: CPT

## 2024-04-18 PROCEDURE — 85025 COMPLETE CBC W/AUTO DIFF WBC: CPT

## 2024-04-18 PROCEDURE — 70450 CT HEAD/BRAIN W/O DYE: CPT | Mod: MC

## 2024-04-18 PROCEDURE — 82803 BLOOD GASES ANY COMBINATION: CPT

## 2024-04-18 PROCEDURE — 81001 URINALYSIS AUTO W/SCOPE: CPT

## 2024-04-18 PROCEDURE — 85610 PROTHROMBIN TIME: CPT

## 2024-04-18 PROCEDURE — 83036 HEMOGLOBIN GLYCOSYLATED A1C: CPT

## 2024-04-18 RX ORDER — FUROSEMIDE 40 MG
1 TABLET ORAL
Qty: 30 | Refills: 0
Start: 2024-04-18 | End: 2024-05-17

## 2024-04-18 RX ORDER — NICOTINE POLACRILEX 2 MG
1 GUM BUCCAL
Qty: 2 | Refills: 0
Start: 2024-04-18 | End: 2024-05-17

## 2024-04-18 RX ORDER — LIDOCAINE 4 G/100G
1 CREAM TOPICAL
Qty: 14 | Refills: 0
Start: 2024-04-18 | End: 2024-05-01

## 2024-04-18 RX ORDER — INSULIN DEGLUDEC 100 U/ML
20 INJECTION, SOLUTION SUBCUTANEOUS
Qty: 0 | Refills: 0 | DISCHARGE
Start: 2024-04-18

## 2024-04-18 RX ORDER — ALBUTEROL 90 UG/1
2 AEROSOL, METERED ORAL
Qty: 1 | Refills: 0
Start: 2024-04-18 | End: 2024-05-17

## 2024-04-18 RX ORDER — METOPROLOL TARTRATE 50 MG
1 TABLET ORAL
Qty: 0 | Refills: 0 | DISCHARGE
Start: 2024-04-18

## 2024-04-18 RX ORDER — METOPROLOL TARTRATE 50 MG
1 TABLET ORAL
Qty: 0 | Refills: 0 | DISCHARGE

## 2024-04-18 RX ORDER — CALCITRIOL 0.5 UG/1
1 CAPSULE ORAL
Qty: 0 | Refills: 0 | DISCHARGE
Start: 2024-04-18

## 2024-04-18 RX ORDER — ACETAMINOPHEN 500 MG
2 TABLET ORAL
Qty: 0 | Refills: 0 | DISCHARGE
Start: 2024-04-18

## 2024-04-18 RX ADMIN — Medication 40 MILLIGRAM(S): at 05:14

## 2024-04-18 RX ADMIN — TICAGRELOR 60 MILLIGRAM(S): 90 TABLET ORAL at 05:14

## 2024-04-18 RX ADMIN — Medication 3: at 11:57

## 2024-04-18 RX ADMIN — HEPARIN SODIUM 5000 UNIT(S): 5000 INJECTION INTRAVENOUS; SUBCUTANEOUS at 14:47

## 2024-04-18 RX ADMIN — PANTOPRAZOLE SODIUM 40 MILLIGRAM(S): 20 TABLET, DELAYED RELEASE ORAL at 05:14

## 2024-04-18 RX ADMIN — LIDOCAINE 1 PATCH: 4 CREAM TOPICAL at 12:14

## 2024-04-18 RX ADMIN — Medication 25 MILLIGRAM(S): at 05:14

## 2024-04-18 RX ADMIN — Medication 1: at 07:39

## 2024-04-18 RX ADMIN — HEPARIN SODIUM 5000 UNIT(S): 5000 INJECTION INTRAVENOUS; SUBCUTANEOUS at 05:14

## 2024-04-18 NOTE — DISCHARGE NOTE PROVIDER - NSDCCPCAREPLAN_GEN_ALL_CORE_FT
PRINCIPAL DISCHARGE DIAGNOSIS  Diagnosis: Shortness of breath  Assessment and Plan of Treatment: In setting of pulmonary edema from ESRD. You had multiple hemodialysis sessions during hospital stay and were started on lasix 40mg daily. Please continue lasix as precribed. Follow up with Nephrology within 1-2 weeks of discharge.      SECONDARY DISCHARGE DIAGNOSES  Diagnosis: ESRD on dialysis  Assessment and Plan of Treatment: Continue hemodialysis as scheduled.    Diagnosis: Type 2 diabetes mellitus  Assessment and Plan of Treatment: Your A1c was 6.2 during hospital stay. You were evaluated by Diabetic NP, who recommended tresiba 20 units in the morning. Please follow up with Endocrinology within 2 weeks of discharge.    Diagnosis: CAD (coronary artery disease)  Assessment and Plan of Treatment: You were evaluated by Cardiology during hospital stay and had a transthoracic echocardiogram (TTE) which showed normal ejection fraction (EF), but was notable for an area of hypokinesis. Your outpatient cardiology records were reviewed and it is recommended you follow up with your outpatient Cardiologist, Dr. David Shaw for a pharmocological stress test within 2 weeks. If experiencing severe chest pain or shortness of breath, please return to Emergency Department.

## 2024-04-18 NOTE — CONSULT NOTE ADULT - PROBLEM SELECTOR RECOMMENDATION 9
Type  A1c % adm  Recommend endocrine-Perlman onconsult  FU appt: TBA  DSC recommendations: return to home regimen and glucose monitoring  diabetes education provided  Diabetes support info and cell # [phone] given   Goal 100-180 mg/dL; 140-180 mg/dL in critical care areas Type 2 A1c 6.2%  FU appt: [facility]GRANT  DSC recommendations: return to home tresiba 20 units AM regimen and CGM/ glucose monitoring  diabetes education provided  Diabetes support info and cell # [phone] given   Goal 100-180 mg/dL; 140-180 mg/dL in critical care areas

## 2024-04-18 NOTE — PROGRESS NOTE ADULT - PROVIDER SPECIALTY LIST ADULT
Pulmonology
Cardiology
Nephrology
Pulmonology
Nephrology
Pulmonology
Cardiology
Internal Medicine
Nephrology
Pulmonology
Internal Medicine
Hospitalist

## 2024-04-18 NOTE — DISCHARGE NOTE PROVIDER - CARE PROVIDER_API CALL
Pahlavan, Mohsen  Nephrology  [address] [facility], [address] [address]  Phone: [phone]  Fax: [phone]  Follow Up Time:     David Shaw  Cardiovascular Disease  [address] [place] [address]  Phone: [phone]  Fax: [phone]  Follow Up Time:

## 2024-04-18 NOTE — DISCHARGE NOTE NURSING/CASE MANAGEMENT/SOCIAL WORK - NSDCPEEMAIL_GEN_ALL_CORE
[facility] for Tobacco Control email [email or web address]

## 2024-04-18 NOTE — DISCHARGE NOTE NURSING/CASE MANAGEMENT/SOCIAL WORK - PATIENT PORTAL LINK FT
You can access the FollowMyHealth Patient Portal offered by [facility] by registering at the following website: [email or web address]. By joining [employer]’s FollowMyHealth portal, you will also be able to view your health information using other applications (apps) compatible with our system.

## 2024-04-18 NOTE — DISCHARGE NOTE NURSING/CASE MANAGEMENT/SOCIAL WORK - NSDCPEWEB_GEN_ALL_CORE
[facility] for Tobacco Control website --- [email or web address] --- [email or web address]

## 2024-04-18 NOTE — DISCHARGE NOTE PROVIDER - NSDCMRMEDTOKEN_GEN_ALL_CORE_FT
acetaminophen 325 mg oral tablet: 2 tab(s) orally every 6 hours As needed Temp greater or equal to 38C (100.4F), Mild Pain (1 - 3)  albuterol 90 mcg/inh inhalation aerosol: 2 puff(s) inhaled every 6 hours as needed for Shortness of Breath and/or Wheezing  atorvastatin 40 mg oral tablet: 1 tab(s) orally once a day (at bedtime)  Auryxia 210 mg oral tablet: 420 milligram(s) orally 3 times a day (with meals)  Brilinta (ticagrelor) 60 mg oral tablet: 1 dose(s) orally 2 times a day  calcitriol 0.25 mcg oral capsule: 1 cap(s) orally once a day  famotidine 20 mg oral tablet: 1 dose(s) orally once a day  furosemide 40 mg oral tablet: 1 tab(s) orally once a day  lidocaine 4% topical film: Apply topically to affected area once a day  metoprolol tartrate 25 mg oral tablet: 1 tab(s) orally 2 times a day  Nephro-Beronica oral tablet: 1 tab(s) orally once a day  nicotine 21 mg/24 hr transdermal film, extended release: 1 patch transdermally once a day  ocular lubricant ophthalmic solution: 1 drop(s) to each affected eye 3 times a day  Tresiba 100 units/mL subcutaneous solution: 20 unit(s) subcutaneous once a day  Zetia 10 mg oral tablet: 1 tab(s) orally once a day

## 2024-04-18 NOTE — CONSULT NOTE ADULT - ASSESSMENT
Physical Exam:   Vital Signs Last 24 Hrs  T(C): 36.6 (18 Apr 2024 11:42), Max: 36.9 (18 Apr 2024 04:35)  T(F): 97.8 (18 Apr 2024 11:42), Max: 98.4 (18 Apr 2024 04:35)  HR: 69 (18 Apr 2024 11:42) (69 - 91)  BP: 166/73 (18 Apr 2024 11:42) (96/56 - 166/73)  BP(mean): --  RR: 19 (18 Apr 2024 11:42) (18 - 19)  SpO2: 98% (18 Apr 2024 11:42) (96% - 98%)    Parameters below as of 18 Apr 2024 11:42  Patient On (Oxygen Delivery Method): room air             CAPILLARY BLOOD GLUCOSE      POCT Blood Glucose.: 258 mg/dL (18 Apr 2024 11:38)  POCT Blood Glucose.: 196 mg/dL (18 Apr 2024 07:33)  POCT Blood Glucose.: 209 mg/dL (17 Apr 2024 21:05)  POCT Blood Glucose.: 199 mg/dL (17 Apr 2024 16:28)      Cholesterol, Serum: 113 mg/dL (05.19.21 @ 08:36)     HDL Cholesterol, Serum: 22 mg/dL (05.19.21 @ 08:36)     LDL Cholesterol Calculated: 66 mg/dL (05.19.21 @ 08:36)     DIET: CC  >50%

## 2024-04-18 NOTE — DISCHARGE NOTE PROVIDER - CARE PROVIDERS DIRECT ADDRESSES
[email or web address]

## 2024-04-18 NOTE — PROGRESS NOTE ADULT - SUBJECTIVE AND OBJECTIVE BOX
Date/Time Patient Seen:  		  Referring MD:   Data Reviewed	       Patient is a 68y old  Male who presents with a chief complaint of SOB (17 Apr 2024 16:45)      Subjective/HPI     PAST MEDICAL & SURGICAL HISTORY:  HTN (hypertension)    DM2 (diabetes mellitus, type 2)    Hypercholesterolemia    CKD (chronic kidney disease)    Anemia    ESRD on dialysis    T2DM (type 2 diabetes mellitus)    CAD (coronary artery disease)    HLD (hyperlipidemia)    GERD (gastroesophageal reflux disease)    H/O eye surgery          Medication list         MEDICATIONS  (STANDING):  atorvastatin 40 milliGRAM(s) Oral at bedtime  calcitriol   Capsule 0.25 MICROGram(s) Oral daily  dextrose 10% Bolus 125 milliLiter(s) IV Bolus once  dextrose 5%. 1000 milliLiter(s) (50 mL/Hr) IV Continuous <Continuous>  dextrose 5%. 1000 milliLiter(s) (100 mL/Hr) IV Continuous <Continuous>  dextrose 50% Injectable 25 Gram(s) IV Push once  dextrose 50% Injectable 12.5 Gram(s) IV Push once  epoetin demetrius-epbx (RETACRIT) Injectable 12014 Unit(s) IV Push <User Schedule>  ezetimibe 10 milliGRAM(s) Oral daily  furosemide    Tablet 40 milliGRAM(s) Oral daily  glucagon  Injectable 1 milliGRAM(s) IntraMuscular once  heparin   Injectable 5000 Unit(s) SubCutaneous every 8 hours  insulin lispro (ADMELOG) corrective regimen sliding scale   SubCutaneous three times a day before meals  lidocaine   4% Patch 1 Patch Transdermal daily  metoprolol tartrate 25 milliGRAM(s) Oral two times a day  pantoprazole    Tablet 40 milliGRAM(s) Oral before breakfast  polyethylene glycol 3350 17 Gram(s) Oral daily  senna 2 Tablet(s) Oral at bedtime  ticagrelor 60 milliGRAM(s) Oral two times a day    MEDICATIONS  (PRN):  acetaminophen     Tablet .. 650 milliGRAM(s) Oral every 6 hours PRN Temp greater or equal to 38C (100.4F), Mild Pain (1 - 3)  albuterol    90 MICROgram(s) HFA Inhaler 2 Puff(s) Inhalation every 6 hours PRN Shortness of Breath and/or Wheezing  aluminum hydroxide/magnesium hydroxide/simethicone Suspension 30 milliLiter(s) Oral every 4 hours PRN Dyspepsia  dextrose Oral Gel 15 Gram(s) Oral once PRN Blood Glucose LESS THAN 70 milliGRAM(s)/deciliter  melatonin 3 milliGRAM(s) Oral at bedtime PRN Insomnia  trimethobenzamide Injectable 200 milliGRAM(s) IntraMuscular every 8 hours PRN Nausea and/or Vomiting         Vitals log        ICU Vital Signs Last 24 Hrs  T(C): 36.9 (18 Apr 2024 04:35), Max: 36.9 (18 Apr 2024 04:35)  T(F): 98.4 (18 Apr 2024 04:35), Max: 98.4 (18 Apr 2024 04:35)  HR: 91 (18 Apr 2024 04:35) (61 - 91)  BP: 114/60 (18 Apr 2024 04:35) (96/56 - 174/65)  BP(mean): --  ABP: --  ABP(mean): --  RR: 18 (18 Apr 2024 04:35) (17 - 20)  SpO2: 96% (18 Apr 2024 04:35) (95% - 100%)    O2 Parameters below as of 18 Apr 2024 04:35  Patient On (Oxygen Delivery Method): room air                 Input and Output:  I&O's Detail    16 Apr 2024 07:01  -  17 Apr 2024 07:00  --------------------------------------------------------  IN:  Total IN: 0 mL    OUT:    Other (mL): 3000 mL  Total OUT: 3000 mL    Total NET: -3000 mL      17 Apr 2024 07:01  -  18 Apr 2024 05:41  --------------------------------------------------------  IN:  Total IN: 0 mL    OUT:    Other (mL): 3500 mL  Total OUT: 3500 mL    Total NET: -3500 mL          Lab Data    04-17    133<L>  |  96  |  60<H>  ----------------------------<  203<H>  5.3   |  26  |  8.90<H>    Ca    9.2      17 Apr 2024 07:05  Phos  7.6     04-17  Mg     2.9     04-17              Review of Systems	      Objective     Physical Examination    heart s1s2  lung dc BS  head nc  head at  verbal    Pertinent Lab findings & Imaging      Ankush:  NO   Adequate UO     I&O's Detail    16 Apr 2024 07:01  -  17 Apr 2024 07:00  --------------------------------------------------------  IN:  Total IN: 0 mL    OUT:    Other (mL): 3000 mL  Total OUT: 3000 mL    Total NET: -3000 mL      17 Apr 2024 07:01  -  18 Apr 2024 05:41  --------------------------------------------------------  IN:  Total IN: 0 mL    OUT:    Other (mL): 3500 mL  Total OUT: 3500 mL    Total NET: -3500 mL               Discussed with:     Cultures:	        Radiology

## 2024-04-18 NOTE — DISCHARGE NOTE PROVIDER - HOSPITAL COURSE
ADMISSION DATE:  04-14-24    ---  FROM ADMISSION H+P:   HPI:  69 yo male with PMHX T2DM, CKD on HD (M,W,F-[facility]), HTN, HLD, GERD, present to ED after having traveled to [facility] c/o SOB, worsening x months, worst this AM.     Denies fever, chills, chest pain, palpitations, SOB, cough, abdominal pain, nausea, vomiting, diarrhea, constipation, urinary frequency, urgency, or dysuria, headaches, changes in vision, dizziness, numbness, tingling.  Denies recent travel, recent antibiotic use, or sick contacts.    ED Course:   Vitals: BP: 155/98 , HR: 67, Temp: 97.3, RR: 20 , SpO2: 95% on RA   Labs:  WBC: 9.63, H/H: 10.5/34.5, K 6.0, Cr. 8.10, BNP: 23,411, RVP: Negative  UA: pending  CXR:There has been interval development of a pulmonary edema pattern. No consolidation or pleural collections. No pneumothorax. Left-sided subclavian vascular stent redemonstrated. Prominent heart size.  EKG: sr with apcs. Possible Left atrial enlargement. QTC:499, VR: 65 Left bundle branch block     (14 Apr 2024 17:51)      ---  HOSPITAL COURSE/PERTINENT LABS/PROCEDURES PERFORMED/PENDING TESTS:  Patient admitted to medicine for respiratory distress probably secondary to fluid overload in setting of ESRD. Patient evaluated by Cardiology, Pulmonology and Nephrology during hospital stay. Patient underwent dialysis sessions on 4/15, 4/16 and 4/17 to remove excess fluids and respiratory distress resolved. Patient had TTE with normal EF but concern for area of hypokinesis and also with new LBBB noted on EKG, outpatient records reviewed by Cardiology, recommend outpatient stress test with Cardiology. Patient's blood cultures with NGTD and UA not significant for infection, however urine culture with 50k-99k morganella morganii, likely colonized, patient without fevers, no leukocytosis, doubt acute infection. Patient's condition improved through hospital stay, stable for discharge home with close outpatient follow up with Cardiology and Nephrology.    ---  PATIENT CONDITION:  - stable    ---  PHYSICAL EXAM ON DAY OF DISCHARGE:  General:  laying in bed comfortably, NAD  Neurology: A&Ox3, nonfocal, LINARES x 4  Respiratory: CTA B/L, no w/r/r  CV: RRR, S1S2, no murmurs, rubs or gallops  Abdominal: Soft, NT, ND +BS  Extremities: No edema, + peripheral pulses      ---  CONSULTANTS:   Dr. Shoemaker (Nephro)  Dr. Vazquez (Pulm)  Dr. Haywood (Cardio)    ---  TIME SPENT:  I, the attending physician, was physically present for the key portions of the evaluation and management (E/M) service provided. The total amount of time spent reviewing the hospital notes, laboratory values, imaging findings, assessing/counseling the patient, discussing with consultant physicians, social work, nursing staff was 42 minutes

## 2024-04-18 NOTE — CONSULT NOTE ADULT - SUBJECTIVE AND OBJECTIVE BOX
Patient is a 68y old  Male who presents with a chief complaint of SOB (18 Apr 2024 14:09)    Type: DX year known complications Endocrine Last seen Rx home Hx DKA/HHS, Glucometer checks, needs, weight, diet, exercise  diabetes education provided  Hx ASCVD, CKD, HF    HPI:  69 yo male with PMHX T2DM, CKD on HD (M,W,F-[facility]), HTN, HLD, GERD, present to ED after having traveled to [facility] c/o SOB, worsening x months, worst this AM.     Denies fever, chills, chest pain, palpitations, SOB, cough, abdominal pain, nausea, vomiting, diarrhea, constipation, urinary frequency, urgency, or dysuria, headaches, changes in vision, dizziness, numbness, tingling.  Denies recent travel, recent antibiotic use, or sick contacts.    ED Course:   Vitals: BP: 155/98 , HR: 67, Temp: 97.3, RR: 20 , SpO2: 95% on RA   Labs:  WBC: 9.63, H/H: 10.5/34.5, K 6.0, Cr. 8.10, BNP: 23,411, RVP: Negative  UA: pending  CXR:There has been interval development of a pulmonary edema   pattern. No consolidation or pleural collections. No pneumothorax.   Left-sided subclavian vascular stent redemonstrated. Prominent heart size.  EKG: sr with apcs. Possible Left atrial enlargement. QTC:499, VR: 65 Left bundle branch block     (14 Apr 2024 17:51)      PAST MEDICAL & SURGICAL HISTORY:  HTN (hypertension)      DM2 (diabetes mellitus, type 2)      Hypercholesterolemia      CKD (chronic kidney disease)      Anemia      ESRD on dialysis      T2DM (type 2 diabetes mellitus)      CAD (coronary artery disease)      HLD (hyperlipidemia)      GERD (gastroesophageal reflux disease)      H/O eye surgery          Allergies    No Known Allergies    Intolerances        MEDICATIONS  (STANDING):  atorvastatin 40 milliGRAM(s) Oral at bedtime  calcitriol   Capsule 0.25 MICROGram(s) Oral daily  dextrose 10% Bolus 125 milliLiter(s) IV Bolus once  dextrose 5%. 1000 milliLiter(s) (50 mL/Hr) IV Continuous <Continuous>  dextrose 5%. 1000 milliLiter(s) (100 mL/Hr) IV Continuous <Continuous>  dextrose 50% Injectable 25 Gram(s) IV Push once  dextrose 50% Injectable 12.5 Gram(s) IV Push once  epoetin demetrius-epbx (RETACRIT) Injectable 82540 Unit(s) IV Push <User Schedule>  ezetimibe 10 milliGRAM(s) Oral daily  furosemide    Tablet 40 milliGRAM(s) Oral daily  glucagon  Injectable 1 milliGRAM(s) IntraMuscular once  heparin   Injectable 5000 Unit(s) SubCutaneous every 8 hours  insulin lispro (ADMELOG) corrective regimen sliding scale   SubCutaneous three times a day before meals  lidocaine   4% Patch 1 Patch Transdermal daily  metoprolol tartrate 25 milliGRAM(s) Oral two times a day  pantoprazole    Tablet 40 milliGRAM(s) Oral before breakfast  polyethylene glycol 3350 17 Gram(s) Oral daily  senna 2 Tablet(s) Oral at bedtime  ticagrelor 60 milliGRAM(s) Oral two times a day       Patient is a 68y old  Male who presents with a chief complaint of SOB (18 Apr 2024 14:09)    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 a1c 6.2%.  known complications: nephropathy. Endocrine-no uses his PCP  Last seen: q3 mos.  Rx home: tresiba 20-30 units HS- has hypoglycemic events (6.2%) discussed to change to AM dose. No Hx DKA/HHS, Glucometer checks- once day- placed sensor libree3 with home DSC kit. ,denies needs, diabetes education provided verbally and handouts.       HPI:  69 yo male with PMHX T2DM, CKD on HD ([facility]), HTN, HLD, GERD, present to ED after having traveled to [facility] c/o SOB, worsening x months, worst this AM.     Denies fever, chills, chest pain, palpitations, SOB, cough, abdominal pain, nausea, vomiting, diarrhea, constipation, urinary frequency, urgency, or dysuria, headaches, changes in vision, dizziness, numbness, tingling.  Denies recent travel, recent antibiotic use, or sick contacts.    ED Course:   Vitals: BP: 155/98 , HR: 67, Temp: 97.3, RR: 20 , SpO2: 95% on RA   Labs:  WBC: 9.63, H/H: 10.5/34.5, K 6.0, Cr. 8.10, BNP: 23,411, RVP: Negative  UA: pending  CXR:There has been interval development of a pulmonary edema   pattern. No consolidation or pleural collections. No pneumothorax.   Left-sided subclavian vascular stent redemonstrated. Prominent heart size.  EKG: sr with apcs. Possible Left atrial enlargement. QTC:499, VR: 65 Left bundle branch block     (14 Apr 2024 17:51)      PAST MEDICAL & SURGICAL HISTORY:  HTN (hypertension)      DM2 (diabetes mellitus, type 2)      Hypercholesterolemia      CKD (chronic kidney disease)      Anemia      ESRD on dialysis      T2DM (type 2 diabetes mellitus)      CAD (coronary artery disease)      HLD (hyperlipidemia)      GERD (gastroesophageal reflux disease)      H/O eye surgery          Allergies    No Known Allergies    Intolerances        MEDICATIONS  (STANDING):  atorvastatin 40 milliGRAM(s) Oral at bedtime  calcitriol   Capsule 0.25 MICROGram(s) Oral daily  dextrose 10% Bolus 125 milliLiter(s) IV Bolus once  dextrose 5%. 1000 milliLiter(s) (50 mL/Hr) IV Continuous <Continuous>  dextrose 5%. 1000 milliLiter(s) (100 mL/Hr) IV Continuous <Continuous>  dextrose 50% Injectable 25 Gram(s) IV Push once  dextrose 50% Injectable 12.5 Gram(s) IV Push once  epoetin demetrius-epbx (RETACRIT) Injectable 95597 Unit(s) IV Push <User Schedule>  ezetimibe 10 milliGRAM(s) Oral daily  furosemide    Tablet 40 milliGRAM(s) Oral daily  glucagon  Injectable 1 milliGRAM(s) IntraMuscular once  heparin   Injectable 5000 Unit(s) SubCutaneous every 8 hours  insulin lispro (ADMELOG) corrective regimen sliding scale   SubCutaneous three times a day before meals  lidocaine   4% Patch 1 Patch Transdermal daily  metoprolol tartrate 25 milliGRAM(s) Oral two times a day  pantoprazole    Tablet 40 milliGRAM(s) Oral before breakfast  polyethylene glycol 3350 17 Gram(s) Oral daily  senna 2 Tablet(s) Oral at bedtime  ticagrelor 60 milliGRAM(s) Oral two times a day

## 2024-04-18 NOTE — PROGRESS NOTE ADULT - NS ATTEND AMEND GEN_ALL_CORE FT
69 yo male with PMH HTN, HLD, DM, CKD on HD (M,W,F-[facility]), GERD, present to ED c/o SOB, worsening x months. Follows Dr David Shaw for cardiology     - Pt p/w SOB, imaging consistent with pulmonary edema  - Technically difficult ECHO showed normal LV & RV size and function EF 63%, There may be an area of hypokinesis of the basal inferoseptal wall and basal inferior wall on this technically difficult study. Mild LVH, mod LVDD, Trace AR, mild to modd MR, elevated RAP, mild pulm HTN   - Has h/o ESRD and is on HD  - HD per renal for fluid removal. For HD today  - Volume status improving.  Continue to remove with HD  - Continue Lasix 40 mg Po daily   - His LBBB is   since Jan 2023. Called Dr David Shaw for records.   - He reports that he has had recent ischemia testing. To review records once obtained from Dr. Shaw  - Continue Brilinta, statin, Zetia  - Continue BB   - -160s.  - given improvement in symptoms with HD, negative troponin, and overall normal LV function (septal wall motion abn likely from lbbb), favor outpt pharm stress with his primary cardiologist. this was explained again to pt.

## 2024-04-18 NOTE — PROGRESS NOTE ADULT - SUBJECTIVE AND OBJECTIVE BOX
[facility] -- Karl Jackson, Donnie Crowe Savella, , Selin Lees  Office # [phone]    Follow Up:  HFpEF, New LBBB, SWMA on TTE    Subjective/Observations:     REVIEW OF SYSTEMS: All other review of systems is negative unless indicated above  PAST MEDICAL & SURGICAL HISTORY:  HTN (hypertension)  DM2 (diabetes mellitus, type 2)  Hypercholesterolemia  CKD (chronic kidney disease)  Anemia  ESRD on dialysis  T2DM (type 2 diabetes mellitus)  CAD (coronary artery disease)  HLD (hyperlipidemia)  GERD (gastroesophageal reflux disease)  H/O eye surgery    MEDICATIONS  (STANDING):  atorvastatin 40 milliGRAM(s) Oral at bedtime  calcitriol   Capsule 0.25 MICROGram(s) Oral daily  dextrose 10% Bolus 125 milliLiter(s) IV Bolus once  dextrose 5%. 1000 milliLiter(s) (100 mL/Hr) IV Continuous <Continuous>  dextrose 5%. 1000 milliLiter(s) (50 mL/Hr) IV Continuous <Continuous>  dextrose 50% Injectable 25 Gram(s) IV Push once  dextrose 50% Injectable 12.5 Gram(s) IV Push once  epoetin demetrius-epbx (RETACRIT) Injectable 11236 Unit(s) IV Push <User Schedule>  ezetimibe 10 milliGRAM(s) Oral daily  furosemide    Tablet 40 milliGRAM(s) Oral daily  glucagon  Injectable 1 milliGRAM(s) IntraMuscular once  heparin   Injectable 5000 Unit(s) SubCutaneous every 8 hours  insulin lispro (ADMELOG) corrective regimen sliding scale   SubCutaneous three times a day before meals  lidocaine   4% Patch 1 Patch Transdermal daily  metoprolol tartrate 25 milliGRAM(s) Oral two times a day  pantoprazole    Tablet 40 milliGRAM(s) Oral before breakfast  polyethylene glycol 3350 17 Gram(s) Oral daily  senna 2 Tablet(s) Oral at bedtime  ticagrelor 60 milliGRAM(s) Oral two times a day    MEDICATIONS  (PRN):  acetaminophen     Tablet .. 650 milliGRAM(s) Oral every 6 hours PRN Temp greater or equal to 38C (100.4F), Mild Pain (1 - 3)  albuterol    90 MICROgram(s) HFA Inhaler 2 Puff(s) Inhalation every 6 hours PRN Shortness of Breath and/or Wheezing  aluminum hydroxide/magnesium hydroxide/simethicone Suspension 30 milliLiter(s) Oral every 4 hours PRN Dyspepsia  dextrose Oral Gel 15 Gram(s) Oral once PRN Blood Glucose LESS THAN 70 milliGRAM(s)/deciliter  melatonin 3 milliGRAM(s) Oral at bedtime PRN Insomnia  trimethobenzamide Injectable 200 milliGRAM(s) IntraMuscular every 8 hours PRN Nausea and/or Vomiting    Allergies    No Known Allergies    Intolerances    Vital Signs Last 24 Hrs  T(C): 36.6 (18 Apr 2024 11:42), Max: 36.9 (18 Apr 2024 04:35)  T(F): 97.8 (18 Apr 2024 11:42), Max: 98.4 (18 Apr 2024 04:35)  HR: 69 (18 Apr 2024 11:42) (67 - 91)  BP: 166/73 (18 Apr 2024 11:42) (96/56 - 174/65)  BP(mean): --  RR: 19 (18 Apr 2024 11:42) (17 - 20)  SpO2: 98% (18 Apr 2024 11:42) (95% - 100%)    Parameters below as of 18 Apr 2024 11:42  Patient On (Oxygen Delivery Method): room air    I&O's Summary    17 Apr 2024 07:01  -  18 Apr 2024 07:00  --------------------------------------------------------  IN: 0 mL / OUT: 3500 mL / NET: -3500 mL        PHYSICAL EXAM:  TELE:   Constitutional: NAD, awake and alert, well-developed  HEENT: Moist Mucous Membranes, Anicteric  Pulmonary: Non-labored, breath sounds are clear bilaterally, No wheezing, rales or rhonchi  Cardiovascular: Regular, S1 and S2, + murmurs, no rubs, gallops or clicks  Gastrointestinal: Bowel Sounds present, soft, nontender.   Lymph: No peripheral edema. No lymphadenopathy.  Skin: No visible rashes or ulcers.  Psych:  Mood & affect appropriate  LABS: All Labs Reviewed:                        11.8   8.36  )-----------( 161      ( 18 Apr 2024 08:15 )             37.4     18 Apr 2024 08:15    133    |  94     |  54     ----------------------------<  196    5.0     |  28     |  7.70   17 Apr 2024 07:05    133    |  96     |  60     ----------------------------<  203    5.3     |  26     |  8.90   16 Apr 2024 09:10    135    |  98     |  29     ----------------------------<  242    5.3     |  28     |  6.70     Ca    8.9        18 Apr 2024 08:15  Ca    9.2        17 Apr 2024 07:05  Ca    8.8        16 Apr 2024 09:10  Phos  7.6       17 Apr 2024 07:05  Phos  5.2       16 Apr 2024 09:10  Mg     2.9       17 Apr 2024 07:05  Mg     3.0       16 Apr 2024 09:10    TPro  7.2    /  Alb  3.5    /  TBili  0.5    /  DBili  x      /  AST  16     /  ALT  22     /  AlkPhos  107    18 Apr 2024 08:15          12 Lead ECG:   Ventricular Rate 68 BPM    Atrial Rate 68 BPM    P-R Interval 166 ms    QRS Duration 126 ms    Q-T Interval 502 ms    QTC Calculation(Bazett) 533 ms    P Axis 14 degrees    R Axis -38 degrees    T Axis 94 degrees    Diagnosis Line Normal sinus rhythm  Left axis deviation  Left bundle branch block    Confirmed by SUE CROWE ([id number]) on 4/16/2024 12:23:12 PM (04-16-24 @ 00:32)  TRANSTHORACIC ECHOCARDIOGRAM REPORT  ________________________________________________________________________________                                      _______  Pt. Name:       NERY LEONARD   Study Date:    4/15/2024  MRN:            [id number]  YOB: 1955  Accession #:    [id number]   Age:           68 years  Account#:       [id number]  Gender:        M  Visit ID#  Heart Rate:                 Height:        68.90 in (175.00 cm)  Rhythm:                     Weight:        185.19 lb (84.00 kg)  Blood Pressure: 173/73 mmHg BSA/BMI:       2.00 m² / 27.43 kg/m²  ________________________________________________________________________________________  Referring Physician:    Laila Mercado MD  Interpreting Physician: Lorie Lees MD  Primary Sonographer:    Anderson Vivas    CPT:               ECHO TTE WO CON COMP W DOPP - 17503.m  Indication(s):     Edema, unspecified - R60.9  Procedure:         Transthoracic echocardiogram with 2-D, M-mode and complete                     spectral and color flow Doppler.  Ordering Location: [facility]  Admission Status:  Inpatient  Study Information: Image quality for this study is technically difficult.  _______________________________________________________________________________________     CONCLUSIONS:      1. Technically difficult image quality.   2. Left ventricular systolic function is normal with an ejection fraction of 63 % by Pompa's method of disks. There may be an area of hypokinesis of the basal inferoseptal wall and basal inferior wall on this technically difficult study.   3. Mild left ventricular hypertrophy.   4. There is moderate (grade 2) left ventricular diastolic dysfunction.   5. Normal right ventricular cavity size and normal systolic function.   6. There iscalcification of the mitral valve annulus.   7. Trace aortic regurgitation.   8. Mild to moderate mitral regurgitation.   9. The inferior vena cava is dilated measuring 2.39 cm in diameter, (dilated >2.1cm) with normal inspiratory collapse (normal >50%) consistent with mildly elevated right atrial pressure (~8, range 5-10mmHg).  10. Estimated pulmonary artery systolic pressure is 40 mmHg, consistent with mild pulmonary hypertension.  11. Small right pleural effusion noted.    ________________________________________________________________________________________  FINDINGS:     Left Ventricle:  Left ventricular systolic function is normal with a calculated ejection fraction of 63 % by the Pompa's biplane method of disks. There is moderate (grade 2) left ventricular diastolic dysfunction. Mild left ventricular hypertrophy.     Right Ventricle:  The right ventricular cavity is normal in size and normal systolic function. Tricuspid annular plane systolic excursion (TAPSE) is 2.2 cm (normal>=1.7 cm).     Left Atrium:  The left atrium is normal in size with an indexed volume of 28.59 ml/m².     Right Atrium:  The right atrium is normal in size.     Aortic Valve:  The aortic valve anatomy cannot be determined. There is mild calcificationof the aortic valve leaflets. There is no aortic valve stenosis. There is trace aortic regurgitation.     Mitral Valve:  There is calcification of the mitral valve annulus. There is mild to moderate mitral regurgitation.     Tricuspid Valve:  There is mild tricuspid regurgitation. Estimated pulmonary artery systolic pressure is 40 mmHg, consistent with mild pulmonary hypertension.     Pulmonic Valve:  The pulmonic valve was not well visualized. There is trace pulmonic regurgitation.     Aorta:  The aortic root at the sinuses of Valsalva is normal in size, measuring 2.90 cm (indexed 1.45 cm/m²).     Pleura:  Small right pleural effusion noted.     Systemic Veins:  The inferior vena cava is dilated measuring 2.39 cm in diameter, (dilated >2.1cm) with normal inspiratory collapse (normal >50%) consistent with mildly elevated right atrial pressure (~8, range 5-10mmHg).  ____________________________________________________________________  QUANTITATIVE DATA:  Left Ventricle Measurements: (Indexed to BSA)     IVSd (2D):   1.1 cm  LVPWd (2D):  1.2 cm  LVIDd (2D):  5.2 cm  LVIDs (2D):  3.2 cm  LV Mass:     232 g  116.4 g/m²  LV Vol d, MOD A2C: 128.0 ml 64.09 ml/m²  LV Vol d, MOD A4C: 74.1 ml  37.10 ml/m²  LV Vol d, MOD BP:  110.5 ml 55.35 ml/m²  LV Vol s, MOD A2C: 49.6 ml  24.84 ml/m²  LV Vol s, MOD A4C: 23.6 ml  11.82 ml/m²  LV Vol s, MOD BP:  41.0 ml  20.53 ml/m²  LVOT SV MOD BP:    69.5 ml  LV EF% MOD BP:     63 %     MV E Vmax:    1.21 m/s  MV A Vmax:    0.74 m/s  MV E/A:       1.64  e' lateral:   7.18 cm/s  e' medial:    7.29 cm/s  E/e' lateral: 16.85  E/e' medial:  16.60  E/e' Average: 16.72  MV DT:        258 msec    Aorta Measurements: (Normal range) (Indexed to BSA)     Sinuses of Valsalva: 2.90 cm (3.1 - 3.7 cm)  Ao Asc prox:        3.40 cm    Left Atrium Measurements: (Indexed to BSA)  LA Diam 2D: 4.20 cm    Right Ventricle Measurements:     TAPSE:            2.2 cm  RV Base (RVID1):  3.2 cm  RV Mid (RVID2):   3.2 cm  RV Major (RVID3): 7.5 cm    LVOT / RVOT/ Qp/QsData: (Indexed to BSA)  LVOT Diameter: 1.80 cm    Mitral Valve Measurements:     MV E Vmax: 1.2 m/s         MR Vmax:          4.84 m/s  MV A Vmax: 0.7 m/s         MR Peak Gradient: 93.7 mmHg  MV E/A:    1.6    Tricuspid Valve Measurements:     TR Vmax:          2.8 m/s  TR Peak Gradient: 31.9 mmHg  RA Pressure:      8 mmHg  PASP:             40 mmHg    ________________________________________________________________________________________  Electronically signed on 4/15/2024 at 3:45:34 PM by Lorie Lees MD    *** Final ***      [facility] -- Karl Jackson, Donnie Crowe Savella, , Selin Lees  Office # [phone]    Follow Up:  HFpEF, New LBBB, SWMA on TTE    Subjective/Observations: Patient seen and examined. Events noted. Resting comfortably in bed. No complaints of chest pain, dyspnea, or palpitations reported. No signs of orthopnea or PND.     REVIEW OF SYSTEMS: All other review of systems is negative unless indicated above  PAST MEDICAL & SURGICAL HISTORY:  HTN (hypertension)  DM2 (diabetes mellitus, type 2)  Hypercholesterolemia  CKD (chronic kidney disease)  Anemia  ESRD on dialysis  T2DM (type 2 diabetes mellitus)  CAD (coronary artery disease)  HLD (hyperlipidemia)  GERD (gastroesophageal reflux disease)  H/O eye surgery    MEDICATIONS  (STANDING):  atorvastatin 40 milliGRAM(s) Oral at bedtime  calcitriol   Capsule 0.25 MICROGram(s) Oral daily  dextrose 10% Bolus 125 milliLiter(s) IV Bolus once  dextrose 5%. 1000 milliLiter(s) (100 mL/Hr) IV Continuous <Continuous>  dextrose 5%. 1000 milliLiter(s) (50 mL/Hr) IV Continuous <Continuous>  dextrose 50% Injectable 25 Gram(s) IV Push once  dextrose 50% Injectable 12.5 Gram(s) IV Push once  epoetin demetrius-epbx (RETACRIT) Injectable 57127 Unit(s) IV Push <User Schedule>  ezetimibe 10 milliGRAM(s) Oral daily  furosemide    Tablet 40 milliGRAM(s) Oral daily  glucagon  Injectable 1 milliGRAM(s) IntraMuscular once  heparin   Injectable 5000 Unit(s) SubCutaneous every 8 hours  insulin lispro (ADMELOG) corrective regimen sliding scale   SubCutaneous three times a day before meals  lidocaine   4% Patch 1 Patch Transdermal daily  metoprolol tartrate 25 milliGRAM(s) Oral two times a day  pantoprazole    Tablet 40 milliGRAM(s) Oral before breakfast  polyethylene glycol 3350 17 Gram(s) Oral daily  senna 2 Tablet(s) Oral at bedtime  ticagrelor 60 milliGRAM(s) Oral two times a day    MEDICATIONS  (PRN):  acetaminophen     Tablet .. 650 milliGRAM(s) Oral every 6 hours PRN Temp greater or equal to 38C (100.4F), Mild Pain (1 - 3)  albuterol    90 MICROgram(s) HFA Inhaler 2 Puff(s) Inhalation every 6 hours PRN Shortness of Breath and/or Wheezing  aluminum hydroxide/magnesium hydroxide/simethicone Suspension 30 milliLiter(s) Oral every 4 hours PRN Dyspepsia  dextrose Oral Gel 15 Gram(s) Oral once PRN Blood Glucose LESS THAN 70 milliGRAM(s)/deciliter  melatonin 3 milliGRAM(s) Oral at bedtime PRN Insomnia  trimethobenzamide Injectable 200 milliGRAM(s) IntraMuscular every 8 hours PRN Nausea and/or Vomiting    Allergies    No Known Allergies    Intolerances    Vital Signs Last 24 Hrs  T(C): 36.6 (18 Apr 2024 11:42), Max: 36.9 (18 Apr 2024 04:35)  T(F): 97.8 (18 Apr 2024 11:42), Max: 98.4 (18 Apr 2024 04:35)  HR: 69 (18 Apr 2024 11:42) (67 - 91)  BP: 166/73 (18 Apr 2024 11:42) (96/56 - 174/65)  BP(mean): --  RR: 19 (18 Apr 2024 11:42) (17 - 20)  SpO2: 98% (18 Apr 2024 11:42) (95% - 100%)    Parameters below as of 18 Apr 2024 11:42  Patient On (Oxygen Delivery Method): room air    I&O's Summary    17 Apr 2024 07:01  -  18 Apr 2024 07:00  --------------------------------------------------------  IN: 0 mL / OUT: 3500 mL / NET: -3500 mL        PHYSICAL EXAM:  TELE:   Constitutional: NAD, awake and alert, well-developed  HEENT: Moist Mucous Membranes, Anicteric  Pulmonary: Non-labored, breath sounds are clear bilaterally, No wheezing, rales or rhonchi  Cardiovascular: Regular, S1 and S2, + murmurs, no rubs, gallops or clicks  Gastrointestinal: Bowel Sounds present, soft, nontender.   Lymph: No peripheral edema. No lymphadenopathy.  Skin: No visible rashes or ulcers.  Psych:  Mood & affect appropriate  LABS: All Labs Reviewed:                        11.8   8.36  )-----------( 161      ( 18 Apr 2024 08:15 )             37.4     18 Apr 2024 08:15    133    |  94     |  54     ----------------------------<  196    5.0     |  28     |  7.70   17 Apr 2024 07:05    133    |  96     |  60     ----------------------------<  203    5.3     |  26     |  8.90   16 Apr 2024 09:10    135    |  98     |  29     ----------------------------<  242    5.3     |  28     |  6.70     Ca    8.9        18 Apr 2024 08:15  Ca    9.2        17 Apr 2024 07:05  Ca    8.8        16 Apr 2024 09:10  Phos  7.6       17 Apr 2024 07:05  Phos  5.2       16 Apr 2024 09:10  Mg     2.9       17 Apr 2024 07:05  Mg     3.0       16 Apr 2024 09:10    TPro  7.2    /  Alb  3.5    /  TBili  0.5    /  DBili  x      /  AST  16     /  ALT  22     /  AlkPhos  107    18 Apr 2024 08:15          12 Lead ECG:   Ventricular Rate 68 BPM    Atrial Rate 68 BPM    P-R Interval 166 ms    QRS Duration 126 ms    Q-T Interval 502 ms    QTC Calculation(Bazett) 533 ms    P Axis 14 degrees    R Axis -38 degrees    T Axis 94 degrees    Diagnosis Line Normal sinus rhythm  Left axis deviation  Left bundle branch block    Confirmed by SUE CROWE ([id number]) on 4/16/2024 12:23:12 PM (04-16-24 @ 00:32)  TRANSTHORACIC ECHOCARDIOGRAM REPORT  ________________________________________________________________________________                                      _______  Pt. Name:       NERY LEONARD   Study Date:    4/15/2024  MRN:            [id number]  YOB: 1955  Accession #:    [id number]   Age:           68 years  Account#:       [id number]  Gender:        M  Visit ID#  Heart Rate:                 Height:        68.90 in (175.00 cm)  Rhythm:                     Weight:        185.19 lb (84.00 kg)  Blood Pressure: 173/73 mmHg BSA/BMI:       2.00 m² / 27.43 kg/m²  ________________________________________________________________________________________  Referring Physician:    Liala Mercado MD  Interpreting Physician: Lorie Lees MD  Primary Sonographer:    Anderson Vivas    CPT:               ECHO TTE WO CON COMP W DOPP - 13741.m  Indication(s):     Edema, unspecified - R60.9  Procedure:         Transthoracic echocardiogram with 2-D, M-mode and complete                     spectral and color flow Doppler.  Ordering Location: [facility]  Admission Status:  Inpatient  Study Information: Image quality for this study is technically difficult.  _______________________________________________________________________________________     CONCLUSIONS:      1. Technically difficult image quality.   2. Left ventricular systolic function is normal with an ejection fraction of 63 % by Pompa's method of disks. There may be an area of hypokinesis of the basal inferoseptal wall and basal inferior wall on this technically difficult study.   3. Mild left ventricular hypertrophy.   4. There is moderate (grade 2) left ventricular diastolic dysfunction.   5. Normal right ventricular cavity size and normal systolic function.   6. There iscalcification of the mitral valve annulus.   7. Trace aortic regurgitation.   8. Mild to moderate mitral regurgitation.   9. The inferior vena cava is dilated measuring 2.39 cm in diameter, (dilated >2.1cm) with normal inspiratory collapse (normal >50%) consistent with mildly elevated right atrial pressure (~8, range 5-10mmHg).  10. Estimated pulmonary artery systolic pressure is 40 mmHg, consistent with mild pulmonary hypertension.  11. Small right pleural effusion noted.    ________________________________________________________________________________________  FINDINGS:     Left Ventricle:  Left ventricular systolic function is normal with a calculated ejection fraction of 63 % by the Pompa's biplane method of disks. There is moderate (grade 2) left ventricular diastolic dysfunction. Mild left ventricular hypertrophy.     Right Ventricle:  The right ventricular cavity is normal in size and normal systolic function. Tricuspid annular plane systolic excursion (TAPSE) is 2.2 cm (normal>=1.7 cm).     Left Atrium:  The left atrium is normal in size with an indexed volume of 28.59 ml/m².     Right Atrium:  The right atrium is normal in size.     Aortic Valve:  The aortic valve anatomy cannot be determined. There is mild calcificationof the aortic valve leaflets. There is no aortic valve stenosis. There is trace aortic regurgitation.     Mitral Valve:  There is calcification of the mitral valve annulus. There is mild to moderate mitral regurgitation.     Tricuspid Valve:  There is mild tricuspid regurgitation. Estimated pulmonary artery systolic pressure is 40 mmHg, consistent with mild pulmonary hypertension.     Pulmonic Valve:  The pulmonic valve was not well visualized. There is trace pulmonic regurgitation.     Aorta:  The aortic root at the sinuses of Valsalva is normal in size, measuring 2.90 cm (indexed 1.45 cm/m²).     Pleura:  Small right pleural effusion noted.     Systemic Veins:  The inferior vena cava is dilated measuring 2.39 cm in diameter, (dilated >2.1cm) with normal inspiratory collapse (normal >50%) consistent with mildly elevated right atrial pressure (~8, range 5-10mmHg).  ____________________________________________________________________  QUANTITATIVE DATA:  Left Ventricle Measurements: (Indexed to BSA)     IVSd (2D):   1.1 cm  LVPWd (2D):  1.2 cm  LVIDd (2D):  5.2 cm  LVIDs (2D):  3.2 cm  LV Mass:     232 g  116.4 g/m²  LV Vol d, MOD A2C: 128.0 ml 64.09 ml/m²  LV Vol d, MOD A4C: 74.1 ml  37.10 ml/m²  LV Vol d, MOD BP:  110.5 ml 55.35 ml/m²  LV Vol s, MOD A2C: 49.6 ml  24.84 ml/m²  LV Vol s, MOD A4C: 23.6 ml  11.82 ml/m²  LV Vol s, MOD BP:  41.0 ml  20.53 ml/m²  LVOT SV MOD BP:    69.5 ml  LV EF% MOD BP:     63 %     MV E Vmax:    1.21 m/s  MV A Vmax:    0.74 m/s  MV E/A:       1.64  e' lateral:   7.18 cm/s  e' medial:    7.29 cm/s  E/e' lateral: 16.85  E/e' medial:  16.60  E/e' Average: 16.72  MV DT:        258 msec    Aorta Measurements: (Normal range) (Indexed to BSA)     Sinuses of Valsalva: 2.90 cm (3.1 - 3.7 cm)  Ao Asc prox:        3.40 cm    Left Atrium Measurements: (Indexed to BSA)  LA Diam 2D: 4.20 cm    Right Ventricle Measurements:     TAPSE:            2.2 cm  RV Base (RVID1):  3.2 cm  RV Mid (RVID2):   3.2 cm  RV Major (RVID3): 7.5 cm    LVOT / RVOT/ Qp/QsData: (Indexed to BSA)  LVOT Diameter: 1.80 cm    Mitral Valve Measurements:     MV E Vmax: 1.2 m/s         MR Vmax:          4.84 m/s  MV A Vmax: 0.7 m/s         MR Peak Gradient: 93.7 mmHg  MV E/A:    1.6    Tricuspid Valve Measurements:     TR Vmax:          2.8 m/s  TR Peak Gradient: 31.9 mmHg  RA Pressure:      8 mmHg  PASP:             40 mmHg    ________________________________________________________________________________________  Electronically signed on 4/15/2024 at 3:45:34 PM by Lorie Lees MD    *** Final ***

## 2024-04-18 NOTE — DISCHARGE NOTE NURSING/CASE MANAGEMENT/SOCIAL WORK - NSDCPEFALRISK_GEN_ALL_CORE
For information on Fall & Injury Prevention, visit: [email or web address] OR  [email or web address] OR  https://www.cdc.gov/steadi/patient.html

## 2024-04-18 NOTE — PROGRESS NOTE ADULT - ASSESSMENT
67 yo male with PMH HTN, HLD, DM, CKD on HD (M,W,F-[facility]), GERD, present to ED c/o SOB, worsening x months. Follows Dr David Shaw for cardiology     HTN, HLD, CHFpEF, Diastolic dysfunction   - CXR: pulmonary edema pattern.   - CT chest: concerning for possible CHF, shows b/l pleural effusions.   - BNP:  <--20179. Elevated likely in the setting of ESRD  - He is now clinically euvolemic.  Non-orthopneic on RA  - Technically difficult ECHO showed normal LV & RV size and function EF 63%, There may be an area of hypokinesis of the basal inferoseptal wall and basal inferior wall on this technically difficult study. Mild LVH, mod LVDD, Trace AR, mild to modd MR, elevated RAP, mild pulm HTN     - HD per renal for fluid removal.  - Diuretic per Renal    - Tele w/ SR 60s, no events.  Can D/C   - Known CAD, S/p PCI in 2016 to LAD  - Troponin normal.  No anginal complaints  - EKG with SR @ 68, LAD, LBBB.  - His LBBB is new since Jan 2023.  Spoke with Dr. David Shaw for records.  Records reviewed and no LBBB noted in EKG from May 2023.   - Last stress test in 2022, normal.   - Recommend stress test out patient.  - Continue Brilinta, statin, Zetia  - Continue BB     - BP labile at systolic , though, 90's can be an outlier  - Would not control BP strictly to allow room for HD     - Monitor and replete lytes, keep K>4, Mg>2.  - Will continue to follow.    Jana Elizabeth DNP, NP-C, AGACNP-C  Cardiology   Call TEAMS

## 2024-04-18 NOTE — CASE MANAGEMENT PROGRESS NOTE - NSCMPROGRESSNOTE_GEN_ALL_CORE
Pt is cleared medically for transition home today with no skilled needs. The pt is independent from home and lives with many supportive family members. This CM spoke with the pt's granddaughter Pt is cleared medically for transition home today with no skilled needs. The pt is independent from home and lives with many supportive family members. This CM spoke with the pt's granddaughter Lang, at the pt's requested for the discharge planning. The granddaughter is a nursing student and she will take the pt home after 3PM today. This CM informed the SW team as the pt has dialysis in the community and all flow sheets for dialysis will be sent by SW. CM team to remain available for post acute needs.

## 2024-04-19 LAB
CULTURE RESULTS: SIGNIFICANT CHANGE UP
CULTURE RESULTS: SIGNIFICANT CHANGE UP
SPECIMEN SOURCE: SIGNIFICANT CHANGE UP
SPECIMEN SOURCE: SIGNIFICANT CHANGE UP

## 2024-05-29 ENCOUNTER — TRANSCRIPTION ENCOUNTER (OUTPATIENT)
Age: 69
End: 2024-05-29

## 2024-05-29 ENCOUNTER — OUTPATIENT (OUTPATIENT)
Dept: OUTPATIENT SERVICES | Facility: HOSPITAL | Age: 69
LOS: 1 days | Discharge: HOME | End: 2024-05-29
Payer: MEDICARE

## 2024-05-29 VITALS
HEART RATE: 69 BPM | RESPIRATION RATE: 12 BRPM | SYSTOLIC BLOOD PRESSURE: 150 MMHG | OXYGEN SATURATION: 98 % | DIASTOLIC BLOOD PRESSURE: 55 MMHG

## 2024-05-29 VITALS
SYSTOLIC BLOOD PRESSURE: 151 MMHG | DIASTOLIC BLOOD PRESSURE: 56 MMHG | OXYGEN SATURATION: 100 % | RESPIRATION RATE: 18 BRPM | TEMPERATURE: 100 F | HEART RATE: 71 BPM | WEIGHT: 179.9 LBS | HEIGHT: 69 IN

## 2024-05-29 DIAGNOSIS — Z98.89 OTHER SPECIFIED POSTPROCEDURAL STATES: Chronic | ICD-10-CM

## 2024-05-29 DIAGNOSIS — I20.0 UNSTABLE ANGINA: ICD-10-CM

## 2024-05-29 LAB
ANION GAP SERPL CALC-SCNC: 13 MMOL/L — SIGNIFICANT CHANGE UP (ref 7–14)
BUN SERPL-MCNC: 20 MG/DL — SIGNIFICANT CHANGE UP (ref 7–23)
CALCIUM SERPL-MCNC: 9.5 MG/DL — SIGNIFICANT CHANGE UP (ref 8.4–10.5)
CHLORIDE SERPL-SCNC: 96 MMOL/L — LOW (ref 98–107)
CO2 SERPL-SCNC: 31 MMOL/L — SIGNIFICANT CHANGE UP (ref 22–31)
CREAT SERPL-MCNC: 5.03 MG/DL — HIGH (ref 0.5–1.3)
EGFR: 12 ML/MIN/1.73M2 — LOW
GLUCOSE BLDC GLUCOMTR-MCNC: 109 MG/DL — HIGH (ref 70–99)
GLUCOSE BLDC GLUCOMTR-MCNC: 73 MG/DL — SIGNIFICANT CHANGE UP (ref 70–99)
GLUCOSE SERPL-MCNC: 110 MG/DL — HIGH (ref 70–99)
HCT VFR BLD CALC: 32.6 % — LOW (ref 39–50)
HGB BLD-MCNC: 10.1 G/DL — LOW (ref 13–17)
MAGNESIUM SERPL-MCNC: 2.4 MG/DL — SIGNIFICANT CHANGE UP (ref 1.6–2.6)
MCHC RBC-ENTMCNC: 28.9 PG — SIGNIFICANT CHANGE UP (ref 27–34)
MCHC RBC-ENTMCNC: 31 GM/DL — LOW (ref 32–36)
MCV RBC AUTO: 93.4 FL — SIGNIFICANT CHANGE UP (ref 80–100)
NRBC # BLD: 0 /100 WBCS — SIGNIFICANT CHANGE UP (ref 0–0)
NRBC # FLD: 0 K/UL — SIGNIFICANT CHANGE UP (ref 0–0)
PHOSPHATE SERPL-MCNC: 3.1 MG/DL — SIGNIFICANT CHANGE UP (ref 2.5–4.5)
PLATELET # BLD AUTO: 130 K/UL — LOW (ref 150–400)
POTASSIUM SERPL-MCNC: 4 MMOL/L — SIGNIFICANT CHANGE UP (ref 3.5–5.3)
POTASSIUM SERPL-SCNC: 4 MMOL/L — SIGNIFICANT CHANGE UP (ref 3.5–5.3)
RBC # BLD: 3.49 M/UL — LOW (ref 4.2–5.8)
RBC # FLD: 14.3 % — SIGNIFICANT CHANGE UP (ref 10.3–14.5)
SODIUM SERPL-SCNC: 140 MMOL/L — SIGNIFICANT CHANGE UP (ref 135–145)
WBC # BLD: 6.34 K/UL — SIGNIFICANT CHANGE UP (ref 3.8–10.5)
WBC # FLD AUTO: 6.34 K/UL — SIGNIFICANT CHANGE UP (ref 3.8–10.5)

## 2024-05-29 PROCEDURE — 93010 ELECTROCARDIOGRAM REPORT: CPT

## 2024-05-29 RX ORDER — ATORVASTATIN CALCIUM 80 MG/1
1 TABLET, FILM COATED ORAL
Qty: 0 | Refills: 0 | DISCHARGE

## 2024-05-29 RX ORDER — SODIUM CHLORIDE 9 MG/ML
3 INJECTION INTRAMUSCULAR; INTRAVENOUS; SUBCUTANEOUS EVERY 8 HOURS
Refills: 0 | Status: DISCONTINUED | OUTPATIENT
Start: 2024-05-29 | End: 2024-06-12

## 2024-05-29 RX ORDER — LINACLOTIDE 145 UG/1
1 CAPSULE, GELATIN COATED ORAL
Refills: 0 | DISCHARGE

## 2024-05-29 RX ORDER — FAMOTIDINE 10 MG/ML
1 INJECTION INTRAVENOUS
Qty: 0 | Refills: 0 | DISCHARGE

## 2024-05-29 RX ORDER — TICAGRELOR 90 MG/1
60 TABLET ORAL ONCE
Refills: 0 | Status: COMPLETED | OUTPATIENT
Start: 2024-05-29 | End: 2024-05-29

## 2024-05-29 RX ORDER — TICAGRELOR 90 MG/1
1 TABLET ORAL
Qty: 0 | Refills: 0 | DISCHARGE

## 2024-05-29 RX ORDER — FERRIC CITRATE 210 MG/1
420 TABLET, COATED ORAL
Refills: 0 | DISCHARGE

## 2024-05-29 RX ADMIN — TICAGRELOR 60 MILLIGRAM(S): 90 TABLET ORAL at 13:50

## 2024-05-29 NOTE — H&P CARDIOLOGY - COMMENTS
Pre-sedation Evaluation    Urine pregnancy: N/A  Dentures: None  Last PO intake:  Obstructive sleep apnea: No  Aspiration risk: No  Mallampati score: 2  ASA Classification: 2  Prior Sedative or Anesthesia Experience: No complications  Informed consent by responsible adult: Yes  Responsible adult escort: Yes  Based on today's assessment, anesthesia consult requested: No Pre-sedation Evaluation    Urine pregnancy: N/A  Dentures: None  Last PO intake: 5/29/24 at 4AM   Obstructive sleep apnea: No  Aspiration risk: No  Mallampati score: 2  ASA Classification: 3  Prior Sedative or Anesthesia Experience: No complications  Informed consent by responsible adult: Yes  Responsible adult escort: Yes  Based on today's assessment, anesthesia consult requested: No

## 2024-05-29 NOTE — H&P CARDIOLOGY - HISTORY OF PRESENT ILLNESS
67 yo male with PMHX T2DM, CKD on HD [facility] HTN, HLD, GERD presents today for an elective cardiac catheterization.      Recently admission at [facility] for resp failure 4/2024.  Subsequent TTE: EF 63%.  There may be an area of hypokinesis of the basal inferoseptal wall and basal inferior wall on this technically difficult study. Mild LVH.  Moderate G2DD.  Calcification of MV annulus. Trace AR.  PASP 40mmHg Small right pleural effusion noted.     Referring:   History obtained via Language Line, ID # [id number]    Pt is a 69y/o M, current every day smoker, PMHX  CAD s/p PCI  LAD (2015), ESRD on HD (M,W,F, last HD on 5/29, follows with Nephrologist Bertrand), HTN, HLD, T2DM, GERD, recently admitted to [facility] 4/2024 with volume overload in setting of ESRD.  Subsequent  TTE 4/2024 done with EF 63%. There may be an area of hypokinesis of the basal inferoseptal wall and basal inferior wall on this technically difficult study. Mild LVH.  Moderate G2DD.  Calcification of MV annulus. Trace AR.  PASP 40mmHg.  Small right pleural effusion noted.  Inpatient EKG with SR and concerns for new LBBB.  Pt was recommend outpatient stress test with Cardiology; however, reported that no stress was done prior to this procedure.   Pt currently denies CP, SOB, palpitations, diaphoresis, orthopnea, PND, dizziness, syncope, hemoptysis, hematuria, melena, abdominal, pain/discomfort, LE swelling or any other complaints at this time.  Of note; pt noted with temp 100F, with repeat 99.5    In light of the pt's known history of CAD, recent abnormal ECHO and EKG, pt is recommended for cardiac catheterization with possible intervention with Dr Shaw    Referring:  Dr David Shaw    Dialysis Center: [facility] History obtained via Language Line, ID # [id number]    Pt is a 69y/o M, current every day smoker, PMHX  CAD s/p PCI  LAD (2015), ESRD on HD (M,W,F, last HD on 5/29, follows with Nephrologist, Dr Thurston), HTN, HLD, T2DM, GERD, recently admitted to [facility] 4/2024 with volume overload in setting of ESRD.  Subsequent  TTE 4/2024 done with EF 63%. There may be an area of hypokinesis of the basal inferoseptal wall and basal inferior wall on this technically difficult study. Mild LVH.  Moderate G2DD.  Calcification of MV annulus. Trace AR.  PASP 40mmHg.  Small right pleural effusion noted.  Inpatient EKG with SR and concerns for new LBBB.  Pt was recommend outpatient stress test with Cardiology; however, reported that no stress was done prior to this procedure.   Pt currently denies CP, SOB, palpitations, diaphoresis, orthopnea, PND, dizziness, syncope, hemoptysis, hematuria, melena, abdominal, pain/discomfort, LE swelling or any other complaints at this time.  Of note; pt noted with temp 100F, with repeat 99.5    In light of the pt's known history of CAD, recent abnormal ECHO and EKG, pt is recommended for cardiac catheterization with possible intervention with Dr Shaw    Referring:  Dr David Shaw    Dialysis Center: [facility]

## 2024-05-29 NOTE — H&P CARDIOLOGY - NSICDXPASTMEDICALHX_GEN_ALL_CORE_FT
PAST MEDICAL HISTORY:  Anemia     CAD (coronary artery disease)     CKD (chronic kidney disease)     DM2 (diabetes mellitus, type 2)     ESRD on dialysis     GERD (gastroesophageal reflux disease)     HLD (hyperlipidemia)     HTN (hypertension)     Hypercholesterolemia     T2DM (type 2 diabetes mellitus)

## 2024-05-29 NOTE — ASU PATIENT PROFILE, ADULT - FALL HARM RISK - UNIVERSAL INTERVENTIONS
Bed in lowest position, wheels locked, appropriate side rails in place/Call bell, personal items and telephone in reach/Instruct patient to call for assistance before getting out of bed or chair/Non-slip footwear when patient is out of bed/[place] to call system/Physically safe environment - no spills, clutter or unnecessary equipment/Purposeful Proactive Rounding/Room/bathroom lighting operational, light cord in reach

## 2024-05-29 NOTE — ASU DISCHARGE PLAN (ADULT/PEDIATRIC) - NS MD DC FALL RISK RISK
For information on Fall & Injury Prevention, visit: [email or web address] OR  [email or web address] OR  https://www.cdc.gov/steadi/patient.html

## 2024-05-29 NOTE — ASU PREOP CHECKLIST - WEIGHT IN LBS
Daily Note     Today's date: 2020  Patient name: Laurie Thacker  : 1934  MRN: [id number]  Referring provider: Avani Magallanes DO  Dx:   Encounter Diagnosis     ICD-10-CM    1  Weakness generalized R53 1        Start Time: 1202  Stop Time: 1249  Total time in clinic (min): 47 minutes    Subjective: pt reported feeling pretty good after last treatment session  Objective: See treatment diary below      Assessment: Continued with treatment session, overall patient able to perform exercises with increase in verbal cues along with increase in rest breaks  Pts SPO2 was within ranges of 96% to 93%  Tolerated treatment well  Patient demonstrated fatigue post treatment, exhibited good technique with therapeutic exercises and would benefit from continued PT  Plan: Continue per plan of care        Precautions: Legally blind (macular degeneration), depression, anxiety, CHF (monitor SPO2), afib, peripheral neuropathy          Exercise Diary  2-3 (IE) -          NuStep (arms and legs) Monitor SPO2 6 mins, L3 NV Sp02 at rest 93%    6 mins, L3 SP02 at 93% L5 8 min                        Standing:             - front step up 10 x 4" step NV 2 x 10 ea 4" step 2x 10 4"           - lateral step up and over 10 x 4" step NV 10 x 4" step 2x 10 4"           - sit to stand (to adjustable mat to level he can transfer without use of arms, gradually lower table) 10 x NV 2 x 10           - heel/toe raises 2 x 10 ea NV 2 x 10 ea 2x 10           - hip abd  10 x ea 10x           - hip ext  10 x ea 10x           HS curls    2x 10           TB:             - rows 2 x 10 RTB NV 2 x 10 RTB           - pulldowns 2 x 10 RTB NV 2 x 10 RTB           - bicep curls 2 x 10 GTB NV 2  10 GTB           - tricep extensions 2 x 10 RTB NV 2 x 10 RTB                        - Lateral cone walks             - FT, EO, foam             - SLS (initially 2 finger support)             Bicep curls    20x 2# 179.8

## 2024-05-29 NOTE — ASU DISCHARGE PLAN (ADULT/PEDIATRIC) - ASU DC SPECIAL INSTRUCTIONSFT
WOUND CARE:  The day after your procedure:  - Remove the bandage at the site gently, clean with a small amount of soap and water, and pat try. Leave open to air.  - You may shower.  - Do not apply lotions, creams, ointments, powder, or perfumes to your incision site.  - Check your groin daily. A small amount of bruising or soreness is normal.  - Do not soak the procedural site for 1 week (no baths, pools, tubs, etc).  --------------------------------------------  ACTIVITY:  For the next 24 hours:  - Do not drive or operate heavy machinery.  - Do not drink any alcoholic beverages.  - Do not make any important decisions or sign legal documents.  ---------------------------------------------------------  If your procedure was performed through the groin,  For the next 5 days:  - Limit climbing stairs.  - Do not soak in a bathtub or pool.  - Avoid strenuous activities (pushing, pulling, straining).  - Do not lift anything more than 10 pounds.  ---------------------------------------------  MEDICATION:   - Take your medications as explained (see attached medication reconciliation on discharge paperwork).  - Take your medications as prescribed. Do not stop taking them without consulting with your doctor.  ----------------------------------------------  ADDITIONAL INSTRUCTIONS:  - Please continue hemodialysis sessions as scheduled  - Follow a heart healthy diet recommended by your doctor.   - If you smoke, we encourage smoking cessation. You may call [phone] for center of tobacco control if you need assistance.  - Call your doctor to make appointment within 2 weeks.  -------------------------------------------------  ***CALL YOUR DOCTOR***  - If you experience fever, chills, body aches, or severe pain, swelling, redness, heat, or yellow discharge from your incision site.  - If you notice bleeding or significant swelling at the procedural site.  - If you experience chest pain.  - If you experience extremity numbness, tingling, or temperature change.  ----------------------------------------------------  If you are unable to get in contact with your doctor, you may contact the [facility] at [phone].  Please reference your discharge instructions for any questions or concerns.

## 2024-05-29 NOTE — ASU DISCHARGE PLAN (ADULT/PEDIATRIC) - COMMENTS
Follow up with Dr Shaw in 1-2 weeks or sooner if needed   Continue outpatient hemodialysis sessions as scheduled

## 2024-06-04 NOTE — ASU DISCHARGE PLAN (ADULT/PEDIATRIC) - CARE PROVIDER_API CALL
David Shaw  Cardiovascular Disease  [address] [place] [address]  Phone: [phone]  Fax: [phone]  Follow Up Time:    with patient

## 2024-11-01 NOTE — CONSULT NOTE ADULT - CONSULT REASON
SOB
68y A1C with Estimated Average Glucose Result: 6.2 % (04-15-24 @ 06:32)   diabetes mellitus uncontrolled type 2
esrd on hd
sob
16

## 2025-06-12 NOTE — H&P CARDIOLOGY - NEUROLOGICAL
"Daily Note     Today's date: 2025  Patient name: Kiel Chavira  : 1972  MRN: [id number]  Referring provider: Tone Diaz MD  Dx:   Encounter Diagnosis     ICD-10-CM    1. Right hip pain  M25.551           Start Time:   Stop Time:   Total time in clinic (min): 40 minutes    Subjective: Pt reports that he did have some muscle soreness after last PT session. He reports back to his baseline discomfort in the hips today.       Objective: See treatment diary below      Assessment: Pt with good tolerance to treatment. Did not perform squats to avoid aggravating hips/knees. Performed hamstring curls to work on posterior chain. SLS also initiated for single leg stability. Remains fatigued with hip stabilizer strengthening. Did not update HEP as current exercises likely to continue appropriately loading gluteal mm. Pt will benefit from continued skilled PT.       Plan: Continue per plan of care.      Precautions: None      Manuals 6/3 6/10 6/12            Hip inf glide/ distraction W/ belt  DL          tigertail  Glute in s/l DL Glute in s/l DL          R hip PROM                          Neuro Re-Ed                                                                                                        Ther Ex                          Bridge HEP Pball 2x10 Pball knees bent 2x5          S/l hip abduction HEP 2x10 ea.  2x10 ea.           S/l clamshell Grn HEP            Quadruped fire hydrant HEP  Standing Red 2x10 ea.           Lateral band walk  Red 4 laps Red 4 laps          SLS   30\"x3 airex           HS curl   5# 3x10 ea.  Trial 10# 3x10 ea.                                    Ther Activity             Bike  5' 5'          Squat  Blk band @ knee 2x10           U/l RDL  10# 2x10 ea.  10# 2x10 ea.           U/l sit to stand             Split squat UE support prn            Gait Training                                       Modalities                                                     " detailed exam negative

## (undated) DEVICE — BLADE SCALPEL SAFETYLOCK #10

## (undated) DEVICE — POSITIONER FOAM EGG CRATE ULNAR 2PCS (PINK)

## (undated) DEVICE — MARKING PEN W RULER

## (undated) DEVICE — SUCTION YANKAUER NO CONTROL VENT

## (undated) DEVICE — PREP CHLORAPREP HI-LITE ORANGE 26ML

## (undated) DEVICE — VENODYNE/SCD SLEEVE CALF LARGE

## (undated) DEVICE — STAPLER SKIN VISI-STAT 35 WIDE

## (undated) DEVICE — GLV 7.5 PROTEXIS (WHITE)

## (undated) DEVICE — DRSG STERISTRIPS 0.5 X 4"

## (undated) DEVICE — DRSG OPSITE 13.75 X 4"

## (undated) DEVICE — GLV 7 PROTEXIS (WHITE)

## (undated) DEVICE — DRAPE MAGNETIC INSTRUMENT MEDIUM

## (undated) DEVICE — SUT POLYSORB 3-0 30" V-20 UNDYED

## (undated) DEVICE — DRSG DERMABOND 0.7ML

## (undated) DEVICE — SUT SOFSILK 4-0 18" TIES

## (undated) DEVICE — SOL IRR POUR H2O 250ML

## (undated) DEVICE — GLV 8 PROTEXIS (WHITE)

## (undated) DEVICE — GOWN TRIMAX LG

## (undated) DEVICE — ELCTR BOVIE TIP NEEDLE INSULATED 2.8" EDGE

## (undated) DEVICE — NDL HYPO SAFE 25G X 5/8" (ORANGE)

## (undated) DEVICE — GLV 8.5 PROTEXIS (WHITE)

## (undated) DEVICE — SOL IRR POUR NS 0.9% 500ML

## (undated) DEVICE — SUT BIOSYN 4-0 18" P-12

## (undated) DEVICE — DRAPE 3/4 SHEET W REINFORCEMENT 56X77"

## (undated) DEVICE — SYR LUER LOK 10CC

## (undated) DEVICE — DRAPE 1/2 SHEET 40X57"

## (undated) DEVICE — WARMING BLANKET LOWER ADULT

## (undated) DEVICE — DRAPE TOWEL BLUE 17" X 24"

## (undated) DEVICE — BLADE SCALPEL SAFETYLOCK #11

## (undated) DEVICE — DRAPE MAYO STAND 30"

## (undated) DEVICE — DRAPE SURGICAL #1010

## (undated) DEVICE — SPECIMEN CONTAINER 100ML

## (undated) DEVICE — BLADE SCALPEL SAFETYLOCK #15

## (undated) DEVICE — DRSG TEGADERM 6"X8"

## (undated) DEVICE — DRAPE MINOR PROCEDURE

## (undated) DEVICE — DRAPE LIGHT HANDLE COVER (BLUE)

## (undated) DEVICE — GLV 6.5 PROTEXIS (WHITE)

## (undated) DEVICE — PACK GENERAL MINOR

## (undated) DEVICE — SUT SOFSILK 3-0 18" TIES

## (undated) DEVICE — DRAPE INSTRUMENT POUCH 6.75" X 11"

## (undated) DEVICE — MEDICATION LABELS W MARKER